# Patient Record
Sex: FEMALE | Race: WHITE | Employment: OTHER | URBAN - METROPOLITAN AREA
[De-identification: names, ages, dates, MRNs, and addresses within clinical notes are randomized per-mention and may not be internally consistent; named-entity substitution may affect disease eponyms.]

---

## 2018-08-01 ENCOUNTER — HOSPITAL ENCOUNTER (OUTPATIENT)
Dept: PREADMISSION TESTING | Age: 38
Discharge: HOME | End: 2018-08-05
Payer: COMMERCIAL

## 2018-08-01 VITALS
DIASTOLIC BLOOD PRESSURE: 81 MMHG | SYSTOLIC BLOOD PRESSURE: 133 MMHG | BODY MASS INDEX: 25.34 KG/M2 | HEIGHT: 68 IN | OXYGEN SATURATION: 96 % | TEMPERATURE: 98.2 F | WEIGHT: 167.2 LBS | HEART RATE: 90 BPM | RESPIRATION RATE: 16 BRPM

## 2018-08-01 DIAGNOSIS — M51.36 DEGENERATIVE DISC DISEASE, LUMBAR: ICD-10-CM

## 2018-08-01 LAB
ANION GAP SERPL CALCULATED.3IONS-SCNC: 10 MEQ/L (ref 7–13)
BUN BLDV-MCNC: 14 MG/DL (ref 6–20)
CALCIUM SERPL-MCNC: 8.6 MG/DL (ref 8.6–10.2)
CHLORIDE BLD-SCNC: 108 MEQ/L (ref 98–107)
CO2: 24 MEQ/L (ref 22–29)
CREAT SERPL-MCNC: 0.68 MG/DL (ref 0.5–0.9)
EKG ATRIAL RATE: 73 BPM
EKG P AXIS: 52 DEGREES
EKG P-R INTERVAL: 136 MS
EKG Q-T INTERVAL: 364 MS
EKG QRS DURATION: 80 MS
EKG QTC CALCULATION (BAZETT): 401 MS
EKG R AXIS: 39 DEGREES
EKG T AXIS: 15 DEGREES
EKG VENTRICULAR RATE: 73 BPM
GFR AFRICAN AMERICAN: >60
GFR NON-AFRICAN AMERICAN: >60
GLUCOSE BLD-MCNC: 83 MG/DL (ref 74–109)
HCT VFR BLD CALC: 36.7 % (ref 37–47)
HEMOGLOBIN: 12.6 G/DL (ref 12–16)
MCH RBC QN AUTO: 30.8 PG (ref 27–31.3)
MCHC RBC AUTO-ENTMCNC: 34.3 % (ref 33–37)
MCV RBC AUTO: 90 FL (ref 82–100)
PDW BLD-RTO: 15.6 % (ref 11.5–14.5)
PLATELET # BLD: 285 K/UL (ref 130–400)
POTASSIUM SERPL-SCNC: 4.1 MEQ/L (ref 3.5–5.1)
RBC # BLD: 4.08 M/UL (ref 4.2–5.4)
SODIUM BLD-SCNC: 142 MEQ/L (ref 132–144)
WBC # BLD: 10.1 K/UL (ref 4.8–10.8)

## 2018-08-01 PROCEDURE — 80048 BASIC METABOLIC PNL TOTAL CA: CPT

## 2018-08-01 PROCEDURE — 85027 COMPLETE CBC AUTOMATED: CPT

## 2018-08-01 PROCEDURE — 93005 ELECTROCARDIOGRAM TRACING: CPT

## 2018-08-01 RX ORDER — ATORVASTATIN CALCIUM 10 MG/1
10 TABLET, FILM COATED ORAL DAILY
COMMUNITY

## 2018-08-01 RX ORDER — LORATADINE 10 MG/1
10 CAPSULE, LIQUID FILLED ORAL DAILY
COMMUNITY
End: 2020-02-21 | Stop reason: SDUPTHER

## 2018-08-01 RX ORDER — PREGABALIN 150 MG/1
150 CAPSULE ORAL 2 TIMES DAILY
COMMUNITY

## 2018-08-01 RX ORDER — TIZANIDINE 2 MG/1
2 TABLET ORAL EVERY 8 HOURS PRN
COMMUNITY

## 2018-08-01 RX ORDER — CELECOXIB 100 MG/1
100 CAPSULE ORAL 2 TIMES DAILY
Status: ON HOLD | COMMUNITY
End: 2020-09-18

## 2018-08-01 RX ORDER — SODIUM CHLORIDE 0.9 % (FLUSH) 0.9 %
10 SYRINGE (ML) INJECTION EVERY 12 HOURS SCHEDULED
Status: CANCELLED | OUTPATIENT
Start: 2018-08-01

## 2018-08-01 RX ORDER — BUPROPION HYDROCHLORIDE 150 MG/1
150 TABLET ORAL EVERY MORNING
Status: ON HOLD | COMMUNITY
End: 2020-02-24

## 2018-08-01 RX ORDER — LIDOCAINE HYDROCHLORIDE 10 MG/ML
1 INJECTION, SOLUTION EPIDURAL; INFILTRATION; INTRACAUDAL; PERINEURAL
Status: CANCELLED | OUTPATIENT
Start: 2018-08-01 | End: 2018-08-01

## 2018-08-01 RX ORDER — DULOXETIN HYDROCHLORIDE 30 MG/1
30 CAPSULE, DELAYED RELEASE ORAL DAILY
COMMUNITY

## 2018-08-01 RX ORDER — MAGNESIUM OXIDE 400 MG/1
400 TABLET ORAL DAILY
COMMUNITY

## 2018-08-01 RX ORDER — SODIUM CHLORIDE, SODIUM LACTATE, POTASSIUM CHLORIDE, CALCIUM CHLORIDE 600; 310; 30; 20 MG/100ML; MG/100ML; MG/100ML; MG/100ML
INJECTION, SOLUTION INTRAVENOUS CONTINUOUS
Status: CANCELLED | OUTPATIENT
Start: 2018-08-01

## 2018-08-01 RX ORDER — ATENOLOL 25 MG/1
25 TABLET ORAL DAILY
COMMUNITY
End: 2020-02-21 | Stop reason: SDUPTHER

## 2018-08-01 RX ORDER — HYDROCODONE BITARTRATE AND ACETAMINOPHEN 7.5; 325 MG/1; MG/1
1 TABLET ORAL EVERY 8 HOURS PRN
COMMUNITY

## 2018-08-01 RX ORDER — CYANOCOBALAMIN 1000 UG/ML
1000 INJECTION INTRAMUSCULAR; SUBCUTANEOUS
COMMUNITY

## 2018-08-01 RX ORDER — SODIUM CHLORIDE 0.9 % (FLUSH) 0.9 %
10 SYRINGE (ML) INJECTION PRN
Status: CANCELLED | OUTPATIENT
Start: 2018-08-01

## 2018-08-01 RX ORDER — DIPHENHYDRAMINE HYDROCHLORIDE 25 MG/1
1 TABLET ORAL ONCE
Status: ON HOLD | COMMUNITY
End: 2020-02-24

## 2018-08-01 RX ORDER — OMEPRAZOLE 20 MG/1
20 CAPSULE, DELAYED RELEASE ORAL 2 TIMES DAILY
Status: ON HOLD | COMMUNITY
End: 2020-02-24

## 2018-08-01 ASSESSMENT — ENCOUNTER SYMPTOMS
HEARTBURN: 0
SORE THROAT: 0
COUGH: 0
BACK PAIN: 1
WHEEZING: 0
BLURRED VISION: 0
ABDOMINAL PAIN: 0
DOUBLE VISION: 0
NAUSEA: 0
VOMITING: 0
SINUS PAIN: 0
SPUTUM PRODUCTION: 0
EYE DISCHARGE: 0
CONSTIPATION: 0
SHORTNESS OF BREATH: 0
DIARRHEA: 0
PHOTOPHOBIA: 0

## 2018-08-01 NOTE — H&P
Nurse Practitioner History and Physical      CHIEF COMPLAINT:  Chronic low back pain    HISTORY OF PRESENT ILLNESS:      The patient is a 34 y.o. female with significant past medical history of low back pain, takes narcotics without long-term relief. Presents today for spinal cord stimulator trial.     Past Medical History:        Diagnosis Date    Arthritis     lumbar spine, on meds    COPD (chronic obstructive pulmonary disease) (HCC)     no meds    Diabetes mellitus ([address] 2005    diet controlled    Hyperlipidemia     on meds > 10yrs    Hypertension     on meds >10 yrs    Thyroid disease 2018    left thyroid nodule     Past Surgical History:    Past Surgical History:   Procedure Laterality Date    BREAST SURGERY Right 2009    lumpectomy    HYSTERECTOMY  2008   [address]         Medications Prior to Admission:    Current Outpatient Prescriptions   Medication Sig Dispense Refill    tiZANidine (ZANAFLEX) 2 MG tablet Take 2 mg by mouth every 8 hours as needed      HYDROcodone-acetaminophen (NORCO) 7.5-325 MG per tablet Take 1 tablet by mouth every 8 hours as needed for Pain. [place] celecoxib (CELEBREX) 100 MG capsule Take 100 mg by mouth 2 times daily      pregabalin (LYRICA) 150 MG capsule Take 150 mg by mouth 2 times daily. [place] omeprazole (PRILOSEC) 20 MG delayed release capsule Take 20 mg by mouth 2 times daily      loratadine (CLARITIN) 10 MG capsule Take 10 mg by mouth daily      buPROPion (WELLBUTRIN XL) 150 MG extended release tablet Take 150 mg by mouth every morning      atenolol (TENORMIN) 25 MG tablet Take 25 mg by mouth daily      aspirin 81 MG tablet Take 81 mg by mouth daily      cyanocobalamin 1000 MCG/ML injection Inject 1,000 mcg into the muscle every 30 days      DULoxetine (CYMBALTA) 30 MG extended release capsule Take 30 mg by mouth daily      atorvastatin (LIPITOR) 10 MG tablet Take 10 mg by mouth daily      Biotin 5 MG CAPS Take 1 capsule by

## 2018-08-07 ENCOUNTER — ANESTHESIA (OUTPATIENT)
Dept: OPERATING ROOM | Age: 38
End: 2018-08-07
Payer: COMMERCIAL

## 2018-08-07 ENCOUNTER — HOSPITAL ENCOUNTER (OUTPATIENT)
Age: 38
Setting detail: OUTPATIENT SURGERY
Discharge: HOME | End: 2018-08-07
Attending: SPECIALIST | Admitting: SPECIALIST
Payer: COMMERCIAL

## 2018-08-07 ENCOUNTER — HOSPITAL ENCOUNTER (OUTPATIENT)
Dept: GENERAL RADIOLOGY | Age: 38
Setting detail: OUTPATIENT SURGERY
Discharge: HOME | End: 2018-08-09
Attending: SPECIALIST
Payer: COMMERCIAL

## 2018-08-07 ENCOUNTER — ANESTHESIA EVENT (OUTPATIENT)
Dept: OPERATING ROOM | Age: 38
End: 2018-08-07
Payer: COMMERCIAL

## 2018-08-07 VITALS
HEIGHT: 68 IN | RESPIRATION RATE: 16 BRPM | SYSTOLIC BLOOD PRESSURE: 104 MMHG | BODY MASS INDEX: 25.34 KG/M2 | TEMPERATURE: 97.3 F | DIASTOLIC BLOOD PRESSURE: 62 MMHG | OXYGEN SATURATION: 98 % | HEART RATE: 74 BPM | WEIGHT: 167.19 LBS

## 2018-08-07 VITALS
RESPIRATION RATE: 21 BRPM | TEMPERATURE: 75.4 F | DIASTOLIC BLOOD PRESSURE: 50 MMHG | SYSTOLIC BLOOD PRESSURE: 92 MMHG | OXYGEN SATURATION: 99 %

## 2018-08-07 DIAGNOSIS — R52 PAIN: ICD-10-CM

## 2018-08-07 PROCEDURE — 2500000003 HC RX 250 WO HCPCS: Performed by: SPECIALIST

## 2018-08-07 PROCEDURE — C1778 LEAD, NEUROSTIMULATOR: HCPCS | Performed by: SPECIALIST

## 2018-08-07 PROCEDURE — 7100000010 HC PHASE II RECOVERY - FIRST 15 MIN: Performed by: SPECIALIST

## 2018-08-07 PROCEDURE — 2580000003 HC RX 258: Performed by: NURSE PRACTITIONER

## 2018-08-07 PROCEDURE — 2580000003 HC RX 258

## 2018-08-07 PROCEDURE — 3600000012 HC SURGERY LEVEL 2 ADDTL 15MIN: Performed by: SPECIALIST

## 2018-08-07 PROCEDURE — 6360000002 HC RX W HCPCS

## 2018-08-07 PROCEDURE — 2709999900 HC NON-CHARGEABLE SUPPLY: Performed by: SPECIALIST

## 2018-08-07 PROCEDURE — 2500000003 HC RX 250 WO HCPCS: Performed by: NURSE ANESTHETIST, CERTIFIED REGISTERED

## 2018-08-07 PROCEDURE — 7100000011 HC PHASE II RECOVERY - ADDTL 15 MIN: Performed by: SPECIALIST

## 2018-08-07 PROCEDURE — 3600000002 HC SURGERY LEVEL 2 BASE: Performed by: SPECIALIST

## 2018-08-07 PROCEDURE — 2500000003 HC RX 250 WO HCPCS: Performed by: NURSE PRACTITIONER

## 2018-08-07 PROCEDURE — 3700000001 HC ADD 15 MINUTES (ANESTHESIA): Performed by: SPECIALIST

## 2018-08-07 PROCEDURE — 2580000003 HC RX 258: Performed by: SPECIALIST

## 2018-08-07 PROCEDURE — 6360000002 HC RX W HCPCS: Performed by: NURSE ANESTHETIST, CERTIFIED REGISTERED

## 2018-08-07 PROCEDURE — A4648 IMPLANTABLE TISSUE MARKER: HCPCS | Performed by: SPECIALIST

## 2018-08-07 PROCEDURE — 7100000000 HC PACU RECOVERY - FIRST 15 MIN: Performed by: SPECIALIST

## 2018-08-07 PROCEDURE — 3209999900 FLUORO FOR SURGICAL PROCEDURES

## 2018-08-07 PROCEDURE — 3700000000 HC ANESTHESIA ATTENDED CARE: Performed by: SPECIALIST

## 2018-08-07 DEVICE — IMPLANTABLE DEVICE: Type: IMPLANTABLE DEVICE | Site: THORACIC | Status: FUNCTIONAL

## 2018-08-07 RX ORDER — SODIUM CHLORIDE 0.9 % (FLUSH) 0.9 %
10 SYRINGE (ML) INJECTION EVERY 12 HOURS SCHEDULED
Status: DISCONTINUED | OUTPATIENT
Start: 2018-08-07 | End: 2018-08-07 | Stop reason: HOSPADM

## 2018-08-07 RX ORDER — ONDANSETRON 2 MG/ML
4 INJECTION INTRAMUSCULAR; INTRAVENOUS
Status: DISCONTINUED | OUTPATIENT
Start: 2018-08-07 | End: 2018-08-07 | Stop reason: HOSPADM

## 2018-08-07 RX ORDER — MEPERIDINE HYDROCHLORIDE 25 MG/ML
12.5 INJECTION INTRAMUSCULAR; INTRAVENOUS; SUBCUTANEOUS EVERY 5 MIN PRN
Status: DISCONTINUED | OUTPATIENT
Start: 2018-08-07 | End: 2018-08-07 | Stop reason: HOSPADM

## 2018-08-07 RX ORDER — LIDOCAINE HYDROCHLORIDE 10 MG/ML
1 INJECTION, SOLUTION EPIDURAL; INFILTRATION; INTRACAUDAL; PERINEURAL
Status: COMPLETED | OUTPATIENT
Start: 2018-08-07 | End: 2018-08-07

## 2018-08-07 RX ORDER — FENTANYL CITRATE 50 UG/ML
50 INJECTION, SOLUTION INTRAMUSCULAR; INTRAVENOUS EVERY 10 MIN PRN
Status: DISCONTINUED | OUTPATIENT
Start: 2018-08-07 | End: 2018-08-07 | Stop reason: HOSPADM

## 2018-08-07 RX ORDER — SODIUM CHLORIDE 0.9 % (FLUSH) 0.9 %
10 SYRINGE (ML) INJECTION PRN
Status: DISCONTINUED | OUTPATIENT
Start: 2018-08-07 | End: 2018-08-07 | Stop reason: HOSPADM

## 2018-08-07 RX ORDER — DIPHENHYDRAMINE HYDROCHLORIDE 50 MG/ML
12.5 INJECTION INTRAMUSCULAR; INTRAVENOUS
Status: DISCONTINUED | OUTPATIENT
Start: 2018-08-07 | End: 2018-08-07 | Stop reason: HOSPADM

## 2018-08-07 RX ORDER — LIDOCAINE HYDROCHLORIDE 10 MG/ML
INJECTION, SOLUTION EPIDURAL; INFILTRATION; INTRACAUDAL; PERINEURAL
Status: DISCONTINUED
Start: 2018-08-07 | End: 2018-08-07 | Stop reason: HOSPADM

## 2018-08-07 RX ORDER — MAGNESIUM HYDROXIDE 1200 MG/15ML
LIQUID ORAL CONTINUOUS PRN
Status: DISCONTINUED | OUTPATIENT
Start: 2018-08-07 | End: 2018-08-07 | Stop reason: HOSPADM

## 2018-08-07 RX ORDER — PROPOFOL 10 MG/ML
INJECTION, EMULSION INTRAVENOUS CONTINUOUS PRN
Status: DISCONTINUED | OUTPATIENT
Start: 2018-08-07 | End: 2018-08-07 | Stop reason: SDUPTHER

## 2018-08-07 RX ORDER — SODIUM CHLORIDE, SODIUM LACTATE, POTASSIUM CHLORIDE, CALCIUM CHLORIDE 600; 310; 30; 20 MG/100ML; MG/100ML; MG/100ML; MG/100ML
INJECTION, SOLUTION INTRAVENOUS CONTINUOUS
Status: DISCONTINUED | OUTPATIENT
Start: 2018-08-07 | End: 2018-08-07 | Stop reason: HOSPADM

## 2018-08-07 RX ORDER — LIDOCAINE HYDROCHLORIDE AND EPINEPHRINE 20; 5 MG/ML; UG/ML
INJECTION, SOLUTION EPIDURAL; INFILTRATION; INTRACAUDAL; PERINEURAL PRN
Status: DISCONTINUED | OUTPATIENT
Start: 2018-08-07 | End: 2018-08-07 | Stop reason: HOSPADM

## 2018-08-07 RX ORDER — METOCLOPRAMIDE HYDROCHLORIDE 5 MG/ML
10 INJECTION INTRAMUSCULAR; INTRAVENOUS
Status: DISCONTINUED | OUTPATIENT
Start: 2018-08-07 | End: 2018-08-07 | Stop reason: HOSPADM

## 2018-08-07 RX ORDER — HYDROCODONE BITARTRATE AND ACETAMINOPHEN 5; 325 MG/1; MG/1
2 TABLET ORAL PRN
Status: DISCONTINUED | OUTPATIENT
Start: 2018-08-07 | End: 2018-08-07 | Stop reason: HOSPADM

## 2018-08-07 RX ORDER — HYDROCODONE BITARTRATE AND ACETAMINOPHEN 5; 325 MG/1; MG/1
1 TABLET ORAL PRN
Status: DISCONTINUED | OUTPATIENT
Start: 2018-08-07 | End: 2018-08-07 | Stop reason: HOSPADM

## 2018-08-07 RX ORDER — CEFAZOLIN SODIUM 1 G/50ML
INJECTION, SOLUTION INTRAVENOUS PRN
Status: DISCONTINUED | OUTPATIENT
Start: 2018-08-07 | End: 2018-08-07 | Stop reason: SDUPTHER

## 2018-08-07 RX ORDER — MIDAZOLAM HYDROCHLORIDE 1 MG/ML
INJECTION INTRAMUSCULAR; INTRAVENOUS PRN
Status: DISCONTINUED | OUTPATIENT
Start: 2018-08-07 | End: 2018-08-07 | Stop reason: SDUPTHER

## 2018-08-07 RX ORDER — LIDOCAINE HYDROCHLORIDE 20 MG/ML
INJECTION, SOLUTION INFILTRATION; PERINEURAL PRN
Status: DISCONTINUED | OUTPATIENT
Start: 2018-08-07 | End: 2018-08-07 | Stop reason: SDUPTHER

## 2018-08-07 RX ADMIN — PROPOFOL 50 MCG/KG/MIN: 10 INJECTION, EMULSION INTRAVENOUS at 13:19

## 2018-08-07 RX ADMIN — LIDOCAINE HYDROCHLORIDE 50 MG: 20 INJECTION, SOLUTION INFILTRATION; PERINEURAL at 13:18

## 2018-08-07 RX ADMIN — CEFAZOLIN SODIUM 2 G: 1 INJECTION, SOLUTION INTRAVENOUS at 13:12

## 2018-08-07 RX ADMIN — LIDOCAINE HYDROCHLORIDE 1 ML: 10 INJECTION, SOLUTION EPIDURAL; INFILTRATION; INTRACAUDAL; PERINEURAL at 09:15

## 2018-08-07 RX ADMIN — SODIUM CHLORIDE, POTASSIUM CHLORIDE, SODIUM LACTATE AND CALCIUM CHLORIDE 1000 ML: 600; 310; 30; 20 INJECTION, SOLUTION INTRAVENOUS at 09:15

## 2018-08-07 RX ADMIN — MIDAZOLAM HYDROCHLORIDE 2 MG: 1 INJECTION, SOLUTION INTRAMUSCULAR; INTRAVENOUS at 13:16

## 2018-08-07 ASSESSMENT — PULMONARY FUNCTION TESTS
PIF_VALUE: 10
PIF_VALUE: 15
PIF_VALUE: 9
PIF_VALUE: 15
PIF_VALUE: 15
PIF_VALUE: 10
PIF_VALUE: 15
PIF_VALUE: 0
PIF_VALUE: 15
PIF_VALUE: 10
PIF_VALUE: 15
PIF_VALUE: 0
PIF_VALUE: 10
PIF_VALUE: 15
PIF_VALUE: 15
PIF_VALUE: 10
PIF_VALUE: 15
PIF_VALUE: 10
PIF_VALUE: 15
PIF_VALUE: 15
PIF_VALUE: 10
PIF_VALUE: 9
PIF_VALUE: 0
PIF_VALUE: 10
PIF_VALUE: 15
PIF_VALUE: 1
PIF_VALUE: 15

## 2018-08-07 ASSESSMENT — PAIN DESCRIPTION - DESCRIPTORS: DESCRIPTORS: SPASM

## 2018-08-07 ASSESSMENT — PAIN - FUNCTIONAL ASSESSMENT: PAIN_FUNCTIONAL_ASSESSMENT: 0-10

## 2018-08-07 ASSESSMENT — LIFESTYLE VARIABLES: SMOKING_STATUS: 1

## 2018-08-07 NOTE — ANESTHESIA PRE PROCEDURE
Department of Anesthesiology  Preprocedure Note       Name:  Kassandra Domínguez   Age:  34 y.o.  :  1980                                          MRN:  [id number]         Date:  2018      Surgeon: Ahmet Mccullough):  Jennifer Soria MD    Procedure: Procedure(s):  SPINAL CORD STIMULATOR TRIAL [facility] REP IS Zoë Weiss [phone])    Medications prior to admission:   Prior to Admission medications    Medication Sig Start Date End Date Taking? Authorizing Provider   Tiotropium Bromide-Olodaterol (STIOLTO RESPIMAT) 2.5-2.5 MCG/ACT AERS Inhale 1 puff into the lungs daily   Yes Historical Provider, MD   HYDROcodone-acetaminophen (NORCO) 7.5-325 MG per tablet Take 1 tablet by mouth every 8 hours as needed for Pain. .   Yes Historical Provider, MD   pregabalin (LYRICA) 150 MG capsule Take 150 mg by mouth 2 times daily. .   Yes Historical Provider, MD   omeprazole (PRILOSEC) 20 MG delayed release capsule Take 20 mg by mouth 2 times daily   Yes Historical Provider, MD   loratadine (CLARITIN) 10 MG capsule Take 10 mg by mouth daily   Yes Historical Provider, MD   buPROPion (WELLBUTRIN XL) 150 MG extended release tablet Take 150 mg by mouth every morning   Yes Historical Provider, MD   atenolol (TENORMIN) 25 MG tablet Take 25 mg by mouth daily   Yes Historical Provider, MD   cyanocobalamin 1000 MCG/ML injection Inject 1,000 mcg into the muscle every 30 days   Yes Historical Provider, MD   DULoxetine (CYMBALTA) 30 MG extended release capsule Take 30 mg by mouth daily   Yes Historical Provider, MD   atorvastatin (LIPITOR) 10 MG tablet Take 10 mg by mouth daily   Yes Historical Provider, MD   tiZANidine (ZANAFLEX) 2 MG tablet Take 2 mg by mouth every 8 hours as needed    Historical Provider, MD   celecoxib (CELEBREX) 100 MG capsule Take 100 mg by mouth 2 times daily    Historical Provider, MD   aspirin 81 MG tablet Take 81 mg by mouth daily    Historical Provider, MD   Biotin 5 MG CAPS Take 1 capsule by mouth once TempSrc: Temporal   SpO2: 98%   Weight: 167 lb 3 oz (75.8 kg)   Height: 5' 7.75\" (1.721 m)                                              BP Readings from Last 3 Encounters:   08/07/18 114/71   08/01/18 133/81       NPO Status: Time of last liquid consumption: 2100                        Time of last solid consumption: 2100                        Date of last liquid consumption: 08/06/18                        Date of last solid food consumption: 08/06/18    BMI:   Wt Readings from Last 3 Encounters:   08/07/18 167 lb 3 oz (75.8 kg)   08/01/18 167 lb 3.2 oz (75.8 kg)     Body mass index is 25.61 kg/m². CBC:   Lab Results   Component Value Date    WBC 10.1 08/01/2018    RBC 4.08 08/01/2018    HGB 12.6 08/01/2018    HCT 36.7 08/01/2018    MCV 90.0 08/01/2018    RDW 15.6 08/01/2018     08/01/2018       CMP:   Lab Results   Component Value Date     08/01/2018    K 4.1 08/01/2018     08/01/2018    CO2 24 08/01/2018    BUN 14 08/01/2018    CREATININE 0.68 08/01/2018    GFRAA >60.0 08/01/2018    LABGLOM >60.0 08/01/2018    GLUCOSE 83 08/01/2018    CALCIUM 8.6 08/01/2018       POC Tests: No results for input(s): POCGLU, POCNA, POCK, POCCL, POCBUN, POCHEMO, POCHCT in the last 72 hours.     Coags: No results found for: PROTIME, INR, APTT    HCG (If Applicable): No results found for: PREGTESTUR, PREGSERUM, HCG, HCGQUANT     ABGs: No results found for: PHART, PO2ART, VDV7FNV, VRA4DZM, BEART, I2RZQLNL     Type & Screen (If Applicable):  No results found for: LABABO, [address]    Anesthesia Evaluation  Patient summary reviewed and Nursing notes reviewed no history of anesthetic complications:   Airway: Mallampati: II  TM distance: >3 FB   Neck ROM: full  Mouth opening: > = 3 FB Dental:    (+) upper dentures and lower dentures      Pulmonary:   (+) COPD:  current smoker                           Cardiovascular:    (+) hypertension:, hyperlipidemia      ECG reviewed               Beta Blocker:  Dose within 24 Hrs         Neuro/Psych:               GI/Hepatic/Renal: Neg GI/Hepatic/Renal ROS            Endo/Other:    (+) DiabetesType II DM, , .                 Abdominal:           Vascular: negative vascular ROS. Anesthesia Plan      MAC     ASA 2       Induction: intravenous. Anesthetic plan and risks discussed with patient and spouse. Plan discussed with CRNA.     Attending anesthesiologist reviewed and agrees with Neeru Hennessy MD   8/7/2018

## 2018-08-07 NOTE — BRIEF OP NOTE
Brief Postoperative Note  ______________________________________________________________    Patient: Amanda Aragon  YOB: 1980  MRN: [id number]  Date of Procedure: 8/7/2018    Pre-Op Diagnosis: BACK PAIN    Post-Op Diagnosis: Same       Procedure(s):  SPINAL CORD STIMULATOR TRIAL    Anesthesia: Monitor Anesthesia Care    Surgeon(s):  Ritu Wilcox MD    Staff:  First Assistant: Novella Hashimoto Beatty-Whitfield, RN  Scrub Person First: Andrew Goins     Estimated Blood Loss: * No values recorded between 8/7/2018  1:10 PM and 8/7/2018  1:57 PM * None    Complications: None    Specimens:   * No specimens in log *    Implants:    Implant Name Type Inv.  Item Serial No.  Lot No. LRB No. Used   KIT LEAD TRIAL 50 CM CONTACT 8 Spine KIT LEAD TRIAL 50 CM CONTACT 8  [place] [employer]: INTERVENTIONAL CARDIO [id number] N/A 1   KIT LEAD TRIAL 50 CM CONTACT 8 Spine KIT LEAD TRIAL 50 CM CONTACT 8   [place] [employer]: INTERVENTIONAL CARDIO [id number] N/A 1         Drains:      Findings: 2 Spinal Cord Stimulator leads were placed in the Thoracic Epidural space    Ritu Wilcox MD  Date: 8/7/2018  Time: 2:13 PM

## 2018-08-09 NOTE — OP NOTE
[address]                       [address], [address] [facility]                                 OPERATIVE REPORT    PATIENT NAME: Apolonia Noonan                 :        1980  MED REC NO:   [id number]                            ROOM:  ACCOUNT NO:   [de-identified]                           ADMIT DATE: 2018  PROVIDER:     Kirti Murphy MD    DATE OF PROCEDURE:  2018    PREOPERATIVE DIAGNOSES:  Chronic low back pain, lumbar radiculopathy, and  lower extremity pain. POSTOPERATIVE DIAGNOSES:  Chronic low back pain, lumbar radiculopathy, and  lower extremity pain. OPERATION PERFORMED:  Placement of 2 spinal cord stimulator octad lead  trials for spinal cord stimulator trial.    SURGEON:  Kirti Murphy MD    ANESTHESIA:  MAC with local anesthesia. COMPLICATIONS:  None. BLOOD LOSS:  Minimal.    OPERATIVE PROCEDURE:  The patient was brought to the operating room after  receiving 1 gm of Ancef intravenously. The patient was placed in the prone  position and after applying all monitors, the mid and the lower back area  was prepped and draped aseptically. Using the C-arm image intensifier, the  intralaminar space at L3-4 was identified and a skin zainab was made 1/2 inch  lateral and 1 inch caudad toward the left side. The skin marked area was  infiltrated with lidocaine 2% with epinephrine. Then, 14-gauge Tuohy  needle 4 inch long was inserted through the skin infiltrated area, directed  towards the epidural space at L3-4 through the midline approach. Epidural  space was identified using glass syringe for loss of resistance technique  with air. Negative aspiration for blood and CSF through the Tuohy needle  and a spinal cord stimulator trial lead octad was inserted and placed  toward the tip of T8 a few millimeters from the midline toward the left  side.   The same procedure was repeated on the right side and another spinal  cord stimulator trial

## 2018-10-09 ENCOUNTER — HOSPITAL ENCOUNTER (OUTPATIENT)
Dept: PREADMISSION TESTING | Age: 38
Discharge: HOME | End: 2018-10-13
Payer: COMMERCIAL

## 2018-10-09 VITALS
RESPIRATION RATE: 16 BRPM | TEMPERATURE: 98.3 F | HEART RATE: 72 BPM | HEIGHT: 67 IN | SYSTOLIC BLOOD PRESSURE: 121 MMHG | WEIGHT: 162 LBS | BODY MASS INDEX: 25.43 KG/M2 | DIASTOLIC BLOOD PRESSURE: 84 MMHG | OXYGEN SATURATION: 98 %

## 2018-10-09 LAB
ANION GAP SERPL CALCULATED.3IONS-SCNC: 12 MEQ/L (ref 7–13)
BUN BLDV-MCNC: 14 MG/DL (ref 6–20)
CALCIUM SERPL-MCNC: 9.2 MG/DL (ref 8.6–10.2)
CHLORIDE BLD-SCNC: 103 MEQ/L (ref 98–107)
CO2: 26 MEQ/L (ref 22–29)
CREAT SERPL-MCNC: 0.82 MG/DL (ref 0.5–0.9)
GFR AFRICAN AMERICAN: >60
GFR NON-AFRICAN AMERICAN: >60
GLUCOSE BLD-MCNC: 84 MG/DL (ref 74–109)
HCT VFR BLD CALC: 40.3 % (ref 37–47)
HEMOGLOBIN: 13.6 G/DL (ref 12–16)
MCH RBC QN AUTO: 30.3 PG (ref 27–31.3)
MCHC RBC AUTO-ENTMCNC: 33.8 % (ref 33–37)
MCV RBC AUTO: 89.8 FL (ref 82–100)
PDW BLD-RTO: 14 % (ref 11.5–14.5)
PLATELET # BLD: 267 K/UL (ref 130–400)
POTASSIUM SERPL-SCNC: 4.2 MEQ/L (ref 3.5–5.1)
RBC # BLD: 4.48 M/UL (ref 4.2–5.4)
SODIUM BLD-SCNC: 141 MEQ/L (ref 132–144)
WBC # BLD: 7.8 K/UL (ref 4.8–10.8)

## 2018-10-09 PROCEDURE — 85027 COMPLETE CBC AUTOMATED: CPT

## 2018-10-09 PROCEDURE — 80048 BASIC METABOLIC PNL TOTAL CA: CPT

## 2018-10-09 RX ORDER — LIDOCAINE HYDROCHLORIDE 10 MG/ML
1 INJECTION, SOLUTION EPIDURAL; INFILTRATION; INTRACAUDAL; PERINEURAL
Status: CANCELLED | OUTPATIENT
Start: 2018-10-09 | End: 2018-10-09

## 2018-10-09 RX ORDER — SODIUM CHLORIDE, SODIUM LACTATE, POTASSIUM CHLORIDE, CALCIUM CHLORIDE 600; 310; 30; 20 MG/100ML; MG/100ML; MG/100ML; MG/100ML
INJECTION, SOLUTION INTRAVENOUS CONTINUOUS
Status: CANCELLED | OUTPATIENT
Start: 2018-10-09

## 2018-10-09 RX ORDER — SODIUM CHLORIDE 0.9 % (FLUSH) 0.9 %
10 SYRINGE (ML) INJECTION EVERY 12 HOURS SCHEDULED
Status: CANCELLED | OUTPATIENT
Start: 2018-10-09

## 2018-10-09 RX ORDER — SODIUM CHLORIDE 0.9 % (FLUSH) 0.9 %
10 SYRINGE (ML) INJECTION PRN
Status: CANCELLED | OUTPATIENT
Start: 2018-10-09

## 2018-10-09 ASSESSMENT — ENCOUNTER SYMPTOMS
CONSTIPATION: 0
BACK PAIN: 1
HEARTBURN: 0
SORE THROAT: 0
EYES NEGATIVE: 1
SHORTNESS OF BREATH: 1
COUGH: 0
NAUSEA: 0
WHEEZING: 0
STRIDOR: 0
DIARRHEA: 0

## 2018-10-16 ENCOUNTER — ANESTHESIA (OUTPATIENT)
Dept: OPERATING ROOM | Age: 38
End: 2018-10-16
Payer: COMMERCIAL

## 2018-10-16 ENCOUNTER — APPOINTMENT (OUTPATIENT)
Dept: GENERAL RADIOLOGY | Age: 38
End: 2018-10-16
Attending: SPECIALIST
Payer: COMMERCIAL

## 2018-10-16 ENCOUNTER — HOSPITAL ENCOUNTER (OUTPATIENT)
Age: 38
Setting detail: OUTPATIENT SURGERY
Discharge: HOME | End: 2018-10-16
Attending: SPECIALIST | Admitting: SPECIALIST
Payer: COMMERCIAL

## 2018-10-16 ENCOUNTER — ANESTHESIA EVENT (OUTPATIENT)
Dept: OPERATING ROOM | Age: 38
End: 2018-10-16
Payer: COMMERCIAL

## 2018-10-16 VITALS
WEIGHT: 162 LBS | HEIGHT: 67 IN | RESPIRATION RATE: 16 BRPM | SYSTOLIC BLOOD PRESSURE: 105 MMHG | HEART RATE: 69 BPM | BODY MASS INDEX: 25.43 KG/M2 | DIASTOLIC BLOOD PRESSURE: 71 MMHG | OXYGEN SATURATION: 100 % | TEMPERATURE: 97.9 F

## 2018-10-16 VITALS — OXYGEN SATURATION: 96 % | DIASTOLIC BLOOD PRESSURE: 71 MMHG | TEMPERATURE: 97.7 F | SYSTOLIC BLOOD PRESSURE: 109 MMHG

## 2018-10-16 LAB
GLUCOSE BLD-MCNC: 102 MG/DL (ref 60–115)
GLUCOSE BLD-MCNC: 103 MG/DL (ref 60–115)
PERFORMED ON: NORMAL
PERFORMED ON: NORMAL

## 2018-10-16 PROCEDURE — 7100000000 HC PACU RECOVERY - FIRST 15 MIN: Performed by: SPECIALIST

## 2018-10-16 PROCEDURE — C1713 ANCHOR/SCREW BN/BN,TIS/BN: HCPCS | Performed by: SPECIALIST

## 2018-10-16 PROCEDURE — 7100000001 HC PACU RECOVERY - ADDTL 15 MIN: Performed by: SPECIALIST

## 2018-10-16 PROCEDURE — 6360000002 HC RX W HCPCS: Performed by: NURSE PRACTITIONER

## 2018-10-16 PROCEDURE — 3700000000 HC ANESTHESIA ATTENDED CARE: Performed by: SPECIALIST

## 2018-10-16 PROCEDURE — 7100000010 HC PHASE II RECOVERY - FIRST 15 MIN: Performed by: SPECIALIST

## 2018-10-16 PROCEDURE — C1822 GEN, NEURO, HF, RECHG BAT: HCPCS

## 2018-10-16 PROCEDURE — 2580000003 HC RX 258: Performed by: NURSE ANESTHETIST, CERTIFIED REGISTERED

## 2018-10-16 PROCEDURE — 7100000011 HC PHASE II RECOVERY - ADDTL 15 MIN: Performed by: SPECIALIST

## 2018-10-16 PROCEDURE — 3600000014 HC SURGERY LEVEL 4 ADDTL 15MIN: Performed by: SPECIALIST

## 2018-10-16 PROCEDURE — 2500000003 HC RX 250 WO HCPCS: Performed by: NURSE ANESTHETIST, CERTIFIED REGISTERED

## 2018-10-16 PROCEDURE — 2709999900 HC NON-CHARGEABLE SUPPLY: Performed by: SPECIALIST

## 2018-10-16 PROCEDURE — C1778 LEAD, NEUROSTIMULATOR: HCPCS

## 2018-10-16 PROCEDURE — 2580000003 HC RX 258: Performed by: NURSE PRACTITIONER

## 2018-10-16 PROCEDURE — 2500000003 HC RX 250 WO HCPCS: Performed by: SPECIALIST

## 2018-10-16 PROCEDURE — 6360000002 HC RX W HCPCS: Performed by: NURSE ANESTHETIST, CERTIFIED REGISTERED

## 2018-10-16 PROCEDURE — 2580000003 HC RX 258: Performed by: SPECIALIST

## 2018-10-16 PROCEDURE — 3209999900 FLUORO FOR SURGICAL PROCEDURES

## 2018-10-16 PROCEDURE — 2500000003 HC RX 250 WO HCPCS: Performed by: ANESTHESIOLOGY

## 2018-10-16 PROCEDURE — 3700000001 HC ADD 15 MINUTES (ANESTHESIA): Performed by: SPECIALIST

## 2018-10-16 PROCEDURE — 3600000004 HC SURGERY LEVEL 4 BASE: Performed by: SPECIALIST

## 2018-10-16 RX ORDER — MIDAZOLAM HYDROCHLORIDE 1 MG/ML
INJECTION INTRAMUSCULAR; INTRAVENOUS PRN
Status: DISCONTINUED | OUTPATIENT
Start: 2018-10-16 | End: 2018-10-16 | Stop reason: SDUPTHER

## 2018-10-16 RX ORDER — METOCLOPRAMIDE HYDROCHLORIDE 5 MG/ML
10 INJECTION INTRAMUSCULAR; INTRAVENOUS
Status: DISCONTINUED | OUTPATIENT
Start: 2018-10-16 | End: 2018-10-16 | Stop reason: HOSPADM

## 2018-10-16 RX ORDER — LIDOCAINE HYDROCHLORIDE 20 MG/ML
INJECTION, SOLUTION INFILTRATION; PERINEURAL PRN
Status: DISCONTINUED | OUTPATIENT
Start: 2018-10-16 | End: 2018-10-16 | Stop reason: SDUPTHER

## 2018-10-16 RX ORDER — LIDOCAINE HYDROCHLORIDE AND EPINEPHRINE BITARTRATE 20; .01 MG/ML; MG/ML
INJECTION, SOLUTION SUBCUTANEOUS PRN
Status: DISCONTINUED | OUTPATIENT
Start: 2018-10-16 | End: 2018-10-16 | Stop reason: HOSPADM

## 2018-10-16 RX ORDER — MEPERIDINE HYDROCHLORIDE 25 MG/ML
12.5 INJECTION INTRAMUSCULAR; INTRAVENOUS; SUBCUTANEOUS EVERY 5 MIN PRN
Status: DISCONTINUED | OUTPATIENT
Start: 2018-10-16 | End: 2018-10-16 | Stop reason: HOSPADM

## 2018-10-16 RX ORDER — SODIUM CHLORIDE 0.9 % (FLUSH) 0.9 %
10 SYRINGE (ML) INJECTION PRN
Status: DISCONTINUED | OUTPATIENT
Start: 2018-10-16 | End: 2018-10-16 | Stop reason: HOSPADM

## 2018-10-16 RX ORDER — SODIUM CHLORIDE 0.9 % (FLUSH) 0.9 %
10 SYRINGE (ML) INJECTION EVERY 12 HOURS SCHEDULED
Status: DISCONTINUED | OUTPATIENT
Start: 2018-10-16 | End: 2018-10-16 | Stop reason: HOSPADM

## 2018-10-16 RX ORDER — HYDROCODONE BITARTRATE AND ACETAMINOPHEN 5; 325 MG/1; MG/1
2 TABLET ORAL PRN
Status: DISCONTINUED | OUTPATIENT
Start: 2018-10-16 | End: 2018-10-16 | Stop reason: HOSPADM

## 2018-10-16 RX ORDER — FENTANYL CITRATE 50 UG/ML
50 INJECTION, SOLUTION INTRAMUSCULAR; INTRAVENOUS EVERY 10 MIN PRN
Status: DISCONTINUED | OUTPATIENT
Start: 2018-10-16 | End: 2018-10-16 | Stop reason: HOSPADM

## 2018-10-16 RX ORDER — FENTANYL CITRATE 50 UG/ML
INJECTION, SOLUTION INTRAMUSCULAR; INTRAVENOUS PRN
Status: DISCONTINUED | OUTPATIENT
Start: 2018-10-16 | End: 2018-10-16 | Stop reason: SDUPTHER

## 2018-10-16 RX ORDER — ONDANSETRON 2 MG/ML
4 INJECTION INTRAMUSCULAR; INTRAVENOUS
Status: DISCONTINUED | OUTPATIENT
Start: 2018-10-16 | End: 2018-10-16 | Stop reason: HOSPADM

## 2018-10-16 RX ORDER — SODIUM CHLORIDE, SODIUM LACTATE, POTASSIUM CHLORIDE, CALCIUM CHLORIDE 600; 310; 30; 20 MG/100ML; MG/100ML; MG/100ML; MG/100ML
INJECTION, SOLUTION INTRAVENOUS CONTINUOUS PRN
Status: DISCONTINUED | OUTPATIENT
Start: 2018-10-16 | End: 2018-10-16 | Stop reason: SDUPTHER

## 2018-10-16 RX ORDER — MAGNESIUM HYDROXIDE 1200 MG/15ML
LIQUID ORAL CONTINUOUS PRN
Status: DISCONTINUED | OUTPATIENT
Start: 2018-10-16 | End: 2018-10-16 | Stop reason: HOSPADM

## 2018-10-16 RX ORDER — DIPHENHYDRAMINE HYDROCHLORIDE 50 MG/ML
12.5 INJECTION INTRAMUSCULAR; INTRAVENOUS
Status: DISCONTINUED | OUTPATIENT
Start: 2018-10-16 | End: 2018-10-16 | Stop reason: HOSPADM

## 2018-10-16 RX ORDER — KETOROLAC TROMETHAMINE 30 MG/ML
INJECTION, SOLUTION INTRAMUSCULAR; INTRAVENOUS PRN
Status: DISCONTINUED | OUTPATIENT
Start: 2018-10-16 | End: 2018-10-16 | Stop reason: SDUPTHER

## 2018-10-16 RX ORDER — PROPOFOL 10 MG/ML
INJECTION, EMULSION INTRAVENOUS PRN
Status: DISCONTINUED | OUTPATIENT
Start: 2018-10-16 | End: 2018-10-16 | Stop reason: SDUPTHER

## 2018-10-16 RX ORDER — PROPOFOL 10 MG/ML
INJECTION, EMULSION INTRAVENOUS CONTINUOUS PRN
Status: DISCONTINUED | OUTPATIENT
Start: 2018-10-16 | End: 2018-10-16 | Stop reason: SDUPTHER

## 2018-10-16 RX ORDER — SODIUM CHLORIDE, SODIUM LACTATE, POTASSIUM CHLORIDE, CALCIUM CHLORIDE 600; 310; 30; 20 MG/100ML; MG/100ML; MG/100ML; MG/100ML
INJECTION, SOLUTION INTRAVENOUS CONTINUOUS
Status: DISCONTINUED | OUTPATIENT
Start: 2018-10-16 | End: 2018-10-16 | Stop reason: HOSPADM

## 2018-10-16 RX ORDER — ONDANSETRON 2 MG/ML
INJECTION INTRAMUSCULAR; INTRAVENOUS PRN
Status: DISCONTINUED | OUTPATIENT
Start: 2018-10-16 | End: 2018-10-16 | Stop reason: SDUPTHER

## 2018-10-16 RX ORDER — DEXAMETHASONE SODIUM PHOSPHATE 10 MG/ML
INJECTION INTRAMUSCULAR; INTRAVENOUS PRN
Status: DISCONTINUED | OUTPATIENT
Start: 2018-10-16 | End: 2018-10-16 | Stop reason: SDUPTHER

## 2018-10-16 RX ORDER — LIDOCAINE HYDROCHLORIDE 10 MG/ML
1 INJECTION, SOLUTION EPIDURAL; INFILTRATION; INTRACAUDAL; PERINEURAL
Status: COMPLETED | OUTPATIENT
Start: 2018-10-16 | End: 2018-10-16

## 2018-10-16 RX ORDER — LIDOCAINE HYDROCHLORIDE 10 MG/ML
1 INJECTION, SOLUTION EPIDURAL; INFILTRATION; INTRACAUDAL; PERINEURAL
Status: DISCONTINUED | OUTPATIENT
Start: 2018-10-16 | End: 2018-10-16 | Stop reason: HOSPADM

## 2018-10-16 RX ORDER — HYDROCODONE BITARTRATE AND ACETAMINOPHEN 5; 325 MG/1; MG/1
1 TABLET ORAL PRN
Status: DISCONTINUED | OUTPATIENT
Start: 2018-10-16 | End: 2018-10-16 | Stop reason: HOSPADM

## 2018-10-16 RX ADMIN — PROPOFOL 50 MG: 10 INJECTION, EMULSION INTRAVENOUS at 08:50

## 2018-10-16 RX ADMIN — PROPOFOL 50 MG: 10 INJECTION, EMULSION INTRAVENOUS at 08:29

## 2018-10-16 RX ADMIN — LIDOCAINE HYDROCHLORIDE 0.1 ML: 10 INJECTION, SOLUTION EPIDURAL; INFILTRATION; INTRACAUDAL; PERINEURAL at 06:57

## 2018-10-16 RX ADMIN — Medication 2 G: at 07:35

## 2018-10-16 RX ADMIN — LIDOCAINE HYDROCHLORIDE 50 MG: 20 INJECTION, SOLUTION INFILTRATION; PERINEURAL at 07:37

## 2018-10-16 RX ADMIN — PROPOFOL 75 MCG/KG/MIN: 10 INJECTION, EMULSION INTRAVENOUS at 07:45

## 2018-10-16 RX ADMIN — DEXAMETHASONE SODIUM PHOSPHATE 8 MG: 10 INJECTION INTRAMUSCULAR; INTRAVENOUS at 07:45

## 2018-10-16 RX ADMIN — MIDAZOLAM HYDROCHLORIDE 0.5 MG: 1 INJECTION, SOLUTION INTRAMUSCULAR; INTRAVENOUS at 08:02

## 2018-10-16 RX ADMIN — FENTANYL CITRATE 100 MCG: 50 INJECTION, SOLUTION INTRAMUSCULAR; INTRAVENOUS at 07:35

## 2018-10-16 RX ADMIN — ONDANSETRON HYDROCHLORIDE 2 MG: 2 INJECTION, SOLUTION INTRAMUSCULAR; INTRAVENOUS at 07:45

## 2018-10-16 RX ADMIN — MIDAZOLAM HYDROCHLORIDE 2 MG: 1 INJECTION, SOLUTION INTRAMUSCULAR; INTRAVENOUS at 07:35

## 2018-10-16 RX ADMIN — PROPOFOL 100 MG: 10 INJECTION, EMULSION INTRAVENOUS at 07:37

## 2018-10-16 RX ADMIN — MIDAZOLAM HYDROCHLORIDE 1 MG: 1 INJECTION, SOLUTION INTRAMUSCULAR; INTRAVENOUS at 07:50

## 2018-10-16 RX ADMIN — PROPOFOL 50 MG: 10 INJECTION, EMULSION INTRAVENOUS at 09:06

## 2018-10-16 RX ADMIN — SODIUM CHLORIDE, POTASSIUM CHLORIDE, SODIUM LACTATE AND CALCIUM CHLORIDE: 600; 310; 30; 20 INJECTION, SOLUTION INTRAVENOUS at 06:45

## 2018-10-16 RX ADMIN — PROPOFOL 50 MG: 10 INJECTION, EMULSION INTRAVENOUS at 08:00

## 2018-10-16 RX ADMIN — PROPOFOL 50 MG: 10 INJECTION, EMULSION INTRAVENOUS at 08:15

## 2018-10-16 RX ADMIN — SODIUM CHLORIDE, POTASSIUM CHLORIDE, SODIUM LACTATE AND CALCIUM CHLORIDE: 600; 310; 30; 20 INJECTION, SOLUTION INTRAVENOUS at 08:40

## 2018-10-16 RX ADMIN — MIDAZOLAM HYDROCHLORIDE 0.5 MG: 1 INJECTION, SOLUTION INTRAMUSCULAR; INTRAVENOUS at 08:15

## 2018-10-16 RX ADMIN — SODIUM CHLORIDE, POTASSIUM CHLORIDE, SODIUM LACTATE AND CALCIUM CHLORIDE: 600; 310; 30; 20 INJECTION, SOLUTION INTRAVENOUS at 06:57

## 2018-10-16 RX ADMIN — KETOROLAC TROMETHAMINE 30 MG: 30 INJECTION, SOLUTION INTRAMUSCULAR; INTRAVENOUS at 07:45

## 2018-10-16 ASSESSMENT — PULMONARY FUNCTION TESTS
PIF_VALUE: 0
PIF_VALUE: 1
PIF_VALUE: 0

## 2018-10-16 ASSESSMENT — PAIN DESCRIPTION - DESCRIPTORS: DESCRIPTORS: ACHING

## 2018-10-16 ASSESSMENT — COPD QUESTIONNAIRES: CAT_SEVERITY: NO INTERVAL CHANGE

## 2018-10-16 ASSESSMENT — PAIN SCALES - GENERAL: PAINLEVEL_OUTOF10: 0

## 2018-10-16 ASSESSMENT — PAIN - FUNCTIONAL ASSESSMENT: PAIN_FUNCTIONAL_ASSESSMENT: 0-10

## 2018-10-16 NOTE — ANESTHESIA POSTPROCEDURE EVALUATION
Department of Anesthesiology  Postprocedure Note    Patient: Matt Winters  MRN: [id number]  YOB: 1980  Date of evaluation: 10/16/2018  Time:  9:22 AM     Procedure Summary     Date:  10/16/18 Room / Location:  [facility] OR 09 / [facility] OR    Anesthesia Start:  7514 Anesthesia Stop:      Procedure:  SPINAL CORD STIMULATOR IMPLANT (N/A Spine Lumbar) Diagnosis:  (BACK PAIN LUMBAR RADICULOPATHY)    Surgeon:  Annie Ruby MD Responsible Provider:  Maryann Wilson MD    Anesthesia Type:  MAC ASA Status:  3          Anesthesia Type: MAC    Arianna Phase I:      Arianna Phase II:      Last vitals: Reviewed and per EMR flowsheets.        Anesthesia Post Evaluation    Patient location during evaluation: PACU  Patient participation: complete - patient participated  Level of consciousness: awake and alert  Pain score: 0  Airway patency: patent  Nausea & Vomiting: no nausea  Complications: no  Cardiovascular status: hemodynamically stable  Respiratory status: acceptable  Hydration status: stable

## 2020-02-18 ENCOUNTER — OFFICE VISIT (OUTPATIENT)
Dept: FAMILY MEDICINE CLINIC | Age: 40
End: 2020-02-18
Payer: COMMERCIAL

## 2020-02-18 VITALS
TEMPERATURE: 97.8 F | OXYGEN SATURATION: 99 % | RESPIRATION RATE: 14 BRPM | HEART RATE: 70 BPM | SYSTOLIC BLOOD PRESSURE: 116 MMHG | BODY MASS INDEX: 23.39 KG/M2 | WEIGHT: 149 LBS | DIASTOLIC BLOOD PRESSURE: 73 MMHG | HEIGHT: 67 IN

## 2020-02-18 DIAGNOSIS — Z01.818 PREOP EXAMINATION: ICD-10-CM

## 2020-02-18 LAB
ANION GAP SERPL CALCULATED.3IONS-SCNC: 14 MEQ/L (ref 9–15)
APTT: 33.4 SEC (ref 24.4–36.8)
BASOPHILS ABSOLUTE: 0 K/UL (ref 0–0.2)
BASOPHILS RELATIVE PERCENT: 0.5 %
BUN BLDV-MCNC: 11 MG/DL (ref 6–20)
CALCIUM SERPL-MCNC: 9.2 MG/DL (ref 8.5–9.9)
CHLORIDE BLD-SCNC: 105 MEQ/L (ref 95–107)
CO2: 23 MEQ/L (ref 20–31)
CREAT SERPL-MCNC: 0.79 MG/DL (ref 0.5–0.9)
EOSINOPHILS ABSOLUTE: 0.1 K/UL (ref 0–0.7)
EOSINOPHILS RELATIVE PERCENT: 0.8 %
GFR AFRICAN AMERICAN: >60
GFR NON-AFRICAN AMERICAN: >60
GLUCOSE BLD-MCNC: 83 MG/DL (ref 70–99)
HCT VFR BLD CALC: 40.5 % (ref 37–47)
HEMOGLOBIN: 13.4 G/DL (ref 12–16)
INR BLD: 1.1
LYMPHOCYTES ABSOLUTE: 3 K/UL (ref 1–4.8)
LYMPHOCYTES RELATIVE PERCENT: 33.5 %
MCH RBC QN AUTO: 30.7 PG (ref 27–31.3)
MCHC RBC AUTO-ENTMCNC: 33.2 % (ref 33–37)
MCV RBC AUTO: 92.6 FL (ref 82–100)
MONOCYTES ABSOLUTE: 0.5 K/UL (ref 0.2–0.8)
MONOCYTES RELATIVE PERCENT: 5.4 %
NEUTROPHILS ABSOLUTE: 5.3 K/UL (ref 1.4–6.5)
NEUTROPHILS RELATIVE PERCENT: 59.8 %
PDW BLD-RTO: 14.9 % (ref 11.5–14.5)
PLATELET # BLD: 249 K/UL (ref 130–400)
POTASSIUM SERPL-SCNC: 4.5 MEQ/L (ref 3.4–4.9)
PROTHROMBIN TIME: 14.4 SEC (ref 12.3–14.9)
RBC # BLD: 4.37 M/UL (ref 4.2–5.4)
SODIUM BLD-SCNC: 142 MEQ/L (ref 135–144)
WBC # BLD: 8.9 K/UL (ref 4.8–10.8)

## 2020-02-18 PROCEDURE — G8420 CALC BMI NORM PARAMETERS: HCPCS | Performed by: NURSE PRACTITIONER

## 2020-02-18 PROCEDURE — 93000 ELECTROCARDIOGRAM COMPLETE: CPT | Performed by: NURSE PRACTITIONER

## 2020-02-18 PROCEDURE — G8484 FLU IMMUNIZE NO ADMIN: HCPCS | Performed by: NURSE PRACTITIONER

## 2020-02-18 PROCEDURE — 99242 OFF/OP CONSLTJ NEW/EST SF 20: CPT | Performed by: NURSE PRACTITIONER

## 2020-02-18 PROCEDURE — G8427 DOCREV CUR MEDS BY ELIG CLIN: HCPCS | Performed by: NURSE PRACTITIONER

## 2020-02-18 SDOH — ECONOMIC STABILITY: TRANSPORTATION INSECURITY
IN THE PAST 12 MONTHS, HAS LACK OF TRANSPORTATION KEPT YOU FROM MEETINGS, WORK, OR FROM GETTING THINGS NEEDED FOR DAILY LIVING?: NO

## 2020-02-18 SDOH — ECONOMIC STABILITY: INCOME INSECURITY: HOW HARD IS IT FOR YOU TO PAY FOR THE VERY BASICS LIKE FOOD, HOUSING, MEDICAL CARE, AND HEATING?: NOT HARD AT ALL

## 2020-02-18 SDOH — ECONOMIC STABILITY: FOOD INSECURITY: WITHIN THE PAST 12 MONTHS, THE FOOD YOU BOUGHT JUST DIDN'T LAST AND YOU DIDN'T HAVE MONEY TO GET MORE.: NEVER TRUE

## 2020-02-18 SDOH — ECONOMIC STABILITY: TRANSPORTATION INSECURITY
IN THE PAST 12 MONTHS, HAS THE LACK OF TRANSPORTATION KEPT YOU FROM MEDICAL APPOINTMENTS OR FROM GETTING MEDICATIONS?: NO

## 2020-02-18 SDOH — ECONOMIC STABILITY: FOOD INSECURITY: WITHIN THE PAST 12 MONTHS, YOU WORRIED THAT YOUR FOOD WOULD RUN OUT BEFORE YOU GOT MONEY TO BUY MORE.: NEVER TRUE

## 2020-02-18 ASSESSMENT — PATIENT HEALTH QUESTIONNAIRE - PHQ9
SUM OF ALL RESPONSES TO PHQ QUESTIONS 1-9: 0
2. FEELING DOWN, DEPRESSED OR HOPELESS: 0
SUM OF ALL RESPONSES TO PHQ QUESTIONS 1-9: 0
SUM OF ALL RESPONSES TO PHQ9 QUESTIONS 1 & 2: 0
1. LITTLE INTEREST OR PLEASURE IN DOING THINGS: 0

## 2020-02-18 ASSESSMENT — ENCOUNTER SYMPTOMS
SINUS PRESSURE: 0
SORE THROAT: 0
SHORTNESS OF BREATH: 0
SINUS PAIN: 0
RHINORRHEA: 0
WHEEZING: 0
NAUSEA: 0
DIARRHEA: 0
BACK PAIN: 1
VOMITING: 0
COUGH: 0
ABDOMINAL PAIN: 0

## 2020-02-18 NOTE — PROGRESS NOTES
20 mg by mouth 2 times daily      loratadine (CLARITIN) 10 MG capsule Take 10 mg by mouth daily      buPROPion (WELLBUTRIN XL) 150 MG extended release tablet Take 150 mg by mouth every morning      atenolol (TENORMIN) 25 MG tablet Take 25 mg by mouth daily      aspirin 81 MG tablet Take 81 mg by mouth daily      cyanocobalamin 1000 MCG/ML injection Inject 1,000 mcg into the muscle every 30 days      DULoxetine (CYMBALTA) 30 MG extended release capsule Take 30 mg by mouth daily      atorvastatin (LIPITOR) 10 MG tablet Take 10 mg by mouth daily      Biotin 5 MG CAPS Take 1 capsule by mouth once      magnesium oxide (MAG-OX) 400 MG tablet Take 400 mg by mouth daily      omeprazole (PRILOSEC) 40 MG delayed release capsule take 1 capsule by mouth twice a day 60 capsule 3    atorvastatin (LIPITOR) 20 MG tablet take 1 tablet by mouth once daily at bedtime 30 tablet 3    BIOTIN 5000 PO Take by mouth      albuterol sulfate HFA (VENTOLIN HFA) 108 (90 BASE) MCG/ACT inhaler Inhale 2 puffs into the lungs every 6 hours as needed for Wheezing or Shortness of Breath 18 g 2    aspirin 81 MG EC tablet Take 81 mg by mouth daily      cyanocobalamin 1000 MCG/ML injection Inject 1,000 mcg into the muscle every 30 days      naproxen sodium (ANAPROX) 220 MG tablet Take 220 mg by mouth as needed      aclidinium (TUDORZA PRESSAIR) 400 MCG/ACT AEPB inhaler Inhale 1 puff into the lungs 2 times daily 1 each 1       This patient presents to the office today for a preoperative consultation at the request of surgeon, Dr. Berta Wadsworth, who plans on performing revision of spinal cord stimulator on February 24 at [facility]. The current problem began 20 years ago, and symptoms have been improving with time. Conservative therapy: symptoms have improved with spinal cord stimulator.     Planned anesthesia: MAC   Known anesthesia problems: None   Bleeding risk: No recent or remote history of abnormal bleeding  Personal or FH of Problems Daughter     Asthma Son      Social History     Socioeconomic History    Marital status:      Spouse name: Not on file    Number of children: Not on file    Years of education: Not on file    Highest education level: Not on file   Occupational History    Not on file   Social Needs    Financial resource strain: Not hard at all   [place] insecurity:     Worry: Never true     Inability: Never true   [employer] needs:     Medical: No     Non-medical: No   Tobacco Use    Smoking status: Current Every Day Smoker     Packs/day: 2.00     Years: 20.00     Pack years: 40.00     Types: Cigarettes     Start date: 1998    Smokeless tobacco: Never Used    Tobacco comment: Pt has switched to vapor cigarrettes   Substance and Sexual Activity    Alcohol use: No    Drug use: No    Sexual activity: Yes   Lifestyle    Physical activity:     Days per week: Not on file     Minutes per session: Not on file    Stress: Not on file   Relationships    Social connections:     Talks on phone: Not on file     Gets together: Not on file     Attends Faith service: Not on file     Active member of club or organization: Not on file     Attends meetings of clubs or organizations: Not on file     Relationship status: Not on file    Intimate partner violence:     Fear of current or ex partner: Not on file     Emotionally abused: Not on file     Physically abused: Not on file     Forced sexual activity: Not on file   Other Topics Concern    Not on file   Social History Narrative    ** Merged History Encounter **            Review of Systems  Review of Systems   Constitutional: Negative for chills and fever. HENT: Negative for congestion, ear pain, postnasal drip, rhinorrhea, sinus pressure, sinus pain and sore throat. Respiratory: Negative for cough, shortness of breath and wheezing. Cardiovascular: Negative for chest pain. Gastrointestinal: Negative for abdominal pain, diarrhea, nausea and vomiting.

## 2020-02-24 ENCOUNTER — ANESTHESIA (OUTPATIENT)
Dept: OPERATING ROOM | Age: 40
End: 2020-02-24
Payer: COMMERCIAL

## 2020-02-24 ENCOUNTER — HOSPITAL ENCOUNTER (OUTPATIENT)
Age: 40
Setting detail: OUTPATIENT SURGERY
Discharge: HOME | End: 2020-02-24
Attending: SPECIALIST | Admitting: SPECIALIST
Payer: COMMERCIAL

## 2020-02-24 ENCOUNTER — ANESTHESIA EVENT (OUTPATIENT)
Dept: OPERATING ROOM | Age: 40
End: 2020-02-24
Payer: COMMERCIAL

## 2020-02-24 ENCOUNTER — HOSPITAL ENCOUNTER (OUTPATIENT)
Dept: GENERAL RADIOLOGY | Age: 40
Setting detail: OUTPATIENT SURGERY
Discharge: HOME | End: 2020-02-26
Attending: SPECIALIST
Payer: COMMERCIAL

## 2020-02-24 VITALS
OXYGEN SATURATION: 98 % | DIASTOLIC BLOOD PRESSURE: 64 MMHG | HEART RATE: 65 BPM | RESPIRATION RATE: 9 BRPM | HEIGHT: 67 IN | BODY MASS INDEX: 23.39 KG/M2 | TEMPERATURE: 98.5 F | WEIGHT: 149 LBS | SYSTOLIC BLOOD PRESSURE: 108 MMHG

## 2020-02-24 VITALS
RESPIRATION RATE: 22 BRPM | DIASTOLIC BLOOD PRESSURE: 52 MMHG | SYSTOLIC BLOOD PRESSURE: 96 MMHG | OXYGEN SATURATION: 96 %

## 2020-02-24 LAB
GLUCOSE BLD-MCNC: 85 MG/DL (ref 60–115)
PERFORMED ON: NORMAL

## 2020-02-24 PROCEDURE — 3700000001 HC ADD 15 MINUTES (ANESTHESIA): Performed by: SPECIALIST

## 2020-02-24 PROCEDURE — 7100000001 HC PACU RECOVERY - ADDTL 15 MIN: Performed by: SPECIALIST

## 2020-02-24 PROCEDURE — 6360000002 HC RX W HCPCS: Performed by: STUDENT IN AN ORGANIZED HEALTH CARE EDUCATION/TRAINING PROGRAM

## 2020-02-24 PROCEDURE — 7100000010 HC PHASE II RECOVERY - FIRST 15 MIN: Performed by: SPECIALIST

## 2020-02-24 PROCEDURE — 3700000000 HC ANESTHESIA ATTENDED CARE: Performed by: SPECIALIST

## 2020-02-24 PROCEDURE — 3600000014 HC SURGERY LEVEL 4 ADDTL 15MIN: Performed by: SPECIALIST

## 2020-02-24 PROCEDURE — 7100000011 HC PHASE II RECOVERY - ADDTL 15 MIN: Performed by: SPECIALIST

## 2020-02-24 PROCEDURE — 2500000003 HC RX 250 WO HCPCS: Performed by: SPECIALIST

## 2020-02-24 PROCEDURE — 2580000003 HC RX 258: Performed by: SPECIALIST

## 2020-02-24 PROCEDURE — 2580000003 HC RX 258: Performed by: NURSE PRACTITIONER

## 2020-02-24 PROCEDURE — 7100000000 HC PACU RECOVERY - FIRST 15 MIN: Performed by: SPECIALIST

## 2020-02-24 PROCEDURE — 3600000004 HC SURGERY LEVEL 4 BASE: Performed by: SPECIALIST

## 2020-02-24 PROCEDURE — 2500000003 HC RX 250 WO HCPCS: Performed by: NURSE ANESTHETIST, CERTIFIED REGISTERED

## 2020-02-24 PROCEDURE — 6360000002 HC RX W HCPCS: Performed by: SPECIALIST

## 2020-02-24 PROCEDURE — C1822 GEN, NEURO, HF, RECHG BAT: HCPCS | Performed by: SPECIALIST

## 2020-02-24 PROCEDURE — 2709999900 HC NON-CHARGEABLE SUPPLY: Performed by: SPECIALIST

## 2020-02-24 PROCEDURE — 6370000000 HC RX 637 (ALT 250 FOR IP): Performed by: SPECIALIST

## 2020-02-24 PROCEDURE — 6360000002 HC RX W HCPCS: Performed by: NURSE ANESTHETIST, CERTIFIED REGISTERED

## 2020-02-24 PROCEDURE — 6370000000 HC RX 637 (ALT 250 FOR IP): Performed by: STUDENT IN AN ORGANIZED HEALTH CARE EDUCATION/TRAINING PROGRAM

## 2020-02-24 PROCEDURE — 2720000010 HC SURG SUPPLY STERILE: Performed by: SPECIALIST

## 2020-02-24 DEVICE — SENZA II
Type: IMPLANTABLE DEVICE | Site: BUTTOCKS | Status: FUNCTIONAL
Brand: NEVRO

## 2020-02-24 RX ORDER — MEPERIDINE HYDROCHLORIDE 25 MG/ML
12.5 INJECTION INTRAMUSCULAR; INTRAVENOUS; SUBCUTANEOUS EVERY 5 MIN PRN
Status: DISCONTINUED | OUTPATIENT
Start: 2020-02-24 | End: 2020-02-24 | Stop reason: HOSPADM

## 2020-02-24 RX ORDER — LIDOCAINE HYDROCHLORIDE 10 MG/ML
1 INJECTION, SOLUTION EPIDURAL; INFILTRATION; INTRACAUDAL; PERINEURAL
Status: DISCONTINUED | OUTPATIENT
Start: 2020-02-24 | End: 2020-02-24 | Stop reason: HOSPADM

## 2020-02-24 RX ORDER — DIPHENHYDRAMINE HYDROCHLORIDE 50 MG/ML
12.5 INJECTION INTRAMUSCULAR; INTRAVENOUS
Status: DISCONTINUED | OUTPATIENT
Start: 2020-02-24 | End: 2020-02-24 | Stop reason: HOSPADM

## 2020-02-24 RX ORDER — CEFAZOLIN SODIUM 2 G/50ML
2 SOLUTION INTRAVENOUS
Status: COMPLETED | OUTPATIENT
Start: 2020-02-24 | End: 2020-02-24

## 2020-02-24 RX ORDER — LIDOCAINE HYDROCHLORIDE 20 MG/ML
INJECTION, SOLUTION INFILTRATION; PERINEURAL PRN
Status: DISCONTINUED | OUTPATIENT
Start: 2020-02-24 | End: 2020-02-24 | Stop reason: SDUPTHER

## 2020-02-24 RX ORDER — ONDANSETRON 2 MG/ML
4 INJECTION INTRAMUSCULAR; INTRAVENOUS
Status: DISCONTINUED | OUTPATIENT
Start: 2020-02-24 | End: 2020-02-24 | Stop reason: HOSPADM

## 2020-02-24 RX ORDER — METOCLOPRAMIDE HYDROCHLORIDE 5 MG/ML
10 INJECTION INTRAMUSCULAR; INTRAVENOUS
Status: DISCONTINUED | OUTPATIENT
Start: 2020-02-24 | End: 2020-02-24 | Stop reason: HOSPADM

## 2020-02-24 RX ORDER — FENTANYL CITRATE 50 UG/ML
INJECTION, SOLUTION INTRAMUSCULAR; INTRAVENOUS PRN
Status: DISCONTINUED | OUTPATIENT
Start: 2020-02-24 | End: 2020-02-24 | Stop reason: SDUPTHER

## 2020-02-24 RX ORDER — SODIUM CHLORIDE 0.9 % (FLUSH) 0.9 %
10 SYRINGE (ML) INJECTION EVERY 12 HOURS SCHEDULED
Status: DISCONTINUED | OUTPATIENT
Start: 2020-02-24 | End: 2020-02-24 | Stop reason: HOSPADM

## 2020-02-24 RX ORDER — DIPHENHYDRAMINE HYDROCHLORIDE 50 MG/ML
INJECTION INTRAMUSCULAR; INTRAVENOUS PRN
Status: DISCONTINUED | OUTPATIENT
Start: 2020-02-24 | End: 2020-02-24 | Stop reason: SDUPTHER

## 2020-02-24 RX ORDER — MIDAZOLAM HYDROCHLORIDE 1 MG/ML
INJECTION INTRAMUSCULAR; INTRAVENOUS PRN
Status: DISCONTINUED | OUTPATIENT
Start: 2020-02-24 | End: 2020-02-24 | Stop reason: SDUPTHER

## 2020-02-24 RX ORDER — HYDROCODONE BITARTRATE AND ACETAMINOPHEN 5; 325 MG/1; MG/1
2 TABLET ORAL PRN
Status: COMPLETED | OUTPATIENT
Start: 2020-02-24 | End: 2020-02-24

## 2020-02-24 RX ORDER — PROPOFOL 10 MG/ML
INJECTION, EMULSION INTRAVENOUS PRN
Status: DISCONTINUED | OUTPATIENT
Start: 2020-02-24 | End: 2020-02-24 | Stop reason: SDUPTHER

## 2020-02-24 RX ORDER — ONDANSETRON 2 MG/ML
INJECTION INTRAMUSCULAR; INTRAVENOUS PRN
Status: DISCONTINUED | OUTPATIENT
Start: 2020-02-24 | End: 2020-02-24 | Stop reason: SDUPTHER

## 2020-02-24 RX ORDER — WOUND DRESSING ADHESIVE - LIQUID
LIQUID MISCELLANEOUS PRN
Status: DISCONTINUED | OUTPATIENT
Start: 2020-02-24 | End: 2020-02-24 | Stop reason: ALTCHOICE

## 2020-02-24 RX ORDER — LIDOCAINE HYDROCHLORIDE AND EPINEPHRINE BITARTRATE 20; .01 MG/ML; MG/ML
INJECTION, SOLUTION SUBCUTANEOUS PRN
Status: DISCONTINUED | OUTPATIENT
Start: 2020-02-24 | End: 2020-02-24 | Stop reason: ALTCHOICE

## 2020-02-24 RX ORDER — FENTANYL CITRATE 50 UG/ML
50 INJECTION, SOLUTION INTRAMUSCULAR; INTRAVENOUS EVERY 10 MIN PRN
Status: DISCONTINUED | OUTPATIENT
Start: 2020-02-24 | End: 2020-02-24 | Stop reason: HOSPADM

## 2020-02-24 RX ORDER — SODIUM CHLORIDE 0.9 % (FLUSH) 0.9 %
10 SYRINGE (ML) INJECTION PRN
Status: DISCONTINUED | OUTPATIENT
Start: 2020-02-24 | End: 2020-02-24 | Stop reason: HOSPADM

## 2020-02-24 RX ORDER — SODIUM CHLORIDE, SODIUM LACTATE, POTASSIUM CHLORIDE, CALCIUM CHLORIDE 600; 310; 30; 20 MG/100ML; MG/100ML; MG/100ML; MG/100ML
INJECTION, SOLUTION INTRAVENOUS CONTINUOUS
Status: DISCONTINUED | OUTPATIENT
Start: 2020-02-24 | End: 2020-02-24 | Stop reason: HOSPADM

## 2020-02-24 RX ORDER — HYDROCODONE BITARTRATE AND ACETAMINOPHEN 5; 325 MG/1; MG/1
1 TABLET ORAL PRN
Status: COMPLETED | OUTPATIENT
Start: 2020-02-24 | End: 2020-02-24

## 2020-02-24 RX ORDER — PROPOFOL 10 MG/ML
INJECTION, EMULSION INTRAVENOUS CONTINUOUS PRN
Status: DISCONTINUED | OUTPATIENT
Start: 2020-02-24 | End: 2020-02-24 | Stop reason: SDUPTHER

## 2020-02-24 RX ADMIN — FENTANYL CITRATE 25 MCG: 50 INJECTION, SOLUTION INTRAMUSCULAR; INTRAVENOUS at 12:42

## 2020-02-24 RX ADMIN — HYDROCODONE BITARTRATE AND ACETAMINOPHEN 2 TABLET: 5; 325 TABLET ORAL at 14:30

## 2020-02-24 RX ADMIN — DIPHENHYDRAMINE HYDROCHLORIDE 25 MG: 50 INJECTION INTRAMUSCULAR; INTRAVENOUS at 12:42

## 2020-02-24 RX ADMIN — SODIUM CHLORIDE, POTASSIUM CHLORIDE, SODIUM LACTATE AND CALCIUM CHLORIDE 125 ML/HR: 600; 310; 30; 20 INJECTION, SOLUTION INTRAVENOUS at 10:30

## 2020-02-24 RX ADMIN — FENTANYL CITRATE 50 MCG: 50 INJECTION, SOLUTION INTRAMUSCULAR; INTRAVENOUS at 13:53

## 2020-02-24 RX ADMIN — ONDANSETRON 4 MG: 2 INJECTION INTRAMUSCULAR; INTRAVENOUS at 13:04

## 2020-02-24 RX ADMIN — FENTANYL CITRATE 25 MCG: 50 INJECTION, SOLUTION INTRAMUSCULAR; INTRAVENOUS at 12:37

## 2020-02-24 RX ADMIN — DIPHENHYDRAMINE HYDROCHLORIDE 25 MG: 50 INJECTION INTRAMUSCULAR; INTRAVENOUS at 12:35

## 2020-02-24 RX ADMIN — PROPOFOL 100 MCG/KG/MIN: 10 INJECTION, EMULSION INTRAVENOUS at 12:31

## 2020-02-24 RX ADMIN — FENTANYL CITRATE 25 MCG: 50 INJECTION, SOLUTION INTRAMUSCULAR; INTRAVENOUS at 12:41

## 2020-02-24 RX ADMIN — CEFAZOLIN SODIUM 2 G: 2 SOLUTION INTRAVENOUS at 12:37

## 2020-02-24 RX ADMIN — MIDAZOLAM HYDROCHLORIDE 2 MG: 2 INJECTION, SOLUTION INTRAMUSCULAR; INTRAVENOUS at 12:25

## 2020-02-24 RX ADMIN — PROPOFOL 30 MG: 10 INJECTION, EMULSION INTRAVENOUS at 12:37

## 2020-02-24 RX ADMIN — FENTANYL CITRATE 25 MCG: 50 INJECTION, SOLUTION INTRAMUSCULAR; INTRAVENOUS at 12:29

## 2020-02-24 RX ADMIN — LIDOCAINE HYDROCHLORIDE 40 MG: 20 INJECTION, SOLUTION INFILTRATION; PERINEURAL at 12:31

## 2020-02-24 RX ADMIN — PROPOFOL 30 MG: 10 INJECTION, EMULSION INTRAVENOUS at 12:42

## 2020-02-24 ASSESSMENT — LIFESTYLE VARIABLES: SMOKING_STATUS: 1

## 2020-02-24 ASSESSMENT — PULMONARY FUNCTION TESTS
PIF_VALUE: 1
PIF_VALUE: 0
PIF_VALUE: 1
PIF_VALUE: 0
PIF_VALUE: 1
PIF_VALUE: 1

## 2020-02-24 ASSESSMENT — ENCOUNTER SYMPTOMS: SHORTNESS OF BREATH: 1

## 2020-02-24 ASSESSMENT — PAIN SCALES - GENERAL
PAINLEVEL_OUTOF10: 6
PAINLEVEL_OUTOF10: 6
PAINLEVEL_OUTOF10: 9
PAINLEVEL_OUTOF10: 0

## 2020-02-24 ASSESSMENT — PAIN DESCRIPTION - FREQUENCY: FREQUENCY: INTERMITTENT

## 2020-02-24 ASSESSMENT — PAIN DESCRIPTION - DESCRIPTORS
DESCRIPTORS: ACHING
DESCRIPTORS: ACHING

## 2020-02-24 ASSESSMENT — PAIN DESCRIPTION - LOCATION: LOCATION: BACK

## 2020-02-24 ASSESSMENT — PAIN DESCRIPTION - PAIN TYPE: TYPE: SURGICAL PAIN;CHRONIC PAIN

## 2020-02-24 ASSESSMENT — PAIN - FUNCTIONAL ASSESSMENT: PAIN_FUNCTIONAL_ASSESSMENT: 0-10

## 2020-02-24 ASSESSMENT — PAIN DESCRIPTION - ORIENTATION: ORIENTATION: LEFT;MID

## 2020-02-24 NOTE — ANESTHESIA PRE PROCEDURE
1 puff into the lungs 2 times daily 5/11/16  Yes Sendy Cope MD   celecoxib (CELEBREX) 200 MG capsule Take 200 mg by mouth 2 times daily    Historical Provider, MD   celecoxib (CELEBREX) 100 MG capsule Take 100 mg by mouth 2 times daily    Historical Provider, MD   aspirin 81 MG tablet Take 81 mg by mouth daily    Historical Provider, MD   atorvastatin (LIPITOR) 20 MG tablet take 1 tablet by mouth once daily at bedtime 10/10/16   Rosendo Marx MD   naproxen sodium (ANAPROX) 220 MG tablet Take 220 mg by mouth as needed 5/20/14   Historical Provider, MD       Current medications:    Current Facility-Administered Medications   Medication Dose Route Frequency Provider Last Rate Last Dose    ceFAZolin (ANCEF) 2 g in dextrose 3 % 50 mL IVPB (duplex)  2 g Intravenous On Call to Piper Mathis MD        lactated ringers infusion   Intravenous Continuous Sharif Granados APRN - CNP        sodium chloride flush 0.9 % injection 10 mL  10 mL Intravenous 2 times per day Sharif Granados, APRN - CNP        sodium chloride flush 0.9 % injection 10 mL  10 mL Intravenous PRN Sharif Granados, APRN - CNP        lidocaine PF 1 % injection 1 mL  1 mL Intradermal Once PRN Andrena Granados, APRN - CNP           Allergies:     Allergies   Allergen Reactions    Gabapentin Other (See Comments)     Headache/migraine    Pcn [Penicillins] Hives    Codeine Nausea And Vomiting    Codeine Nausea And Vomiting    Vicodin [Hydrocodone-Acetaminophen] Nausea And Vomiting       Problem List:    Patient Active Problem List   Diagnosis Code    Restless leg syndrome G25.81    Raynaud's disease I73.00    Fibromyalgia M79.7    RA (rheumatoid arthritis) ([facility]) M06.9    Degenerative disc disease, lumbar M51.36    Anxiety F41.9    Hyperlipidemia E78.5    Chronic obstructive pulmonary disease ([facility]) J44.9    Tobacco abuse disorder Z72.0    Degenerative disc disease, lumbar M51.36       Past Medical History:        Diagnosis Date    consumption: 2230                        Time of last solid consumption: 1900                        Date of last liquid consumption: 02/23/20                        Date of last solid food consumption: 02/23/20    BMI:   Wt Readings from Last 3 Encounters:   02/21/20 149 lb (67.6 kg)   02/18/20 149 lb (67.6 kg)   10/16/18 162 lb (73.5 kg)     Body mass index is 23.34 kg/m². CBC:   Lab Results   Component Value Date    WBC 8.9 02/18/2020    RBC 4.37 02/18/2020    RBC 4.03 08/30/2016    HGB 13.4 02/18/2020    HCT 40.5 02/18/2020    MCV 92.6 02/18/2020    RDW 14.9 02/18/2020     02/18/2020       CMP:   Lab Results   Component Value Date     02/18/2020    K 4.5 02/18/2020     02/18/2020    CO2 23 02/18/2020    BUN 11 02/18/2020    CREATININE 0.79 02/18/2020    GFRAA >60.0 02/18/2020    LABGLOM >60.0 02/18/2020    GLUCOSE 83 02/18/2020    PROT 6.2 08/30/2016    CALCIUM 9.2 02/18/2020    BILITOT 0.6 08/30/2016    ALKPHOS 84 08/30/2016    AST 16 08/30/2016    ALT 17 08/30/2016       POC Tests: No results for input(s): POCGLU, POCNA, POCK, POCCL, POCBUN, POCHEMO, POCHCT in the last 72 hours. Coags:   Lab Results   Component Value Date    PROTIME 14.4 02/18/2020    INR 1.1 02/18/2020    APTT 33.4 02/18/2020       HCG (If Applicable): No results found for: PREGTESTUR, PREGSERUM, HCG, HCGQUANT     ABGs: No results found for: PHART, PO2ART, PUC4KJE, VEI3UVL, BEART, X5ITNGRD     Type & Screen (If Applicable):  No results found for: LABABO, [address]    Anesthesia Evaluation  Patient summary reviewed and Nursing notes reviewed no history of anesthetic complications:   Airway: Mallampati: II  TM distance: >3 FB   Neck ROM: full  Mouth opening: > = 3 FB Dental: normal exam         Pulmonary:normal exam  breath sounds clear to auscultation  (+) COPD:  shortness of breath:  current smoker          Patient did not smoke on day of surgery.                  Cardiovascular:  Exercise tolerance: good (>4 METS),   (+) hypertension:, hyperlipidemia      ECG reviewed  Rhythm: regular  Rate: normal           Beta Blocker:  Not on Beta Blocker      ROS comment: Sinus  Rhythm   WITHIN NORMAL LIMITS     Neuro/Psych:   (+) neuromuscular disease:, psychiatric history:depression/anxiety             GI/Hepatic/Renal:   (+) GERD:,           Endo/Other:    (+) Diabetes, : arthritis:., .          Pt had PAT visit. Abdominal:           Vascular: negative vascular ROS. Anesthesia Plan      MAC     ASA 3       Induction: intravenous. MIPS: Postoperative opioids intended and Prophylactic antiemetics administered. Anesthetic plan and risks discussed with patient. Plan discussed with CRNA.     Attending anesthesiologist reviewed and agrees with Venkata Husain DO   2/24/2020

## 2020-02-24 NOTE — ANESTHESIA POSTPROCEDURE EVALUATION
Department of Anesthesiology  Postprocedure Note    Patient: Patrick Castillo  MRN: [id number]  YOB: 1980  Date of evaluation: 2/24/2020  Time:  2:18 PM     Procedure Summary     Date:  02/24/20 Room / Location:  [address]    Anesthesia Start:  1226 Anesthesia Stop:  6196    Procedure:  REVISION OF SPINAL CORD STIMULATOR, VIKTORIYA MARIE AT [place] [facility] (N/A ) Diagnosis:  (LOW BACK PAIN)    Surgeon:  Nicki Boogie MD Responsible Provider:  Antolin Drake DO    Anesthesia Type:  MAC ASA Status:  3          Anesthesia Type: MAC    Arianna Phase I: Arianna Score: 10    Arianna Phase II:      Last vitals: Reviewed and per EMR flowsheets.        Anesthesia Post Evaluation    Patient location during evaluation: PACU  Patient participation: complete - patient participated  Level of consciousness: awake  Pain score: 0  Airway patency: patent  Nausea & Vomiting: no nausea and no vomiting  Complications: no  Cardiovascular status: blood pressure returned to baseline and hemodynamically stable  Respiratory status: acceptable  Hydration status: euvolemic

## 2020-09-14 ENCOUNTER — NURSE ONLY (OUTPATIENT)
Dept: PRIMARY CARE CLINIC | Age: 40
End: 2020-09-14

## 2020-09-14 ENCOUNTER — HOSPITAL ENCOUNTER (OUTPATIENT)
Age: 40
Setting detail: SPECIMEN
Discharge: HOME | End: 2020-09-14
Payer: COMMERCIAL

## 2020-09-14 PROCEDURE — U0003 INFECTIOUS AGENT DETECTION BY NUCLEIC ACID (DNA OR RNA); SEVERE ACUTE RESPIRATORY SYNDROME CORONAVIRUS 2 (SARS-COV-2) (CORONAVIRUS DISEASE [COVID-19]), AMPLIFIED PROBE TECHNIQUE, MAKING USE OF HIGH THROUGHPUT TECHNOLOGIES AS DESCRIBED BY CMS-2020-01-R: HCPCS

## 2020-09-18 ENCOUNTER — HOSPITAL ENCOUNTER (OUTPATIENT)
Age: 40
Setting detail: OUTPATIENT SURGERY
Discharge: HOME | End: 2020-09-18
Attending: SPECIALIST | Admitting: SPECIALIST
Payer: COMMERCIAL

## 2020-09-18 ENCOUNTER — HOSPITAL ENCOUNTER (OUTPATIENT)
Dept: GENERAL RADIOLOGY | Age: 40
Setting detail: OUTPATIENT SURGERY
Discharge: HOME | End: 2020-09-20
Attending: SPECIALIST
Payer: COMMERCIAL

## 2020-09-18 ENCOUNTER — ANESTHESIA EVENT (OUTPATIENT)
Dept: OPERATING ROOM | Age: 40
End: 2020-09-18
Payer: COMMERCIAL

## 2020-09-18 ENCOUNTER — ANESTHESIA (OUTPATIENT)
Dept: OPERATING ROOM | Age: 40
End: 2020-09-18
Payer: COMMERCIAL

## 2020-09-18 VITALS
WEIGHT: 145 LBS | HEIGHT: 67 IN | OXYGEN SATURATION: 99 % | HEART RATE: 65 BPM | DIASTOLIC BLOOD PRESSURE: 66 MMHG | SYSTOLIC BLOOD PRESSURE: 105 MMHG | RESPIRATION RATE: 14 BRPM | BODY MASS INDEX: 22.76 KG/M2 | TEMPERATURE: 97.9 F

## 2020-09-18 VITALS
SYSTOLIC BLOOD PRESSURE: 78 MMHG | DIASTOLIC BLOOD PRESSURE: 53 MMHG | TEMPERATURE: 97.7 F | OXYGEN SATURATION: 97 % | RESPIRATION RATE: 13 BRPM

## 2020-09-18 PROCEDURE — 6370000000 HC RX 637 (ALT 250 FOR IP): Performed by: STUDENT IN AN ORGANIZED HEALTH CARE EDUCATION/TRAINING PROGRAM

## 2020-09-18 PROCEDURE — 2500000003 HC RX 250 WO HCPCS: Performed by: SPECIALIST

## 2020-09-18 PROCEDURE — 6360000002 HC RX W HCPCS: Performed by: SPECIALIST

## 2020-09-18 PROCEDURE — 6360000002 HC RX W HCPCS: Performed by: STUDENT IN AN ORGANIZED HEALTH CARE EDUCATION/TRAINING PROGRAM

## 2020-09-18 PROCEDURE — 3600000004 HC SURGERY LEVEL 4 BASE: Performed by: SPECIALIST

## 2020-09-18 PROCEDURE — 7100000011 HC PHASE II RECOVERY - ADDTL 15 MIN: Performed by: SPECIALIST

## 2020-09-18 PROCEDURE — 3600000014 HC SURGERY LEVEL 4 ADDTL 15MIN: Performed by: SPECIALIST

## 2020-09-18 PROCEDURE — 3209999900 FLUORO FOR SURGICAL PROCEDURES

## 2020-09-18 PROCEDURE — 2709999900 HC NON-CHARGEABLE SUPPLY: Performed by: SPECIALIST

## 2020-09-18 PROCEDURE — 3700000000 HC ANESTHESIA ATTENDED CARE: Performed by: SPECIALIST

## 2020-09-18 PROCEDURE — 2720000010 HC SURG SUPPLY STERILE: Performed by: SPECIALIST

## 2020-09-18 PROCEDURE — C1713 ANCHOR/SCREW BN/BN,TIS/BN: HCPCS | Performed by: SPECIALIST

## 2020-09-18 PROCEDURE — 2580000003 HC RX 258: Performed by: SPECIALIST

## 2020-09-18 PROCEDURE — 6360000002 HC RX W HCPCS

## 2020-09-18 PROCEDURE — 2580000003 HC RX 258: Performed by: STUDENT IN AN ORGANIZED HEALTH CARE EDUCATION/TRAINING PROGRAM

## 2020-09-18 PROCEDURE — 7100000001 HC PACU RECOVERY - ADDTL 15 MIN: Performed by: SPECIALIST

## 2020-09-18 PROCEDURE — 3700000001 HC ADD 15 MINUTES (ANESTHESIA): Performed by: SPECIALIST

## 2020-09-18 PROCEDURE — 7100000000 HC PACU RECOVERY - FIRST 15 MIN: Performed by: SPECIALIST

## 2020-09-18 PROCEDURE — 6370000000 HC RX 637 (ALT 250 FOR IP): Performed by: SPECIALIST

## 2020-09-18 PROCEDURE — 7100000010 HC PHASE II RECOVERY - FIRST 15 MIN: Performed by: SPECIALIST

## 2020-09-18 DEVICE — N300 LEAD ANCHOR KIT
Type: IMPLANTABLE DEVICE | Site: BACK | Status: FUNCTIONAL
Brand: NEVRO®

## 2020-09-18 RX ORDER — SODIUM CHLORIDE 0.9 % (FLUSH) 0.9 %
10 SYRINGE (ML) INJECTION EVERY 12 HOURS SCHEDULED
Status: DISCONTINUED | OUTPATIENT
Start: 2020-09-18 | End: 2020-09-18 | Stop reason: HOSPADM

## 2020-09-18 RX ORDER — SODIUM CHLORIDE 0.9 % (FLUSH) 0.9 %
10 SYRINGE (ML) INJECTION PRN
Status: DISCONTINUED | OUTPATIENT
Start: 2020-09-18 | End: 2020-09-18 | Stop reason: HOSPADM

## 2020-09-18 RX ORDER — LIDOCAINE HYDROCHLORIDE 10 MG/ML
1 INJECTION, SOLUTION EPIDURAL; INFILTRATION; INTRACAUDAL; PERINEURAL
Status: DISCONTINUED | OUTPATIENT
Start: 2020-09-18 | End: 2020-09-18 | Stop reason: HOSPADM

## 2020-09-18 RX ORDER — WOUND DRESSING ADHESIVE - LIQUID
LIQUID MISCELLANEOUS PRN
Status: DISCONTINUED | OUTPATIENT
Start: 2020-09-18 | End: 2020-09-18 | Stop reason: ALTCHOICE

## 2020-09-18 RX ORDER — PROPOFOL 10 MG/ML
INJECTION, EMULSION INTRAVENOUS PRN
Status: DISCONTINUED | OUTPATIENT
Start: 2020-09-18 | End: 2020-09-18 | Stop reason: SDUPTHER

## 2020-09-18 RX ORDER — PROPOFOL 10 MG/ML
INJECTION, EMULSION INTRAVENOUS CONTINUOUS PRN
Status: DISCONTINUED | OUTPATIENT
Start: 2020-09-18 | End: 2020-09-18 | Stop reason: SDUPTHER

## 2020-09-18 RX ORDER — MAGNESIUM HYDROXIDE 1200 MG/15ML
LIQUID ORAL CONTINUOUS PRN
Status: COMPLETED | OUTPATIENT
Start: 2020-09-18 | End: 2020-09-18

## 2020-09-18 RX ORDER — MIDAZOLAM HYDROCHLORIDE 1 MG/ML
INJECTION INTRAMUSCULAR; INTRAVENOUS PRN
Status: DISCONTINUED | OUTPATIENT
Start: 2020-09-18 | End: 2020-09-18 | Stop reason: SDUPTHER

## 2020-09-18 RX ORDER — MEPERIDINE HYDROCHLORIDE 25 MG/ML
12.5 INJECTION INTRAMUSCULAR; INTRAVENOUS; SUBCUTANEOUS EVERY 5 MIN PRN
Status: DISCONTINUED | OUTPATIENT
Start: 2020-09-18 | End: 2020-09-18 | Stop reason: HOSPADM

## 2020-09-18 RX ORDER — METOCLOPRAMIDE HYDROCHLORIDE 5 MG/ML
10 INJECTION INTRAMUSCULAR; INTRAVENOUS
Status: DISCONTINUED | OUTPATIENT
Start: 2020-09-18 | End: 2020-09-18 | Stop reason: HOSPADM

## 2020-09-18 RX ORDER — HYDROCODONE BITARTRATE AND ACETAMINOPHEN 5; 325 MG/1; MG/1
1 TABLET ORAL PRN
Status: COMPLETED | OUTPATIENT
Start: 2020-09-18 | End: 2020-09-18

## 2020-09-18 RX ORDER — DIPHENHYDRAMINE HYDROCHLORIDE 50 MG/ML
12.5 INJECTION INTRAMUSCULAR; INTRAVENOUS
Status: DISCONTINUED | OUTPATIENT
Start: 2020-09-18 | End: 2020-09-18 | Stop reason: HOSPADM

## 2020-09-18 RX ORDER — ONDANSETRON 2 MG/ML
4 INJECTION INTRAMUSCULAR; INTRAVENOUS
Status: DISCONTINUED | OUTPATIENT
Start: 2020-09-18 | End: 2020-09-18 | Stop reason: HOSPADM

## 2020-09-18 RX ORDER — LIDOCAINE HYDROCHLORIDE 20 MG/ML
INJECTION, SOLUTION INTRAVENOUS PRN
Status: DISCONTINUED | OUTPATIENT
Start: 2020-09-18 | End: 2020-09-18 | Stop reason: SDUPTHER

## 2020-09-18 RX ORDER — FENTANYL CITRATE 50 UG/ML
50 INJECTION, SOLUTION INTRAMUSCULAR; INTRAVENOUS EVERY 10 MIN PRN
Status: DISCONTINUED | OUTPATIENT
Start: 2020-09-18 | End: 2020-09-18 | Stop reason: HOSPADM

## 2020-09-18 RX ORDER — ATENOLOL 25 MG/1
25 TABLET ORAL DAILY
Status: COMPLETED | OUTPATIENT
Start: 2020-09-18 | End: 2020-09-18

## 2020-09-18 RX ORDER — FENTANYL CITRATE 50 UG/ML
INJECTION, SOLUTION INTRAMUSCULAR; INTRAVENOUS PRN
Status: DISCONTINUED | OUTPATIENT
Start: 2020-09-18 | End: 2020-09-18 | Stop reason: SDUPTHER

## 2020-09-18 RX ORDER — SODIUM CHLORIDE, SODIUM LACTATE, POTASSIUM CHLORIDE, CALCIUM CHLORIDE 600; 310; 30; 20 MG/100ML; MG/100ML; MG/100ML; MG/100ML
INJECTION, SOLUTION INTRAVENOUS CONTINUOUS
Status: DISCONTINUED | OUTPATIENT
Start: 2020-09-18 | End: 2020-09-18 | Stop reason: HOSPADM

## 2020-09-18 RX ORDER — HYDROCODONE BITARTRATE AND ACETAMINOPHEN 5; 325 MG/1; MG/1
2 TABLET ORAL PRN
Status: COMPLETED | OUTPATIENT
Start: 2020-09-18 | End: 2020-09-18

## 2020-09-18 RX ADMIN — LIDOCAINE HYDROCHLORIDE 40 MG: 20 INJECTION, SOLUTION INTRAVENOUS at 14:53

## 2020-09-18 RX ADMIN — FENTANYL CITRATE 25 MCG: 50 INJECTION, SOLUTION INTRAMUSCULAR; INTRAVENOUS at 15:06

## 2020-09-18 RX ADMIN — PROPOFOL 20 MG: 10 INJECTION, EMULSION INTRAVENOUS at 14:53

## 2020-09-18 RX ADMIN — ATENOLOL 25 MG: 25 TABLET ORAL at 13:16

## 2020-09-18 RX ADMIN — VANCOMYCIN HYDROCHLORIDE 1000 MG: 1 INJECTION, POWDER, LYOPHILIZED, FOR SOLUTION INTRAVENOUS at 13:49

## 2020-09-18 RX ADMIN — PROPOFOL 20 MG: 10 INJECTION, EMULSION INTRAVENOUS at 14:58

## 2020-09-18 RX ADMIN — PROPOFOL 20 MG: 10 INJECTION, EMULSION INTRAVENOUS at 14:56

## 2020-09-18 RX ADMIN — MIDAZOLAM HYDROCHLORIDE 2 MG: 2 INJECTION, SOLUTION INTRAMUSCULAR; INTRAVENOUS at 14:48

## 2020-09-18 RX ADMIN — FENTANYL CITRATE 25 MCG: 50 INJECTION, SOLUTION INTRAMUSCULAR; INTRAVENOUS at 15:14

## 2020-09-18 RX ADMIN — SODIUM CHLORIDE, POTASSIUM CHLORIDE, SODIUM LACTATE AND CALCIUM CHLORIDE 125 ML/HR: 600; 310; 30; 20 INJECTION, SOLUTION INTRAVENOUS at 12:30

## 2020-09-18 RX ADMIN — FENTANYL CITRATE 50 MCG: 50 INJECTION, SOLUTION INTRAMUSCULAR; INTRAVENOUS at 16:19

## 2020-09-18 RX ADMIN — FENTANYL CITRATE 50 MCG: 50 INJECTION, SOLUTION INTRAMUSCULAR; INTRAVENOUS at 15:11

## 2020-09-18 RX ADMIN — HYDROCODONE BITARTRATE AND ACETAMINOPHEN 2 TABLET: 5; 325 TABLET ORAL at 16:51

## 2020-09-18 RX ADMIN — FENTANYL CITRATE 50 MCG: 50 INJECTION, SOLUTION INTRAMUSCULAR; INTRAVENOUS at 16:09

## 2020-09-18 RX ADMIN — PROPOFOL 100 MCG/KG/MIN: 10 INJECTION, EMULSION INTRAVENOUS at 14:53

## 2020-09-18 ASSESSMENT — PULMONARY FUNCTION TESTS
PIF_VALUE: 0
PIF_VALUE: 1
PIF_VALUE: 0
PIF_VALUE: 1
PIF_VALUE: 0
PIF_VALUE: 0
PIF_VALUE: 1
PIF_VALUE: 0
PIF_VALUE: 2
PIF_VALUE: 0

## 2020-09-18 ASSESSMENT — PAIN SCALES - GENERAL
PAINLEVEL_OUTOF10: 8

## 2020-09-18 ASSESSMENT — ENCOUNTER SYMPTOMS: SHORTNESS OF BREATH: 1

## 2020-09-18 ASSESSMENT — PAIN DESCRIPTION - PAIN TYPE: TYPE: SURGICAL PAIN

## 2020-09-18 ASSESSMENT — PAIN - FUNCTIONAL ASSESSMENT: PAIN_FUNCTIONAL_ASSESSMENT: 0-10

## 2020-09-18 ASSESSMENT — PAIN DESCRIPTION - DESCRIPTORS: DESCRIPTORS: ACHING

## 2020-09-18 ASSESSMENT — LIFESTYLE VARIABLES: SMOKING_STATUS: 1

## 2020-09-18 NOTE — BRIEF OP NOTE
Brief Postoperative Note      Patient: Shakeel Calhoun  YOB: 1980  MRN: [id number]    Date of Procedure: 9/18/2020    Pre-Op Diagnosis: COMPLEX REGIONAL PAIN SYNDROME    Post-Op Diagnosis: Same       Procedure(s):  REVISION OF SCS LEADS    Surgeon(s):  Sai Manuel MD    Assistant:  First Assistant: Chilango Hooper    Anesthesia: Monitor Anesthesia Care    Estimated Blood Loss (mL): Minimal    Complications: None    Specimens:   * No specimens in log *    Implants:  Implant Name Type Inv. Item Serial No.  Lot No. LRB No. Used [employer]   [id number] lead anchor kit   [id number] Inhale Digital [id number] Left 1 Implanted         Drains: * No LDAs found *    Findings:  The left SCS lead was out of the epidural space and was in the IPG pocket, we removed the lead from the pocket and placed it in the epidural space and connected it back to the IPG    Electronically signed by Sai Manuel MD on 9/18/2020 at 3:51 PM

## 2020-09-18 NOTE — ANESTHESIA PRE PROCEDURE
Department of Anesthesiology  Preprocedure Note       Name:  Julian Ramirez   Age:  41 y.o.  :  1980                                          MRN:  [id number]         Date:  2020      Surgeon: Venice Rangel):  Karson Sherwood MD    Procedure: Procedure(s):  REVISION OF SCS LEADS (NEVRO) PAT ON ADMIT DR. BAKER TO COVER H&P    Medications prior to admission:   Prior to Admission medications    Medication Sig Start Date End Date Taking? Authorizing Provider   atenolol (TENORMIN) 25 MG tablet Take 25 mg by mouth daily   Yes Historical Provider, MD   celecoxib (CELEBREX) 200 MG capsule Take 200 mg by mouth 2 times daily   Yes Historical Provider, MD   loratadine (CLARITIN) 10 MG tablet Take 10 mg by mouth daily   Yes Historical Provider, MD   Tiotropium Bromide-Olodaterol (STIOLTO RESPIMAT) 2.5-2.5 MCG/ACT AERS Inhale 1 puff into the lungs daily   Yes Historical Provider, MD   tiZANidine (ZANAFLEX) 2 MG tablet Take 2 mg by mouth every 8 hours as needed   Yes Historical Provider, MD   HYDROcodone-acetaminophen (NORCO) 7.5-325 MG per tablet Take 1 tablet by mouth every 8 hours as needed for Pain. .   Yes Historical Provider, MD   pregabalin (LYRICA) 150 MG capsule Take 150 mg by mouth 2 times daily. .   Yes Historical Provider, MD   cyanocobalamin 1000 MCG/ML injection Inject 1,000 mcg into the muscle every 30 days   Yes Historical Provider, MD   DULoxetine (CYMBALTA) 30 MG extended release capsule Take 30 mg by mouth daily   Yes Historical Provider, MD   omeprazole (PRILOSEC) 40 MG delayed release capsule take 1 capsule by mouth twice a day 16  Yes JULIA Marlow - CNP   atorvastatin (LIPITOR) 20 MG tablet take 1 tablet by mouth once daily at bedtime 10/10/16  Yes Osiris Crawley MD   naproxen sodium (ANAPROX) 220 MG tablet Take 220 mg by mouth as needed 14  Yes Historical Provider, MD   aspirin 81 MG tablet Take 81 mg by mouth daily    Historical Provider, MD   atorvastatin (LIPITOR) 10 MG tablet Take 10 mg by mouth daily    Historical Provider, MD   magnesium oxide (MAG-OX) 400 MG tablet Take 400 mg by mouth daily    Historical Provider, MD       Current medications:    No current facility-administered medications for this encounter. Allergies:     Allergies   Allergen Reactions    Gabapentin Other (See Comments)     Headache/migraine    Pcn [Penicillins] Hives    Codeine Nausea And Vomiting    Codeine Nausea And Vomiting    Vicodin [Hydrocodone-Acetaminophen] Nausea And Vomiting       Problem List:    Patient Active Problem List   Diagnosis Code    Restless leg syndrome G25.81    Raynaud's disease I73.00    Fibromyalgia M79.7    RA (rheumatoid arthritis) ([facility]) M06.9    Degenerative disc disease, lumbar M51.36    Anxiety F41.9    Hyperlipidemia E78.5    Chronic obstructive pulmonary disease ([facility]) J44.9    Tobacco abuse disorder Z72.0    Degenerative disc disease, lumbar M51.36       Past Medical History:        Diagnosis Date    Anxiety     Anxiety     Arthritis     lumbar spine, on meds    Chest pain     COPD (chronic obstructive pulmonary disease) ([facility])     no meds    Degenerative disc disease, lumbar     Depression     Diabetes mellitus ([address] 2005    diet controlled    Fibromyalgia     Heart palpitations     Hyperlipidemia     on meds > 10yrs    Hyperlipidemia     Hypertension     on meds >10 yrs    Lightheadedness     Palpitations     RA (rheumatoid arthritis) ([address]     Raynaud's disease     Reflex sympathetic dystrophy of lower limb     bilateral    Restless leg syndrome     SOB (shortness of breath)     Swollen ankles     Thyroid disease 2018    left thyroid nodule       Past Surgical History:        Procedure Laterality Date    BREAST LUMPECTOMY Right 04/01/2008    BREAST SURGERY Right 2009    lumpectomy    HYSTERECTOMY  2008    HYSTERECTOMY  2003    NERVE SURGERY N/A 2/24/2020    REVISION OF SPINAL CORD STIMULATOR, VIKTORIYA MARIE AT [facility] performed by Nohemi Rivera MD at [place] [facility]/ N/A 10/16/2018    SPINAL CORD STIMULATOR IMPLANT performed by Nohemi Rivera MD at [address] N/A 8/7/2018    SPINAL CORD STIMULATOR TRIAL performed by Nohemi Rivera MD at [address] [facility]  1980's   [facility]       Social History:    Social History     Tobacco Use    Smoking status: Current Every Day Smoker     Packs/day: 2.00     Years: 20.00     Pack years: 40.00     Types: Cigarettes     Start date: 1998    Smokeless tobacco: Never Used    Tobacco comment: Pt has switched to vapor cigarrettes   Substance Use Topics    Alcohol use: No                                Ready to quit: Not Answered  Counseling given: Not Answered  Comment: Pt has switched to vapor cigarrettes      Vital Signs (Current):   Vitals:    09/18/20 1145   BP: 119/75   Pulse: 83   Resp: 14   Temp: 99.4 °F (37.4 °C)   TempSrc: Temporal   SpO2: 98%   Weight: 145 lb (65.8 kg)   Height: 5' 7\" (1.702 m)                                              BP Readings from Last 3 Encounters:   09/18/20 119/75   02/24/20 108/64   02/24/20 (!) 96/52       NPO Status: Time of last liquid consumption: 0800(few sips of black coffee. )                        Time of last solid consumption: 1900                        Date of last liquid consumption: 09/18/20                        Date of last solid food consumption: 09/17/20    BMI:   Wt Readings from Last 3 Encounters:   09/18/20 145 lb (65.8 kg)   02/21/20 149 lb (67.6 kg)   02/18/20 149 lb (67.6 kg)     Body mass index is 22.71 kg/m².     CBC:   Lab Results   Component Value Date    WBC 8.9 02/18/2020    RBC 4.37 02/18/2020    RBC 4.03 08/30/2016    HGB 13.4 02/18/2020    HCT 40.5 02/18/2020    MCV 92.6 02/18/2020    RDW 14.9 02/18/2020     02/18/2020       CMP:   Lab Results   Component Value Date     02/18/2020    K 4.5 02/18/2020     02/18/2020    CO2 23 02/18/2020    BUN 11 02/18/2020    CREATININE 0.79 02/18/2020    GFRAA >60.0 02/18/2020    LABGLOM >60.0 02/18/2020    GLUCOSE 83 02/18/2020    PROT 6.2 08/30/2016    CALCIUM 9.2 02/18/2020    BILITOT 0.6 08/30/2016    ALKPHOS 84 08/30/2016    AST 16 08/30/2016    ALT 17 08/30/2016       POC Tests: No results for input(s): POCGLU, POCNA, POCK, POCCL, POCBUN, POCHEMO, POCHCT in the last 72 hours. Coags:   Lab Results   Component Value Date    PROTIME 14.4 02/18/2020    INR 1.1 02/18/2020    APTT 33.4 02/18/2020       HCG (If Applicable): No results found for: PREGTESTUR, PREGSERUM, HCG, HCGQUANT     ABGs: No results found for: PHART, PO2ART, SUV2XHG, LWA8HPR, BEART, I2SEILNY     Type & Screen (If Applicable):  No results found for: LABABO, LABRH    Drug/Infectious Status (If Applicable):  Lab Results   Component Value Date    HEPCAB NOT DETECTED 08/30/2016       COVID-19 Screening (If Applicable):   Lab Results   Component Value Date    COVID19 Not Detected 09/14/2020         Anesthesia Evaluation  Patient summary reviewed and Nursing notes reviewed no history of anesthetic complications:   Airway: Mallampati: II  TM distance: >3 FB   Neck ROM: full  Mouth opening: > = 3 FB Dental: normal exam         Pulmonary:normal exam  breath sounds clear to auscultation  (+) COPD:  shortness of breath:  current smoker          Patient did not smoke on day of surgery. Cardiovascular:  Exercise tolerance: good (>4 METS),   (+) hypertension:, hyperlipidemia      ECG reviewed  Rhythm: regular  Rate: normal           Beta Blocker:  Dose within 24 Hrs         Neuro/Psych:   (+) neuromuscular disease:, psychiatric history:            GI/Hepatic/Renal:   (+) GERD:,           Endo/Other:    (+) Diabetes, : arthritis: rheumatoid. , . Pt had PAT visit. Abdominal:           Vascular: negative vascular ROS.                                        Anesthesia Plan      MAC     ASA 3 Induction: intravenous. MIPS: Postoperative opioids intended and Prophylactic antiemetics administered. Anesthetic plan and risks discussed with patient. Plan discussed with CRNA.     Attending anesthesiologist reviewed and agrees with DO Shivam   9/18/2020

## 2020-09-18 NOTE — ANESTHESIA POSTPROCEDURE EVALUATION
Department of Anesthesiology  Postprocedure Note    Patient: Zane Orellana  MRN: [id number]  YOB: 1980  Date of evaluation: 9/18/2020  Time:  3:59 PM     Procedure Summary     Date:  09/18/20 Room / Location:  [address]    Anesthesia Start:  9610 Anesthesia Stop:      Procedure:  REVISION OF SCS LEADS (N/A Back) Diagnosis:  (COMPLEX REGIONAL PAIN SYNDROME)    Surgeon:  Kade Rogers MD Responsible Provider:  Leopold Galley, APRN - CRNA    Anesthesia Type:  MAC ASA Status:  3          Anesthesia Type: MAC    Arianna Phase I:      Arianna Phase II:      Last vitals: Reviewed and per EMR flowsheets.        Anesthesia Post Evaluation    Patient location during evaluation: PACU  Patient participation: complete - patient participated  Level of consciousness: awake and alert  Pain score: 0  Airway patency: patent  Nausea & Vomiting: no nausea  Complications: no  Cardiovascular status: hemodynamically stable  Respiratory status: acceptable  Hydration status: stable

## 2020-09-19 NOTE — OP NOTE
[facility] [address] [place] [address], [id number] [facility]                                OPERATIVE REPORT    PATIENT NAME: Sharyle Bali            :        1980  MED REC NO:   [id number]                            ROOM:  ACCOUNT NO:   [de-identified]                           ADMIT DATE: 2020  PROVIDER:     Leigha Dias MD    DATE OF PROCEDURE:  2020    PREPROCEDURE DIAGNOSES:  Complex regional pain syndrome, peripheral  neuropathy. POSTPROCEDURE DIAGNOSES:  Complex regional pain syndrome, peripheral  neuropathy. INDICATION FOR THE PROCEDURE:  Severe low back and lower extremity pain. PROCEDURE PERFORMED:  Revision of spinal cord stimulator leads. SURGEON:  Leigha Dias MD    ANESTHESIA:  MAC with local anesthesia. COMPLICATIONS:  None. BLOOD LOSS:  Minimal.    OPERATIVE PROCEDURE:  The patient was brought to the operating room  after receiving 1 gm of Vancomycin intravenously, 30 minutes before the  procedure start. The patient was placed in the prone position and after  applying all monitors and protecting all pressure points, the mid and  the lower back and the left gluteal region were prepped and draped  aseptically. Using the C-arm image intensifier, the location of the  left spinal cord lead was identified outside the epidural space and it  was wrapped around the IPG in the IPG pocket. An incision was made over  the IPG pocket removing the old surgical scar. After local anesthetic  infiltration using lidocaine 2% with epi, sharp and blunt dissection was  used to deliver the IPG and spinal cord stimulator leads from the  pocket. The spinal cord stimulator lead was disconnected from the IPG  and another incision was made in the mid lumbar region removing the old  surgical scar, after local anesthetic infiltration.   A 14-gauge 4-inch  Tuohy needle was inserted and directed with interlaminar space at L2-L3. Epidural space was identified using loss of resistance technique with  glass syringe and air. Negative aspiration for blood and CSF. The left  spinal cord stimulator lead was placed. Through the Tuohy needle in the  epidural space, after placing the lead Stylet and it was directed  towards the top of T8 few millimeters from the midline towards the left  side. The lead Stylet was removed with Tuohy needle and the lead was  anchored in place using the plastic anchor with the middle screw and two  2-0 silk sutures. The lead was tunneled than from the lumbar incision  to the IPG pockets and connected to the IPG. The impedance for both  leads the right and left one was checked and numbers were within normal  limit. Hemostasis and irrigation was done in both incisions. The IPG  was anchored in place in the pocket using two 2-0 silk sutures. Both  incisions were closed in layers using 3-0 Vicryl interrupted inverted  suture for the subcutaneous layer and 4-0 Vicryl subcuticular continuous  suture for the skin. Steri-Strips were applied and sterile dressing. The patient tolerated the procedure well and transferred to recovery  area in stable condition, moving both lower extremities. The patient  will follow up in two weeks for reevaluation.         Alissa Srivastava MD    D: 09/18/2020 16:00:58       T: 09/18/2020 23:54:29     [email or web address]  Job#: [id number]     Doc#: [id number]    CC:

## 2023-02-01 NOTE — PROGRESS NOTES
Discharge instructions were reviewed with patient by Marily Mckeon R.N.  Patient verbalized understanding. Awake, A & O. Anxious to go home, as they live 1 1/2 hours away. Condition is stable. Will proceed with discharge. Tetracycline Pregnancy And Lactation Text: This medication is Pregnancy Category D and not consider safe during pregnancy. It is also excreted in breast milk.

## 2024-09-18 ENCOUNTER — APPOINTMENT (OUTPATIENT)
Dept: PRIMARY CARE | Facility: CLINIC | Age: 44
End: 2024-09-18
Payer: COMMERCIAL

## 2025-02-07 ENCOUNTER — HOSPITAL ENCOUNTER (OUTPATIENT)
Dept: HOSPITAL 100 - RAD | Age: 45
Discharge: HOME | End: 2025-02-07
Payer: MEDICAID

## 2025-02-07 DIAGNOSIS — R13.10: Primary | ICD-10-CM

## 2025-02-07 PROCEDURE — 74221 X-RAY XM ESOPHAGUS 2CNTRST: CPT

## 2025-02-07 NOTE — XMS RPT_ITS
Comprehensive CCD (C-CDA v2.1)  
  
                          Created on: 2025  
  
  
SALTY PALACIOS, MRS. ROBERT QUIROGA  
External Reference #: [id number],PersonID:[id number]  
: 1980  
Sex: Female  
  
Demographics  
  
  
                                        Address             [address] [place]  [address]  
   
                                        Home Phone          [phone]  
   
                                        Home Phone          [phone]  
   
                                        Home Phone          [phone]  
   
                                        Home Phone          [phone]  
   
                                        Preferred Language  en  
   
                                        Marital Status        
   
                                        Nondenominational Affiliation Unknown  
   
                                        Race                White  
   
                                        Ethnic Group        Not  or Lati  
no  
  
  
Author  
  
  
                                        Organization        [facility]  
  
  
Care Team Providers  
  
  
                                Care Team Member Name Role            Phone  
   
                                DawsonBrunilda goodes Attending       Unavailable  
   
                                PROVIDER, UNKNOWN Referring       Unavailable  
   
                                Aurelia Daniels     Primary Care    Unavailable  
   
                                Jenna Chavarria Primary Care Provider [phone]  
[phone]  
   
                                Jenna Chavarria Primary Care Provider [phone]  
[phone]  
   
                                MALAK, OSAMA A  Admitting       Unavailable  
   
                                JENNA CHAVARRIA Primary Care    Unavailable  
   
                                RYAN, OSAMA A  Attending       Unavailable  
   
                                RYAN, OSAMA A  Admitting       Unavailable  
   
                                JENNA CHAVARRIA Primary Care    Unavailable  
   
                                RYAN, OSAMA A  Attending       Unavailable  
   
                                JENNA CHAVARRIA Primary Care    Unavailable  
   
                                MALAK, OSAMA A  Attending       Unavailable  
   
                                RYAN, OSAMA A  Referring       Unavailable  
   
                                JENNA CHAVARRIA Primary Care    Unavailable  
   
                                MALAK, OSAMA A  Referring       Unavailable  
   
                                MALAK, OSAMA A  Referring       Unavailable  
   
                                JENNA CHAVARRIA Primary Care    Unavailable  
   
                                LEFTYAK, OSAMA A  Attending       Unavailable  
   
                                Jenna Chavarria MD Primary Care Provider [phone]  
[phone]  
   
                                Jenna Chavarria MD Primary Care Provider [phone]  
[phone]  
   
                                Jenna Chavarria MD Primary Care Provider [phone]  
[phone]  
   
                                Jenna Chavarria MD Primary Care Provider [phone]  
[phone]  
   
                                Temsic APRN.Juliane PINZON Primary Care Provider  
 [phone]  
   
                                PHYSICIAN, NOT RECORDED Primary Care Physician U  
carenailbeatrice  
   
                                PHYSICIAN, NOT RECORDED Primary Care    Unavaila  
KIERSTEN Patterson PA-C Attending       Unavailable  
   
                                Temsic APRN.Juliane PINZON Primary Care Provider  
 [phone]  
   
                                Cele FONTAINE, Rich Unavailable     [phone]  
   
                                Kontak MD, Jenna R Primary Care Provider [phone]  
[phone]  
   
                                YOVANY, SARAHPRELLE Referring       Unavailable  
   
                                TEMSIC, JULIANE K Primary Care    Unavailable  
   
                                TEMSIC, JULIANE K Primary Care    Unavailable  
   
                                Yovany FONTAINE, Manpreeterprit Unavailable     [phone]  
   
                                Kari Davis Kirstin Unavailable     [phone]  
   
                                Sandy FONTAINE, Judie Unavailable     Unavailable  
   
                                Pascual Austin MD Unavailable     [phone]  
   
                                TEMSIC, JULIANE K Referring       Unavailable  
   
                                TEMSIC, JULIANE K Primary Care    Unavailable  
   
                                REGINALD PEDROZA Attending       Unavailable  
   
                                TEMSIC, JULIANE K Primary Care    Unavailable  
   
                                REGINALD PEDROZA Attending       Unavailable  
   
                                Jaclyn FONTAINE, Prabhakaran Unavailable     [phone]  
[phone]  
   
                                DEEJAY FONTAINE, TAVON Attending       Unavailable  
   
                                TEMSIC, MRS. CARDOZO Primary Care    Unavailable  
   
                                TEMSIC, MRS. CARDOZO Primary Care    Unavailable  
   
                                BOBBY FONTAINE, NERY Attending       Unavailable  
   
                                TEMSIC, JULIANE K Primary Care    Unavailable  
   
                                SELF            Referring       Unavailable  
   
                                TEMSIC, JULIANE K Primary Care    Unavailable  
   
                                JEFFREY ALVAREZ Attending       Unavailable  
   
                                TEMSIC, JULIANE K Attending       Unavailable  
   
                                TEMSIC, JULIANE K Primary Care    Unavailable  
   
                                TEMSIC, JULIANE K Attending       Unavailable  
   
                                TEMSIC, JULIANE K Primary Care    Unavailable  
   
                                TEMSIC, JULIANE K Attending       Unavailable  
   
                                TEMSIC, JULIANE K Primary Care    Unavailable  
   
                                TEMSIC, JULIANE K Referring       Unavailable  
   
                                TEMSIC, JULIANE K Primary Care    Unavailable  
   
                                TEMSIC, JULIANE K Attending       Unavailable  
   
                                TEMSIC, JULIANE K Primary Care    Unavailable  
   
                                TEMSIC, JULIANE K Referring       Unavailable  
   
                                TEMSIC, JULIANE K Primary Care    Unavailable  
   
                                TEMSIC, JULIANE K Referring       Unavailable  
   
                                TEMSIC, JULIANE K Primary Care    Unavailable  
   
                                TEMSIC, JULIANE K Referring       Unavailable  
   
                                TEMSIC, JULIANE K Primary Care    Unavailable  
   
                                TEMSIC, JULIANE K Referring       Unavailable  
   
                                TEMSIC, JULIANE K Primary Care    Unavailable  
   
                                TEMSIC, JULIANE K Attending       Unavailable  
   
                                TEMSIC, JULIANE K Primary Care    Unavailable  
   
                                PASCUAL AUSTIN Attending       Unavaila  
MOSES Crenshaw Referring       Unavailable  
   
                                TEMSIC, JULIANE K Primary Care    Unavailable  
   
                                TEMSIC, JULIANE K Referring       Unavailable  
   
                                TEMSIC, JULIANE K Primary Care    Unavailable  
   
                                SANDY, JUDIE  Attending       Unavailable  
   
                                TEMSIC, JULIANE K Primary Care    Unavailable  
   
                                TEMSIC, JULIANE K Referring       Unavailable  
   
                                TEMSIC, JULIANE K Primary Care    Unavailable  
   
                                TEMSIC, JULIANE K Attending       Unavailable  
   
                                TEMSIC, JULIANE K Primary Care    Unavailable  
   
                                TEMSIC, JULIANE K Attending       Unavailable  
   
                                TEMSIC, JULIANE K Primary Care    Unavailable  
   
                                RAPAKA, JUDIE  Referring       Unavailable  
   
                                TEMSIC, JULIANE K Primary Care    Unavailable  
   
                                RAPAKA, JUDIE  Referring       Unavailable  
   
                                TEMSIC, JULIANE K Primary Care    Unavailable  
   
                                RAPAKA, JUDIE  Referring       Unavailable  
   
                                TEMSIC, JULIANE K Primary Care    Unavailable  
   
                                SELF            Referring       Unavailable  
   
                                TEMSIC, JULIANE K Primary Care    Unavailable  
   
                                TEE VILLANUEVA Attending       Unavailable  
   
                                TEMSIC, JULIANE K Primary Care    Unavailable  
   
                                LEROY ONEILL Attending       Unavailable  
   
                                TEMSIC, JULIANE K Attending       Unavailable  
   
                                TEMSIC, JULIANE K Primary Care    Unavailable  
   
                                TEMSIC, JULIANE K Attending       Unavailable  
   
                                TEMSIC, JULIANE K Primary Care    Unavailable  
  
  
  
Allergies  
  
  
                                                    Allergy   
Classification                          Reported   
Allergen(s)               Allergy Type              Date of   
Onset                     Reaction(s)               Facility  
   
                                                      
(4 sources)                             Acetaminophen /   
HYDROcodone               Drug Allergy                
8                                       Nausea And   
Vomiting                                [place], [place]  
   
                                                      
(20 sources)                            Codeine;   
Translations:   
[codeine]                 Drug Allergy                
5                                       Nausea And   
Vomiting,   
Vomiting                                [place], [place]  
   
                                                      
(20 sources)                            gabapentin;   
Translations:   
[GABAPENTIN]              Drug Allergy                
6                                       Other (See   
Comments),   
Other: See   
Comments                                [place], [place]  
   
                                                      
(7 sources)                             Penicillins;   
Translations:   
[PENICILLINS]                           Propensity to   
adverse   
reactions to   
drug                                      
5                         Hives                     [place], [place]  
   
                                                      
(19 sources)              Penicillins               Propensity to   
adverse   
reactions to   
drug                                      
5                         Unknown                   [facility]  
Work Phone:   
[phone]  
[phone]  
   
                                                      
(20 sources)              Penicillins               Propensity to   
adverse   
reactions to   
drug                                      
5                         Unknown                   [facility]  
Work Phone:   
[phone]  
[phone]  
   
                                                      
(1 source)                              Penicillin;   
Translations:   
[penicillin]    Drug Allergy                                    [facility]   
[place]  
   
                                                      
(15 sources)                            Hydroxychloroquin  
e; Translations:   
[HYDROXYCHLOROQUI  
NE]                       Drug Allergy                
4                                       Other: See   
Comments                                [facility]  
  
  
  
Medications  
Current Medications  
  
  
  
                      Medication Drug Class(es) Dates      Sig (Normalized) Sig   
(Original)  
   
                                                    [id number]   
actuat albuterol   
0.09 mg/actuat   
metered dose   
inhaler  
(20 sources)                            beta2-Adrenergic   
Agonist                                 Start:   
01-                              take 2 puff(s) by   
inhalation every   
four hours as needed   
for wheezing                            albuterol HFA   
(PROVENTIL HFA,   
VENTOLIN HFA) 90   
mcg/actuation   
inhaler Inhale 2   
Puffs as   
instructed every   
4 hours as needed   
for   
wheezing/shortnes  
s of breath. 1   
Each 2025   
Active  
  
  
  
                                                    Start: 10-  
End: 2024                         take 2 puff(s) by inhalation   
every four hours as needed for   
wheezing                                albuterol HFA (PROVENTIL HFA,   
VENTOLIN HFA) 90 mcg/actuation   
inhaler Indications: COPD, mild (HCC)   
Inhale 2 Puffs as instructed every 4   
hours as needed for   
wheezing/shortness of breath. 1 Each   
1 2024 Active  
   
                                                    Start: 2016  
End: 2020                         take 2 puff(s) by inhalation   
every six hours as needed for   
wheezing                                albuterol sulfate HFA (VENTOLIN HFA)   
108 (90 BASE) MCG/ACT inhaler Inhale   
2 puffs into the lungs every 6 hours   
as needed for Wheezing or Shortness   
of Breath 18 g 2 2016 Discontinued (LIST   
CLEANUP)  
  
  
  
                                        Comment on above:   Inhale 2 Puffs as in  
structed every 4 hours as needed for   
wheezing/shortness of breath.   
   
                                                    atenolol 25 mg   
oral tablet  
(20 sources)              beta-Adrenergic Blocker   Start:   
10-08-20  
23  
End:   
20  
24                                      take 1 tablet   
by mouth once   
daily                                   atenolol (TENORMIN)   
25 mg tablet   
Indications:   
Essential   
hypertension take 1   
tablet by mouth once   
daily 30 tablet 11   
2024 Active  
  
  
  
                                                    Start: 10-  
End: 10-                         take 1 tablet by mouth once   
daily                                   atenolol (TENORMIN) 25 mg tablet   
Indications: Essential hypertension   
take 1 tablet by mouth once daily 30   
tablet 3 2023 10/06/2023   
Discontinued  
   
                                                    Start: 2022  
End: 2022                         take 1 tablet by mouth once   
daily                                   atenolol (TENORMIN) 25 mg tablet   
Indications: Essential hypertension   
Take 1 tablet by mouth once daily.   
30 tablet 0 06/10/2022 Active  
   
                                                    Start: 2020  
End: 2020                                     atenolol (TENORMIN) tablet 2  
5 mg  
   
                                                            take 1 tablet by hiro  
th once   
daily                                   atenolol (TENORMIN) 25 MG tablet   
Take 25 mg by mouth daily 0 Active  
  
  
  
                                        Comment on above:   take 1 tablet by hiro  
th once daily   
   
                                                            Take 1 tablet by hiro  
th once daily.   
   
                                                    atorvastatin 20 mg   
oral tablet  
(20 sources)                            HMG-CoA Reductase   
Inhibitor                               Start:   
06-  
2  
End:   
  
4                                       take 1 tablet   
by mouth once   
daily                                   atorvastatin (LIPITOR)   
20 mg tablet   
Indications: Mixed   
hyperlipidemia Take 1   
tablet by mouth once   
daily. 90 tablet 3   
2024 Active  
  
  
  
                                                    Start: 2021  
End: 2022                         take 1 tablet by mouth once   
daily                                   atorvastatin (LIPITOR) 20 mg tablet   
Take 1 tablet by mouth once daily.   
30 tablet 11 2021 Active  
   
                                                    Start: 10-  
End: 2020                         take 1 tablet by mouth once   
daily at bedtime                        atorvastatin (LIPITOR) 20 MG tablet   
take 1 tablet by mouth once daily at   
bedtime 30 tablet 3 10/10/2016   
2020 Discontinued (Stop Taking   
at Discharge)  
   
                                                            take 1 tablet by hiro  
th once   
daily                                   atorvastatin (LIPITOR) 10 MG tablet   
Take 10 mg by mouth daily 0 Active  
  
  
  
                                        Comment on above:   Take 1 tablet by hiro  
th once daily.   
   
                                                            take 1 tablet by hiro  
th once daily   
   
                                                    Blood Glucose Control,   
Normal soln  
(20 sources)                                        Start:   
2024                                          Blood Glucose Control,   
Normal soln Indications:   
Diabetes mellitus type 2,   
diet-controlled (HCC) Use   
as instructed 1 Each   
2024 Active  
  
  
  
                                Start: 2024                 Blood Glucose   
Control, Normal soln Indications: Diabetes   
mellitus   
type 2, diet-controlled (HCC) Use as instructed 1 Each 0   
2024 Active  
  
  
  
                                        Comment on above:   Use as instructed   
   
                                                    Blood-Glucose Meter  
(20 sources)                    Start: 2024                 Blood-Glucose   
Meter   
Indications: Diabetes   
mellitus type 2,   
diet-controlled (HCC) Test   
One time a day and as   
needed. Insulin Dep? Yes   
E11.9 DM 2 1 Each   
2024 Active  
  
  
  
                                Start: 2024                 Blood-Glucose   
Meter Indications: Diabetes mellitus type 2,   
diet-controlled (HCC) Test One time a day and as needed. Insulin   
Dep? Yes E11.9 DM 2 1 Each 0 2024 Active  
  
  
  
                                        Comment on above:   Test One time a day   
and as needed. Insulin Dep? Yes E11.9 DM   
2   
   
                                                    Blood-Glucose Meter   
(FREESTYLE INSULINX)  
(2 sources)                                         Start:   
2022  
End:   
2022                                          Blood-Glucose Meter   
(FREESTYLE INSULINX)   
Daily glucose check .   
Dx: Type 2 DM -   
Controlled E11.9, no   
insulin 1 Each 0   
2022   
Active  
   
                                        Comment on above:   Daily glucose check   
. Dx: Type 2 DM - Controlled E11.9, no   
insulin   
   
                                                    calcium chloride 0.0014   
meq/ml / potassium   
chloride 0.004 meq/ml /   
sodium chloride 0.103   
meq/ml / sodium lactate   
0.028 meq/ml injectable   
solution  
(2 sources)                                         Start:   
2020                                          lactated ringers   
infusion  
  
  
  
                                Start: 2020                 lactated ringe  
rs infusion  
  
  
  
                                                    cefdinir 300 mg oral   
capsule  
(5 sources)                             Cephalosporin   
Antibacterial                           Start:   
2024  
End: 2024                         take 1   
capsule by   
mouth twice   
daily                                   cefdinir (OMNICEF)   
300 mg capsule   
Indications:   
Rhinosinusitis ,   
Acute suppurative   
otitis media of left   
ear without   
spontaneous rupture   
of tympanic   
membrane, recurrence   
not specified Take 1   
capsule by mouth two   
times a day for 10   
days. 20 capsule 0   
2024 Active  
   
                                        Comment on above:   Take 1 capsule by mo  
[facility] two times a day for 10 days.   
   
                                                    cetirizine   
hydrochloride 10 mg   
oral tablet  
(20 sources)                            Histamine-1   
Receptor   
Antagonist                              Start:   
2024  
End: 2025                         take 1   
tablet by   
mouth once   
daily                                   cetirizine (ZYRTEC)   
10 mg tablet   
Indications:   
Environmental and   
seasonal allergies   
Take 1 tablet by   
mouth once daily. 90   
tablet 3 2024 Active  
   
                                                    1 ml diphenhydrAMINE   
hydrochloride 50   
mg/ml cartridge  
(2 sources)                             Histamine-1   
Receptor   
Antagonist                              Start:   
2020  
End: 2020                                     diphenhydrAMINE   
(BENADRYL) injection   
12.5 mg  
  
  
  
                                                    Start: 2020  
End: 2020                                     12.5 mg, Intravenous, ONCE P  
RN, Itching, Starting Mon 20   
at   
1041, For 1 dose, PACU only  
  
  
  
                                                    doxycycline   
hyclate 100 mg   
oral capsule  
(3 sources)               Tetracycline-class Drug   Start:   
2024  
End: 2024                         take 1   
capsule by   
mouth twice   
daily                                   doxycycline hyclate   
(VIBRAMYCIN) 100 mg   
capsule   
Indications:   
Sinobronchitis ,   
Other acute   
nonsuppurative   
otitis media of   
left ear,   
recurrence not   
specified Take 1   
capsule by mouth   
two times a day for   
7 days. 14 capsule   
0 2024 Active  
  
  
  
                                                    Start: 2023  
End: 2023                         take 1 tablet by mouth twice   
daily                                   doxycycline (VIBRA-TABS) 100 mg   
tablet Take 1 tablet by mouth twice   
daily for 10 days. 20 tablet 0   
2023 Active  
  
  
  
                                        Comment on above:   Take 1 tablet by hiro  
 twice daily for 10 days.   
   
                                                            Take 1 capsule by mo  
[facility] two times a day for 7 days.   
   
                                                    DULoxetine 30 mg   
delayed release oral   
capsule  
(5 sources)                             Serotonin and   
Norepinephrine Reuptake   
Inhibitor                                           take 1 capsule by   
mouth once daily                        DULoxetine   
(CYMBALTA) 30 MG   
extended release   
capsule Take 30 mg   
by mouth daily 0   
Active  
  
  
  
                                                      
End: 2020                                     DULoxetine HCl (CYMBALTA PO)  
 Take by mouth daily 0 2020   
Discontinued (DUPLICATE)  
  
  
  
                                                    esomeprazole 40 mg   
delayed release   
oral capsule  
(6 sources)                             Proton Pump   
Inhibitor                               Start:   
2025  
End: 2026                         take 1 capsule   
by mouth twice   
daily before   
mealtime                                esomeprazole (NEXIUM)   
40 mg capsule   
Indications:   
Gastroesophageal   
reflux disease without   
esophagitis Take 1   
capsule by mouth two   
times a day before   
meals. 1 HOUR BEFORE   
EATING. 180 capsule 3   
2025   
Active  
   
                                                    2 ml fentaNYL 0.05   
mg/ml injection  
(2 sources)               Opioid Agonist            Start:   
2020                                          fentaNYL (SUBLIMAZE)   
injection 50 mcg  
  
  
  
                                Start: 2020                 50 mcg, Intrav  
enous, EVERY 10 MIN PRN, Pain Moderate (4-6),   
Starting Mon 20 at 1041, For 4 doses Phase I - Initial   
therapy for moderate pain. PACU only  
  
  
  
                                                    folic acid 1 mg oral   
tablet  
(11 sources)                            Start: 2024   take 1 tablet   
by mouth once   
daily                                   folic acid 1 mg   
tablet Take 1 tablet   
by mouth once daily.   
90 tablet 2024   
Active  
   
                                                    1 ml HYDROmorphone   
hydrochloride 1 mg/ml   
cartridge  
(2 sources)     Opioid Agonist  Start: 2020                 HYDROmorphone   
(DILAUDID) injection   
0.5 mg  
  
  
  
                                Start: 2020                 0.5 mg, Intrav  
enous, EVERY 10 MIN PRN, Pain Severe (7-10),   
Starting Mon 20 at 1041, For 4 doses Phase I - Initial   
therapy for severe pain. PACU only  
  
  
  
                                                    10 ml lidocaine   
hydrochloride 10 mg/ml   
injection  
(2 sources)                             Antiarrhythmic, Amide   
Local Anesthetic                        Start: 2020  
End: 2020                                     lidocaine PF 1 %   
injection 1 mL  
  
  
  
                                                    Start: 2020  
End: 2020                                     lidocaine PF 1 % injection 1  
 mL  
  
  
  
                                                    LORazepam 0.5 mg oral   
tablet  
(20 sources)              Benzodiazepine            Start: 10-  
End: 2025                         take 1 tablet   
by mouth once   
daily as   
needed                                  LORazepam (ATIVAN)   
0.5 mg Indications:   
Generalized anxiety   
disorder with panic   
attacks Take 1   
tablet by mouth   
once daily as   
needed for up to 90   
days. 30 tablet 2   
10/29/2024   
2025 Active  
   
                                                    meclizine   
hydrochloride 25 mg   
chewable tablet  
(20 sources)        Antiemetic          Start: 2024   take 1 tablet   
by mouth   
every eight   
hours as   
needed for   
nausea                                  meclizine 25 mg   
chewable tablet(s)   
Indications:   
Peripheral vertigo   
of both ears Take 1   
tablet by mouth   
every 8 hours as   
needed for   
nausea/vomiting or   
dizziness. 60   
tablet 1 2024   
Active  
  
  
  
                                                    Start: 2023  
End: 2024                         take 1 tablet by mouth twice   
daily as needed for dizziness           meclizine (ANTIVERT) 25 mg tab   
Indications: Vertigo Take 1 tablet   
by mouth two times a day as needed   
(for dizziness). 60 tablet 2   
2023 Discontinued   
(Course of therapy completed)  
   
                                                      
End: 2024                         take 1 tablet by mouth every   
eight hours as needed                   meclizine 25 mg chewable tablet(s)   
Take 25 mg by mouth three times a   
day as needed for nausea/vomiting   
or dizziness. 2024   
Discontinued  
  
  
  
                                        Comment on above:   Take 1 tablet by hiro  
th two times a day as needed (for   
dizziness).   
   
                                                    meloxicam 15 mg   
oral tablet  
(20 sources)                            Nonsteroidal   
Anti-inflammatory Drug                  Start:   
2024  
End:   
2025                              take 1 tablet by   
mouth once daily                        meloxicam (MOBIC)   
15 mg tablet Take   
1 tablet by mouth   
once daily. 30   
tablet 5   
2024 Active  
  
  
  
                                                    Start: 2024  
End: 2024                         take 1 tablet by mouth once   
daily                                   meloxicam (MOBIC) 7.5 mg tablet   
Indications: Chronic ankle pain,   
bilateral , Chronic foot pain, left   
take 1 tablet by mouth once daily 30   
tablet 5 2024   
Discontinued  
  
  
  
                                        Comment on above:   Take 1 tablet by hiro  
 once daily.   
   
                                                    1 ml meperidine   
hydrochloride 25 mg/ml   
cartridge  
(2 sources)               Opioid Agonist            Start:   
2020                                          meperidine (DEMEROL)   
injection 12.5 mg  
  
  
  
                                Start: 2020                 12.5 mg, Intra  
venous, EVERY 5 MIN PRN, Shivering, , Starting   
Mon   
20 at 1041 May give every 5 minutes to max of 50mg. PACU   
only  
  
  
  
                                                    methotrexate 2.5 mg   
oral tablet  
(11 sources)                            Folate Analog   
Metabolic   
Inhibitor                               Start:   
2024  
End: 2025                         take 4   
tablets by   
mouth once                              methotrexate 2.5 mg   
tablet Indications:   
Seronegative   
rheumatoid arthritis   
(HCC) Take 4 tablets   
by mouth every   
. as directed.   
48 tablet 2024 Active  
   
                                                    2 ml metoclopramide   
5 mg/ml prefilled   
syringe  
(2 sources)                             Dopamine-2   
Receptor   
Antagonist                              Start:   
2020  
End: 2020                                     metoclopramide   
(REGLAN) injection   
10 mg  
  
  
  
                                                    Start: 2020  
End: 2020                                     10 mg, Intravenous, ONCE PRN  
, Nausea, Starting Mon 20 at   
1041, For 1 dose Secondary antiemetic therapy if not given   
intraoperatively. PACU only  
  
  
  
                                                    10 actuat   
olodaterol 0.0025   
mg/actuat /   
tiotropium 0.0025   
mg/actuat   
inhalation spray  
(20 sources)                            Anticholinergic,   
beta2-Adrenergic   
Agonist                                 Start:   
10-                                          tiotropium-olodaterol   
(STIOLTO RESPIMAT) 2.5-2.5   
mcg/actuation inhaler   
Inhale 2 Puffs as   
instructed once daily. 2   
inh a day 4 g 1 10/21/2024   
Active  
  
  
  
                                                                Tiotropium Bromi  
de-Olodaterol (STIOLTO RESPIMAT) 2.5-2.5 MCG/ACT AERS Inhale 1  
  
puff into the lungs daily 0 Active  
  
  
  
                                                    2 ml ondansetron 2   
mg/ml injection  
(20 sources)                            Serotonin-3 Receptor   
Antagonist                              Start: 2020  
End: 2020                                     ondansetron (ZOFRAN)   
injection 4 mg  
  
  
  
                                                    Start: 2020  
End: 2020                                     4 mg, Intravenous, ONCE PRN,  
 Nausea,   
Starting Mon 20 at 1041, For 1   
dose Initial antiemetic therapy.   
PACU only  
   
                                                            take 1 tablet by hiro  
th every   
eight hours as needed                   ondansetron (ZOFRAN) 4 mg tablet   
Take 4 mg by mouth every 8 hours as   
needed for nausea/vomiting. Active  
  
  
  
                                                    oseltamivir 75 mg   
oral capsule  
(3 sources)                             Neuraminidase   
Inhibitor                               Start:   
2025  
End: 2025                         take 1   
capsule by   
mouth twice   
daily                                   oseltamivir   
(TAMIFLU) 75 mg   
capsule Take 1   
capsule by mouth   
two times a day for   
5 days. 10 capsule   
2025 Active  
   
                                                    tobramycin 3 mg/ml   
ophthalmic solution  
(3 sources)                             Aminoglycoside   
Antibacterial                           Start:   
05-  
End: 2023                         take 2   
drop(s) into   
the eye(s)   
every six   
hours                                   tobramycin (TOBREX)   
0.3 % ophthalmic   
solution   
Indications: Acute   
bacterial   
conjunctivitis of   
left eye Use 2   
Drops in the left   
eye every 6 hours   
for 7 days. 5 mL 0   
05/10/2023   
2023 Active  
   
                                        Comment on above:   Use 2 Drops in the l  
eft eye every 6 hours for 7 days.   
   
                                                    triamcinolone   
acetonide 1 mg/ml   
topical lotion  
(8 sources)               Corticosteroid            Start:   
2023  
End: 2023                                     triamcinolone   
(KENALOG) 0.1 %   
lotion Apply to   
affected area three   
times daily. 60 mL   
2 2023 Active  
   
                                        Comment on above:   Apply to affected ar  
ea three times daily.   
  
  
  
Completed/Discontinued Medications  
  
  
  
                      Medication Drug Class(es) Dates      Sig (Normalized) Sig   
(Original)  
   
                                                    acetaminophen 325 mg   
/ HYDROcodone   
bitartrate 5 mg oral   
tablet  
(7 sources)               Opioid Agonist            Start:   
2019  
End:   
2022                              take 1 tablet by   
mouth once daily                        HYDROcodone-acetami  
nophen (NORCO)   
5-325 mg per tablet   
Take 1 tablet by   
mouth once daily. 0   
2019   
Discontinued   
(Other)  
  
  
  
                                                            take 1 tablet by hiro  
th every   
eight hours as needed for pain          HYDROcodone-acetaminophen (NORCO) 7.5-32  
5 MG per   
tablet Take 1 tablet by mouth every 8 hours as   
needed for Pain.. 0 Active  
  
  
  
                                        Comment on above:   Take 1 tablet by hiro  
th once daily.   
   
                                                    30 actuat   
aclidinium bromide   
0.4 mg/actuat dry   
powder inhaler  
(2 sources)                                         Start:   
20  
16  
End:   
20  
20                                      take 1 puff(s) by   
inhalation twice   
daily                                   aclidinium (TUDORZA   
PRESSAIR) 400   
MCG/ACT AEPB inhaler   
Indications: Chronic   
obstructive   
pulmonary disease,   
unspecified COPD   
type (HCC) Inhale 1   
puff into the lungs   
2 times daily 1 each   
1 2016   
Discontinued (LIST   
CLEANUP)  
   
                                                    120 actuat   
albuterol 0.1   
mg/actuat /   
ipratropium bromide   
0.02 mg/actuat   
inhalation spray  
(3 sources)                             Anticholinergic,   
beta2-Adrenergic   
Agonist                                 Start:   
02-15-20  
21  
End:   
20  
22                                      take  ug by   
inhalation four   
times daily as   
needed                                  ipratropium 20   
mcg-albuterol 100   
mcg (COMBIVENT   
RESPIMAT)    
mcg/actuation   
inhaler Inhale 1   
Puff as instructed   
four times daily as   
needed. 3 Inhaler 3   
02/15/2021   
2022   
Discontinued (Other)  
   
                                        Comment on above:   Inhale 1 Puff as ins  
tructed four times daily as needed.   
   
                                                    aspirin 81 mg   
delayed release   
oral tablet  
(20 sources)                            Platelet Aggregation   
Inhibitor,   
Nonsteroidal   
Anti-inflammatory   
Drug                                    Start:   
20  
22  
End:   
20  
22                                      take 1 tablet by   
mouth once daily                        aspirin, enteric   
coated (ASPIR-LOW)   
81 mg EC tablet Take   
1 tablet by mouth   
once daily. 30   
tablet 10 06/10/2022   
Active  
  
  
  
                                                    Start: 2014  
End: 2020                         take 1 tablet by mouth once   
daily                                   aspirin 81 MG EC tablet Take 81 mg   
by mouth daily 0 2014 Discontinued (DUPLICATE)  
   
                                                            take 1 tablet by hiro  
th once   
daily                                   aspirin 81 MG tablet Take 81 mg by   
mouth daily 0 Active  
  
  
  
                                        Comment on above:   Take 1 tablet by hiro  
 once daily.   
   
                                                    azithromycin 250 mg   
oral tablet  
(1 source)                              Macrolide   
Antimicrobial                           Start:   
20  
End:   
20                                      take 2 tablets   
by mouth once   
daily, then   
take 1 tablet   
by mouth once   
daily                                   azithromycin   
(ZITHROMAX) 250 mg   
tablet Indications:   
Acute otitis media,   
left Take 2 tablets   
by mouth once daily   
for 1 day, THEN 1   
tablet once daily   
for 4 days. 6 tablet   
2024   
   
                                                    Biotin  
(2 sources)                                           
End:   
20                                      take 1 capsule   
by mouth once                           Biotin 5 MG CAPS   
Take 1 capsule by   
mouth once 0   
2020   
Discontinued (LIST   
CLEANUP)  
  
  
  
                                                      
End: 2020                                     BIOTIN 5000 PO Take by mouth  
 0 2020 Discontinued (LIST   
CLEANUP)  
  
  
  
                                                    brompheniramine   
maleate 0.4 mg/ml   
/   
dextromethorphan   
hydrobromide 2   
mg/ml /   
pseudoephedrine   
hydrochloride 6   
mg/ml oral   
solution  
(4 sources)                             alpha-Adrenergic   
Agonist,   
Uncompetitive   
N-methyl-D-aspartate   
Receptor Antagonist,   
Sigma-1 Agonist                         Start:   
2024  
End:   
2024                              take 10   
mL by   
mouth   
every   
four   
hours   
as   
needed   
for   
cough                                   Brompheniramine-Pseudoeph-DM   
(BROMFED DM) 2-30-10 mg/5 mL   
syrup Indications:   
Rhinosinusitis Take 10 mL by   
mouth every 4 hours as needed   
(COUGH/CONGESTION) for up to   
5 days. 300 mL 0 2024   
   
                                        Comment on above:   Take 10 mL by mouth   
every 4 hours as needed (COUGH/CONGESTION)  
for   
up to 5 days.   
   
                                                    24 hr buPROPion   
hydrochloride 150   
mg extended   
release oral   
tablet  
(1 source)                Aminoketone                 
End:   
2020                              take 1   
tablet   
by   
mouth   
once   
daily   
in the   
morning                                 buPROPion (WELLBUTRIN XL) 150   
MG extended release tablet   
Take 150 mg by mouth every   
morning 0 2020   
Discontinued (LIST CLEANUP)  
   
                                                    busPIRone   
hydrochloride 10   
mg oral tablet  
(20 sources)                                        Start:   
2023  
End:   
2024                              take 1   
tablet   
by   
mouth   
three   
times   
daily                                   busPIRone (BUSPAR) 10 mg   
tablet Take 1 tablet by mouth   
three times daily. 90 tablet   
2 2023   
Discontinued  
   
                                        Comment on above:   Take 1 tablet by [facility] three times daily.   
   
                                                    celecoxib 200 mg   
oral capsule  
(9 sources)                             Nonsteroidal   
Anti-inflammatory   
Drug                                    Start:   
10-  
End:   
2022                              take 1   
capsule   
by   
mouth   
twice   
daily                                   celecoxib (CELEBREX) 200 mg   
capsule Take 1 capsule by   
mouth twice daily. 0   
10/11/2019 2022   
Discontinued (Other)  
  
  
  
                                                      
End: 2020                         take 1 capsule by mouth twice   
daily                                   celecoxib (CELEBREX) 100 MG capsule   
Take 100 mg by mouth 2 times daily 0   
2020 Discontinued (LIST   
CLEANUP)  
  
  
  
                                        Comment on above:   Take 1 capsule by mo  
uth twice daily.   
   
                                                    chlorzoxazone 375 mg   
oral tablet  
(3 sources)               Muscle Relaxant           Start:   
04-15-2  
021  
End:   
                                     take 2 tablets by   
mouth three times   
daily                                   chlorzoxazone 375 mg   
tab take 2 tablets   
by mouth three times   
a day if needed 0   
04/15/2021   
2022   
Discontinued (Other)  
   
                                        Comment on above:   take 2 tablets by mo  
uth three times a day if needed   
   
                                                    clindamycin 10 mg/ml   
topical solution  
(19 sources)                            Lincosamide   
Antibacterial                           Start:   
  
023  
End:   
10-04-2  
023                                                 clindamycin (CLEOCIN   
T) 1 % external   
solution Apply to   
affected area twice   
daily. 30 mL 1   
2023   
10/04/2023   
Discontinued (Other)  
   
                                        Comment on above:   Apply to affected ar  
ea twice daily.   
   
                                                    fluticasone-umeclidin  
-vilanter (TRELEGY   
ELLIPTA) 200-62.5-25   
mcg inhalation powder  
(14 sources)                                        Start:   
  
024  
End:   
                                     take 1 puff(s) by   
inhalation once   
daily                                   fluticasone-umeclidi  
n-vilanter (TRELEGY   
ELLIPTA) 200-62.5-25   
mcg inhalation   
powder Indications:   
Chronic obstructive   
pulmonary disease,   
unspecified COPD   
type (HCC) Inhale 1   
Puff as instructed   
once daily. 1 Each 5   
2024   
Discontinued (Course   
of therapy   
completed)  
  
  
  
                                        Start: 2024   take 1 puff(s) by   
inhalation once daily                   fluticasone-umeclidin-vilanter (TRELEGY   
ELLIPTA) 200-62.5-25 mcg inhalation powder   
Indications: Chronic obstructive pulmonary   
disease, unspecified COPD type (HCC) Inhale   
1 Puff as instructed once daily. 1 Each 5   
2024 Active  
  
  
  
                                                    hydroxychloroquine   
sulfate 200 mg oral   
tablet  
(10 sources)                            Antimalarial,   
Antirheumatic   
Agent                                   Start:   
10-  
End:   
2025                              take 1.5   
tablets by   
mouth once   
daily                                   hydrOXYchloroQUINE   
(PLAQUENIL) 200 mg   
tablet Take 1.5   
tablets by mouth once   
daily. 45 tablet 5   
10/29/2024 2024   
Discontinued (Course   
of therapy completed)  
   
                                                    hydrOXYzine   
hydrochloride 25 mg   
oral tablet  
(20 sources)              Antihistamine             Start:   
2024  
End:   
2024                              take 1-2   
tablets by   
mouth at   
bedtime as   
needed for   
sleep                                   hydrOXYzine HCl   
(ATARAX) 25 mg tablet   
Indications: BENY   
(generalized anxiety   
disorder) , Primary   
insomnia Take 1-2   
tablets by mouth at   
bedtime as needed   
(sleep). 60 tablet 5   
2024   
Discontinued (Other)  
  
  
  
                                                    Start: 2023  
End: 10-                         take 1 tablet by mouth four   
times daily                             hydrOXYzine HCl (ATARAX) 25 mg   
tablet Take 1 tablet by mouth four   
times daily. 120 tablet 1 2023   
10/04/2023 Discontinued (Other)  
  
  
  
                                        Comment on above:   Take 1 tablet by hiro  
th four times daily.   
   
                                                    Lactobacillus   
acidophilus  
(20 sources)                                                    Lactobacillus   
acidophilus (PROBIOTIC   
ORAL) Take by mouth   
once daily. 0 Active  
   
                                        Comment on above:   Take by mouth once d  
aily.   
   
                                                    loratadine 10 mg oral   
tablet  
(20 sources)                                        Start:   
  
End:   
                                       take 1 tablet   
by mouth once   
daily                                   loratadine (CLARITIN)   
10 mg tablet   
Indications:   
Environmental and   
seasonal allergies   
Take 1 tablet by mouth   
once daily. 30 tablet   
5 2024   
Discontinued (Course   
of therapy completed)  
  
  
  
                                                    Start: 01-  
End: 2024                         take 1 tablet by mouth once   
daily                                   loratadine (CLARITIN) 10 mg tablet   
Indications: Environmental and   
seasonal allergies Take 1 tablet by   
mouth once daily. 30 tablet 5   
01/15/2024 2024 Discontinued   
(Course of therapy completed)  
   
                                                    Start: 2022  
End: 2024                         take 1 tablet by mouth once   
daily                                   loratadine (CLARITIN) 10 mg tablet   
Indications: Environmental and   
seasonal allergies Take 1 tablet by   
mouth once daily. 30 tablet 5   
2023 Discontinued  
   
                                                            take 1 tablet by hiro  
th once   
daily                                   loratadine (CLARITIN) 10 MG tablet   
Take 10 mg by mouth daily 0 Active  
   
                                                      
End: 2020                         take 1 capsule by mouth once   
daily                                   loratadine (CLARITIN) 10 MG capsule   
Take 10 mg by mouth daily 0   
2020 Discontinued (DUPLICATE)  
  
  
  
                                        Comment on above:   Take 1 tablet by hiro  
th once daily.   
   
                                                    magnesium oxide 400   
mg oral tablet  
(20 sources)                                          
End:   
2023                              take 1 tablet by   
mouth once daily                        magnesium oxide 400 mg   
magnesium tab Take 1   
tablet by mouth once   
daily. 2023   
Discontinued  
  
  
  
                                                take 1 tablet by mouth twice red  
ly magnesium oxide 400 mg magnesium tab Take 1   
tablet by mouth twice daily. 0 Active  
  
  
  
                                        Comment on above:   Take 1 tablet by hiro  
th twice daily.   
   
                                                            Take 1 tablet by hiro  
th once daily.   
   
                                                    MEDICATION, NON-DATABASE  
(20 sources)                                                    MEDICATION, NON-  
DATABASE   
once daily. estravin 0   
Active  
   
                                        Comment on above:   once daily. estravin  
   
   
                                                    methylPREDNISolone  
(4 sources)               Corticosteroid            Start:   
  
4  
End:   
  
4                                                   methylPREDNISolone   
(MEDROL, ANA,) 4 mg   
Dose-Pack Indications:   
Rhinosinusitis Take as   
instructed per package. 21   
tablet 0 2024   
  
  
  
                                                    Start: 2024  
End: 2024                                     methylPREDNISolone (MEDROL,   
ANA,) 4 mg Dose-Pack Indications:   
Rhinosinusitis Take as instructed per package. 21 tablet 0   
2024 Active  
  
  
  
                                        Comment on above:   Take as instructed p  
er package.   
   
                                                    naproxen sodium 220   
mg oral tablet  
(2 sources)                             Nonsteroidal   
Anti-inflammatory   
Drug                                    Start:   
  
4  
End:   
  
0                                                   naproxen sodium   
(ANAPROX) 220 MG   
tablet Take 220 mg   
by mouth as needed 0   
2014   
Discontinued (Stop   
Taking at Discharge)  
   
                                                    omeprazole 40 mg   
delayed release   
oral capsule  
(20 sources)              Proton Pump Inhibitor     Start:   
  
4  
End:   
01-  
5                                       take 1 capsule   
by mouth twice   
daily                                   omeprazole   
(PRILOSEC) 40 mg   
capsule Take 1   
capsule by mouth two   
times a day. 180   
capsule 3 2024   
Discontinued  
  
  
  
                                                    Start: 2023  
End: 2024                         take 1 capsule by mouth twice   
daily                                   omeprazole (PRILOSEC) 40 mg capsule   
Take 1 capsule by mouth two times a   
day. 180 capsule 3 2024 Active  
   
                                                    Start: 2023  
End: 2023                         take 1 capsule by mouth twice   
daily                                   omeprazole (PRILOSEC) 40 mg capsule   
take 1 capsule by mouth twice a day   
60 capsule 0 2023 Active  
   
                                                    Start: 2022  
End: 2023                         take 1 capsule by mouth twice   
daily                                   omeprazole (PRILOSEC) 40 mg capsule   
Take 1 capsule by mouth twice daily.   
60 capsule 0 2023   
Discontinued  
   
                                        Start: 2016   take 1 capsule by mo  
uth twice   
daily                                   omeprazole (PRILOSEC) 40 MG delayed   
release capsule take 1 capsule by   
mouth twice a day 60 capsule 3   
2016 Active  
   
                                                      
End: 2020                         take 1 capsule by mouth twice   
daily                                   omeprazole (PRILOSEC) 20 MG delayed   
release capsule Take 20 mg by mouth   
2 times daily 0 2020   
Discontinued (LIST CLEANUP)  
  
  
  
                                        Comment on above:   Take 1 capsule by mo  
uth twice daily.   
   
                                                            take 1 capsule by mo  
uth twice a day   
   
                                                            Take 1 capsule by mo  
uth two times a day.   
   
                                                    PARoxetine   
hydrochloride 10 mg   
oral tablet  
(3 sources)                             Serotonin Reuptake   
Inhibitor                               Start:   
  
1  
End:   
  
2                                       take 1 tablet   
by mouth once   
daily                                   PARoxetine (PAXIL) 10   
mg tablet take 1   
tablet by mouth once   
daily 30 tablet 0   
2021   
Discontinued (Other)  
   
                                        Comment on above:   take 1 tablet by [facility] once daily   
   
                                                    perflutren lipid   
microspheres 1.3 mL in   
NaCl (PF) 0.9% 10 mL   
injection (DEFINITY)  
(16 sources)                                        Start:   
  
3  
End:   
10-  
3                                                   perflutren lipid   
microspheres 1.3 mL   
in NaCl (PF) 0.9% 10   
mL injection   
(DEFINITY)  
  
  
  
                                                    Start: 2023  
End: 2024                                     perflutren lipid microsphere  
s 1.3 mL in NaCl (PF) 0.9% 10 mL   
injection (DEFINITY)  
  
  
  
                                                    pregabalin 150 mg   
oral capsule  
(20 sources)                                        Start: 2018  
End: 2020                         take 1 tablet   
by mouth twice   
daily                                   LYRICA 150 mg   
capsule Indications:   
Fibromyalgia Take 1   
tablet by mouth   
twice daily. 3   
2018 Active  
   
                                        Comment on above:   Take 1 tablet by hiro  
 twice daily.   
   
                                                    rOPINIRole 3 mg   
oral tablet  
(4 sources)                             Nonergot   
Dopamine Agonist                        Start: 2023  
End: 2024                         take 1 tablet   
by mouth once   
daily at   
bedtime                                 rOPINIRole   
Hydrochloride 3 mg   
tablet Take 3 mg by   
mouth daily at   
bedtime. 0   
2023   
Discontinued  
   
                                        Comment on above:   Take 3 mg by mouth d  
aily at bedtime.   
   
                                                    125 ml sodium   
chloride 9 mg/ml   
prefilled syringe  
(20 sources)                                        Start: 2023  
End: 2024                                     sodium chloride 0.9   
% (flush) 10 mL (BD   
POSIFLUSH)  
  
  
  
                                Start: 2020                 sodium chlorid  
e flush 0.9 % injection 10 mL  
   
                                Start: 2020                 sodium chlorid  
e flush 0.9 % injection 10 mL  
   
                                Start: 2020                 sodium chlorid  
e flush 0.9 % injection 10 mL  
  
  
  
                                                    tiZANidine 4 mg   
oral tablet  
(7 sources)                             Central alpha-2   
Adrenergic   
Agonist                                 Start: 2018  
End: 2022                         take 2 tablets   
by mouth three   
times daily                             tiZANidine   
(ZANAFLEX) 4 mg   
tablet Take two   
tablets by mouth   
three times daily.   
0 2018   
Discontinued   
(Other)  
  
  
  
                                                            take 1 tablet by hiro  
th every eight hours   
as needed                               tiZANidine (ZANAFLEX) 2 MG tablet Take 2  
 mg   
by mouth every 8 hours as needed 0 Active  
  
  
  
                                        Comment on above:   Take two tablets by   
mouth three times daily.  
   
   
                                                    30 actuat umeclidinium   
0.0625 mg/actuat /   
vilanterol 0.025   
mg/actuat dry powder   
inhaler  
(20 sources)                            Anticholinergic,   
beta2-Adrenergic   
Agonist                                 Start:   
2024  
End:   
2024                              take 1 puff(s)   
by mouth once   
daily                                   ANORO ELLIPTA 62.5-25   
mcg/actuation inhaler   
INHALE 1 PUFF BY   
MOUTH INTO THE LUNGS   
ONCE A DAY 1 Each 11   
2024   
Discontinued  
  
  
  
                                                    Start: 2022  
End: 2024                         take 1 dose by inhalation   
once daily                              umeclidinium-vilanterol (ANORO ELLIPTA)   
62.5-25 mcg/actuation inhaler Inhale 1   
Inhalation as instructed once daily. 1   
Each 5 2023   
Discontinued  
  
  
  
                                        Comment on above:   Inhale 1 Inhalation   
as instructed once daily.   
   
                                                            INHALE 1 PUFF BY HIRO  
TH INTO THE LUNGS ONCE A DAY   
   
                                                    vancomycin 1000 mg   
IVPB in 250 mL D5W   
addavial  
(1 source)                                          Start:   
2020  
End:   
2020                                          vancomycin 1000 mg IVPB in   
250 mL D5W addavial  
   
                                                    vitamin b12 1 mg/ml   
injectable solution  
(20 sources)              Vitamin B12               Start:   
2021  
End:   
2022                                          cyanocobalamin 1,000 mcg/mL   
inject 1 milliliter ( 1000   
MCG ) intramuscularly Every   
Month 1 mL 11 2021 Discontinued  
  
  
  
                                                    Start: 2020  
End: 2022                                     Cyanocobalamin 1,000 mcg sub  
l   
Indications: Vitamin B12 deficiency   
Dissolve 1 tablet under the tongue   
once daily. 90 tablet 3 2020 Discontinued (Other)  
   
                                                      
End: 2024                         take 1 tablet by mouth once   
daily                                   cyanocobalamin (VITAMIN B-12) 100 mcg   
tab Take 100 mcg by mouth once daily.   
2024 Discontinued (Course of   
therapy completed)  
   
                                                      
End: 2020                                     cyanocobalamin 1000 MCG/ML i  
njection   
Inject 1,000 mcg into the muscle   
every 30 days 0 Active  
  
  
  
                                        Comment on above:   Dissolve 1 tablet un  
merlyn the tongue once daily.   
   
                                                            inject 1 milliliter   
( 1000 MCG ) intramuscularly Every Month   
   
                                                            Take 100 mcg by mout  
h once daily.   
  
  
  
Problems  
Active Problems  
  
  
                      Problem Classification Problem    Date       Documented Da  
te Episodic/Chronic  
   
                                                    Adjustment disorders  
(20 sources)                            Adjustment disorder   
with mixed anxiety and   
depressed mood;   
Translations:   
[Adjustment disorder   
with mixed anxiety and   
depressed mood]                         Onset:   
  
8                         2018                Chronic  
   
                                                    Allergic reactions  
(7 sources)                             Allergy status to   
penicillin;   
Translations: [Allergy   
status to other drugs,   
medicaments and   
biological substances   
status]                                 Onset:   
  
9                                                   Episodic  
   
                                                    Anxiety disorders  
(20 sources)                            Anxiety disorder,   
unspecified;   
Translations:   
[Anxiety]                               Onset:   
  
4                         2016                Chronic  
   
                                                    Cancer of cervix  
(20 sources)                            Malignant tumor of   
cervix; Translations:   
[Malignant neoplasm of   
cervix uteri,   
unspecified]                            2021          Chronic  
   
                                                    Chronic obstructive   
pulmonary disease and   
bronchiectasis  
(20 sources)                            Chronic obstructive   
lung disease;   
Translations: [Mild   
chronic obstructive   
pulmonary disease]                      Onset:   
  
4                         2016                Chronic  
   
                                                    Conditions associated   
with dizziness or   
vertigo  
(20 sources)                            Vertigo; Translations:   
[Dizziness and   
giddiness]                              Onset:   
  
3                         2023                Episodic  
   
                                                    Diabetes mellitus   
without complication  
(20 sources)                            Type 2 diabetes   
mellitus controlled by   
diet; Translations:   
[Type 2 diabetes   
mellitus without   
complications]                          Onset:   
10-  
6                         2019                Chronic  
   
                                                    Disorders of lipid   
metabolism  
(20 sources)                            Hyperlipidemia,   
unspecified;   
Translations:   
[Hyperlipidemia]                        Onset:   
  
4                         2016                Chronic  
   
                                                    Esophageal disorders  
(20 sources)                            Gastroesophageal   
reflux disease without   
esophagitis;   
Translations:   
[Gastro-esophageal   
reflux disease without   
esophagitis]                            Onset:   
  
5                         2015                Chronic  
   
                                                    Essential hypertension  
(20 sources)                            Essential   
hypertension;   
Translations:   
[Essential (primary)   
hypertension]                           Onset:   
  
5                         2015                Chronic  
   
                                                    Inflammation;   
infection of eye   
(except that caused by   
tuberculosis or   
sexually   
transmitteddisease)  
(1 source)                              Acute infectious   
conjunctivitis;   
Translations:   
[Unspecified acute   
conjunctivitis, left   
eye]                                                        Episodic  
   
                                                    Influenza  
(8 sources)                             Influenza due to   
Influenza A virus;   
Translations:   
[Influenza due to   
other identified   
influenza virus with   
other respiratory   
manifestations]                         Onset:   
  
5                         2025                Episodic  
   
                                                    Menopausal disorders  
(1 source)                              Menopausal flushing;   
Translations:   
[Menopausal and female   
climacteric states]                                         Chronic  
   
                                                    Miscellaneous mental   
health disorders  
(20 sources)                            Chronic insomnia;   
Translations:   
[Psychophysiologic   
insomnia]                               Onset:   
10-  
3                         10-                Chronic  
   
                                                    Mood disorders  
(20 sources)                            Depressive disorder;   
Translations:   
[Depression]                            Onset:   
10-  
6                         10-                Chronic  
   
                                                    Mood disorders  
(2 sources)                             Major depressive   
disorder, single   
episode, unspecified;   
Translations: [Major   
depressive disorder,   
single episode,   
unspecified]                            Onset:   
  
9                                                     
   
                                                    Nonmalignant breast   
conditions  
(20 sources)                            Fibroadenosis of   
breast; Translations:   
[Fibroadenosis of   
unspecified breast]                     Onset:   
  
9                         2014                Chronic  
   
                                                    Nonspecific chest pain  
(20 sources)                            Precordial pain;   
Translations: [Other   
chest pain]                             Onset:   
  
4                                                   Episodic  
   
                                                    Nutritional   
deficiencies  
(1 source)                              Vitamin D deficiency,   
unspecified;   
Translations: [Vitamin   
D deficiency]                           Onset:   
  
5                                                   Chronic  
   
                                                    Osteoarthritis  
(5 sources)                             Arthritis;   
Translations:   
[Unspecified   
osteoarthritis,   
unspecified site]                       Onset:   
  
4                         2024                Chronic  
   
                                                    Other aftercare  
(2 sources)                             Long term (current)   
use of aspirin;   
Translations: [Long   
term (current) use of   
aspirin]                                Onset:   
  
9                                                   Episodic  
   
                                                    Other aftercare  
(2 sources)                             Long term (current)   
use of non-steroidal   
anti-inflammatories   
(NSAID); Translations:   
[Long term (current)   
use of non-steroidal   
non-inflam (NSAID)]                     Onset:   
  
9                                                   Episodic  
   
                                                    Other circulatory   
disease  
(2 sources)                             Raynaud's syndrome   
without gangrene;   
Translations:   
[Raynaud's syndrome   
without gangrene]                       Onset:   
  
9                                                   Chronic  
   
                                                    Other circulatory   
disease  
(20 sources)                            Raynaud's disease;   
Translations:   
[Raynaud's syndrome   
without gangrene]                       2016          Chronic  
   
                                                    Other congenital   
anomalies  
(20 sources)                            Facial asymmetry;   
Translations:   
[Congenital facial   
asymmetry]                              Onset:   
  
3                         2023                Chronic  
   
                                                    Other connective   
tissue disease  
(2 sources)                             Fibromyalgia;   
Translations:   
[Fibromyalgia]                          Onset:   
  
9                                                   Episodic  
   
                                                    Other connective   
tissue disease  
(2 sources)                             Pain in right hand;   
Translations: [Pain in   
right hand]                             Onset:   
  
9                                                   Episodic  
   
                                                    Other connective   
tissue disease  
(20 sources)                            Fibromyalgia;   
Translations:   
[Fibromyalgia]                          2016          Episodic  
   
                                                    Other gastrointestinal   
disorders  
(1 source)                              Oral phase dysphagia;   
Translations:   
[Dysphagia, oral   
phase]                                                      Episodic  
   
                                                    Other gastrointestinal   
disorders  
(1 source)                              Acute diarrhea;   
Translations:   
[Diarrhea,   
unspecified]                            2024          Episodic  
   
                                                    Other hereditary and   
degenerative nervous   
system conditions  
(2 sources)                             Restless legs   
syndrome;   
Translations:   
[Restless legs   
syndrome]                               Onset:   
  
9                                                   Chronic  
   
                                                    Other hereditary and   
degenerative nervous   
system conditions  
(20 sources)                            Restless legs;   
Translations:   
[Restless legs   
syndrome]                               2016          Chronic  
   
                                                    Other infections;   
including parasitic  
(1 source)                              Recurrent infectious   
disease; Translations:   
[Unspecified   
infectious disease]                     2024          Episodic  
   
                                                    Other nervous system   
disorders  
(2 sources)                             Complex regional pain   
syndrome I of lower   
limb, bilateral;   
Translations: [Complex   
regional pain syndrome   
I of lower limb,   
bilateral]                              Onset:   
  
9                                                   Chronic  
   
                                                    Other nervous system   
disorders  
(20 sources)                            Complex regional pain   
syndrome of lower   
limb; Translations:   
[Complex regional pain   
syndrome I of   
unspecified lower   
limb]                                   Onset:   
  
5                         2021                Chronic  
   
                                                    Other nervous system   
disorders  
(16 sources)                            Chronic pain syndrome;   
Translations: [Chronic   
pain syndrome]                          Onset:   
12-  
4                         12-                Chronic  
   
                                                    Other nervous system   
disorders  
(2 sources)                             Other chronic pain;   
Translations: [Chronic   
ankle pain, bilateral]                  Onset:   
  
4                                                   Chronic  
   
                                                    Other nutritional;   
endocrine; and   
metabolic disorders  
(1 source)                              Abnormal weight gain;   
Translations:   
[Abnormal weight gain]                                         Episodic  
   
                                                    Other nutritional;   
endocrine; and   
metabolic disorders  
(1 source)                              Weight gain;   
Translations:   
[Abnormal weight gain]                                         Episodic  
   
                                                    Other nutritional;   
endocrine; and   
metabolic disorders  
(1 source)                              Unintentional weight   
loss; Translations:   
[Abnormal weight loss]                     09-          Episodic  
   
                                                    Other upper   
respiratory disease  
(20 sources)                            Allergic disposition;   
Translations: [Other   
allergic rhinitis]                      Onset:   
  
8                         2018                Chronic  
   
                                                    Other upper   
respiratory disease  
(1 source)                              Allergic rhinitis due   
to pollen;   
Translations:   
[Allergic rhinitis due   
to pollen]                              2024          Chronic  
   
                                                    Other upper   
respiratory disease  
(2 sources)                             Allergic rhinitis due   
to pollen;   
Translations:   
[Seasonal allergic   
rhinitis due to   
pollen]                                 Onset:   
  
4                                                   Chronic  
   
                                                    Other upper   
respiratory disease  
(1 source)                              Other seasonal   
allergic rhinitis;   
Translations:   
[Seasonal allergies]                    Onset:   
  
4                                                   Chronic  
   
                                                    Other upper   
respiratory infections  
(4 sources)                             Chronic sinusitis,   
unspecified;   
Translations:   
[Unspecified sinusitis   
(chronic)]                              Onset:   
  
4                         2024                Chronic  
   
                                                    Other upper   
respiratory infections  
(3 sources)                             Viral upper   
respiratory tract   
infection;   
Translations: [Acute   
upper respiratory   
infection,   
unspecified]                                                Episodic  
   
                                                    Residual codes;   
unclassified  
(4 sources)                             Tobacco user;   
Translations: [Tobacco   
abuse disorder]                         Onset:   
  
6                         2016                Chronic  
   
                                                    Residual codes;   
unclassified  
(2 sources)                             Acquired absence of   
both cervix and   
uterus; Translations:   
[Acquired absence of   
both cervix and   
uterus]                                 Onset:   
  
9                                                   Episodic  
   
                                                    Residual codes;   
unclassified  
(1 source)                              Nicotine-filled   
electronic cigarette   
user; Translations:   
[Tobacco use]                                               Episodic  
   
                                                    Residual codes;   
unclassified  
(1 source)                              Tobacco user;   
Translations: [Tobacco   
use]                                    10-          Episodic  
   
                                                    Residual codes;   
unclassified  
(1 source)                              Treatment not   
available;   
Translations:   
[Procedure and   
treatment not carried   
out for other reasons]                     2024          Episodic  
   
                                                    Residual codes;   
unclassified  
(3 sources)                             Past history of   
procedure;   
Translations:   
[Personal history of   
other specified   
conditions]                             2024          Episodic  
   
                                                    Rheumatoid arthritis   
and related disease  
(20 sources)                            Other specified   
rheumatoid arthritis,   
right hand;   
Translations:   
[Rheumatoid arthritis]                  Onset:   
  
9                         2016                Chronic  
   
                                                    Spondylosis;   
intervertebral disc   
disorders; other back   
problems  
(20 sources)                            Other intervertebral   
disc degeneration,   
lumbar region;   
Translations:   
[Degeneration of   
lumbar intervertebral   
disc]                                   Onset:   
  
6                         2016                Chronic  
   
                                                    Substance-related   
disorders  
(20 sources)                            Nicotine dependence,   
unspecified,   
uncomplicated;   
Translations: [Smoker]                  Onset:   
  
4                         2015                Chronic  
   
                                                    Thyroid disorders  
(20 sources)                            Thyroid nodule;   
Translations:   
[Nontoxic single   
thyroid nodule]                         Onset:   
  
2                                                   Chronic  
   
                                                    Unclassified  
(20 sources)              NO SHOW                   Onset:   
  
7                         2021                  
  
  
Past or Other Problems  
  
  
                      Problem Classification Problem    Date       Documented Da  
te Episodic/Chronic  
   
                                                    Cardiac dysrhythmias  
(20 sources)                            Tachycardia;   
Translations:   
[Tachycardia,   
unspecified]                            Onset:   
  
4                         2021                Episodic  
   
                                                    Chronic obstructive   
pulmonary disease and   
bronchiectasis  
(1 source)                              Bronchitis, not   
specified as acute or   
chronic; Translations:   
[Sinobronchitis]                        Onset:   
  
4                                                   Episodic  
   
                                                    Immunizations and   
screening for   
infectious disease  
(2 sources)                             Encounter for   
immunization;   
Translations:   
[Encounter for   
screening for human   
immunodeficiency virus   
[HIV]]                                  Onset:   
  
4                                                   Episodic  
   
                                                    Malaise and fatigue  
(20 sources)                            Fatigue; Translations:   
[Other fatigue]                         Onset:   
  
5                         2015                Episodic  
   
                                                    Neoplasms of   
unspecified nature or   
uncertain behavior  
(20 sources)                            Neoplasm of uncertain   
behavior of neck;   
Translations:   
[Neoplasm of uncertain   
behavior of other   
specified sites]                        Onset:   
10-  
6                         10-                Episodic  
   
                                                    Nonmalignant breast   
conditions  
(20 sources)                            Breast lump;   
Translations:   
[Unspecified lump in   
unspecified breast]                     Onset:   
  
9                         2014                Episodic  
   
                                                    Nutritional   
deficiencies  
(20 sources)                            Cobalamin deficiency;   
Translations:   
[Deficiency of other   
specified B group   
vitamins]                               Onset:   
  
4                                                   Episodic  
   
                                                    Other connective   
tissue disease  
(20 sources)                            Chronic pain of left   
foot; Translations:   
[Pain in left foot]                     Onset:   
  
4                         2024                Episodic  
   
                                                    Other connective   
tissue disease  
(1 source)                              Pain in left foot;   
Translations: [Chronic   
foot pain, left]                        Onset:   
  
4                                                   Episodic  
   
                                                    Other ear and sense   
organ disorders  
(20 sources)                            Bilateral tinnitus;   
Translations:   
[Tinnitus, bilateral]                   Onset:   
  
3                         2023                Episodic  
   
                                                    Other gastrointestinal   
disorders  
(20 sources)                            Dysphagia;   
Translations:   
[Dysphagia,   
unspecified]                            Onset:   
  
4                         2014                Episodic  
   
                                                    Other gastrointestinal   
disorders  
(20 sources)                            Constipation;   
Translations:   
[Constipation,   
unspecified]                            Onset:   
  
5                         2015                Episodic  
   
                                                    Other gastrointestinal   
disorders  
(1 source)                              Dysphagia,   
unspecified;   
Translations:   
[Dysphagia,   
unspecified type]                       Onset:   
  
4                                                   Episodic  
   
                                                    Other infections;   
including parasitic  
(2 sources)                             Unspecified infectious   
disease; Translations:   
[Recurrent infections]                  Onset:   
  
4                                                   Episodic  
   
                                                    Other lower   
respiratory disease  
(20 sources)                            Snoring; Translations:   
[Snoring]                               Onset:   
  
3                                                   Episodic  
   
                                                    Other lower   
respiratory disease  
(20 sources)                            Dyspnea; Translations:   
[Shortness of breath]                   Onset:   
  
3                         2023                Episodic  
   
                                                    Other lower   
respiratory disease  
(1 source)                              Shortness of breath;   
Translations: [SOB   
(shortness of breath)]                  Onset:   
  
3                                                   Episodic  
   
                                                    Other non-traumatic   
joint disorders  
(20 sources)                            Ankle pain;   
Translations: [Pain in   
right ankle and joints   
of right foot]                          Onset:   
  
4                         2024                Episodic  
   
                                                    Other non-traumatic   
joint disorders  
(20 sources)                            Multiple joint pain;   
Translations: [Pain in   
unspecified joint]                      Onset:   
  
4                         2024                Episodic  
   
                                                    Other non-traumatic   
joint disorders  
(2 sources)                             Pain in unspecified   
joint; Translations:   
[Multiple joint pain]                   Onset:   
  
4                                                   Episodic  
   
                                                    Other non-traumatic   
joint disorders  
(1 source)                              Pain in right ankle   
and joints of right   
foot; Translations:   
[Chronic ankle pain,   
bilateral]                              Onset:   
  
4                                                   Episodic  
   
                                                    Other non-traumatic   
joint disorders  
(1 source)                              Pain in left ankle and   
joints of left foot;   
Translations: [Chronic   
ankle pain, bilateral]                  Onset:   
  
4                                                   Episodic  
   
                                                    Other screening for   
suspected conditions   
(not mental disorders   
or infectious disease)  
(20 sources)                            Patient encounter   
status; Translations:   
[Encounter for   
screening mammogram   
for malignant neoplasm   
of breast]                              Onset:   
  
4                                                   Episodic  
   
                                                    Other skin disorders  
(20 sources)                            Mass of neck;   
Translations:   
[Localized swelling,   
mass and lump, neck]                    Onset:   
  
4                         2014                Episodic  
   
                                                    Other skin disorders  
(20 sources)                            Loss of hair;   
Translations:   
[Nonscarring hair   
loss, unspecified]                      Onset:   
  
3                                                   Episodic  
   
                                                    Other skin disorders  
(20 sources)                            Folliculitis;   
Translations:   
[Follicular disorder,   
unspecified]                            Onset:   
  
3                                                   Episodic  
   
                                                    Other upper   
respiratory disease  
(20 sources)                            Hoarse; Translations:   
[Dysphonia]                             Onset:   
  
5                         2015                Episodic  
   
                                                    Otitis media and   
related conditions  
(19 sources)                            Acute suppurative   
otitis media without   
spontaneous rupture of   
ear drum;   
Translations: [Acute   
suppurative otitis   
media without   
spontaneous rupture of   
ear drum, left ear]                     Onset:   
  
4                         2024                Episodic  
   
                                                    Residual codes;   
unclassified  
(20 sources)                            Pain; Translations:   
[Pain, unspecified]                     Onset:   
  
3                         2023                Episodic  
   
                                                    Residual codes;   
unclassified  
(20 sources)                            Menopause present;   
Translations:   
[Asymptomatic   
menopausal state]                       Onset:   
  
2                                                   Episodic  
   
                                                    Residual codes;   
unclassified  
(20 sources)                            H/O: breast problem;   
Translations:   
[Personal history of   
other specified   
conditions]                             Onset:   
  
4                         2024                Episodic  
   
                                                    Residual codes;   
unclassified  
(1 source)                              Personal history of   
other specified   
conditions;   
Translations: [History   
of abnormal mammogram]                  Onset:   
  
4                                                   Episodic  
   
                                                    Spondylosis;   
intervertebral disc   
disorders; other back   
problems  
(20 sources)                            Lumbago with sciatica;   
Translations: [Lumbago   
with sciatica, left   
side]                                   Onset:   
  
1                         2021                Episodic  
  
  
  
Results  
  
  
                          Test Name    Value        Interpretation Reference   
Range                                   Facility  
   
                                                    [facility] 2025   
   
                      [facility] 2025   
   
                      [facility]  
   
                                                    CNOVon 2025   
   
                      CNOV                  [facility] 2025   
   
                      [facility]  
   
                                                    CNPTOUTREACHon 2025   
   
                      [facility]  
   
                                                    CBC W Auto Differential pane  
l (Bld)on 2025   
   
                                                    Basophils (Bld)   
[#/Vol]         0.06 10*3/uL    Normal          <0.11           [facility]  
   
                                        Comment on above:   Order Comment: Speci  
men Type: BLOOD SPECIMENOrdering Facility:  
   
[facility] Address: [address] [place] [address]   
   
                                                            Performed By: #### 5  
7021-8 ####[facility] LABORATORYCLIA   
[id number] [place] [address] [place]   
[place]   
   
                                                    Basophils/100 WBC   
(Bld)           0.6 %           Normal                          [facility]  
   
                                        Comment on above:   Order Comment: Speci  
men Type: BLOOD SPECIMENOrdering Facility:  
   
[facility] Address: [address] [place] [address]   
   
                                                            Performed By: #### 5  
7021-8 ####[facility] LABORATORYCLIA   
[id number] [place] [address] [place]   
[place]   
   
                                                    Differential cell   
count method Nom   
(Bld)           Auto            Normal                          [facility]  
   
                                        Comment on above:   Order Comment: Speci  
men Type: BLOOD SPECIMENOrdering Facility:  
   
[facility] Address: [address] [place] [address]   
   
                                                            Performed By: #### 5  
7021-8 ####[facility] LABORATORYCLIA   
[id number] [place] [address] [place]   
   
                                                    Eosinophils (Bld)   
[#/Vol]         0.05 10*3/uL    Normal          <0.46           [facility]  
   
                                        Comment on above:   Order Comment: Speci  
men Type: BLOOD SPECIMENOrdering Facility:  
   
[facility] Address: [address] [place] [address]   
   
                                                            Performed By: #### 5  
7021-8 ####[facility] LABORATORYCLIA   
[id number] [place] [address] [place]   
[place]   
   
                                                    Eosinophils/100 WBC   
(Bld)           0.5 %           Normal                          [facility]  
   
                                        Comment on above:   Order Comment: Speci  
men Type: BLOOD SPECIMENOrdering Facility:  
   
[facility] Address: [address] [place] [address]   
   
                                                            Performed By: #### 5  
7021-8 ####[facility] LABORATORYCLIA   
[id number] [address] [place]   
[place]   
   
                                                    Erythrocyte   
distribution width   
(RBC) [Ratio]   13.6 %          Normal          11.5-15.0       [facility]  
   
                                        Comment on above:   Order Comment: Speci  
men Type: BLOOD SPECIMENOrdering Facility:  
   
[facility] Address: [address] [place] [address]   
   
                                                            Performed By: #### 5  
7021-8 ####[facility] LABORATORYCLIA   
[id number] [place] [address] [place]   
[place]   
   
                                                    Hematocrit (Bld)   
[Volume fraction] 42.4 %          Normal          36.0-46.0       [facility]  
   
                                        Comment on above:   Order Comment: Speci  
men Type: BLOOD SPECIMENOrdering Facility:  
  
[facility] Address: [address] [place] [address]   
   
                                                            Performed By: #### 5  
7021-8 ####[facility] LABORATORYCLIA   
[id number] [place] [id number] [place]   
[place]   
   
                                                    Hemoglobin (Bld)   
[Mass/Vol]      14.0 g/dL       Normal          11.5-15.5       [facility]  
   
                                        Comment on above:   Order Comment: Speci  
men Type: BLOOD SPECIMENOrdering Facility:  
   
[facility] Address: [address] [place] [address]   
   
                                                            Performed By: #### 5  
7021-8 ####[facility] LABORATORYCLIA   
[id number] [place] [address] [place]   
   
                                                    Immature   
granulocytes (Bld)   
[#/Vol]         0.03 10*3/uL    Normal          <0.10           [facility]  
   
                                        Comment on above:   Order Comment: Speci  
men Type: BLOOD SPECIMENOrdering Facility:  
   
[facility] Address: [address] [place] [address]   
   
                                                            Performed By: #### 5  
7021-8 ####[facility] LABORATORYCLIA   
[id number] [address]   
   
                                                    Immature   
granulocytes/100 WBC   
(Bld)           0.3 %           Normal                          [facility]  
   
                                        Comment on above:   Order Comment: Speci  
men Type: BLOOD SPECIMENOrdering Facility:  
   
[facility] Address: [address] [place] [address]   
   
                                                            Performed By: #### 5  
7021-8 ####[facility] LABORATORYCLIA   
[id number] [address] [place]   
[place]   
   
                                                    Lymphocytes (Bld)   
[#/Vol]         1.13 10*3/uL    Normal          1.00-4.00       [facility]  
   
                                        Comment on above:   Order Comment: Speci  
men Type: BLOOD SPECIMENOrdering Facility:  
   
[facility] Address: [address] [place] [address]   
   
                                                            Performed By: #### 5  
7021-8 ####[facility] LABORATORYCLIA   
[id number] [place] [address] [place]   
   
                                                    Lymphocytes/100 WBC   
(Bld)           11.9 %          Normal                          [facility]  
   
                                        Comment on above:   Order Comment: Speci  
men Type: BLOOD SPECIMENOrdering Facility:  
   
[facility] Address: [address] [place] [address]   
   
                                                            Performed By: #### 5  
7021-8 ####[facility] LABORATORYCLIA   
[id number] [place] [id number] [place]   
[place]   
   
                                                    MCH (RBC) [Entitic   
mass]           29.7 pg         Normal          26.0-34.0       [facility]  
   
                                        Comment on above:   Order Comment: Speci  
men Type: BLOOD SPECIMENOrdering Facility:  
  
[facility] Address: [address] [place] [address]   
   
                                                            Performed By: #### 5  
7021-8 ####[facility] LABORATORYCLIA   
[id number] [address] [place]   
[place]   
   
                                                    MCHC (RBC)   
[Mass/Vol]      33.0 g/dL       Normal          30.5-36.0       [facility]  
   
                                        Comment on above:   Order Comment: Speci  
men Type: BLOOD SPECIMENOrdering Facility:  
   
[facility] Address: [address] [place] [address]   
   
                                                            Performed By: #### 5  
7021-8 ####[facility] LABORATORYCLIA   
[id number] [place] [address] [place]   
   
                                                    MCV (RBC) [Entitic   
vol]            89.8 fL         Normal          80.0-100.0      [facility]  
   
                                        Comment on above:   Order Comment: Speci  
men Type: BLOOD SPECIMENOrdering Facility:  
  
[facility] Address: [address] [place] [address]   
   
                                                            Performed By: #### 5  
7021-8 ####[facility] LABORATORYCLIA   
[id number] [place] [address] [place]   
   
                                                    Monocytes (Bld)   
[#/Vol]         0.58 10*3/uL    Normal          <0.87           [facility]  
   
                                        Comment on above:   Order Comment: Speci  
men Type: BLOOD SPECIMENOrdering Facility:  
   
[facility] Address: [address] [place] [address]   
   
                                                            Performed By: #### 5  
7021-8 ####[facility] LABORATORYCLIA   
[id number] [address]   
[place]   
   
                                                    Monocytes/100 WBC   
(Bld)           6.1 %           Normal                          [facility]  
   
                                        Comment on above:   Order Comment: Speci  
men Type: BLOOD SPECIMENOrdering Facility:  
   
[facility] Address: [address] [place] [address]   
   
                                                            Performed By: #### 5  
7021-8 ####[facility] LABORATORYCLIA   
[id number] [place] [address] [place]   
   
                                                    Neutrophils (Bld)   
[#/Vol]         7.62 10*3/uL    High            1.45-7.50       [facility]  
   
                                        Comment on above:   Order Comment: Speci  
men Type: BLOOD SPECIMENOrdering Facility:  
   
[facility] Address: [address] [place] [address]   
   
                                                            Performed By: #### 5  
7021-8 ####[facility] LABORATORYCLIA   
[id number] [place] [address] [place]   
   
                                                    Neutrophils/100 WBC   
(Bld)           80.6 %          Normal                          [facility]  
   
                                        Comment on above:   Order Comment: Speci  
men Type: BLOOD SPECIMENOrdering Facility:  
   
[facility] Address: [address] [place] [address]   
   
                                                            Performed By: #### 5  
7021-8 ####[facility] LABORATORYCLIA   
[id number] [place] [address] [place]   
   
                                                    Nucleated RBC (Bld)   
[#/Vol]         10*3/uL         Normal          <0.01           [facility]  
   
                                        Comment on above:   Order Comment: Speci  
men Type: BLOOD SPECIMENOrdering Facility:  
   
[facility] Address: [address] [place] [address]   
   
                                                            Performed By: #### 5  
7021-8 ####[facility] LABORATORYCLIA   
[id number] [place] [address] [place]   
   
                                                    Nucleated RBC/100   
WBC (Bld) [Ratio] 0.0 /100 WBC    Normal                          [facility]  
   
                                        Comment on above:   Order Comment: Speci  
men Type: BLOOD SPECIMENOrdering Facility:  
   
[facility] Address: [address] [place] [address]   
   
                                                            Performed By: #### 5  
7021-8 ####[facility] LABORATORYCLIA   
[id number] [place] [address] [place]   
   
                                                    Platelet mean volume   
(Bld) [Entitic vol] 10.6 fL         Normal          9.0-12.7        [facility]  
   
                                        Comment on above:   Order Comment: Speci  
men Type: BLOOD SPECIMENOrdering Facility:  
  
[facility] Address: [address] [place] [address]   
   
                                                            Performed By: #### 5  
7021-8 ####[facility] LABORATORYCLIA   
[id number] [place] [id number] [place] [facility] OF   
[place]   
   
                                                    Platelets (Bld)   
[#/Vol]         341 10*3/uL     Normal          150-400         [facility]  
   
                                        Comment on above:   Order Comment: Speci  
men Type: BLOOD SPECIMENOrdering Facility:  
   
[facility] Address: [address] [place] [address]   
   
                                                            Performed By: #### 5  
7021-8 ####[facility] LABORATORYCLIA   
[id number] [place] [address] [place] [facility] OF   
[place]   
   
                      RBC (Bld) [#/Vol] 4.72 10*6/uL Normal     3.90-5.20  [facility]  
   
                                        Comment on above:   Order Comment: Speci  
men Type: BLOOD SPECIMENOrdering Facility:  
   
[facility] Address: [address] [place] [address]   
   
                                                            Performed By: #### 5  
7021-8 ####[facility] LABORATORYCLIA   
[id number] [address]   
   
                      WBC (Bld) [#/Vol] 9.47 10*3/uL Normal     3.70-11.00 [facility]  
   
                                        Comment on above:   Order Comment: Speci  
men Type: BLOOD SPECIMENOrdering Facility:  
   
[facility] Address: [address] [place] [address]   
   
                                                            Performed By: #### 5  
7021-8 ####[facility] LABORATORYCLIA   
[id number] [place] [id number] [facility] OF   
[place]   
   
                                                    CNOVon 2025   
   
                      CNOV                  Normal                [facility]  
   
                                                    Comprehensive metabolic 2000  
 panelon 2025   
   
                      Albumin [Mass/Vol] 4.3 g/dL   Normal     3.2-5.0    [facility]  
   
                                        Comment on above:   Order Comment: Speci  
men Type: BLOOD SPECIMENOrdering Facility:  
   
[facility] Address: [address] [place] [address]   
   
                                                            Performed By: #### 2  
4323-8, 32572-6, [id number] ####[facility] LABORATORYCLIA [id number] [place]   
[address] [place]   
   
                                                    ALP [Catalytic   
activity/Vol]   81 U/L          Normal                    [facility]  
   
                                        Comment on above:   Order Comment: Speci  
men Type: BLOOD SPECIMENOrdering Facility:  
  
[facility] Address: [address] [place] [address]   
   
                                                            Performed By: #### 2  
4323-8, , [id number] ####[facility] LABORATORYCLIA [id number] [place] [id number] [place]   
   
                                                    ALT [Catalytic   
activity/Vol]   16 U/L          Normal          13-61           [facility]  
   
                                        Comment on above:   Order Comment: Speci  
men Type: BLOOD SPECIMENOrdering Facility:  
  
[facility] Address: [address] [place] [address]   
   
                                                            Result Comment: Resu  
lts may be falsely depressed after the   
administration of Sulfasalazine and/or Sulfapyridine.   
   
                                                            Performed By: #### 2  
4323-8, , [id number] ####[facility] LABORATORYCLIA [id number] [address] [place] [facility]   
   
                                                    Anion gap   
[Moles/Vol]     6 mmol/L        Normal          5-16            [facility]  
   
                                        Comment on above:   Order Comment: Speci  
men Type: BLOOD SPECIMENOrdering Facility:  
   
[facility] Address: [address] [place] [address]   
   
                                                            Performed By: #### 2  
4323-8, , [id number] ####[facility] LABORATORYCLIA [id number] [place] [id number] [place]   
   
                                                    AST [Catalytic   
activity/Vol]   15 U/L          Normal          8-34            [facility]  
   
                                        Comment on above:   Order Comment: Speci  
men Type: BLOOD SPECIMENOrdering Facility:  
  
[facility] Address: [address] [place] [address]   
   
                                                            Result Comment: Resu  
lts may be falsely depressed after the   
administration of Sulfasalazine and/or Sulfapyridine.   
   
                                                            Performed By: #### 2  
4323-8, , [id number] ####[facility] LABORATORYCLIA [id number] [place] [id number] [place]   
   
                      Bilirubin [Mass/Vol] 1.2 mg/dL  High       0.2-1.0    [facility]  
   
                                        Comment on above:   Order Comment: Speci  
men Type: BLOOD SPECIMENOrdering Facility:  
   
[facility] Address: [address] [place] [address]   
   
                                                            Performed By: #### 2  
4323-8, , [id number] ####[facility] LABORATORYCLIA [id number] [place] [id number] [place]   
   
                      Calcium [Mass/Vol] 10.1 mg/dL Normal     8.5-10.5   [facility]  
   
                                        Comment on above:   Order Comment: Speci  
men Type: BLOOD SPECIMENOrdering Facility:  
   
[facility] Address: [address] [place], [place] [address] [id number]   
   
                                                            Performed By: #### 2  
4323-8, , [id number] ####[facility] LABORATORYCLIA [id number] [place] [address] [place]   
   
                      Chloride [Moles/Vol] 107 mmol/L Normal          [facility]  
   
                                        Comment on above:   Order Comment: Speci  
men Type: BLOOD SPECIMENOrdering Facility:  
   
[facility] Address: [address] [place] [address]   
   
                                                            Performed By: #### 2  
4323-8, , [id number] ####[facility] LABORATORYCLIA [id number] [place] [id number] [place]   
   
                      CO2 [Moles/Vol] 26 mmol/L  Normal     21-32      [facility]  
   
                                        Comment on above:   Order Comment: Speci  
men Type: BLOOD SPECIMENOrdering Facility:  
   
[facility] Address: [address] [place] [address]   
   
                                                            Performed By: #### 2  
4323-8, , [id number] ####[facility] LABORATORYCLIA [id number] [place] [id number] [place]   
   
                                                    Creatinine   
[Mass/Vol]      0.78 mg/dL      Normal          0.51-0.95       [facility]  
   
                                        Comment on above:   Order Comment: Speci  
men Type: BLOOD SPECIMENOrdering Facility:  
   
[facility] Address: [address] [place] [address]   
   
                                                            Result Comment: Anna  
ents receiving either N-Acetylcysteine (NAC)   
or Metamizole prior to venipuncture, may have falsely depressed   
results.   
   
                                                            Performed By: #### 2  
4323-8, 09355-9, [id number] ####[facility] LABORATORYCLIA [id number] [place] [id number] [place]   
   
                                                    Creatinine and   
Glomerular   
filtration   
rate.predicted panel   
(S/P/Bld)       95 mL/min/1.73m??? Normal          >=60            [facility]  
   
                                        Comment on above:   Order Comment: Speci  
men Type: BLOOD SPECIMENOrdering Facility:  
   
[facility] Address: [address] [place] [address]   
   
                                                            Result Comment: Erika  
mated Glomerular Filtration Rate (eGFR) is   
calculated using the  CKD-EPI creatinine equation. This   
equation utilizes serum creatinine, sex, and age as parameters.   
The creatinine assay has traceable calibration to isotope   
dilution-mass spectrometry. Refer to KDIGO guidelines for   
clinical interpretation. In patients with unstable renal   
function, e.g. those with acute kidney injury, the eGFR may not   
accurately reflect actual GFR.   
   
                                                            Performed By: #### 2  
4323-8, 12929-6, [id number] ####[facility] LABORATORYCLIA [id number] [place] [id number] [place]   
   
                      Glucose [Mass/Vol] 98 mg/dL   Normal          [facility]  
   
                                        Comment on above:   Order Comment: Speci  
men Type: BLOOD SPECIMENOrdering Facility:  
   
[facility] Address: [address] [place] [id number]   
   
                                                            Result Comment: The   
American Diabetes Association (ADA) provides   
guidance for cutoff values for fasting glucose and random   
glucose. The ADA defines fasting as no caloric intake for at   
least 8 hours. Fasting plasma glucose results between 100 to 125   
mg/dL indicate increased risk for diabetes (prediabetes).Fasting   
plasma glucose results greater than or equal to 126 mg/dL meet   
the criteria for diagnosis of diabetes. In the absence of   
unequivocal hyperglycemia, results should be confirmed by repeat   
testing. In a patient with classic symptoms of hyperglycemia or   
hyperglycemic crisis, random plasma glucose results greater than   
or equal to 200 mg/dL meet the criteria for diagnosis of   
diabetes.Reference: Standards of Medical Care in Diabetes 2016,   
American Diabetes Association. Diabetes Care. 2016.39(Suppl   
1).Results may be falsely elevated after the administration of   
Sulfapyridine.Results may be falsely depressed after the   
administration of Sulfasalazine.   
   
                                                            Performed By: #### 2  
4323-8, , [id number] ####[facility] LABORATORYCLIA [id number] [place] [id number] [place]   
   
                                                    Potassium   
[Moles/Vol]     4.1 mmol/L      Normal          3.5-5.1         [facility]  
   
                                        Comment on above:   Order Comment: Speci  
men Type: BLOOD SPECIMENOrdering Facility:  
   
[facility] Address: [address] [place] [address]   
   
                                                            Performed By: #### 2  
4323-8, , [id number] ####[facility] LABORATORYCLIA [id number] [place] [id number] [place]   
   
                      Protein [Mass/Vol] 7.4 g/dL   Normal     6.0-8.5    [facility]  
   
                                        Comment on above:   Order Comment: Speci  
men Type: BLOOD SPECIMENOrdering Facility:  
   
[facility] Address: [address] [place] [address]   
   
                                                            Performed By: #### 2  
4323-8, , [id number] ####[facility] LABORATORYCLIA [id number] [place] [id number] [place]   
   
                      Sodium [Moles/Vol] 139 mmol/L Normal     136-145    [facility]  
   
                                        Comment on above:   Order Comment: Speci  
men Type: BLOOD SPECIMENOrdering Facility:  
   
[facility] Address: [address] [place] [address]   
   
                                                            Performed By: #### 2  
4323-8, , [id number] ####[facility] LABORATORYCLIA [id number] [place] [id number] [place]   
   
                                                    Urea nitrogen   
[Mass/Vol]      6 mg/dL         Low             7-26            [facility]  
   
                                        Comment on above:   Order Comment: Speci  
men Type: BLOOD SPECIMENOrdering Facility:  
   
[facility] Address: [address] [place] [address]   
   
                                                            Performed By: #### 2  
4323-8, , [id number] ####[facility] LABORATORYCLIA [id number] [place] [id number] [place]   
   
                                                    ECG COMPLETEon 2025   
   
                      ECG COMPLETE            Normal                [facility]  
   
                                                    ED PROV NOTEon 2025   
   
                      ED PROV NOTE            Normal                [facility]  
   
                                                    ED Triage Noteon 2025   
   
                      ED Triage Note            Normal                [facility]  
   
                                                    HIGH SENSITIVITY TROPONIN I   
(INITIAL)on 2025   
   
                                                    Tropinin I.cardiac   
panel High   
sensitivity method <2.5            Normal          0.0-34.0        [facility]  
   
                                        Comment on above:   Order Comment: Speci  
men Type: BLOOD SPECIMENOrdering Facility:  
   
[facility] Address: [address] [place] [address]   
   
                                                            Performed By: #### 2  
4323-8, , [id number] ####[facility] LABORATORYCLIA [id number] [address]   
   
                                                    HIGH SENSITIVITY TROPONIN I   
(SECOND)on 2025   
   
                                                    Tropinin I.cardiac   
panel High   
sensitivity method <2.5            Normal          0.0-34.0        [facility]  
   
                                        Comment on above:   Order Comment: Speci  
men Type: BLOOD SPECIMENOrdering Facility:  
   
[facility] Address: [address] [place] [address]   
   
                                                            Performed By: #### H  
[id number] ####[facility]   
LABORATORYCLIA [id number] [place] [id number]   
[place]   
   
                                                    Magnesium SerPl-mCncon    
   
                      Magnesium [Mass/Vol] 2.1 mg/dL  Normal     1.6-2.6    [facility]  
   
                                        Comment on above:   Order Comment: Speci  
men Type: BLOOD SPECIMENOrdering Facility:  
   
[facility] Address: [address] [place] [address]   
   
                                                            Performed By: #### 2  
4323-8, , [id number] ####[facility] LABORATORYCLIA [id number] [place] [id number] [place]   
   
                                                    XR CHEST 2V FRONTAL/LATon    
   
                                                    XR CHEST 2V   
FRONTAL/LAT                     [facility]  
   
                                                    CNPTOUTREACHon 2024   
   
                      CNPTOUTREACH            [facility]  
   
                                                    CNOVon 2024   
   
                                        CNOV                Office Visit (CLARK  
)  
---------------------------  
---------------------------  
--------------------------  
ROBERT LIMA   
([id number]) 1980 F  
Date Time Provider   
Department  
24 1:40 PM REGINALD PEDROZA  
During your visit today, we   
recorded the following   
information about you:  
Temperature Pulse Blood   
pressure Weight  
98.5 degrees 76/minute   
106/67 73.5 kg  
Height  
1.702 m  
Reginald Pedroza MD   
2024 2:07 PM Signed  
This note was created using   
Listen Edition.  
Subjective  
Robert Palacios is a   
44 year old female.  
Of the plaquenil  
Hand are hurting  
On plaquenil some   
improvement  
But could not tolerate  
Nausea and chest pain  
Hand pains swollen and pain  
Back also hurt  
Equal  
Neck pain off and on  
Review of Systems  
Objective  
/67   Pulse 76   Temp   
36.9 ?C (98.5 ?F)   
(Temporal)   Ht 170.2 cm   
(5'  
7 )   Wt 73.5 kg (162 lb)     
LMP (LMP Unknown)   BMI   
25.37 kg/m?  
Physical Exam  
Vitals reviewed.  
Constitutional:  
General: She is not in   
acute distress.  
Appearance: She is not   
ill-appearing or   
toxic-appearing.  
Cardiovascular:  
Rate and Rhythm: Normal   
rate and regular rhythm.  
Heart sounds: Normal heart   
sounds. No murmur heard.  
No friction rub. No gallop.  
Pulmonary:  
Effort: No respiratory   
distress.  
Breath sounds: Normal   
breath sounds. No stridor.   
No wheezing or rhonchi.  
Abdominal:  
General: There is no   
distension.  
Palpations: Abdomen is   
soft. There is no mass.  
Tenderness: There is no   
abdominal tenderness.  
Hernia: No hernia is   
present.  
Musculoskeletal:  
Right shoulder: Normal.  
Left shoulder: Normal.  
Right elbow: Normal.  
Left elbow: Normal.  
Right wrist: Normal.  
Left wrist: Normal.  
Right hand: Normal.  
Left hand: Normal.  
Cervical back: No rigidity   
or tenderness.  
Thoracic back: Normal.  
Lumbar back: Normal.  
Right hip: Normal.  
Left hip: Normal.  
Right knee: Normal.  
Left knee: Normal.  
Right lower leg: No edema.  
Left lower leg: No edema.  
Right ankle: Normal.  
Left ankle: Normal.  
Right foot: Normal.  
Left foot: Normal.  
Comments: No clear   
synovitis  
Synovitis seen only on US   
images in her  
Lymphadenopathy:  
Cervical: No cervical   
adenopathy.  
Skin:  
Findings: No rash.  
Assessment and Plan  
First visit 2024 lt   
handed  
[facility] seen 2019, no   
RA diagnosis Dr. Tess Yang 2024  
Moves to [place]   
RA seronegative  
 HIV Hep B ab 2019 Hep   
C neg  
 ASHLIE neg , 2019 RF CCP   
ASHLIE neg  
 cold agglutin neg cryo   
neg  
 TPO neg  
 SPEP IgG 550 , 2019   
SPEP IgG 800  
2019   
 alpha 1 anti trypsin   
129 normla  
 IgG 737 IgG1 367 low ,   
IgG2 278 Low , IgG3 88 IgG4   
2.2 low  
 Post vaccination   
diptheria ab. tetanus ab.   
normal  
2024 aug pre/ post   
vaccination pneumococcal   
ab.low in serotype : 2023 RF ASHLIE neg CCP 28 (<20   
) , esr 5 crp normal uric   
4.1  
 IgA, Transglutaminase   
Ab: neg, IgA (mg/dl): 126  
 ACE 36  
 cANCA pANCA neg  
 RAST NE 5 neg  
 - ESR < 10 .  
9619-6895 CRP normal  
 chest xr normal  
2019 hand xr bl normal  
2019 feet xr bl normal  
2019 bilateral knee xr :   
Lateral patellar tilt   
bilaterally.  
2016 CT neck saliva gland   
normal  
 US bilateral wrist   
hand  
3RD PIP: Hypertrophy:   
Moderate. Power Doppler:   
None.  
4TH MCP: Hypertrophy:   
Moderate. Power Doppler:   
None.  
4TH PIP: Hypertrophy: None.   
Power Doppler: None.  
3RD PIP: Hypertrophy:   
Moderate. Power Doppler:   
None.  
4TH PIP: Hypertrophy:   
Moderate. Power Doppler:   
None.  
5TH PIP: Hypertrophy:   
Moderate. Power Doppler:   
None.  
Synovial hypertrophy   
without hyperemia at the   
bilateral PIP joints as   
described  
above.  
 EMG rt upper and lower   
ext normal  
((  sept Chronic   
leukocytosis? Last high wbc   
in 2019 24170 and rest   
normal  
)  
((2024 RA diagnosis   
2004 [place] Rheum hand pain,   
knee pain back pain,  
plaquenil 9311-3749 not   
sure if helped), enbrel   
[phone] helped a lot ) (   
no  
use of arava mtx ) (   
prednisone used past no   
memory )  
(( to  no tt ))   
((2004 no tt ))  
((2024 Raynaud's   
Phenomenon 2010 or before,   
hand and feet triphasic  
changes  
((2024 left side of   
face swells up 2023   
onset, left side gland   
swells  
up visually apparent,   
happens about once a week,   
at time can last one one  
week, uses warm rag, no   
medicine used for this ) (   
age 25 loss of teeth,  
failed root canals )  
((2024 : exam nail   
capillary normal, nasal   
septal perforation, no   
saliva  
gland swelling )  
TT  
LABS NOT SUPPORTIVE OF   
Immunodeficiency  
10/26/24 plaquenil 300 mg (   
never took ) , used   
plaquenil 200 mg 24  
message sternal pain   
indigestion was told to   
hold 10 day ( after   
stopping it  
all better )  
24 MTx 10 mg ( max 15   
mg ) 24 ( labs in 5   
week ) PA next visit  
, I will see visit after .  
Drug and disease monitoring  
2024 cmp cbc normal  
 tsh normal  
2024 : iron 55, tibc   
294 % sat 19 low , ferritin   
6.3 ( PCP addressing )  
 folic normal B12 763  
Fibromyalgia  
((2024 or before   
diagnosis )  
T (more content not   
included)...        Normal                                  [facility]  
   
                                                    DIMERon 2024   
   
                      D-Dimer    <200       Normal     0-230      VIKKI   
MASSILLON  
   
                                        Comment on above:   Result Comment: DDN:  
 Results reported in D-DU ng/mL. Negative   
for   
D-dimer. DVT/PE is highly unlikely. Note: False negative results   
may be seen in patients on anticoagulant therapy.   
   
                                                            Performed By: #### D  
MAGDALENE SIMS ####  
V[place]ki [place]  
 [address] [address]24   
   
                                                    High Sensitivity   
Troponin I      6 ng/L          Normal          0-51            VIKKI   
MASSILLON  
   
                                        Comment on above:   Result Comment: High  
 Sensitive Troponin I Reference Ranges:  
Female: 0-51 ng/L  
Male: 0-76 ng/L  
Testing performed on [employer] using a homogeneous sandwich  
chemiluminescent immunoassay based on [employer] technology.   
   
                                                            Performed By: #### D  
MAGDALENE SIMS ####  
Vikki Emy  
 [place], [place] [address]   
   
                                                    CNPNon 2024   
   
                      CNPN                  [facility] 2024   
   
                                        [facility] [place]                Telephone (iWOPI)  
---------------------------  
---------------------------  
--------------------------  
ROBERT LIMA   
([id number]) 1980 F  
Date Time Provider   
Department  
24 REGINALD PEDROZA  
During your visit today, we   
recorded the following   
information about you:  
Ondina Fitch LPN   
2024 2:15 PM Signed  
Pt reports that since   
starting plaquenil she has   
been with indigestion and  
slight pressure of the   
sternal area. Her PCP   
encouraged her to call this  
office. Pt never took the   
1.5 tabs daily has been on   
just 1 tab daily.  
Last visit was 24 next   
visit 24  
MAC Hinkle Inderprit, MD   
2024 7:06 PM Signed  
Please stop plaquenil for   
10 days and resume and see   
if still has same issue  
Thanks  
Reginald Pedroza MD  
Allergies As of Date:   
2024 Noted Allergy   
Reaction  
CODEINE 2005 11 -   
Vomiting  
GABAPENTIN 2016 14 -   
Other: See Comments  
Comments: Headache/migraine  
PENICILLINS 2005 16 -   
Unknown  
Comments: childhood   
reaction  
Date Reviewed: 2024  
Reviewed by: Ruth Leo, RRT - Fully Assessed  
Reason for Visit:  
Medication Problem [65]  
Prescriptions as of   
12/10/2024  
- ondansetron (ZOFRAN) 4 mg   
tablet  
Take 4 mg by mouth every 8   
hours as needed for   
nausea/vomiting.  
- meclizine 25 mg chewable   
tablet(s)  
Take 1 tablet by mouth   
every 8 hours as needed for   
nausea/vomiting or  
dizziness.  
- hydrOXYchloroQUINE   
(PLAQUENIL) 200 mg tablet  
Take 1.5 tablets by mouth   
once daily.  
- LORazepam (ATIVAN) 0.5 mg  
Take 1 tablet by mouth once   
daily as needed for up to   
90 days.  
- tiotropium-olodaterol   
(STIOLTO RESPIMAT) 2.5-2.5   
mcg/actuation inhaler  
Inhale 2 Puffs as   
instructed once daily. 2   
inh a day  
-   
fluticasone-umeclidin-vilan  
ter (TRELEGY ELLIPTA)   
200-62.5-25 mcg inhalation  
powder  
Inhale 1 Puff as instructed   
once daily.  
- albuterol HFA (PROVENTIL   
HFA, VENTOLIN HFA) 90   
mcg/actuation inhaler  
Inhale 2 Puffs as   
instructed every 4 hours as   
needed for   
wheezing/shortness  
of breath.  
- meloxicam (MOBIC) 15 mg   
tablet  
Take 1 tablet by mouth once   
daily.  
- cetirizine (ZYRTEC) 10 mg   
tablet  
Take 1 tablet by mouth once   
daily.  
- omeprazole (PRILOSEC) 40   
mg capsule  
Take 1 capsule by mouth two   
times a day.  
- Blood-Glucose Meter  
Test One time a day and as   
needed. Insulin Dep? Yes   
E11.9 DM 2  
- Blood Glucose Control,   
Normal soln  
Use as instructed  
- blood sugar diagnostic   
(BLOOD GLUCOSE TEST) test   
strip  
1 Strip once daily. Test   
blood sugar once daily and   
as needed.  
- atenolol (TENORMIN) 25 mg   
tablet  
take 1 tablet by mouth once   
daily  
- atorvastatin (LIPITOR) 20   
mg tablet  
Take 1 tablet by mouth once   
daily.  
- blood sugar diagnostic   
(FREESTYLE LITE STRIPS)   
test strip  
Test blood sugar(s) 1 times   
daily. Dx: E11.9. Insulin:   
No  
- Lancets lancets  
Check glucose daily . Dx:   
11.9.  
Problem List As Of Date   
2024 Noted Resolved  
NO SHOW [654480] 2007  
Lump or mass in breast   
[N63.0] 2009  
Fibroadenosis of breast   
[N60.29] 2009  
Anxiety [F41.9] 2014  
Malignant neoplasm of   
cervix (HCC) [C53.9]  
Fibromyalgia [M79.7]  
Rheumatoid arthritis of   
multiple sites with neg*  
Raynaud disease [I73.00]  
Restless legs [G25.81]  
Mass of left side of neck   
[R22.1] 2014  
Routine gynecological   
examination [Z01.419]   
2014  
Dysphagia [R13.10]   
2014  
Smoker [F17.200] 2014  
Mixed hyperlipidemia   
[E78.2] 2014  
Tachycardia [R00.0]   
2014  
COPD, mild (HCC) [J44.9]   
2014  
Essential hypertension   
[I10] 2015  
Constipation [K59.00]   
2015  
Fatigue [R53.83] 2015  
Hoarse voice quality   
[R49.0] 2015  
Gastroesophageal reflux   
disease without   
esophag*2015  
RSD lower limb [G90.529]   
2015  
Diabetes mellitus type 2,   
diet-controlled   
(HCC)*10/14/2016  
Depression [F32.A]   
10/14/2016  
Neoplasm of uncertain   
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   
10/14/2016  
Encounter for gynecological   
examination   
without*10/14/2016  
Adjustment disorder with   
mixed anxiety and   
depr*2018  
Environmental and seasonal   
allergies [J30.89]   
2018  
Bilateral low back pain   
with bilateral   
sciatica*2021  
Sacroiliitis (HCC) [M46.1]   
2021  
Menopause [Z78.0]   
2022  
Degenerative disc disease,   
lumbar [M51.369] 2016  
Thyroid nodule [E04.1]   
2022  
Snoring [R06.83] 2023  
Pain [R52] 2023  
Folliculitis [L73.9]   
2023  
Loss of hair [L65.9]   
2023  
Chronic insomnia [F51.04]   
10/04/2023  
Chronic midline low back   
pain with bilateral   
sc*10/04/2023  
Vertigo [R42] 2023  
Tinnitus of both ears   
[H93.13] 2023  
SOB (shortness of breath)   
[R06.02] 2023  
Palpitations [R00.2]   
2023  
Facial asymmetry [Q67.0]   
2023  
Chronic ankle pain,   
bilateral [M25.571,   
G89.29,*2024  
Chronic foot pain, left   
[M79.672, G89.29]   
2024  
BENY (generalized anxiety   
disorder) [F41.1]   
2024  
Vitamin B12 deficiency   
[E53.8] 2024  
Multiple joint pain   
[M25.50] 2024  
Chest discomfort (more   
content not included)... Normal                                  [facility]  
   
                                                    No Panel Informationon    
   
                                                            [facility]  
[address] [place] [address]  
  
  
Test Date: 2024  
Pat Name: ROBERT PALACIOS Department:  
Patient ID: [id number] Room:  
Gender: F  Technician:  
: 1980 Requested   
By:  
Order Number:   
[id number]   
MD: Tripp Fonseca DO  
Interpretive Statements  
PRE and POST BD: Current   
ATS/ERS acceptability and   
repeatability standards for  
spirometry met. Start of   
test and EOFE criteria met.   
Medications and Allergies  
were reviewed for possible   
drug interactions per   
policy. No   
contraindications or  
sensitivities were noted.   
Meds taken: none before   
testing. 2 puffs Albuterol  
(180 mcg) delivered by MDI   
via holding chamber. HR pre   
= 81/min, HR post =  
87/min. Current ATS/ERS   
acceptability and   
repeatability standards for   
DLCO met  
with 2 acceptable   
maneuvers. TGV repeatable X   
3.//MD  
IMPRESSION:  
Spirometry is normal.  
Negative bronchodilator   
response.  
Mild isolated reduction in   
RV  
The diffusing capacity   
(uncorrected for   
hemoglobin) is reduced.  
The reduced kCO (DLCO/VA)   
reflects an alteration of   
the normal   
transfer/diffusion  
of CO from the alveolar   
regions to the blood.   
Clinical correlation   
recommended.  
Electronically Signed On   
2024 11:58:24 EST by   
Tripp Fonseca DO  
  
ID: [id number] Name: ROBERT LIMA Race: White  
Ht: 66.85 in Wt: 162.92 lbs   
Age: 44  
Gender: Female :   
1980 Dx: Chronic   
obstructive pulmonary   
disease,  
unspecified  
Smoking Hx: Non-smoker   
Doctor: JUDIE DELGADO  
Test Date: 2024 Site:   
[facility] Tech: uRth Leo  
  
  
PRE-BRONCH POST-BRONCH  
Pre LLN Pred ULN %Pred Post   
%Pred %Chg  
SPIROMETRY  
FVC (L) 4.29 2.78 3.70 4.65   
115 4.29 115 0  
FEV1 (L) 3.40 2.24 3.01   
3.74 113 3.37 111 -1  
FEV1/FVC 0.79 0.70 0.81   
0.90 97 0.79 97 0  
PEF L/s (L/sec) 9.56 5.41   
7.28 9.15 131 7.75 106 -18  
FEF50 (L/sec)  4.94 2.28   
3.89 5.50 126 5.45 139 10  
FIF50 (L/sec) 1.69 3.85 127  
FEF50/FIF50  2.92    
1.42 -51  
FIVC (L) 3.25 3.97 21  
CDO63-70 (L/sec) 3.37 1.77   
3.06 4.68 109 3.24 105 -3  
 Time (sec) 9.71   
11.53 18  
FET PEF (sec) 0.13 0.12 -6  
RAMESH (L) 0.15 0.13 -13  
Vol Extrap % (%) 4 3 -13  
  
LUNG VOLUMES  
TGV (L)  2.58 2.22 3.08   
3.94 83  
ERV (L) 1.17 1.23 94  
RV (Pleth) (L) 1.44 1.24   
1.86 2.49 77  
SVC (L) 3.99 2.78 3.70 4.65   
107  
IC (L) 2.82 2.47 114  
TLC (Pleth) (L) 5.30 4.60   
5.48 6.36 96  
RV/TLC (Pleth) (%) 27 25 34   
43 80  
  
LUNG DIFFUSION  
DLCOunc (ml/min/mmHg) 18.00   
18.09 24.25 30.42 74  
VA (L) 5.35 4.43 5.53 6.63   
96  
DLunc/VA (ml/min/mmHg/L)    
3.37 3.20 4.52 5.84 74  
BHT (sec) 11.16  
IVC (L)  4.09  
  
  
Comments: PRE and POST BD:   
Current ATS/ERS   
acceptability and   
repeatability  
standards for spirometry   
met. Start of test and EOFE   
criteria met. Medications  
and Allergies were reviewed   
for possible drug   
interactions per policy. No  
contraindications or   
sensitivities were noted.   
Meds taken: none before  
testing. 2 puffs Albuterol   
(180 mcg) delivered by MDI   
via holding chamber. HR  
pre = 81/min, HR post =   
87/min. Current ATS/ERS   
acceptability and  
repeatability standards for   
DLCO met with 2 acceptable   
maneuvers. TGV  
repeatable X 3.//MD  
                                                            PULMONARY   
FUNCTION   
LAB  
   
                                                                  [facility]  
   
                                                    SPIROMETRY - BASELINE AND PO  
ST YASHon 2024   
   
                      DLCO (ml/min/mmHg) 18.00                 ml/min/mmHg [facility] [place] [facility]  
   
                                                    DLCO LLN   
(ml/min/mmHg)   18.09                           ml/min/mmHg     [facility]  
   
                                                    DLCO PREDICTED   
(ml/min/mmHg)   24.25                           ml/min/mmHg     [facility]  
   
                                                    DLCO ULN   
(ml/min/mmHg)   30.42                           ml/min/mmHg     [facility]  
   
                                                    DLCO/VA   
(ml/min/mmHg/L)     3.37                                    ml/min/mmHg/  
L                                       [facility]  
   
                                                    DLCO/VA PREDICTED   
(ml/min/mmHg/L)     4.52                                    ml/min/mmHg/  
L                                       [facility]  
   
                                                    DLCOcor PREDICTED   
(ml/min/mmHg)   24.25                           ml/min/mmHg     [facility]  
   
                      ERV BOX (L) 1.17 L                           [facility]  
   
                      ERV PREDICTED (L) 1.23 L/S                         [facility]  
   
                      FEF25% POST (L/S) 7.39 L/S                         [facility]  
   
                      FEF25% PRE (L/S) 8.75 L/S                         [facility]  
   
                      MBO14-38% LLN (L/S) 1.77 L/S                         [facility]  
   
                      WTC01-40% POST (L/S) 3.24 L/S                         [facility]  
   
                      OIW40-94% PRE (L/S) 3.37 L/S                         [facility]  
   
                                                    PIR44-62% PREDICTED   
(L/S)           3.06 L/S                                        [facility]  
   
                      FEF75% LLN (L/S) 0.50 L/S                         [facility]  
   
                      FEF75% POST (L/S) 0.91 L/S                         [facility]  
   
                      FEF75% PRE (L/S0 0.98 L/S                         [facility]  
d   
[facility]  
   
                                                    FEF75% PREDICTED   
(L/S)           1.10 L/S                                        [facility]  
   
                      FEF75% ULN (L/S) 2.22 L/S                         [facility]  
   
                      FET POST (S) 11.53 S                          [facility]  
   
                      FET PRE (S) 9.71 S                           [facility]  
   
                      FEV1 LLN (L) 2.24 L                           [facility]  
   
                      FEV1 PRE (L) 3.40 L                           [facility]  
   
                      FEV1 PREDICTED (L) 3.01 L                           [facility]  
   
                      FEV1 ULN (L) 3.74 L                           [facility]  
   
                      FEV1/FVC LLN (%) 70 %                             [facility]  
   
                      FEV1/FVC POST (%) 79 %                             [facility]  
   
                      FEV1/FVC PRE (%) 79 %                             [facility]  
   
                                                    FEV1/FVC PREDICTED   
(%)             81 %                                            [facility]  
   
                      FEV1_POST (L) 3.37 L                           [facility]  
   
                      FRC Box (L) 2.58 L                           [facility]  
   
                      FVC LLN (L) 2.78 L                           [facility]  
   
                      FVC POST (L) 4.29 L                           [facility]  
   
                      FVC PRE (L) 4.29 L                           [facility]  
   
                      FVC PREDICTED (L) 3.70 L                           [facility]  
   
                      FVC ULN (L) 4.65 L                           [facility]  
   
                      IC BOX (L) 2.82 L                           [facility]  
   
                      IC PREDICTED (L) 2.47 L/S                         [facility]  
   
                      PEF LLN (L/S) 5.41 L/S                         [facility]  
   
                      PEF POST (L/S) 7.75 L/S                         [facility]  
   
                      PEF PRE (L/S) 9.56 L/S                         [facility]  
   
                      PEF ULN (L/S) 9.15 L/S                         [facility]  
   
                      RV Box (L) 1.44 L                           [facility]  
   
                      RV Box PREDICTED (L) 1.86 L                           [facility]  
   
                      RV/TLC Box (%) 27 %                             [facility]  
   
                                                    RV/TLC Box PREDICTED   
(%)             34 %                                            [facility]  
   
                      SVC LLN (L) 2.78 L/S                         [facility]  
   
                      SVC PREDICTED (L) 3.70 L/S                         [facility]  
   
                      SVC ULN (L) 4.65 L/S                         [facility]  
   
                      TLC Box (L) 5.30 L                           [facility]  
   
                                                    TLC Box PREDICTED   
(L)             5.48 L                                          [facility]  
   
                      VA (L)     5.35 L                           [facility]  
   
                      VA PREDICTED (L) 5.53 L                           [facility]  
   
                      VC (L) BOX 3.99 L                           [facility]  
   
                                                    CNOVon 2024   
   
                      CNOV                  [facility]  
   
                                                    CNPTOUTREACHon 2024   
   
                      [facility]  
   
                                                    CNOVon 10-   
   
                      CNOV                  Normal                [facility]  
   
                                                    No Panel Informationon 10-21  
-2024   
   
                                                            IMPRESSION:  
  
No active synovitis or   
tenosynovitis of the hands   
or wrists.  
  
Synovial hypertrophy   
without hyperemia at the   
bilateral PIP joints as  
described above.  
  
  
  
  
  
: PEDRO  
Transcribe Date/Time: Oct   
21 2024 3:38P  
  
Dictated by : MANDO SENA MD  
  
This examination was   
interpreted and the report   
reviewed and  
electronically signed by:  
MANDO SENA MD on Oct   
21 2024 10:25PM EST  
  
                                                            DIVISION OF   
RADIOLOGY  
   
                                                    Radiology Study   
observation   
(narrative)                                                     [facility]  
   
                                                    No Panel InformationOrdered   
By: [facility] Provider on 10-   
   
                                                                  [facility]  
   
                                                    US HAND/WRIST SYNOVIAL SCREE  
N LTon 10-   
   
                                                    US HAND/WRIST   
SYNOVIAL SCREEN LT                      * * *Final Report* * *  
DATE OF EXAM: Oct 21 2024   
3:37PM  
KELLY 1198 - US HAND/WRIST   
SYNOVIAL SCREEN LT /   
ACCESSION # [id number]  
PROCEDURE REASON:   
Inflammatory arthritis  
  
* * * * Physician   
Interpretation * * * *  
MSK_US SYNOVITIS SCREENING   
ULTRASOUND OF THE HANDS AND   
WRISTS:  
CLINICAL INFORMATION:   
Inflammatory arthritis  
TECHNIQUE: Grey-scale,   
real-time ultrasound of   
both the right and left  
hand and wrist synovium and   
tenosynovium was performed   
with power Doppler  
examination. Images were   
saved to the permanent   
image archive.   
COMPARISON: X-ray   
10/18/2019.  
FINDINGS:  
RIGHT SIDE:  
RIGHT MCP AND PIP JOINT   
SYNOVIUM:  
2ND MCP: Hypertrophy: None.   
Power Doppler: None.  
2ND PIP: Hypertrophy: None.   
Power Doppler: None.  
3RD MCP: Hypertrophy: None.   
Power Doppler: None.  
3RD PIP: Hypertrophy:   
Moderate. Power Doppler:   
None.  
4TH MCP: Hypertrophy:   
Moderate. Power Doppler:   
None.  
4TH PIP: Hypertrophy: None.   
Power Doppler: None.  
5TH MCP: Hypertrophy: None.   
Power Doppler: None.  
5TH PIP: Hypertrophy: None.   
Power Doppler: None.  
RIGHT EXTENSOR AND FLEXOR   
TENOSYNOVIUM:  
2ND Digit Flexor:   
Hypertrophy: None. Power   
Doppler: None.  
2ND Digit Extensor:   
Hypertrophy: None. Power   
Doppler: None.  
3RD Digit Flexor:   
Hypertrophy: None. Power   
Doppler: None.  
3RD Digit Extensor:   
Hypertrophy: None. Power   
Doppler: None.  
4TH Digit Flexor:   
Hypertrophy: None. Power   
Doppler: None.  
4TH Digit Extensor:   
Hypertrophy: None. Power   
Doppler: None.  
5TH Digit Flexor:   
Hypertrophy: None. Power   
Doppler: None.  
5TH Digit Extensor:   
Hypertrophy: None. Power   
Doppler: None.  
CARPUS Synovitis:   
Hypertrophy: None. Power   
Doppler: None.  
OTHER: None  
LEFT SIDE:  
LEFT MCP AND PIP JOINT   
SYNOVIUM:  
2ND MCP: Hypertrophy: None.   
Power Doppler: None.  
2ND PIP: Hypertrophy: None.   
Power Doppler: None.  
3RD MCP: Hypertrophy: None.   
Power Doppler: None.  
3RD PIP: Hypertrophy:   
Moderate. Power Doppler:   
None.  
4TH MCP: Hypertrophy: None.   
Power Doppler: None.  
4TH PIP: Hypertrophy:   
Moderate. Power Doppler:   
None.  
5TH MCP: Hypertrophy: None.   
Power Doppler: None.  
5TH PIP: Hypertrophy:   
Moderate. Power Doppler:   
None.  
LEFT EXTENSOR AND FLEXOR   
TENOSYNOVIUM:  
2ND Digit Flexor:   
Hypertrophy: None. Power   
Doppler: None.  
2ND Digit Extensor:   
Hypertrophy: None. Power   
Doppler: None.  
3RD Digit Flexor:   
Hypertrophy: None. Power   
Doppler: None.  
3RD Digit Extensor:   
Hypertrophy: None. Power   
Doppler: None.  
4TH Digit Flexor:   
Hypertrophy: None. Power   
Doppler: None.  
4TH Digit Extensor:   
Hypertrophy: None. Power   
Doppler: None.  
5TH Digit Flexor:   
Hypertrophy: None. Power   
Doppler: None.  
5TH Digit Extensor:   
Hypertrophy: None. Power   
Doppler: None.  
CARPUS Synovitis:   
Hypertrophy: None. Power   
Doppler: None.  
OTHER: None  
IMPRESSION:  
No active synovitis or   
tenosynovitis of the hands   
or wrists.  
Synovial hypertrophy   
without hyperemia at the   
bilateral PIP joints as  
described above.  
: [facility]  
Transcribe Date/Time: Oct   
21 2024 3:38P  
Dictated by : MANDO SENA MD  
This examination was   
interpreted and the report   
reviewed and  
electronically signed by:  
MANDO SENA MD on Oct   
21 2024 10:25PM EST  
[id number] Normal                                  [facility]  
   
                                                    US HAND/WRIST SYNOVIAL SCREE  
N RTon 10-   
   
                                                    US HAND/WRIST   
SYNOVIAL SCREEN RT                      * * *Final Report* * *  
DATE OF EXAM: Oct 21 2024   
3:15PM  
KELLY 1197 - US HAND/WRIST   
SYNOVIAL SCREEN RT /   
ACCESSION # [id number]  
PROCEDURE REASON:   
Inflammatory arthritis  
  
* * * * Physician   
Interpretation * * * *  
MSK_US SYNOVITIS SCREENING   
ULTRASOUND OF THE HANDS AND   
WRISTS:  
CLINICAL INFORMATION:   
Inflammatory arthritis  
TECHNIQUE: Grey-scale,   
real-time ultrasound of   
both the right and left  
hand and wrist synovium and   
tenosynovium was performed   
with power Doppler  
examination. Images were   
saved to the permanent   
image archive.   
COMPARISON: X-ray   
10/18/2019.  
FINDINGS:  
RIGHT SIDE:  
RIGHT MCP AND PIP JOINT   
SYNOVIUM:  
2ND MCP: Hypertrophy: None.   
Power Doppler: None.  
2ND PIP: Hypertrophy: None.   
Power Doppler: None.  
3RD MCP: Hypertrophy: None.   
Power Doppler: None.  
3RD PIP: Hypertrophy:   
Moderate. Power Doppler:   
None.  
4TH MCP: Hypertrophy:   
Moderate. Power Doppler:   
None.  
4TH PIP: Hypertrophy: None.   
Power Doppler: None.  
5TH MCP: Hypertrophy: None.   
Power Doppler: None.  
5TH PIP: Hypertrophy: None.   
Power Doppler: None.  
RIGHT EXTENSOR AND FLEXOR   
TENOSYNOVIUM:  
2ND Digit Flexor:   
Hypertrophy: None. Power   
Doppler: None.  
2ND Digit Extensor:   
Hypertrophy: None. Power   
Doppler: None.  
3RD Digit Flexor:   
Hypertrophy: None. Power   
Doppler: None.  
3RD Digit Extensor:   
Hypertrophy: None. Power   
Doppler: None.  
4TH Digit Flexor:   
Hypertrophy: None. Power   
Doppler: None.  
4TH Digit Extensor:   
Hypertrophy: None. Power   
Doppler: None.  
5TH Digit Flexor:   
Hypertrophy: None. Power   
Doppler: None.  
5TH Digit Extensor:   
Hypertrophy: None. Power   
Doppler: None.  
CARPUS Synovitis:   
Hypertrophy: None. Power   
Doppler: None.  
OTHER: None  
LEFT SIDE:  
LEFT MCP AND PIP JOINT   
SYNOVIUM:  
2ND MCP: Hypertrophy: None.   
Power Doppler: None.  
2ND PIP: Hypertrophy: None.   
Power Doppler: None.  
3RD MCP: Hypertrophy: None.   
Power Doppler: None.  
3RD PIP: Hypertrophy:   
Moderate. Power Doppler:   
None.  
4TH MCP: Hypertrophy: None.   
Power Doppler: None.  
4TH PIP: Hypertrophy:   
Moderate. Power Doppler:   
None.  
5TH MCP: Hypertrophy: None.   
Power Doppler: None.  
5TH PIP: Hypertrophy:   
Moderate. Power Doppler:   
None.  
LEFT EXTENSOR AND FLEXOR   
TENOSYNOVIUM:  
2ND Digit Flexor:   
Hypertrophy: None. Power   
Doppler: None.  
2ND Digit Extensor:   
Hypertrophy: None. Power   
Doppler: None.  
3RD Digit Flexor:   
Hypertrophy: None. Power   
Doppler: None.  
3RD Digit Extensor:   
Hypertrophy: None. Power   
Doppler: None.  
4TH Digit Flexor:   
Hypertrophy: None. Power   
Doppler: None.  
4TH Digit Extensor:   
Hypertrophy: None. Power   
Doppler: None.  
5TH Digit Flexor:   
Hypertrophy: None. Power   
Doppler: None.  
5TH Digit Extensor:   
Hypertrophy: None. Power   
Doppler: None.  
CARPUS Synovitis:   
Hypertrophy: None. Power   
Doppler: None.  
OTHER: None  
IMPRESSION:  
No active synovitis or   
tenosynovitis of the hands   
or wrists.  
Synovial hypertrophy   
without hyperemia at the   
bilateral PIP joints as  
described above.  
: PEDRO  
Transcribe Date/Time: Oct   
21 2024 3:38P  
Dictated by : MANDO SENA MD  
This examination was   
interpreted and the report   
reviewed and  
electronically signed by:  
MANDO SENA MD on Oct   
21 2024 10:25PM EST  
[id number] Normal                                  [facility]  
   
                                                    US Upper extremity - lefton   
10-   
   
                                                            * * *Final Report* *  
 *  
  
DATE OF EXAM: Oct 21 2024   
3:37PM  
  
[facility] - US HAND/WRIST   
SYNOVIAL SCREEN LT  /   
ACCESSION # [id number]  
  
PROCEDURE REASON:   
Inflammatory arthritis  
  
* * * * Physician   
Interpretation * * * *  
  
MSK_US SYNOVITIS SCREENING   
ULTRASOUND OF THE HANDS AND   
WRISTS:  
  
CLINICAL INFORMATION:   
Inflammatory arthritis  
  
TECHNIQUE: Grey-scale,   
real-time ultrasound of   
both the right and left  
hand and wrist synovium and   
tenosynovium was performed   
with power Doppler  
examination. Images were   
saved to the permanent   
image archive. -  
  
COMPARISON: X-ray   
10/18/2019.  
  
FINDINGS:  
  
RIGHT SIDE:  
  
RIGHT MCP AND PIP JOINT   
SYNOVIUM:  
  
2ND MCP: Hypertrophy: None.   
Power Doppler: None.  
2ND PIP: Hypertrophy: None.   
Power Doppler: None.  
  
3RD MCP: Hypertrophy: None.   
Power Doppler: None.  
3RD PIP: Hypertrophy:   
Moderate. Power Doppler:   
None.  
  
4TH MCP: Hypertrophy:   
Moderate. Power Doppler:   
None.  
4TH PIP: Hypertrophy: None.   
Power Doppler: None.  
  
5TH MCP: Hypertrophy: None.   
Power Doppler: None.  
5TH PIP: Hypertrophy: None.   
Power Doppler: None.  
  
RIGHT EXTENSOR AND FLEXOR   
TENOSYNOVIUM:  
  
2ND Digit Flexor:   
Hypertrophy: None. Power   
Doppler: None.  
2ND Digit Extensor:   
Hypertrophy: None. Power   
Doppler: None.  
  
3RD Digit Flexor:   
Hypertrophy: None. Power   
Doppler: None.  
3RD Digit Extensor:   
Hypertrophy: None. Power   
Doppler: None.  
  
4TH Digit Flexor:   
Hypertrophy: None. Power   
Doppler: None.  
4TH Digit Extensor:   
Hypertrophy: None. Power   
Doppler: None.  
  
5TH Digit Flexor:   
Hypertrophy: None. Power   
Doppler: None.  
5TH Digit Extensor:   
Hypertrophy: None. Power   
Doppler: None.  
  
CARPUS Synovitis:   
Hypertrophy: None. Power   
Doppler: None.  
  
OTHER: None  
  
LEFT SIDE:  
  
LEFT MCP AND PIP JOINT   
SYNOVIUM:  
2ND MCP: Hypertrophy: None.   
Power Doppler: None.  
2ND PIP: Hypertrophy: None.   
Power Doppler: None.  
  
3RD MCP: Hypertrophy: None.   
Power Doppler: None.  
3RD PIP: Hypertrophy:   
Moderate. Power Doppler:   
None.  
  
4TH MCP: Hypertrophy: None.   
Power Doppler: None.  
4TH PIP: Hypertrophy:   
Moderate. Power Doppler:   
None.  
  
5TH MCP: Hypertrophy: None.   
Power Doppler: None.  
5TH PIP: Hypertrophy:   
Moderate. Power Doppler:   
None.  
  
LEFT EXTENSOR AND FLEXOR   
TENOSYNOVIUM:  
  
2ND Digit Flexor:   
Hypertrophy: None. Power   
Doppler: None.  
2ND Digit Extensor:   
Hypertrophy: None. Power   
Doppler: None.  
  
3RD Digit Flexor:   
Hypertrophy: None. Power   
Doppler: None.  
3RD Digit Extensor:   
Hypertrophy: None. Power   
Doppler: None.  
  
4TH Digit Flexor:   
Hypertrophy: None. Power   
Doppler: None.  
4TH Digit Extensor:   
Hypertrophy: None. Power   
Doppler: None.  
  
5TH Digit Flexor:   
Hypertrophy: None. Power   
Doppler: None.  
5TH Digit Extensor:   
Hypertrophy: None. Power   
Doppler: None.  
  
CARPUS Synovitis:   
Hypertrophy: None. Power   
Doppler: None.  
  
OTHER: None  
                                                            DIVISION OF   
RADIOLOGY  
   
                                                            Provider, [facility] - 10/21/2024   
Formatting of this note   
might be different from the   
original.  
* * *Final Report* * *  
  
DATE OF EXAM: Oct 21 2024   
3:37PM  
  
[facility] 1198 - US HAND/WRIST   
SYNOVIAL SCREEN LT /   
ACCESSION # [id number]  
  
PROCEDURE REASON:   
Inflammatory arthritis  
  
* * * * Physician   
Interpretation * * * *  
  
MSK_US SYNOVITIS SCREENING   
ULTRASOUND OF THE HANDS AND   
WRISTS:  
  
CLINICAL INFORMATION:   
Inflammatory arthritis  
  
TECHNIQUE: Grey-scale,   
real-time ultrasound of   
both the right and left  
hand and wrist synovium and   
tenosynovium was performed   
with power Doppler  
examination. Images were   
saved to the permanent   
image archive.   
  
COMPARISON: X-ray   
10/18/2019.  
  
FINDINGS:  
  
RIGHT SIDE:  
  
RIGHT MCP AND PIP JOINT   
SYNOVIUM:  
  
2ND MCP: Hypertrophy: None.   
Power Doppler: None.  
2ND PIP: Hypertrophy: None.   
Power Doppler: None.  
  
3RD MCP: Hypertrophy: None.   
Power Doppler: None.  
3RD PIP: Hypertrophy:   
Moderate. Power Doppler:   
None.  
  
4TH MCP: Hypertrophy:   
Moderate. Power Doppler:   
None.  
4TH PIP: Hypertrophy: None.   
Power Doppler: None.  
  
5TH MCP: Hypertrophy: None.   
Power Doppler: None.  
5TH PIP: Hypertrophy: None.   
Power Doppler: None.  
  
RIGHT EXTENSOR AND FLEXOR   
TENOSYNOVIUM:  
  
2ND Digit Flexor:   
Hypertrophy: None. Power   
Doppler: None.  
2ND Digit Extensor:   
Hypertrophy: None. Power   
Doppler: None.  
  
3RD Digit Flexor:   
Hypertrophy: None. Power   
Doppler: None.  
3RD Digit Extensor:   
Hypertrophy: None. Power   
Doppler: None.  
  
4TH Digit Flexor:   
Hypertrophy: None. Power   
Doppler: None.  
4TH Digit Extensor:   
Hypertrophy: None. Power   
Doppler: None.  
  
5TH Digit Flexor:   
Hypertrophy: None. Power   
Doppler: None.  
5TH Digit Extensor:   
Hypertrophy: None. Power   
Doppler: None.  
  
CARPUS Synovitis:   
Hypertrophy: None. Power   
Doppler: None.  
  
OTHER: None  
  
LEFT SIDE:  
  
LEFT MCP AND PIP JOINT   
SYNOVIUM:  
2ND MCP: Hypertrophy: None.   
Power Doppler: None.  
2ND PIP: Hypertrophy: None.   
Power Doppler: None.  
  
3RD MCP: Hypertrophy: None.   
Power Doppler: None.  
3RD PIP: Hypertrophy:   
Moderate. Power Doppler:   
None.  
  
4TH MCP: Hypertrophy: None.   
Power Doppler: None.  
4TH PIP: Hypertrophy:   
Moderate. Power Doppler:   
None.  
  
5TH MCP: Hypertrophy: None.   
Power Doppler: None.  
5TH PIP: Hypertrophy:   
Moderate. Power Doppler:   
None.  
  
LEFT EXTENSOR AND FLEXOR   
TENOSYNOVIUM:  
  
2ND Digit Flexor:   
Hypertrophy: None. Power   
Doppler: None.  
2ND Digit Extensor:   
Hypertrophy: None. Power   
Doppler: None.  
  
3RD Digit Flexor:   
Hypertrophy: None. Power   
Doppler: None.  
3RD Digit Extensor:   
Hypertrophy: None. Power   
Doppler: None.  
  
4TH Digit Flexor:   
Hypertrophy: None. Power   
Doppler: None.  
4TH Digit Extensor:   
Hypertrophy: None. Power   
Doppler: None.  
  
5TH Digit Flexor:   
Hypertrophy: None. Power   
Doppler: None.  
5TH Digit Extensor:   
Hypertrophy: None. Power   
Doppler: None.  
  
CARPUS Synovitis:   
Hypertrophy: None. Power   
Doppler: None.  
  
OTHER: None  
  
IMPRESSION  
IMPRESSION:  
  
No active synovitis or   
tenosynovitis of the hands   
or wrists.  
  
Synovial hypertrophy   
without hyperemia at the   
bilateral PIP joints as  
described above.  
  
  
  
  
  
: PEDRO  
Transcribe Date/Time: Oct   
21 2024 3:38P  
  
Dictated by : MANDO SENA MD  
  
This examination was   
interpreted and the report   
reviewed and  
electronically signed by:  
MANDO SENA MD on Oct   
21 2024 10:25PM [facility] Upper extremity - righton  
 10-   
   
                                                            * * *Final Report* *  
 *  
  
DATE OF EXAM: Oct 21 2024   
3:15PM  
  
[facility] - US HAND/WRIST   
SYNOVIAL SCREEN RT /   
ACCESSION # [id number]  
  
PROCEDURE REASON:   
Inflammatory arthritis  
  
* * * * Physician   
Interpretation * * * *  
  
MSK_US SYNOVITIS SCREENING   
ULTRASOUND OF THE HANDS AND   
WRISTS:  
  
CLINICAL INFORMATION:   
Inflammatory arthritis  
  
TECHNIQUE: Grey-scale,   
real-time ultrasound of   
both the right and left  
hand and wrist synovium and   
tenosynovium was performed   
with power Doppler  
examination. Images were   
saved to the permanent   
image archive. -  
  
COMPARISON: X-ray   
10/18/2019.  
  
FINDINGS:  
  
RIGHT SIDE:  
  
RIGHT MCP AND PIP JOINT   
SYNOVIUM:  
  
2ND MCP: Hypertrophy: None.   
Power Doppler: None.  
2ND PIP: Hypertrophy: None.   
Power Doppler: None.  
  
3RD MCP: Hypertrophy: None.   
Power Doppler: None.  
3RD PIP: Hypertrophy:   
Moderate. Power Doppler:   
None.  
  
4TH MCP: Hypertrophy:   
Moderate. Power Doppler:   
None.  
4TH PIP: Hypertrophy: None.   
Power Doppler: None.  
  
5TH MCP: Hypertrophy: None.   
Power Doppler: None.  
5TH PIP: Hypertrophy: None.   
Power Doppler: None.  
  
RIGHT EXTENSOR AND FLEXOR   
TENOSYNOVIUM:  
  
2ND Digit Flexor:   
Hypertrophy: None. Power   
Doppler: None.  
2ND Digit Extensor:   
Hypertrophy: None. Power   
Doppler: None.  
  
3RD Digit Flexor:   
Hypertrophy: None. Power   
Doppler: None.  
3RD Digit Extensor:   
Hypertrophy: None. Power   
Doppler: None.  
  
4TH Digit Flexor:   
Hypertrophy: None. Power   
Doppler: None.  
4TH Digit Extensor:   
Hypertrophy: None. Power   
Doppler: None.  
  
5TH Digit Flexor:   
Hypertrophy: None. Power   
Doppler: None.  
5TH Digit Extensor:   
Hypertrophy: None. Power   
Doppler: None.  
  
CARPUS Synovitis:   
Hypertrophy: None. Power   
Doppler: None.  
  
OTHER: None  
  
LEFT SIDE:  
  
LEFT MCP AND PIP JOINT   
SYNOVIUM:  
2ND MCP: Hypertrophy: None.   
Power Doppler: None.  
2ND PIP: Hypertrophy: None.   
Power Doppler: None.  
  
3RD MCP: Hypertrophy: None.   
Power Doppler: None.  
3RD PIP: Hypertrophy:   
Moderate. Power Doppler:   
None.  
  
4TH MCP: Hypertrophy: None.   
Power Doppler: None.  
4TH PIP: Hypertrophy:   
Moderate. Power Doppler:   
None.  
  
5TH MCP: Hypertrophy: None.   
Power Doppler: None.  
5TH PIP: Hypertrophy:   
Moderate. Power Doppler:   
None.  
  
LEFT EXTENSOR AND FLEXOR   
TENOSYNOVIUM:  
  
2ND Digit Flexor:   
Hypertrophy: None. Power   
Doppler: None.  
2ND Digit Extensor:   
Hypertrophy: None. Power   
Doppler: None.  
  
3RD Digit Flexor:   
Hypertrophy: None. Power   
Doppler: None.  
3RD Digit Extensor:   
Hypertrophy: None. Power   
Doppler: None.  
  
4TH Digit Flexor:   
Hypertrophy: None. Power   
Doppler: None.  
4TH Digit Extensor:   
Hypertrophy: None. Power   
Doppler: None.  
  
5TH Digit Flexor:   
Hypertrophy: None. Power   
Doppler: None.  
5TH Digit Extensor:   
Hypertrophy: None. Power   
Doppler: None.  
  
CARPUS Synovitis:   
Hypertrophy: None. Power   
Doppler: None.  
  
OTHER: None  
                                                            DIVISION OF   
RADIOLOGY  
   
                                                            Provider, [facility]/21/[facility]   
Formatting of this note   
might be different from the   
original.  
* * *Final Report* * *  
  
DATE OF EXAM: Oct 21 2024   
3:15PM  
  
[facility] 1197 - US HAND/WRIST   
SYNOVIAL SCREEN RT /   
ACCESSION # [id number]  
  
PROCEDURE REASON:   
Inflammatory arthritis  
  
* * * * Physician   
Interpretation * * * *  
  
MSK_US SYNOVITIS SCREENING   
ULTRASOUND OF THE HANDS AND   
WRISTS:  
  
CLINICAL INFORMATION:   
Inflammatory arthritis  
  
TECHNIQUE: Grey-scale,   
real-time ultrasound of   
both the right and left  
hand and wrist synovium and   
tenosynovium was performed   
with power Doppler  
examination. Images were   
saved to the permanent   
image archive. -  
  
COMPARISON: X-ray   
10/18/2019.  
  
FINDINGS:  
  
RIGHT SIDE:  
  
RIGHT MCP AND PIP JOINT   
SYNOVIUM:  
  
2ND MCP: Hypertrophy: None.   
Power Doppler: None.  
2ND PIP: Hypertrophy: None.   
Power Doppler: None.  
  
3RD MCP: Hypertrophy: None.   
Power Doppler: None.  
3RD PIP: Hypertrophy:   
Moderate. Power Doppler:   
None.  
  
4TH MCP: Hypertrophy:   
Moderate. Power Doppler:   
None.  
4TH PIP: Hypertrophy: None.   
Power Doppler: None.  
  
5TH MCP: Hypertrophy: None.   
Power Doppler: None.  
5TH PIP: Hypertrophy: None.   
Power Doppler: None.  
  
RIGHT EXTENSOR AND FLEXOR   
TENOSYNOVIUM:  
  
2ND Digit Flexor:   
Hypertrophy: None. Power   
Doppler: None.  
2ND Digit Extensor:   
Hypertrophy: None. Power   
Doppler: None.  
  
3RD Digit Flexor:   
Hypertrophy: None. Power   
Doppler: None.  
3RD Digit Extensor:   
Hypertrophy: None. Power   
Doppler: None.  
  
4TH Digit Flexor:   
Hypertrophy: None. Power   
Doppler: None.  
4TH Digit Extensor:   
Hypertrophy: None. Power   
Doppler: None.  
  
5TH Digit Flexor:   
Hypertrophy: None. Power   
Doppler: None.  
5TH Digit Extensor:   
Hypertrophy: None. Power   
Doppler: None.  
  
CARPUS Synovitis:   
Hypertrophy: None. Power   
Doppler: None.  
  
OTHER: None  
  
LEFT SIDE:  
  
LEFT MCP AND PIP JOINT   
SYNOVIUM:  
2ND MCP: Hypertrophy: None.   
Power Doppler: None.  
2ND PIP: Hypertrophy: None.   
Power Doppler: None.  
  
3RD MCP: Hypertrophy: None.   
Power Doppler: None.  
3RD PIP: Hypertrophy:   
Moderate. Power Doppler:   
None.  
  
4TH MCP: Hypertrophy: None.   
Power Doppler: None.  
4TH PIP: Hypertrophy:   
Moderate. Power Doppler:   
None.  
  
5TH MCP: Hypertrophy: None.   
Power Doppler: None.  
5TH PIP: Hypertrophy:   
Moderate. Power Doppler:   
None.  
  
LEFT EXTENSOR AND FLEXOR   
TENOSYNOVIUM:  
  
2ND Digit Flexor:   
Hypertrophy: None. Power   
Doppler: None.  
2ND Digit Extensor:   
Hypertrophy: None. Power   
Doppler: None.  
  
3RD Digit Flexor:   
Hypertrophy: None. Power   
Doppler: None.  
3RD Digit Extensor:   
Hypertrophy: None. Power   
Doppler: None.  
  
4TH Digit Flexor:   
Hypertrophy: None. Power   
Doppler: None.  
4TH Digit Extensor:   
Hypertrophy: None. Power   
Doppler: None.  
  
5TH Digit Flexor:   
Hypertrophy: None. Power   
Doppler: None.  
5TH Digit Extensor:   
Hypertrophy: None. Power   
Doppler: None.  
  
CARPUS Synovitis:   
Hypertrophy: None. Power   
Doppler: None.  
  
OTHER: None  
  
IMPRESSION  
IMPRESSION:  
  
No active synovitis or   
tenosynovitis of the hands   
or wrists.  
  
Synovial hypertrophy   
without hyperemia at the   
bilateral PIP joints as  
described above.  
  
  
  
  
  
: PEDRO  
Transcribe Date/Time: Oct   
21 2024 3:38P  
  
Dictated by : MANDO SENA MD  
  
This examination was   
interpreted and the report   
reviewed and  
electronically signed by:  
MANDO SENA MD on Oct   
21 2024 10:25PM [facility]  
   
                                                    US THYROID/PARATHYROIDon    
   
                                                    US   
THYROID/PARATHYROID                 Normal                          [facility]  
   
                                                    US Thyroid glandon    
   
                                                            IMPRESSION:  
  
Borderline enlarged   
thyroid. No discrete   
nodules or masses.  
  
  
  
  
  
: PEDRO  
Transcribe Date/Time: Sep   
19 2024 12:15P  
  
Dictated by : WILLIAM SANTANA MD  
  
This examination was   
interpreted and the report   
reviewed and  
electronically signed by:  
WILLIAM SANTANA MD on Sep 19   
2024 12:16PM [facility]   
RADIOLOGY  
   
                                                            * * *Final Report* *  
 *  
  
DATE OF EXAM: Sep 19 2024   
12:13PM  
  
[facility] 1048 - US   
THYROID/PARATHYROID /   
ACCESSION # [id number]  
  
PROCEDURE REASON: multiple   
diagnoses  
  
* * * * Physician   
Interpretation * * * *  
  
EXAMINATION: THYROID   
ULTRASOUND  
  
CLINICAL HISTORY:   
Thyromegaly Fatigue,   
unspecified type  
  
TECHNIQUE: Sonography and   
Doppler imaging of the   
thyroid was performed.  
Images were obtained and   
stored in a permanent   
archive.  
  
MQ: UST_1  
COMPARISON: 2022  
  
RESULT:  
  
Right Lobe: 5.8 x 1.7 x 1.8   
cm; homogeneous   
echogenicity, expected  
vascular flow.  
  
Left Lobe: 5.2 x 1.9 x 1.6   
cm; homogeneous   
echogenicity, expected  
vascular flow.  
  
Isthmus: 0.3 cm  
  
The most suspicious thyroid   
nodule(s) (up to four) as   
below:  
  
Nodules: None  
  
                                                            [facility]   
RADIOLOGY  
   
                                                            Provider, Domingo Harrison - 2024   
Formatting of this note   
might be different from the   
original.  
* * *Final Report* * *  
  
DATE OF EXAM: Sep 19 2024   
12:13PM  
  
[facility] 1048 - US   
THYROID/PARATHYROID /   
ACCESSION # [id number]  
  
PROCEDURE REASON: multiple   
diagnoses  
  
* * * * Physician   
Interpretation * * * *  
  
EXAMINATION: THYROID   
ULTRASOUND  
  
CLINICAL HISTORY:   
Thyromegaly Fatigue,   
unspecified type  
  
TECHNIQUE: Sonography and   
Doppler imaging of the   
thyroid was performed.  
Images were obtained and   
stored in a permanent   
archive.  
  
MQ: UST_1  
COMPARISON: 2022  
  
RESULT:  
  
Right Lobe: 5.8 x 1.7 x 1.8   
cm; homogeneous   
echogenicity, expected  
vascular flow.  
  
Left Lobe: 5.2 x 1.9 x 1.6   
cm; homogeneous   
echogenicity, expected  
vascular flow.  
  
Isthmus: 0.3 cm  
  
The most suspicious thyroid   
nodule(s) (up to four) as   
below:  
  
Nodules: None  
  
  
IMPRESSION  
IMPRESSION:  
  
Borderline enlarged   
thyroid. No discrete   
nodules or masses.  
  
  
  
  
  
: PSCB  
Transcribe Date/Time: Sep   
19 2024 12:15P  
  
Dictated by : WILLIAM SANTANA MD  
  
This examination was   
interpreted and the report   
reviewed and  
electronically signed by:  
WILLIAM SANTANA MD on Sep 19   
2024 12:16PM EST  
  
  
                                                            [facility]  
   
                                                    Radiology Study   
observation   
(narrative)                                                     [facility]  
   
                                                    US Thyroid glandOrdered By:   
[facility] Provider on 2024   
   
                                                                  [facility]  
   
                                                    CNOVon 2024   
   
                      CNOV                  [facility]  
   
                                                    CNPNon 2024   
   
                                        CNPN                Telephone (HEMAWS)  
---------------------------  
---------------------------  
--------------------------  
ROBERT LIMA   
([id number]) 1980 F  
Date Time Provider   
Department  
24 KAREL ROBERTSON  
During your visit today, we   
recorded the following   
information about you:  
Erin Napoles 2024   
9:05 AM Signed  
Patient is being referred   
to Hematology. She saw Dr Wiseman about 5 years ago.  
DX : Unintentional Weight   
Loss  
Insurance: [employer]  
Referred by: Juliane Murphy  
Patient requesting Dr Robertson  
Please advise  
Paris Narvaez LPN   
2024 10:31 AM Signed  
Spoke with Dr. Robertson, he   
informed if pt. Has   
questions concerning lab   
results  
she needs to contact her   
rheumatologist and have   
them explain those results   
to  
her. He does not see pts.   
For unintentional weight   
loss.  
Left message on pts.   
Voicemail with instructions   
to contact her PCP and or  
Rheumatologist as we will   
not be scheduling her an   
appt.  
MAC Crawford Kristin K, APRN.CNP   
2024 11:06 AM Signed  
Please inform patient that   
referral has been refused   
by hematology Dr. Robertson  
directly per message above.   
Patient is directed back to   
her rheumatologist to  
discuss questions regarding   
recent lab results. Patient   
does not have lymph  
node enlargement, abnormal   
CBC suggestive of lymphoma,   
or other indication to  
see hematology at this   
time. She does have mild   
enlargement of her thyroid  
gland. Thyroid function   
testing has been   
consistently normal.   
Patient was  
referred for thyroid   
ultrasound but has yet to   
complete testing. She may   
call  
[phone] to schedule   
ultrasound. I will monitor   
for results and  
communicate them to patient   
once reviewed. If patient   
has questions about her  
labs ordered per her   
rheumatologist, she needs   
to contact Dr. Pedroza to   
discuss  
results.  
NITHYA Reyes Kristin K, APRN.CNP   
2024 11:08 AM Signed  
Hello-  
Thank you for the update. I   
will also have my staff   
inform patient regarding  
need to revisit lab results   
with her rheumatologist.  
HELLEN Reyes.ALBERTA Burton Leanna AMAC   
2024 1:59 PM Signed  
I spoke with patient Robert   
and told her that Dr Robertson   
won't take her as a  
patient due to reasons   
listed below . I advised   
patient to go back to her  
Rheumatologist and she   
agreed. She did schedule   
her thyroid ultrasound for   
next  
week on 24.  
Leanna Burton LPN  
2024 1:58 PM  
Juliane Murphy APRN.CNP   
2024 3:43 PM Signed  
Thank you. I will monitor   
for results of thyroid US.  
Juliane Murphy APRN.CNP  
Allergies As of Date:   
2024 Noted Allergy   
Reaction  
CODEINE 2005 11 -   
Vomiting  
GABAPENTIN 2016 14 -   
Other: See Comments  
Comments: Headache/migraine  
PENICILLINS 2005 16 -   
Unknown  
Comments: childhood   
reaction  
Date Reviewed: 2024  
Reviewed by: Reginald Pedroza MD - Fully   
Assessed  
Reason for Visit:  
New Patient [[id number]]  
Prescriptions as of   
10/08/2024  
-   
fluticasone-umeclidin-vilan  
ter (TRELEGY ELLIPTA)   
200-62.5-25 mcg inhalation  
powder  
Inhale 1 Puff as instructed   
once daily.  
- albuterol HFA (PROVENTIL   
HFA, VENTOLIN HFA) 90   
mcg/actuation inhaler  
Inhale 2 Puffs as   
instructed every 4 hours as   
needed for   
wheezing/shortness  
of breath.  
- meloxicam (MOBIC) 15 mg   
tablet  
Take 1 tablet by mouth once   
daily.  
- cetirizine (ZYRTEC) 10 mg   
tablet  
Take 1 tablet by mouth once   
daily.  
- omeprazole (PRILOSEC) 40   
mg capsule  
Take 1 capsule by mouth two   
times a day.  
- Blood-Glucose Meter  
Test One time a day and as   
needed. Insulin Dep? Yes   
E11.9 DM 2  
- Blood Glucose Control,   
Normal soln  
Use as instructed  
- blood sugar diagnostic   
(BLOOD GLUCOSE TEST) test   
strip  
1 Strip once daily. Test   
blood sugar once daily and   
as needed.  
- atenolol (TENORMIN) 25 mg   
tablet  
take 1 tablet by mouth once   
daily  
- atorvastatin (LIPITOR) 20   
mg tablet  
Take 1 tablet by mouth once   
daily.  
- blood sugar diagnostic   
(FREESTYLE LITE STRIPS)   
test strip  
Test blood sugar(s) 1 times   
daily. Dx: E11.9. Insulin:   
No  
- Lancets lancets  
Check glucose daily . Dx:   
11.9.  
Problem List As Of Date   
2024 Noted Resolved  
NO SHOW [648319] 2007  
Lump or mass in breast   
[N63.0] 2009  
Fibroadenosis of breast   
[N60.29] 2009  
Anxiety [F41.9] 2014  
Malignant neoplasm of   
cervix (HCC) [C53.9]  
Fibromyalgia [M79.7]  
Rheumatoid arthritis of   
multiple sites with neg*  
Raynaud disease [I73.00]  
Restless legs [G25.81]  
Mass of left side of neck   
[R22.1] 2014  
Routine gynecological   
examination [Z01.419]   
2014  
Dysphagia [R13.10]   
2014  
Smoker [F17.200] 2014  
Mixed hyperlipidemia   
[E78.2] 2014  
Tachycardia [R00.0]   
2014  
COPD, mild (HCC) [J44.9]   
2014  
Essential hypertension   
[I10] 2015  
Constipation [K59.00]   
2015  
Fatigue [R53.83] 2015  
Hoarse voice quality   
[R49.0] 2015  
Gastroesophageal reflux   
disease without   
esophag*2015  
RSD lower limb [G90.529]   
2015  
Di (more content not   
included)...        Normal                                  [facility]   
   
                      [facility]   
   
                                        [facility] [place]                Telephone (mokonoK)  
---------------------------  
---------------------------  
--------------------------  
ROBERT LIMA   
([id number]) 1980 F  
Date Time Provider   
Department  
24 REGINALD PEDROZA  
During your visit today, we   
recorded the following   
information about you:  
Ondina Fitch LPN   
2024 12:09 PM Signed  
Pt asking about recent lab   
results- specifically     
does any of those results  
show what my issue is .  
MAC Hinkle Inderprit, MD   
2024 7:12 PM Signed  
Please let her know   
immunodeficiency testing   
all negative  
No immuno deficiency  
Blood test for RA positive   
( CCP ) low titer  
Waiting on her US hand   
prior to any additional med  
MD Constantine Diana Gail, LPN   
9/10/2024 9:21 AM Signed  
Did speak to the pt and she   
is scheduled in Oct for the   
us wrists/hands.  
Discussed results as listed   
by  but the pt   
remains concerned over the  
 gland swelling  in her   
neck. Is concerned this is   
lymphoma and discussed  
with PCP and stated today   
that provider said   
  will look into   
that .  
Pt also concerned that all   
of the immunodeficiency   
labs were not reviewed by  
provider.  
MAC Hinkle Inderprit, MD   
9/10/2024 6:18 PM Signed  
Called her  
Do not think lymphoma  
No immunodeficiency  
Reginald Pedroza MD  
[facility]G and getting testing   
done  
Reginald Pedroza MD  
Allergies As of Date:   
2024 Noted Allergy   
Reaction  
CODEINE 2005 11 -   
Vomiting  
GABAPENTIN 2016 14 -   
Other: See Comments  
Comments: Headache/migraine  
PENICILLINS 2005 16 -   
Unknown  
Comments: childhood   
reaction  
Date Reviewed: 2024  
Reviewed by: Reginald Pedroza MD - Fully   
Assessed  
Reason for Visit:  
Results [95]  
Prescriptions as of   
09/10/2024  
- meloxicam (MOBIC) 15 mg   
tablet  
Take 1 tablet by mouth once   
daily.  
- cetirizine (ZYRTEC) 10 mg   
tablet  
Take 1 tablet by mouth once   
daily.  
- omeprazole (PRILOSEC) 40   
mg capsule  
Take 1 capsule by mouth two   
times a day.  
- ANORO ELLIPTA 62.5-25   
mcg/actuation inhaler  
INHALE 1 PUFF BY MOUTH INTO   
THE LUNGS ONCE A DAY  
- Blood-Glucose Meter  
Test One time a day and as   
needed. Insulin Dep? Yes   
E11.9 DM 2  
- Blood Glucose Control,   
Normal soln  
Use as instructed  
- blood sugar diagnostic   
(BLOOD GLUCOSE TEST) test   
strip  
1 Strip once daily. Test   
blood sugar once daily and   
as needed.  
- atenolol (TENORMIN) 25 mg   
tablet  
take 1 tablet by mouth once   
daily  
- atorvastatin (LIPITOR) 20   
mg tablet  
Take 1 tablet by mouth once   
daily.  
- albuterol HFA (PROVENTIL   
HFA, VENTOLIN HFA) 90   
mcg/actuation inhaler  
Inhale 2 Puffs as   
instructed every 4 hours as   
needed for   
wheezing/shortness  
of breath.  
- blood sugar diagnostic   
(FREESTYLE LITE STRIPS)   
test strip  
Test blood sugar(s) 1 times   
daily. Dx: E11.9. Insulin:   
No  
- Lancets lancets  
Check glucose daily . Dx:   
11.9.  
Problem List As Of Date   
2024 Noted Resolved  
NO SHOW [192021] 2007  
Lump or mass in breast   
[N63.0] 2009  
Fibroadenosis of breast   
[N60.29] 2009  
Anxiety [F41.9] 2014  
Malignant neoplasm of   
cervix (HCC) [C53.9]  
Fibromyalgia [M79.7]  
Rheumatoid arthritis of   
multiple sites with neg*  
Raynaud disease [I73.00]  
Restless legs [G25.81]  
Mass of left side of neck   
[R22.1] 2014  
Routine gynecological   
examination [Z01.419]   
2014  
Dysphagia [R13.10]   
2014  
Smoker [F17.200] 2014  
Mixed hyperlipidemia   
[E78.2] 2014  
Tachycardia [R00.0]   
2014  
COPD, mild (HCC) [J44.9]   
2014  
Essential hypertension   
[I10] 2015  
Constipation [K59.00]   
2015  
Fatigue [R53.83] 2015  
Hoarse voice quality   
[R49.0] 2015  
Gastroesophageal reflux   
disease without   
esophag*2015  
RSD lower limb [G90.529]   
2015  
Diabetes mellitus type 2,   
diet-controlled   
(HCC)*10/14/2016  
Depression [F32.A]   
10/14/2016  
Neoplasm of uncertain   
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   
10/14/2016  
Encounter for gynecological   
examination   
without*10/14/2016  
Adjustment disorder with   
mixed anxiety and   
depr*2018  
Environmental and seasonal   
allergies [J30.89]   
2018  
Bilateral low back pain   
with bilateral   
sciatica*2021  
Sacroiliitis (HCC) [M46.1]   
2021  
Menopause [Z78.0]   
2022  
Degenerative disc disease,   
lumbar [M51.36] 2016  
Thyroid nodule [E04.1]   
2022  
Snoring [R06.83] 2023  
Pain [R52] 2023  
Folliculitis [L73.9]   
2023  
Loss of hair [L65.9]   
2023  
Chronic insomnia [F51.04]   
10/04/2023  
Chronic midline low back   
pain with bilateral   
sc*10/04/2023  
Vertigo [R42] 2023  
Tinnitus of both ears   
[H93.13] 2023  
SOB (shortness of breath)   
[R06.02] 2023  
Palpitations [R00.2]   
2023  
Facial asymmetry [Q67.0]   
2023  
Chronic ankle pain,   
bilateral [M25.571,   
G89.29,*2024  
Chronic foot pain, left   
[M79.672, G89.29]   
2024  
BENY (generalized anxiety   
disorder) [F41.1]   
2024  
Vitamin B12 deficiency   
[E53.8] 2024  
Multiple joint pain   
[M25.50] 2024  
Chest discomfort [R0 (more   
content not included)... Normal                                  [facility]  
   
                                                    ACE SerPl-cCncon 2024   
   
                                                    Angiotensin   
converting enzyme   
[Catalytic   
activity/Vol]   36 U/L          Normal          <=52            [facility]  
   
                                        Comment on above:   Order Comment: Speci  
men Type: BLOOD SPECIMENOrdering Facility:  
  
[facility] Address: [address] [place] [address]   
   
                                                            Result Comment: Patrica  
ficially low ACE levels may be found for   
patients taking ACE inhibitors or after the administration of   
gadolinium.This test was developed and its performance   
characteristics determined by [facility] [place] ([facility] It   
has not been cleared or approved by the FDA. -[facility] is regulated   
under CLIA as qualified to perform high-complexity testing. This   
test is used for clinical purposes. It should not be regarded as   
investigational or for research.   
   
                                                            Performed By: #### 2  
458-8, 2465-3, 2742-5 ####[facility] LABIA [id number] [place] [address] [place]   
   
                                                    ALGN RESP DISEASE PROF REG 5  
on 2024   
   
                                                    A. alternata IgE Qn   
(S)             <0.35           Normal          <0.35           [facility]  
   
                                        Comment on above:   Order Comment: Speci  
men Type: BLOOD SPECIMENOrdering Facility:  
  
[facility] Address: [address] [place] [address]   
   
                                                            Performed By: #### L  
[id number] ####[facility]   
LABIA [id number] [address] [place]   
   
                                                    A. alternata IgE   
RAST class (S)  Class 0         Normal          Class 0         [facility]  
   
                                        Comment on above:   Order Comment: Speci  
men Type: BLOOD SPECIMENOrdering Facility:  
  
[facility] Address: [address] [place] [address]   
   
                                                            Performed By: #### L  
[id number] ####[facility]   
LABIA [id number] [address] [place]   
   
                                                    A. fumigatus IgE Qn   
(S)             <0.35           Normal          <0.35           [facility]  
   
                                        Comment on above:   Order Comment: Speci  
men Type: BLOOD SPECIMENOrdering Facility:  
  
[facility] Address: [address] [place] [address]   
   
                                                            Performed By: #### L  
[id number] ####[facility]   
LABIA [id number] [address] [place]   
   
                                                    A. fumigatus IgE   
RAST class (S)  Class 0         Normal          Class 0         [facility]  
   
                                        Comment on above:   Order Comment: Speci  
men Type: BLOOD SPECIMENOrdering Facility:  
  
[facility] Address: [address] [place] [address]   
   
                                                            Performed By: #### L  
[id number] ####[facility]   
LABCLIA [id number] [place] [address]   
[place]   
   
                                                    American house dust   
mite IgE Qn (S) <0.35           Normal          <0.35           [facility]  
   
                                        Comment on above:   Order Comment: Speci  
men Type: BLOOD SPECIMENOrdering Facility:  
  
[facility] Address: [address] [place] [address]   
   
                                                            Performed By: #### L  
[id number] ####[facility]   
LABCLIA [id number] [address] [place]   
   
                                                    American house dust   
mite IgE RAST class   
(S)             Class 0         Normal          Class 0         [facility]  
   
                                        Comment on above:   Order Comment: Speci  
men Type: BLOOD SPECIMENOrdering Facility:  
  
[facility] Address: [address] [place] [address]   
   
                                                            Performed By: #### L  
[id number] ####[facility]   
LABCLIA [id number] [address] [place]   
   
                                                    Bermuda grass IgE Qn   
(S)             <0.35           Normal          <0.35           [facility]  
   
                                        Comment on above:   Order Comment: Speci  
men Type: BLOOD SPECIMENOrdering Facility:  
  
[facility] Address: [address] [place] [address]   
   
                                                            Performed By: #### L  
[id number] ####[facility]   
LABCLIA [id number] [place] [address]   
[place]   
   
                                                    Bermuda grass IgE   
RAST class (S)  Class 0         Normal          Class 0         [facility]  
   
                                        Comment on above:   Order Comment: Speci  
men Type: BLOOD SPECIMENOrdering Facility:  
  
[facility] Address: [address] [place] [address]   
   
                                                            Performed By: #### L  
[id number] ####[facility]   
LABCLIA [id number] [place] [address]   
[place]   
   
                      Boxelder IgE Qn (S) <0.35      Normal     <0.35      [facility]  
   
                                        Comment on above:   Order Comment: Speci  
men Type: BLOOD SPECIMENOrdering Facility:  
  
[facility] Address: [address] [place] [address]   
   
                                                            Performed By: #### L  
[id number] ####[facility]   
LABCLIA [id number] [address] [place]   
   
                                                    Boxelder IgE RAST   
class (S)       Class 0         Normal          Class 0         [facility]  
   
                                        Comment on above:   Order Comment: Speci  
men Type: BLOOD SPECIMENOrdering Facility:  
  
[facility] Address: [address] [place] [address]   
   
                                                            Performed By: #### L  
[id number] ####[facility]   
LABCLIA [id number] [address] [place]   
   
                                                    C. herbarum IgE Qn   
(S)             <0.35           Normal          <0.35           [facility]  
   
                                        Comment on above:   Order Comment: Speci  
men Type: BLOOD SPECIMENOrdering Facility:  
  
[facility] Address: [address] [place] [address]   
   
                                                            Performed By: #### L  
[id number] ####[facility]   
LABCLIA [id number] [address] [place]   
   
                                                    C. herbarum IgE RAST   
class (S)       Class 0         Normal          Class 0         [facility]  
   
                                        Comment on above:   Order Comment: Speci  
men Type: BLOOD SPECIMENOrdering Facility:  
  
[facility] Address: [address] [place] [address]   
   
                                                            Performed By: #### L  
[id number] ####[facility]   
LABCLIA [id number] [place] [address]   
[place]   
   
                                                    Cat dander IgE Qn   
(S)             <0.35           Normal          <0.35           [facility]  
   
                                        Comment on above:   Order Comment: Speci  
men Type: BLOOD SPECIMENOrdering Facility:  
  
[facility] Address: [address] [place] [address]   
   
                                                            Performed By: #### L  
[id number] ####[facility]   
LABCLIA [id number] [place] [address]   
[place]   
   
                                                    Cat dander IgE RAST   
class (S)       Class 0         Normal          Class 0         [facility]  
   
                                        Comment on above:   Order Comment: Speci  
men Type: BLOOD SPECIMENOrdering Facility:  
  
[facility] Address: [address] [place] [address]   
   
                                                            Performed By: #### L  
[id number] ####[facility]   
LABCLIA [id number] [place] [address]   
[place]   
   
                      Cocklebur IgE Qn (S) <0.35      Normal     <0.35      [facility]  
   
                                        Comment on above:   Order Comment: Speci  
men Type: BLOOD SPECIMENOrdering Facility:  
  
[facility] Address: [address] [place] [address]   
   
                                                            Performed By: #### L  
[id number] ####[facility]   
LABCLIA [id number] [address] [place]   
   
                                                    [place]le[place] IgE RAST   
class (S)       Class 0         Normal          Class 0         [facility]  
   
                                        Comment on above:   Order Comment: Speci  
men Type: BLOOD SPECIMENOrdering Facility:  
  
[facility] Address: [address] [place] [address]   
   
                                                            Performed By: #### L  
[id number] ####[facility]   
LABCLIA [id number] [address] [place]   
   
                      Cockroach IgE Qn (S) <0.35      Normal     <0.35      [facility]  
   
                                        Comment on above:   Order Comment: Speci  
men Type: BLOOD SPECIMENOrdering Facility:  
  
[facility] Address: [address] [place] [address]   
   
                                                            Performed By: #### L  
[id number] ####[facility]   
LABCLIA [id number] [address] [place]   
   
                                                    Cockroach IgE RAST   
class (S)       Class 0         Normal          Class 0         [facility]  
   
                                        Comment on above:   Order Comment: Speci  
men Type: BLOOD SPECIMENOrdering Facility:  
  
[facility] Address: [address] [place] [address]   
   
                                                            Performed By: #### L  
[id number] ####[facility]   
LABCLIA [id number] [place] [address]   
[place]   
   
                                                    Common Pigweed IgE   
Qn (S)          <0.35           Normal          <0.35           [facility]  
   
                                        Comment on above:   Order Comment: Speci  
men Type: BLOOD SPECIMENOrdering Facility:  
  
[facility] Address: [address] [place] [address]   
   
                                                            Performed By: #### L  
[id number] ####[facility]   
LABCLIA [id number] [address] [place]   
   
                                                    Common Pigweed IgE   
RAST class (S)  Class 0         Normal          Class 0         [facility]  
   
                                        Comment on above:   Order Comment: Speci  
men Type: BLOOD SPECIMENOrdering Facility:  
  
[facility] Address: [address] [place] [address]   
   
                                                            Performed By: #### L  
[id number] ####[facility]   
LABCLIA [id number] [address] [place]   
   
                                                    Common Ragweed IgE   
Qn (S)          <0.35           Normal          <0.35           [facility]  
   
                                        Comment on above:   Order Comment: Speci  
men Type: BLOOD SPECIMENOrdering Facility:  
  
[facility] Address: [address] [place] [address]   
   
                                                            Performed By: #### L  
[id number] ####[facility]   
LABCLIA [id number] [address] [place]   
   
                                                    Common Ragweed IgE   
RAST class (S)  Class 0         Normal          Class 0         [facility]  
   
                                        Comment on above:   Order Comment: Speci  
men Type: BLOOD SPECIMENOrdering Facility:  
  
[facility] Address: [address] [place] [address]   
   
                                                            Performed By: #### L  
[id number] ####[facility]   
LABCLIA [id number] [place] [address]   
[place]   
   
                                                    Fentress IgE Qn   
(S)             <0.35           Normal          <0.35           [facility]  
   
                                        Comment on above:   Order Comment: Speci  
men Type: BLOOD SPECIMENOrdering Facility:  
  
[facility] Address: [address] [place] [address]   
   
                                                            Performed By: #### L  
[id number] ####[facility]   
LABCLIA [id number] [address] [place]   
   
                                                    [place]entress IgE RAST   
class (S)       Class 0         Normal          Class 0         [facility]  
   
                                        Comment on above:   Order Comment: Speci  
men Type: BLOOD SPECIMENOrdering Facility:  
  
[facility] Address: [address] [place] [address]   
   
                                                            Performed By: #### L  
[id number] ####[facility]   
LABCLIA [id number] [address] [place]   
   
                                                    Dog dander IgE Qn   
(S)             <0.35           Normal          <0.35           [facility]  
   
                                        Comment on above:   Order Comment: Speci  
men Type: BLOOD SPECIMENOrdering Facility:  
  
[facility] Address: [address] [place] [address]   
   
                                                            Performed By: #### L  
[id number] ####[facility]   
LABCLIA [id number] [address] [place]   
   
                                                    Dog dander IgE RAST   
class (S)       Class 0         Normal          Class 0         [facility]  
   
                                        Comment on above:   Order Comment: Speci  
men Type: BLOOD SPECIMENOrdering Facility:  
  
[facility] Address: [address] [place] [address]   
   
                                                            Performed By: #### L  
[id number] ####[facility]   
LABCLIA [id number] [address] [place]   
   
                                                    English plantain IgE   
Qn (S)          <0.35           Normal          <0.35           [facility]  
   
                                        Comment on above:   Order Comment: Speci  
men Type: BLOOD SPECIMENOrdering Facility:  
  
[facility] Address: [address] [place] [address]   
   
                                                            Performed By: #### L  
[id number] ####[facility]   
LABCLIA [id number] [place] [address]   
[place]   
   
                                                    English plantain IgE   
RAST class (S)  Class 0         Normal          Class 0         [facility]  
   
                                        Comment on above:   Order Comment: Speci  
men Type: BLOOD SPECIMENOrdering Facility:  
  
[facility] Address: [address] [place] [address]   
   
                                                            Performed By: #### L  
[id number] ####[facility]   
LABCLIA [id number] [place] [address]   
[place]   
   
                                                    European house dust   
mite IgE Qn (S) <0.35           Normal          <0.35           [facility]  
   
                                        Comment on above:   Order Comment: Speci  
men Type: BLOOD SPECIMENOrdering Facility:  
  
[facility] Address: [address] [place] [address]   
   
                                                            Performed By: #### L  
[id number] ####[facility]   
LABCLIA [id number] [address] [place]   
   
                                                    European house dust   
mite IgE RAST class   
(S)             Class 0         Normal          Class 0         [facility]  
   
                                        Comment on above:   Order Comment: Speci  
men Type: BLOOD SPECIMENOrdering Facility:  
  
[facility] Address: [address] [place] [address]   
   
                                                            Performed By: #### L  
[id number] ####[facility]   
LABCLIA [id number] [address] [place]   
   
                      [place] IgE Qn (S) <0.35      Normal     <0.35      [facility]  
   
                                        Comment on above:   Order Comment: Speci  
men Type: BLOOD SPECIMENOrdering Facility:  
  
[facility] Address: [address] [place] [address]   
   
                                                            Performed By: #### L  
[id number] ####[facility]   
LABCLIA [id number] [address] [place]   
   
                                                    [place] IgE RAST   
class (S)       Class 0         Normal          Class 0         [facility]  
   
                                        Comment on above:   Order Comment: Speci  
men Type: BLOOD SPECIMENOrdering Facility:  
  
[facility] Address: [address] [place], [place] [address]   
   
                                                            Performed By: #### L  
[id number] ####[facility]   
LABCLIA [id number] [address] [place]   
   
                                                    Paul grass smut   
IgE Qn (S)      <0.35           Normal          <0.35           [facility]  
   
                                        Comment on above:   Order Comment: Speci  
men Type: BLOOD SPECIMENOrdering Facility:  
  
[facility] Address: [address] [place] [address]   
   
                                                            Performed By: #### L  
[id number] ####[facility]   
LABCLIA [id number] [address] [place]   
   
                                                    Paul grass smut   
IgE RAST class (S) Class 0         Normal          Class 0         [facility]  
   
                                        Comment on above:   Order Comment: Speci  
men Type: BLOOD SPECIMENOrdering Facility:  
  
[facility] Address: [address] [place] [address]   
   
                                                            Performed By: #### L  
[id number] ####[facility]   
LABCLIA [id number] [address] [place]   
   
                                                    [place] IgE Qn   
(S)             <0.35           Normal          <0.35           [facility]  
   
                                        Comment on above:   Order Comment: Speci  
men Type: BLOOD SPECIMENOrdering Facility:  
  
[facility] Address: [address] [place] [address]   
   
                                                            Performed By: #### L  
[id number] ####[facility]   
LABCLIA [id number] [address] [place]   
   
                                                    [place] IgE   
RAST class (S)  Class 0         Normal          Class 0         [facility]  
   
                                        Comment on above:   Order Comment: Speci  
men Type: BLOOD SPECIMENOrdering Facility:  
  
[facility] Address: [address] [place] [address]   
   
                                                            Performed By: #### L  
[id number] ####[facility]   
LABCLIA [id number] [address] [place]   
   
                                                    Márquez [place] IgE Qn   
(S)             <0.35           Normal          <0.35           [facility]  
   
                                        Comment on above:   Order Comment: Speci  
men Type: BLOOD SPECIMENOrdering Facility:  
  
[facility] Address: [address] [place] [address]   
   
                                                            Performed By: #### L  
[id number] ####[facility]   
LABCLIA [id number] [place] [address]   
[place]   
   
                                                    Márquez Elder IgE RAST   
class (S)       Class 0         Normal          Class 0         [facility]  
   
                                        Comment on above:   Order Comment: Speci  
men Type: BLOOD SPECIMENOrdering Facility:  
  
[facility] Address: [address] [place] [address]   
   
                                                            Performed By: #### L  
[id number] ####[facility]   
LABCLIA [id number] [place] [address]   
[place]   
   
                                                    Mouse urine proteins   
IgE Qn (S)      <0.35           Normal          <0.35           [facility]  
   
                                        Comment on above:   Order Comment: Speci  
men Type: BLOOD SPECIMENOrdering Facility:  
  
[facility] Address: [address] [place] [address]   
   
                                                            Performed By: #### L  
[id number] ####[facility]   
LABCLIA [id number] [address] [place]   
   
                                                    Mouse urine proteins   
IgE RAST class (S) Class 0         Normal          Class 0         [facility]  
   
                                        Comment on above:   Order Comment: Speci  
men Type: BLOOD SPECIMENOrdering Facility:  
  
[facility] Address: [address] [place] [address]   
   
                                                            Performed By: #### L  
[id number] ####[facility]   
LABCLIA [id number] [place] [address]   
[place]   
   
                                                    Pecan or [place]   
Tree IgE Qn (S) <0.35           Normal          <0.35           [facility]  
   
                                        Comment on above:   Order Comment: Speci  
men Type: BLOOD SPECIMENOrdering Facility:  
  
[facility] Address: [address] [place] [address]   
   
                                                            Performed By: #### L  
[id number] ####[facility]   
LABCLIA [id number] [place] [address]   
[place]   
   
                                                    Pecan or [place]   
Tree IgE RAST class   
(S)             Class 0         Normal          Class 0         [facility]  
   
                                        Comment on above:   Order Comment: Speci  
men Type: BLOOD SPECIMENOrdering Facility:  
  
[facility] Address: [address] [place] [address]   
   
                                                            Performed By: #### L  
[id number] ####[facility]   
LABCLIA [id number] [place] [address]   
[place]   
   
                                                    Sheep Sorrel IgE Qn   
(S)             <0.35           Normal          <0.35           [facility]  
   
                                        Comment on above:   Order Comment: Speci  
men Type: BLOOD SPECIMENOrdering Facility:  
  
[facility] Address: [address] [place] [address]   
   
                                                            Performed By: #### L  
[id number] ####[facility]   
LABCLIA [id number] [place] [address]   
[place]   
   
                                                    Sheep [place] IgE   
RAST class (S)  Class 0         Normal          Class 0         [facility]  
   
                                        Comment on above:   Order Comment: Speci  
men Type: BLOOD SPECIMENOrdering Facility:  
  
[facility] Address: [address] [place] [address]   
   
                                                            Performed By: #### L  
[id number] ####[facility]   
LABCLIA [id number] [place] [address]   
[place]   
   
                                                    [place] IgE Qn   
(S)             <0.35           Normal          <0.35           [facility]  
   
                                        Comment on above:   Order Comment: Speci  
men Type: BLOOD SPECIMENOrdering Facility:  
  
[facility] Address: [address] [place] [address]   
   
                                                            Performed By: #### L  
[id number] ####[facility]   
LABCLIA [id number] [place] [address]   
[place]   
   
                                                    [place] IgE   
RAST class (S)  Class 0         Normal          Class 0         [facility]  
   
                                        Comment on above:   Order Comment: Speci  
men Type: BLOOD SPECIMENOrdering Facility:  
  
[facility] Address: [address] [place] [address]   
   
                                                            Performed By: #### L  
[id number] ####[facility]   
LABCLIA [id number] [address] [place]   
   
                      Crist[place] IgE Qn (S) <0.35      Normal     <0.35      [facility]  
   
                                        Comment on above:   Order Comment: Speci  
men Type: BLOOD SPECIMENOrdering Facility:  
  
[facility] Address: [address] [place] [address]   
   
                                                            Performed By: #### L  
[id number] ####[facility]   
LABCLIA [id number] [address] [place]   
   
                                                    Cristian IgE RAST   
class (S)       Class 0         Normal          Class 0         [facility]  
   
                                        Comment on above:   Order Comment: Speci  
men Type: BLOOD SPECIMENOrdering Facility:  
  
[facility] Address: [address] [place] [address]   
   
                                                            Performed By: #### L  
[id number] ####[facility]   
LABCLIA [id number] [address] [place]   
   
                      [place] IgE Qn (S) <0.35      Normal     <0.35      [facility]  
   
                                        Comment on above:   Order Comment: Speci  
men Type: BLOOD SPECIMENOrdering Facility:  
  
[facility] Address: [address] [place] [address]   
   
                                                            Performed By: #### L  
[id number] ####[facility]   
LABCLIA [id number] [place] [address]   
[place]   
   
                                                    [place] IgE RAST   
class (S)       Class 0         Normal          Class 0         [facility]  
   
                                        Comment on above:   Order Comment: Speci  
men Type: BLOOD SPECIMENOrdering Facility:  
  
[facility] Address: [address] [place] [address]   
   
                                                            Performed By: #### L  
[id number] ####[facility]   
LABCLIA [id number] [address] [place]   
   
                      [place] IgE Qn (S) <0.35      Normal     <0.35      [facility]  
   
                                        Comment on above:   Order Comment: Speci  
men Type: BLOOD SPECIMENOrdering Facility:  
  
[facility] Address: [address] [place] [address]   
   
                                                            Performed By: #### L  
[id number] ####[facility]   
LABCLIA [id number] [address] [place]   
   
                                                    [place] IgE RAST   
class (S)       Class 0         Normal          Class 0         [facility]  
   
                                        Comment on above:   Order Comment: Speci  
men Type: BLOOD SPECIMENOrdering Facility:  
  
[facility] Address: [address] [place], [place] [address]   
   
                                                            Performed By: #### L  
[id number] ####[facility]   
LABCLIA [id number] [address] [place]   
   
                                                    White [place] IgE   
Qn (S)          <0.35           Normal          <0.35           [facility]  
   
                                        Comment on above:   Order Comment: Speci  
men Type: BLOOD SPECIMENOrdering Facility:  
  
[facility] Address: [address] [place] [address]   
   
                                                            Performed By: #### L  
[id number] ####[facility]   
LABCLIA [id number] [address]   
   
                                                    [place] mul[place] IgE   
RAST class (S)  Class 0         Normal          Class 0         [facility]  
   
                                        Comment on above:   Order Comment: Speci  
men Type: BLOOD SPECIMENOrdering Facility:  
  
[facility] Address: [address] [place] [address]   
   
                                                            Performed By: #### L  
[id number] ####[facility]   
LABCLIA [id number] [address] [place]   
   
                      [place] IgE Qn (S) <0.35      Normal     <0.35      [facility]  
   
                                        Comment on above:   Order Comment: Speci  
men Type: BLOOD SPECIMENOrdering Facility:  
  
[facility] Address: [address] [place], [place] [address]   
   
                                                            Performed By: #### L  
[id number] ####[facility]   
LABCLIA [id number] [id number] [address]   
[place]   
   
                                                    [place] IgE RAST   
class (S)       Class 0         Normal          Class 0         [facility]  
   
                                        Comment on above:   Order Comment: Speci  
men Type: BLOOD SPECIMENOrdering Facility:  
  
[facility] Address: [address] [place] [address]   
   
                                                            Performed By: #### L  
[id number] ####[facility]   
LABCLIA [id number] [place] [address]   
[place]   
   
                                                    ASHLIE BY IFA SCREENon 20  
24   
   
                      Nuclear Ab Ql (S) Negative   Normal     Negative   [facility]  
   
                                        Comment on above:   Order Comment: Speci  
men Type: BLOOD SPECIMENOrdering Facility:  
  
[facility] Address: [address] [place] [address]   
   
                                                            Result Comment: Anti  
-nuclear antibody test is used as an aid in   
diagnosis of systemic autoimmune diseases. Where positive and   
clinically warranted, follow-up using disease-specific testing is   
recommended. Low positive titers are not uncommon with advanced   
age, certain chronic infections, and malignancies among   
others.Test methodology: Indirect fluorescence immunoassay (IFA)   
using HEp-2 cells.   
   
                                                            Performed By: #### A  
NCA, ANAIFS ####[facility]   
LABCLIA [id number] [place] [address]   
[place]   
   
                                                    ANTI NEUTRO CYTO ABon 2024   
   
                                                    INTERPRETATION   
(ANCA)                          Normal                          [facility]  
   
                                        Comment on above:   Order Comment: Speci  
men Type: BLOOD SPECIMENOrdering Facility:  
  
[facility] Address: [address] [place] [address]   
   
                                                            Result Comment: Nega  
tive for C-ANCA and P-ANCA by indirect   
immunofluorescence. A negative result cannot reliably rule out   
ANCA-associated vaculitides especially when in remission.   
Clinical correlation is required.   
   
                                                            Performed By: #### A  
NCA, ANAIFS ####[facility]   
LABCLIA [id number] [place] [address]   
[place]   
   
                                                    Neutrophil   
cytoplasmic   
Ab.classic IF Ql (S) Negative        Normal          Negative        [facility]  
   
                                        Comment on above:   Order Comment: Speci  
men Type: BLOOD SPECIMENOrdering Facility:  
  
[facility] Address: [address] [place] [address]   
   
                                                            Performed By: #### A  
FELIX DUMONTS ####[facility]   
LABCLIA [id number] [place] [address]   
[place]   
   
                                                    Neutrophil   
cytoplasmic   
Ab.perinuclear IF Ql   
(S)             Negative        Normal          Negative        [facility]  
   
                                        Comment on above:   Order Comment: Speci  
men Type: BLOOD SPECIMENOrdering Facility:  
  
[facility] Address: [address] [place] [address]   
   
                                                            Performed By: #### A  
FELIX DUMONTS ####[facility]   
LABCLIA [id number] [place] [address]   
[place]   
   
                      STAFF REVIEW (ANCA) No review performed. Normal             
     [facility]  
   
                                        Comment on above:   Order Comment: Speci  
men Type: BLOOD SPECIMENOrdering Facility:  
  
[facility] Address: [address] [place] [address]   
   
                                                            Performed By: #### A  
LUTHER DUMONT ####[facility]   
LABCLIA [id number] [place] [address]   
[place]   
   
                                                    CRP SerPl-mCncon 2024   
   
                      CRP [Mass/Vol] mg/L       Normal     <1.0       [facility]  
   
                                        Comment on above:   Order Comment: Speci  
men Type: BLOOD SPECIMENOrdering Facility:  
   
[facility] Address: [address] [place] [address]   
   
                                                            Performed By: #### 1  
988-5, 3051-0, 3024-7, 3016-3, 3084-1   
####[facility] LABORATORYCLIA [id number] [place] [address] [place]   
   
                                                    Cyclic citrullinated peptide  
 IgG Qnon 2024   
   
                                                    CCP ANTIBODY IGG   
QUALITATIVE     Positive        Abnormal        Negative        [facility]  
   
                                        Comment on above:   Order Comment: Speci  
men Type: BLOOD SPECIMENOrdering Facility:  
   
[facility] Address: [address] [place] [address]   
   
                                                            Performed By: #### 3  
[id number], 07608-9 ####[facility] LABCLIA [id number] [place] [id number] [place]   
   
                                                    DIPHTHER/TETANUS ABon 2024   
   
                      DIPHTHERIA AB 1.4 IU/mL  [facility]  
   
                                        Comment on above:   Order Comment: Speci  
men Type: BLOOD SPECIMENOrdering Facility:  
   
[facility] Address: [address] [place] [address]   
   
                                                            Result Comment: INTE  
RPRETIVE INFORMATION: Diphtheria Ab,   
IgGAntibody concentration of greater than 0.1 IU/mL is   
usuallyconsidered protective.Responder status is determined   
according to the ratio of a onemonth post-vaccination sample to   
pre-vaccination concentrations ofDiphtheria IgG Abs as follows:1.   
If the one month post-vaccination concentration is less than 1.0   
IU/mL, the patient is considered to be a non-responder.2. If the   
post-vaccination concentration is greater than or equal to 1.0   
IU/mL, a patient with a ratio of less than 1.5 is a   
non-responder, a ratio of 1.5 to less than 3.0, a weak responder,   
and a ratio of 3.0 or greater, a good responder.3. If the   
pre-vaccination concentration is greater than 1.0 IU/mL, it may   
be difficult to assess the response based on a ratio alone. A   
post-vaccination concentration above 2.5 IU/mL in this case is   
usually adequate.This test was developed and its performance   
characteristicsdetermined by [facility]. It has not been   
cleared orapproved by the [place] Food and Drug Administration. This   
test wasperformed in a CLIA certified laboratory and is intended   
forclinical purposes.   
   
                                                            Performed By: #### D  
[facility] ####[facility] [id number]   
[place] [address]   
   
                      TETANUS AB 2.5 IU/mL  [facility]  
   
                                        Comment on above:   Order Comment: [place]  
men Type: BLOOD SPECIMENOrdering Facility:  
   
[facility] Address: [address] [place] [address]   
   
                                                            Result Comment: INTE  
RPRETIVE INFORMATION: Tetanus Ab, IgGAntibody   
concentration of greater than 0.1 IU/mL is usuallyconsidered   
protective.Responder status is determined according to the ratio   
of aone-month post-vaccination sample to pre-vaccination   
concentrationof Tetanus IgG Abs as follows:1. If the one month   
post-vaccination concentration is less than 1.0 IU/mL, the   
patient is considered a non-responder.2. If the post-vaccination   
concentration is greater than or equal to 1.0 IU/mL, a patient   
with a ratio of less than 1.5 is a non-responder, a ratio of 1.5   
to less than 3.0, a weak responder, and a ratio of 3.0 or   
greater, a good responder.3. If the pre-vaccination concentration   
is greater than 1.0 IU/mL, it may be difficult to assess the   
response based on a ratio alone. A post-vaccination concentration   
above 2.5 IU/mL in this case is usually adequate.This test was   
developed and its performance characteristicsdetermined by [facility]. It has not been cleared orapproved by the [place] Food   
and Drug Administration. This test wasperformed in a CLIA   
certified laboratory and is intended forclinical   
purposes.Performed By: [id number] [place], [place] [address]Zgldigiotz Director: Claudy Espinal MD,   
PhDCLIA Number: [id number]   
   
                                                            Performed By: #### D  
IPTET ####[facility]IA [id number]   
[place] [address]   
   
                                                    ESR Westergren method (Bld)   
[Velocity]on 2024   
   
                      ESR (Bld) [Velocity] 5 mm/h     Normal     0-20       [facility]  
   
                                        Comment on above:   Order Comment: Speci  
men Type: BLOOD SPECIMENOrdering Facility:  
   
[facility] Address: [address] [place], [place] [address]   
   
                                                            Performed By: #### 4  
537-7 ####[facility] LABORATORYCLIA   
[id number] [place] [id number] [place]   
   
                                                    IGG SUBCLASS 1,2,3,4on    
   
                                                    IgG subclass 1 (S)   
[Mass/Vol]      367.8 mg/dL     Low             382.4-928.6     [facility]  
   
                                        Comment on above:   Order Comment: Speci  
men Type: BLOOD SPECIMENOrdering Facility:  
   
[facility] Address: [address] [place] [address]   
   
                                                            Performed By: #### I  
 ####[facility]   
LABCLIA [id number] [place] [address]   
[place]   
   
                                                    IgG subclass 2 (S)   
[Mass/Vol]      238.5 mg/dL     Low             241.8-700.3     [facility]  
   
                                        Comment on above:   Order Comment: Speci  
men Type: BLOOD SPECIMENOrdering Facility:  
   
[facility] Address: [address] [place] [address]   
   
                                                            Performed By: #### I  
 ####[facility]   
LABCLIA [id number] [place] [address]   
[place]   
   
                                                    IgG subclass 3 (S)   
[Mass/Vol]      88.9 mg/dL      Normal          21.8-176.1      [facility]  
   
                                        Comment on above:   Order Comment: Speci  
men Type: BLOOD SPECIMENOrdering Facility:  
   
[facility] Address: [address] [place] [address]   
   
                                                            Performed By: #### I  
 ####[facility]   
LABCLIA [id number] [address] [place]   
   
                                                    IgG subclass 4 (S)   
[Mass/Vol]      2.2 mg/dL       Low             3.9-86.4        [facility]  
   
                                        Comment on above:   Order Comment: Speci  
men Type: BLOOD SPECIMENOrdering Facility:  
   
[facility] Address: [address] [place] [address]   
   
                                                            Performed By: #### I  
 ####[facility]   
LABCLIA [id number] [place] [address]   
[place]   
   
                                                    IgA SerPl-mCncon 2024   
   
                      IgA [Mass/Vol] 126 mg/dL  Normal          [facility]  
   
                                        Comment on above:   Order Comment: Speci  
men Type: BLOOD SPECIMENOrdering Facility:  
   
[facility] Address: [address] [place] [address]   
   
                                                            Performed By: #### 2  
458-8, 2465-3, 2742-5 ####[facility] LABCLIA [id number] [place] [address] [place]   
   
                                                    IgG SerPl-mCncon 2024   
   
                      IgG [Mass/Vol] 737 mg/dL  Normal     700-1600   [facility]  
   
                                        Comment on above:   Order Comment: Speci  
men Type: BLOOD SPECIMENOrdering Facility:  
   
[facility] Address: [address] [place] [address]   
   
                                                            Performed By: #### 2  
458-8, 2465-3, 2742-5 ####[facility] LABCLIA [id number] [address] [place]   
   
                                                    No Panel Informationon    
   
                                                    Interpretation and   
review of laboratory   
results         Normal                                          [facility]  
   
                                                    PNEUMOCOCCAL IGG ABS, 14 SER  
OTYPESon 2024   
   
                                                    PNEUMO SEROTYPE 1   
IGG (P13,PNX)   4.96 ug/mL      Normal                          [facility]  
   
                                        Comment on above:   Order Comment: Speci  
men Type: BLOOD SPECIMENOrdering Facility:  
   
[facility] Address: [address] [place] [address]   
   
                                                            Performed By: #### P  
NEUMG ####[facility]IES[facility] [id number]   
[place] [address]   
   
                                                    PNEUMO SEROTYPE 12F   
IGG (PNX)       0.53 ug/mL      Normal                          [facility]  
   
                                        Comment on above:   Order Comment: Speci  
men Type: BLOOD SPECIMENOrdering Facility:  
   
[facility] Address: [address] [place] [address]   
   
                                                            Performed By: #### P  
NEUMG ####[facility]OR[facility]IESCLIA [id number]   
[place] [address]   
   
                                                    PNEUMO SEROTYPE 14   
IGG (P7,P13,PNX) 5.47 ug/mL      [facility]  
   
                                        Comment on above:   Order Comment: Speci  
men Type: BLOOD SPECIMENOrdering Facility:  
   
[facility] Address: [address] [place] [address]   
   
                                                            Performed By: #### P  
NEUMG ####[facility]IESCLIA [id number]   
[place] [address]   
   
                                                    PNEUMO SEROTYPE 18C   
IGG (P7,P13,PNX) 4.75 ug/mL      [facility]  
   
                                        Comment on above:   Order Comment: Speci  
men Type: BLOOD SPECIMENOrdering Facility:  
   
[facility] Address: [address] [place] [address]   
   
                                                            Performed By: #### P  
NEUMG ####[facility]IESCLIA [id number]   
[place] [address]   
   
                                                    PNEUMO SEROTYPE 19F   
IGG (P7,P13,PNX) 69.27 ug/mL     [facility]  
   
                                        Comment on above:   Order Comment: Speci  
men Type: BLOOD SPECIMENOrdering Facility:  
   
[facility] Address: [address] [place] [address]   
   
                                                            Performed By: #### P  
NEUMG ####[facility]IESCLIA [id number]   
[place] [address]   
   
                                                    PNEUMO SEROTYPE 23F   
IGG (P7,P13,PNX) >9.46           [facility]  
   
                                        Comment on above:   Order Comment: Speci  
men Type: BLOOD SPECIMENOrdering Facility:  
   
[facility] Address: [address] [place] [address]   
   
                                                            Performed By: #### P  
NEUMG ####[facility]IESCLIA [id number]   
[place] [address]   
   
                                                    PNEUMO SEROTYPE 3   
IGG (P13,PNX)   2.32 ug/mL      [facility]  
   
                                        Comment on above:   Order Comment: Speci  
men Type: BLOOD SPECIMENOrdering Facility:  
   
[facility] Address: [address] [place] [address]   
   
                                                            Performed By: #### P  
NEUMG ####[facility]IESCLIA [id number]   
[place] [address]   
   
                                                    PNEUMO SEROTYPE 4   
IGG (P7,P13,PNX) 2.28 ug/mL      [facility]  
   
                                        Comment on above:   Order Comment: Speci  
men Type: BLOOD SPECIMENOrdering Facility:  
   
[facility] Address: [address] [place] [address]   
   
                                                            Performed By: #### P  
NEUMG ####[facility]IESCLIA [id number]   
[place] [address]   
   
                                                    PNEUMO SEROTYPE 5   
IGG (P13,PNX)   1.80 ug/mL      [facility]  
   
                                        Comment on above:   Order Comment: Speci  
men Type: BLOOD SPECIMENOrdering Facility:  
   
[facility] Address: [address] [place] [address]   
   
                                                            Performed By: #### P  
NEUMG ####[facility]IESCLIA [id number]   
[place] [address]   
   
                                                    PNEUMO SEROTYPE 6B   
IGG (P7,P13,PNX) 11.11 ug/mL     [facility]  
   
                                        Comment on above:   Order Comment: Speci  
men Type: BLOOD SPECIMENOrdering Facility:  
   
[facility] Address: [address] [place] [address]   
   
                                                            Performed By: #### P  
NEUMG ####[facility]IESCLIA [id number]   
[place] [address]   
   
                                                    PNEUMO SEROTYPE 7F   
IGG (P13,PNX)   16.72 ug/mL     [facility]  
   
                                        Comment on above:   Order Comment: Speci  
men Type: BLOOD SPECIMENOrdering Facility:  
   
[facility] Address: [address] [place] [address]   
   
                                                            Performed By: #### P  
NEUMG ####[facility]IESCLIA [id number]   
[place] [address]   
   
                                                    PNEUMO SEROTYPE 8   
IGG (PNX)       1.97 ug/mL      [facility]  
   
                                        Comment on above:   Order Comment: Speci  
men Type: BLOOD SPECIMENOrdering Facility:  
   
[facility] Address: [address] [place] [address]   
   
                                                            Performed By: #### P  
NEUMG ####[facility]IESCLIA [id number]   
[place] [address]   
   
                                                    PNEUMO SEROTYPE 9N   
IGG (PNX)       1.66 ug/mL      [facility]  
   
                                        Comment on above:   Order Comment: Speci  
men Type: BLOOD SPECIMENOrdering Facility:  
   
[facility] Address: [address] [place] [address]   
   
                                                            Performed By: #### P  
NEUMG ####[facility]IESCLIA [id number]   
[place] [address]   
   
                                                    PNEUMO SEROTYPE 9V   
IGG (P7,P13,PNX) 3.39 ug/mL      [facility]  
   
                                        Comment on above:   Order Comment: Speci  
men Type: BLOOD SPECIMENOrdering Facility:  
   
[facility] Address: [address] [place] [address]   
   
                                                            Performed By: #### P  
NEUMG ####[facility]IESCLIA [id number]   
[place] [address]   
   
                                                    PNEUMOCOCCAL   
INTERPRETATION  See Note        Normal                          [facility]  
   
                                        Comment on above:   Order Comment: Speci  
men Type: BLOOD SPECIMENOrdering Facility:  
   
[facility] Address: [address] [place] [address]   
   
                                                            Result Comment: INTE  
RPRETIVE INFORMATION: Streptococcus   
pneumoniae Antibodies, IgGA pre- and postvaccination comparison   
is required to adequatelyassess the humoral immune response to   
the pure polysaccharidePneumovax 23 (PNX) and/or the protein   
conjugated Prevnar 7 (P7),Prevnar 13 (P13), Prevnar 20 (P20), and   
Vaxneuvance (V15)Streptococcus pneumoniae vaccines.   
Prevaccination samples shouldbe collected prior to vaccine   
administration. Postvaccinationsamples should be obtained at   
least 4 weeks after immunization.Testing of postvaccination   
samples alone will provide only generalimmune status of the   
individual to various pneumococcal serotypes.In the case of pure   
polysaccharide vaccine, indication of immunesystem competence is   
further delineated as an adequate response toat least 50 percent   
of the serotypes in the vaccine challenge forthose 2-5 years of   
age and to at least 70 percent of the serotypesin the vaccine   
challenge for those 6-65 years of age. Individualimmune response   
may vary based on age, past exposure,immunocompetence, and   
pneumococcal serotype.Responder Status Antibody Ratio   
Nonresponder ........... Less than twofold increase and   
postvaccination concentration less than 1.3 ug/mL Good responder   
......... At least a twofold increase and/or a postvaccination   
concentration greater than or equal to 1.3 ug/mLA response to   
50-70 percent or more of the serotypes in thevaccine challenge is   
considered a normal humoral response.(Marian,2014) Antibody   
concentration greater than 1.0-1.3 ug/mL isgenerally considered   
long-term protection.(Marian, 2015)References:1. Marian MOHAN, Laurence MENSAH, Karri X, et al. Multilaboratoryassessment of threshold versus   
fold-change algorithms forminimizing analytical variability in   
multiplexed pneumococcal IgGmeasurements. Clin Vaccine Immunol.   
2014;21(7):982-988.2. Marian MOHAN, Ry SINGLETARY. Use and clinical   
interpretation ofpneumococcal antibody measurements in the   
evaluation of humoralimmune function. Clin Vaccine Immunol.   
2015;22(2):148-152.This test was developed and its performance   
characteristicsdetermined by [facility]. It has not been   
cleared orapproved by the U.S. Food and Drug Administration. This   
test wasperformed in a CLIA-certified laboratory and is intended   
forclinical purposes.Performed By: [facility] [address] [place], [place] [address]Biyaiirfbm Director: Claudy Espinal MD, PhDCLIA Number: [id number]   
   
                                                            Performed By: #### P  
NEUMG ####[facility]IA [id number]   
[place] [address]   
   
                                                    Rheumatoid fact SerPl-aCncon  
 2024   
   
                      Rheumatoid factor Qn [IU]/mL    Normal     0-15       [facility]  
   
                                        Comment on above:   Order Comment: Speci  
men Type: BLOOD SPECIMENOrdering Facility:  
   
[facility] Address: [address] [place] [address]   
   
                                                            Performed By: #### 1  
1572-5 ####[facility] LABORATORYCLIA   
[id number] [place] [address] [place]   
   
                                                    T3, FREEon 2024   
   
                          Free T3 [Mass/Vol] 2.9 pg/mL                 2.2 - 4.0  
   
pg/mL                                   [facility]  
   
                                                    T3Free SerPl-mCncon 20  
24   
   
                      Free T3 [Mass/Vol] 2.9 pg/mL  Normal     2.2-4.0    [facility]  
   
                                        Comment on above:   Order Comment: Speci  
men Type: BLOOD SPECIMENOrdering Facility:  
   
[facility] Address: [address] [place] [address]   
   
                                                            Performed By: #### 1  
988-5, 3051-0, 3024-7, 3016-3, 3084-1   
####[facility] LABORATORYCLIA [id number] [place] [address] [place]   
   
                                                    T4 FREE/FREE THYROXINEon    
   
                          Free T4 [Mass/Vol] 1.1 ng/dL                 0.8 - 1.5  
   
ng/dL                                   [facility]  
   
                                                    T4 Free SerPl-mCncon   
024   
   
                      Free T4 [Mass/Vol] 1.1 ng/dL  Normal     0.8-1.5    [facility]  
   
                                        Comment on above:   Order Comment: Speci  
men Type: BLOOD SPECIMENOrdering Facility:  
   
[facility] Address: [address] [place] [address]   
   
                                                            Performed By: #### 1  
988-5, 3051-0, 3024-7, 6-3, 3084-1   
####[facility] LABORATORYCLIA [id number] [place] [address] [place]   
   
                                                    THYROID STIMULATING HORMONEo  
n 2024   
   
                      TSH Qn     1.554 m[IU]/L                       [facility]  
   
                                        Comment on above:   3rd generation ultra  
 sensitive TSH.   
   
                                                    TSH SerPl-aCncon 2024   
   
                      TSH Qn     1.554 m[IU]/L Normal     0.358-3.740 [facility]  
   
                                        Comment on above:   Order Comment: Speci  
men Type: BLOOD SPECIMENOrdering Facility:  
   
[facility] Address: [address] [place] [address]   
   
                                                            Result Comment: 3rd   
generation ultra sensitive TSH.   
   
                                                            Performed By: #### 1  
988-5, 3051-0, -7, -3, 308-1   
####[facility] LABORATORYCLIA [id number] [address] [place]   
   
                                                    Urate SerPl-mCncon   
4   
   
                      Urate [Mass/Vol] 4.3 mg/dL  Normal     2.6-6.0    [facility]  
   
                                        Comment on above:   Order Comment: Speci  
men Type: BLOOD SPECIMENOrdering Facility:  
   
[facility] Address: [address] [place] [address]   
   
                                                            Result Comment: Anna  
ents receiving Metamizole prior to   
venipuncture, may have falsely depressed results.   
   
                                                            Performed By: #### 1  
988-5, 3051-0, 3024-7, 3016-3, 3084-1   
####[facility] LABORATORYCLIA [id number] [place] [address] [place]   
   
                                                    cCP IgG SerPl-aCncon   
024   
   
                                                    Cyclic citrullinated   
peptide IgG Qn  28 Units        High            <20             [facility]  
   
                                        Comment on above:   Order Comment: Speci  
men Type: BLOOD SPECIMENOrdering Facility:  
   
[facility] Address: [address] [place] [address]   
   
                                                            Performed By: #### 3  
3935-8, 81954-2 ####[facility] LABCLIA [id number] [address] [place]   
   
                                                    tTG IgA Qn (S)on 2024   
   
                                                    TRANSGLUTAMINASE IGA   
ABS INTERPRETATION Negative        Normal          Negative        [facility]  
   
                                        Comment on above:   Order Comment: Speci  
men Type: BLOOD SPECIMENOrdering Facility:  
   
[facility] Address: [address] [place] [address]   
   
                                                            Result Comment: The   
following results were obtained with [employer]   
[employer] R h-tTG IgA ALECIA.???R h-tTG IgA values obtained with   
different manufacturers' assay methods may not be used   
interchangeably. The magnitude of the reported IgA levels cannot   
be correlated to an endpoint???concentration.This is used as an   
aid in diagnosis of celiac disease. Clinical correlation is   
required.   
   
                                                            Performed By: #### 3  
3935-8, 87614-2 ####[facility] LABIA [id number] [place] [address] [place]   
   
                                                    tTG IgA Ser-aCncon    
   
                      tTG IgA Qn (S) <2         Normal     <4         [facility]  
   
                                        Comment on above:   Order Comment: Speci  
men Type: BLOOD SPECIMENOrdering Facility:  
   
[facility] Address: [address] [place] [address]   
   
                                                            Performed By: #### 3  
3935-8, 20796-1 ####[facility] LABIA [id number] [place] [id number] [place]   
   
                                                    CNOVon 2024   
   
                                        CNOV                Office Visit (SOHANK  
)  
---------------------------  
---------------------------  
--------------------------  
ROBERT LIMA   
([id number]) 1980 F  
Date Time Provider   
Department  
24 8:20 AM REGINALD PEDROZA  
During your visit today, we   
recorded the following   
information about you:  
Temperature Pulse Blood   
pressure Weight  
98.7 degrees 69/minute   
122/65 74.5 kg  
Height  
1.702 m  
Reginald Pedroza MD   
2024 8:33 AM Addendum  
This note was created using   
Click[employer].  
Subjective  
Robert Palacios is a   
44 year old female.  
Not feeling good  
Worse since 2024  
Has to force herself to do   
any thing  
Wt loss  
180 and now down to 164   
2024  
Hurt most back and legs  
Morning stiffness is   
lasting 4 hours plus, vague   
possibly day long, not  
recovering from stiffness.  
Review of Systems  
Objective  
/65   Pulse 69   Temp   
37.1 ?C (98.7 ?F)   Ht   
170.2 cm (5' 7 )   Wt  
74.5 kg (164 lb 3.2 oz)     
LMP (LMP Unknown)   BMI   
25.72 kg/m?  
Physical Exam  
Vitals reviewed.  
Constitutional:  
General: She is not in   
acute distress.  
Appearance: She is not   
ill-appearing or   
toxic-appearing.  
HENT:  
Right Ear: There is no   
impacted cerumen.  
Left Ear: There is no   
impacted cerumen.  
Nose: No congestion or   
rhinorrhea.  
Mouth/Throat:  
Pharynx: No oropharyngeal   
exudate or posterior   
oropharyngeal erythema.  
Eyes:  
General:  
Right eye: No discharge.  
Left eye: No discharge.  
Cardiovascular:  
Rate and Rhythm: Normal   
rate and regular rhythm.  
Heart sounds: Normal heart   
sounds. No murmur heard.  
No friction rub. No gallop.  
Pulmonary:  
Effort: No respiratory   
distress.  
Breath sounds: Normal   
breath sounds. No stridor.   
No wheezing or rhonchi.  
Abdominal:  
General: There is no   
distension.  
Palpations: Abdomen is   
soft. There is no mass.  
Tenderness: There is no   
abdominal tenderness.  
Hernia: No hernia is   
present.  
Musculoskeletal:  
General: No swelling,   
tenderness, deformity or   
signs of injury.  
Right shoulder: Normal.  
Left shoulder: Normal.  
Right elbow: Normal.  
Left elbow: Normal.  
Right wrist: Normal.  
Left wrist: Normal.  
Right hand: Normal.  
Left hand: Normal.  
Cervical back: No rigidity   
or tenderness.  
Thoracic back: Normal.  
Lumbar back: Normal.  
Right hip: Normal.  
Left hip: Normal.  
Right knee: Normal.  
Left knee: Normal.  
Right ankle: Normal.  
Left ankle: Normal.  
Right foot: Normal.  
Left foot: Normal.  
Comments: Tenderness CS TS   
LS  
Decreased range of motion   
CS LS  
bilateral shoulder passive   
range of motion is near   
normal , active range of  
motion is mod limited  
Wrist not tender  
Mcp not tender  
Pip 2-5 low grade swelling  
Dip pip enlargement  
Ankle feet normal  
There was no sclerodactyly   
noted on exam either and   
there was also no loss of  
digital pulp. There is no   
periungual erythema.  
Lymphadenopathy:  
Cervical: No cervical   
adenopathy.  
Skin:  
Coloration: Skin is not   
jaundiced or pale.  
Findings: No bruising or   
erythema.  
Neurological:  
Mental Status: She is   
oriented to person, place,   
and time.  
Cranial Nerves: No cranial   
nerve deficit.  
Sensory: No sensory   
deficit.  
Motor: No weakness.  
Coordination: Coordination   
normal.  
Assessment and Plan  
First visit 2024 lt   
handed  
[facility] seen , no   
RA diagnosis Dr. Tess Yang 2024  
Moves to [place]   
Polyarthralgia  
 HIV Hep B ab 2019 Hep   
C neg  
2020 ASHLIE neg , 2019 RF CCP   
ASHLIE neg  
2020 cold agglutin neg cryo   
neg  
2020 TPO neg  
2020 SPEP IgG 550 , 2019   
SPEP IgG 800  
2019   
 alpha 1 anti trypsin   
129 normla  
 IgG 737 IgG1 367 low ,   
IgG2 278 Low , IgG3 88 IgG4   
2.2 low  
 Post vaccination   
diptheria ab. tetanus ab.   
normal  
2024 aug pre/ post   
vaccination pneumococcal   
ab.low in serotype : 2023 RF ASHLIE neg CCP 28 (<20   
) , esr 5 crp normal uric   
4.1  
 IgA, Transglutaminase   
Ab: neg, IgA (mg/dl): 126  
 ACE 36  
 cANCA pANCA neg  
 RAST NE 5 neg  
 - ESR < 10 .  
[id number] CRP normal  
 chest xr normal  
2019 hand xr bl normal  
2019 feet xr bl normal  
2019 bilateral knee xr :   
Lateral patellar tilt   
bilaterally.  
2016 CT neck saliva gland   
normal  
 US bilateral wrist   
hand  
3RD PIP: Hypertrophy:   
Moderate. Power Doppler:   
None.  
4TH MCP: Hypertrophy:   
Moderate. Power Doppler:   
None.  
4TH PIP: Hypertrophy: None.   
Power Doppler: None.  
3RD PIP: Hypertrophy:   
Moderate. Power Doppler:   
None.  
4TH PIP: Hypertrophy:   
Moderate. Power Doppler:   
None.  
5TH PIP: Hypertrophy:   
Moderate. Power Doppler:   
None.  
Synovial hypertrophy   
without hyperemia at the   
bilateral PIP joints as   
described  
above.  
 EMG rt upper and lower   
ext normal  
((  sept Chronic   
leukocytosis? Last high wbc   
in 2019 80521 and rest   
normal  
)  
((2024 RA diagnosis   
2004 [place] Rheum hand pain,   
knee pain back pain,  
plaquenil [phone] not   
sure if helped), enbrel   
[phone] helped a lot ) (   
no  
use of arava mtx ) (   
prednisone used past no   
memory )  
((2024 Raynaud's   
Phenomenon 2010 or before,   
hand and feet triphasic  
changes  
((20 (more content not   
included)...        Normal                                  [facility]  
   
                                                    CNPNon 2024   
   
                                        CNPN                Telephone (RULTTB)  
---------------------------  
---------------------------  
--------------------------  
ROBERT LIMA   
([id number]) 1980 F  
Date Time Provider   
Department  
24 LIZZY YOUNG   
LT  
During your visit today, we   
recorded the following   
information about you:  
Lizzy Young 2024   
9:42 AM Signed  
Visit Type: MSK SYN  
Visit Length: 45, 50 OR 60   
MINUTES  
Order Name/Protocol: US   
HAND/WRIST SYNOVIAL SCREEN   
RT+LT  
Preferred Provider: N/A  
Comment: N/A  
Location: ANY FACILITY  
Slot held: N/A  
Lizzy Young 2024   
11:47 AM Signed  
Called patient on 2024 at 11:47 AM to   
schedule their MSK US exam.  
No answer, left , 1st   
attempt.  
Lizzy Young 2024   
12:42 PM Signed  
PT scheduled for MSK US on   
10/21/24 at 3 PM at [facility]  
Allergies As of Date:   
2024 Noted Allergy   
Reaction  
CODEINE 2005 11 -   
Vomiting  
GABAPENTIN 2016 14 -   
Other: See Comments  
Comments: Headache/migraine  
PENICILLINS 2005 16 -   
Unknown  
Comments: childhood   
reaction  
Date Reviewed: 2024  
Reviewed by: Reginald Pedroza MD - Fully   
Assessed  
Reason for Visit:  
Appointment [186]  
Prescriptions as of   
2024  
- meloxicam (MOBIC) 15 mg   
tablet  
Take 1 tablet by mouth once   
daily.  
- cetirizine (ZYRTEC) 10 mg   
tablet  
Take 1 tablet by mouth once   
daily.  
- omeprazole (PRILOSEC) 40   
mg capsule  
Take 1 capsule by mouth two   
times a day.  
- ANORO ELLIPTA 62.5-25   
mcg/actuation inhaler  
INHALE 1 PUFF BY MOUTH INTO   
THE LUNGS ONCE A DAY  
- Blood-Glucose Meter  
Test One time a day and as   
needed. Insulin Dep? Yes   
E11.9 DM 2  
- Blood Glucose Control,   
Normal soln  
Use as instructed  
- blood sugar diagnostic   
(BLOOD GLUCOSE TEST) test   
strip  
1 Strip once daily. Test   
blood sugar once daily and   
as needed.  
- atenolol (TENORMIN) 25 mg   
tablet  
take 1 tablet by mouth once   
daily  
- atorvastatin (LIPITOR) 20   
mg tablet  
Take 1 tablet by mouth once   
daily.  
- albuterol HFA (PROVENTIL   
HFA, VENTOLIN HFA) 90   
mcg/actuation inhaler  
Inhale 2 Puffs as   
instructed every 4 hours as   
needed for   
wheezing/shortness  
of breath.  
- blood sugar diagnostic   
(FREESTYLE LITE STRIPS)   
test strip  
Test blood sugar(s) 1 times   
daily. Dx: E11.9. Insulin:   
No  
- Lancets lancets  
Check glucose daily . Dx:   
11.9.  
Problem List As Of Date   
2024 Noted Resolved  
NO SHOW [839591] 2007  
Lump or mass in breast   
[N63.0] 2009  
Fibroadenosis of breast   
[N60.29] 2009  
Anxiety [F41.9] 2014  
Malignant neoplasm of   
cervix (HCC) [C53.9]  
Fibromyalgia [M79.7]  
Rheumatoid arthritis of   
multiple sites with neg*  
Raynaud disease [I73.00]  
Restless legs [G25.81]  
Mass of left side of neck   
[R22.1] 2014  
Routine gynecological   
examination [Z01.419]   
2014  
Dysphagia [R13.10]   
2014  
Smoker [F17.200] 2014  
Mixed hyperlipidemia   
[E78.2] 2014  
Tachycardia [R00.0]   
2014  
COPD, mild (HCC) [J44.9]   
2014  
Essential hypertension   
[I10] 2015  
Constipation [K59.00]   
2015  
Fatigue [R53.83] 2015  
Hoarse voice quality   
[R49.0] 2015  
Gastroesophageal reflux   
disease without   
esophag*2015  
RSD lower limb [G90.529]   
2015  
Diabetes mellitus type 2,   
diet-controlled   
(HCC)*10/14/2016  
Depression [F32.A]   
10/14/2016  
Neoplasm of uncertain   
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   
10/14/2016  
Encounter for gynecological   
examination   
without*10/14/2016  
Adjustment disorder with   
mixed anxiety and   
depr*2018  
Environmental and seasonal   
allergies [J30.89]   
2018  
Bilateral low back pain   
with bilateral   
sciatica*2021  
Sacroiliitis (HCC) [M46.1]   
2021  
Menopause [Z78.0]   
2022  
Degenerative disc disease,   
lumbar [M51.36] 2016  
Thyroid nodule [E04.1]   
2022  
Snoring [R06.83] 2023  
Pain [R52] 2023  
Folliculitis [L73.9]   
2023  
Loss of hair [L65.9]   
2023  
Chronic insomnia [F51.04]   
10/04/2023  
Chronic midline low back   
pain with bilateral   
sc*10/04/2023  
Vertigo [R42] 2023  
Tinnitus of both ears   
[H93.13] 2023  
SOB (shortness of breath)   
[R06.02] 2023  
Palpitations [R00.2]   
2023  
Facial asymmetry [Q67.0]   
2023  
Chronic ankle pain,   
bilateral [M25.571,   
G89.29,*2024  
Chronic foot pain, left   
[M79.672, G89.29]   
2024  
BENY (generalized anxiety   
disorder) [F41.1]   
2024  
Vitamin B12 deficiency   
[E53.8] 2024  
Multiple joint pain   
[M25.50] 2024  
Chest discomfort [R07.89]   
2024  
Abnormal Holter exam   
[R94.31] 2024  
Encounter Number: [id number]  
Encounter Status:Closed by   
LIZZY YOUNG on 24 Normal                                  [facility]  
   
                                                    CNOVon 2024   
   
                      CNOV                  Normal                [facility]  
   
                                                    HISTORY PHYSICALon    
   
                      HISTORY PHYSICAL            Normal                [facility]  
   
                                                    CNOVon 2024   
   
                      CNOV                  Normal                [facility]  
   
                                                    CNPNon 2024   
   
                      CNPN                  Normal                [facility]  
   
                                                    XR Chest PA and Lateralon    
   
                                                            IMPRESSION:  
  
No acute abnormalities.  
  
  
  
  
  
  
  
: PEDRO  
Transcribe Date/Time: 2024 8:01A  
  
Dictated by : BALTAZAR HODGES MD  
  
This examination was   
interpreted and the report   
reviewed and  
electronically signed by:  
BALTAZAR HODGES MD on 2024 8:02AM EST  
  
                                                            [facility]   
RADIOLOGY  
   
                                                            * * *Final Report* *  
 *  
  
DATE OF EXAM: 2024   
11:10AM  
  
RMX 5291 - XR CHEST 2V   
FRONTAL/LAT / ACCESSION #   
[id number]  
  
PROCEDURE REASON: multiple   
diagnoses  
  
* * * * Physician   
Interpretation * * * *  
  
EXAMINATION: CHEST   
RADIOGRAPH (2 VIEW FRONTAL   
& LATERAL)  
  
CLINICAL HISTORY: SOB   
(shortness of breath) COPD,   
mild (HCC)  
MQ: XC2_6  
  
EXAM DATE/TIME: 2024   
11:10 AM  
  
COMPARISON: 2020  
  
  
RESULT:  
  
Lines, tubes, and devices:   
There is a spinal   
stimulator.  
  
Lungs and pleura: The   
costophrenic angles are   
clear. No acute  
infiltrates or congestion   
is seen. There is no   
pneumothorax.  
  
Cardiomediastinal   
silhouette: The cardiac   
silhouette is normal in   
size.  
The aorta is slightly   
tortuous.  
  
Bones and soft tissues:   
There are no acute osseous   
abnormalities.  
  
                                                            [facility]   
RADIOLOGY  
   
                                                            Provider, Domingo Harrison - 2024   
Formatting of this note   
might be different from the   
original.  
* * *Final Report* * *  
  
DATE OF EXAM: 2024   
11:10AM  
  
RMX 5291 - XR CHEST 2V   
FRONTAL/LAT / ACCESSION #   
[id number]  
  
PROCEDURE REASON: multiple   
diagnoses  
  
* * * * Physician   
Interpretation * * * *  
  
EXAMINATION: CHEST   
RADIOGRAPH (2 VIEW FRONTAL   
& LATERAL)  
  
CLINICAL HISTORY: SOB   
(shortness of breath) COPD,   
mild (HCC)  
MQ: XC2_6  
  
EXAM DATE/TIME: 2024   
11:10 AM  
  
COMPARISON: 2020  
  
  
RESULT:  
  
Lines, tubes, and devices:   
There is a spinal   
stimulator.  
  
Lungs and pleura: The   
costophrenic angles are   
clear. No acute  
infiltrates or congestion   
is seen. There is no   
pneumothorax.  
  
Cardiomediastinal   
silhouette: The cardiac   
silhouette is normal in   
size.  
The aorta is slightly   
tortuous.  
  
Bones and soft tissues:   
There are no acute osseous   
abnormalities.  
  
  
IMPRESSION  
IMPRESSION:  
  
No acute abnormalities.  
  
  
  
  
  
  
  
: PEDRO  
Transcribe Date/Time: 2024 8:01A  
  
Dictated by : BALTAZAR HODGES MD  
  
This examination was   
interpreted and the report   
reviewed and  
electronically signed by:  
BALTAZAR HODGES MD on 2024 8:02AM EST  
  
  
                                                            [facility]  
   
                                                    XR Chest PA and LateralOrder  
ed By: [facility] Provider on 2024   
   
                                                                  [facility]  
   
                                                    CBC W Auto Differential pane  
l (Bld)on 2024   
   
                                                    Basophils (Bld)   
[#/Vol]         0.05 10*3/uL    Normal          <0.11           [facility]  
   
                                        Comment on above:   Order Comment: Speci  
men Type: BLOOD SPECIMENOrdering Facility:  
   
[facility] Address: [address] [place] [address]   
   
                                                            Performed By: #### 5  
7021-8 ####[facility]   
[id number] [place] [address] [place]   
OF [place]   
   
                                                    Basophils/100 WBC   
(Bld)           0.6 %           Normal                          [facility]  
   
                                        Comment on above:   Order Comment: Speci  
men Type: BLOOD SPECIMENOrdering Facility:  
   
[facility] Address: [address] [place] [address]   
   
                                                            Performed By: #### 5  
7021-8 ####[facility]   
[id number] [place] [id number] [place]   
OF [place]   
   
                                                    Differential cell   
count method Nom   
(Bld)           Auto            Normal                          [facility]  
   
                                        Comment on above:   Order Comment: Speci  
men Type: BLOOD SPECIMENOrdering Facility:  
   
[facility] Address: [address] [place] [address]   
   
                                                            Performed By: #### 5  
7021-8 ####[facility]   
[id number] [place] [address] [place]   
[place]   
   
                                                    Eosinophils (Bld)   
[#/Vol]         0.12 10*3/uL    Normal          <0.46           [facility]  
   
                                        Comment on above:   Order Comment: Speci  
men Type: BLOOD SPECIMENOrdering Facility:  
   
[facility] Address: [address] [place] [address]   
   
                                                            Performed By: #### 5  
7021-8 ####[facility]   
[id number] [place] [address] [place]   
OF [place]   
   
                                                    Eosinophils/100 WBC   
(Bld)           1.5 %           Normal                          [facility]  
   
                                        Comment on above:   Order Comment: Speci  
men Type: BLOOD SPECIMENOrdering Facility:  
   
[facility] Address: [address] [place] [address]   
   
                                                            Performed By: #### 5  
7021-8 ####[facility]   
[id number] [place] [address] [facility]   
OF [place]   
   
                                                    Erythrocyte   
distribution width   
(RBC) [Ratio]   14.2 %          Normal          11.5-15.0       [facility]  
   
                                        Comment on above:   Order Comment: Speci  
men Type: BLOOD SPECIMENOrdering Facility:  
   
[facility] Address: [address] [place] [address]   
   
                                                            Performed By: #### 5  
7021-8 ####[facility]   
[id number] [place] [address] [place]   
OF [place]   
   
                                                    Hematocrit (Bld)   
[Volume fraction] 38.4 %          Normal          36.0-46.0       [facility]  
   
                                        Comment on above:   Order Comment: Speci  
men Type: BLOOD SPECIMENOrdering Facility:  
  
[facility] Address: [address] [place] [address]   
   
                                                            Performed By: #### 5  
7021-8 ####[facility]   
[id number] [place] [address] [place]   
OF [place]   
   
                                                    Hemoglobin (Bld)   
[Mass/Vol]      12.5 g/dL       Normal          11.5-15.5       [facility]  
   
                                        Comment on above:   Order Comment: Speci  
men Type: BLOOD SPECIMENOrdering Facility:  
   
[facility] Address: [address] [place] [address]   
   
                                                            Performed By: #### 5  
7021-8 ####[facility]   
[id number] [place] [address] [place]   
OF [place]   
   
                                                    Immature   
granulocytes (Bld)   
[#/Vol]         10*3/uL         Normal          <0.10           [facility]  
   
                                        Comment on above:   Order Comment: Speci  
men Type: BLOOD SPECIMENOrdering Facility:  
   
[facility] Address: [address] [place] [address]   
   
                                                            Performed By: #### 5  
7021-8 ####[facility]   
[id number] [place] [address] [place]   
OF [place]   
   
                                                    Immature   
granulocytes/100 WBC   
(Bld)           0.1 %           Normal                          [facility]  
   
                                        Comment on above:   Order Comment: Speci  
men Type: BLOOD SPECIMENOrdering Facility:  
   
[facility] Address: [address] [place] [address]   
   
                                                            Performed By: #### 5  
7021-8 ####[facility]   
[id number] [place] [address] [place]   
[place]   
   
                                                    Lymphocytes (Bld)   
[#/Vol]         2.25 10*3/uL    Normal          1.00-4.00       [facility]  
   
                                        Comment on above:   Order Comment: Speci  
men Type: BLOOD SPECIMENOrdering Facility:  
   
[facility] Address: [address] [place] [address]   
   
                                                            Performed By: #### 5  
7021-8 ####[facility]   
[id number] [place] [address] [place]   
[place]   
   
                                                    Lymphocytes/100 WBC   
(Bld)           28.7 %          Normal                          [facility]  
   
                                        Comment on above:   Order Comment: Speci  
men Type: BLOOD SPECIMENOrdering Facility:  
   
[facility] Address: [address] [place] [address]   
   
                                                            Performed By: #### 5  
7021-8 ####[facility]   
[id number] [place] [address] [place]   
[place]   
   
                                                    MCH (RBC) [Entitic   
mass]           28.1 pg         Normal          26.0-34.0       [facility]  
   
                                        Comment on above:   Order Comment: Speci  
men Type: BLOOD SPECIMENOrdering Facility:  
  
[facility] Address: [address] [place] [address]   
   
                                                            Performed By: #### 5  
7021-8 ####[facility]   
[id number] [place] [address] [place]   
OF [place]   
   
                                                    MCHC (RBC)   
[Mass/Vol]      32.6 g/dL       Normal          30.5-36.0       [facility]  
   
                                        Comment on above:   Order Comment: Speci  
men Type: BLOOD SPECIMENOrdering Facility:  
   
[facility] Address: [address] [place] [address]   
   
                                                            Performed By: #### 5  
7021-8 ####[facility]   
[id number] [place] [address] [place]   
[place]   
   
                                                    MCV (RBC) [Entitic   
vol]            86.3 fL         Normal          80.0-100.0      [facility]  
   
                                        Comment on above:   Order Comment: Speci  
men Type: BLOOD SPECIMENOrdering Facility:  
  
[facility] Address: [address] [place] [address]   
   
                                                            Performed By: #### 5  
7021-8 ####[facility]   
[id number] [place] [address] [place]   
OF [place]   
   
                                                    Monocytes (Bld)   
[#/Vol]         0.51 10*3/uL    Normal          <0.87           [facility]  
   
                                        Comment on above:   Order Comment: Speci  
men Type: BLOOD SPECIMENOrdering Facility:  
   
[facility] Address: [address] [place] [address]   
   
                                                            Performed By: #### 5  
7021-8 ####[facility]   
[id number] [place] [address] [place]   
[place]   
   
                                                    Monocytes/100 WBC   
(Bld)           6.5 %           Normal                          [facility]  
   
                                        Comment on above:   Order Comment: Speci  
men Type: BLOOD SPECIMENOrdering Facility:  
   
[facility] Address: [address] [place] [address]   
   
                                                            Performed By: #### 5  
7021-8 ####[facility]   
[id number] [place] [address] [place]   
OF [place]   
   
                                                    Neutrophils (Bld)   
[#/Vol]         4.91 10*3/uL    Normal          1.45-7.50       [facility]  
   
                                        Comment on above:   Order Comment: Speci  
men Type: BLOOD SPECIMENOrdering Facility:  
   
[facility] Address: [address] [place] [address]   
   
                                                            Performed By: #### 5  
7021-8 ####[facility]   
[id number] [place] [address] [place]   
OF [place]   
   
                                                    Neutrophils/100 WBC   
(Bld)           62.6 %          Normal                          [facility]  
   
                                        Comment on above:   Order Comment: Speci  
men Type: BLOOD SPECIMENOrdering Facility:  
   
[facility] Address: [address] [place] [address]   
   
                                                            Performed By: #### 5  
7021-8 ####[facility]   
[id number] [place] [address] [place]   
[place]   
   
                                                    Platelet mean volume   
(Bld) [Entitic vol] 10.6 fL         Normal          9.0-12.7        [facility]  
   
                                        Comment on above:   Order Comment: Speci  
men Type: BLOOD SPECIMENOrdering Facility:  
  
[facility] Address: [address] [place] [address]   
   
                                                            Performed By: #### 5  
7021-8 ####[facility]   
[id number] [place] [address] [place]   
[place]   
   
                                                    Platelets (Bld)   
[#/Vol]         337 10*3/uL     Normal          150-400         [facility]  
   
                                        Comment on above:   Order Comment: Speci  
men Type: BLOOD SPECIMENOrdering Facility:  
   
[facility] Address: [address] [place] [address]   
   
                                                            Performed By: #### 5  
7021-8 ####[facility]   
[id number] [place], [place] [address] [place]   
[place]   
   
                      RBC (Bld) [#/Vol] 4.45 10*6/uL Normal     3.90-5.20  [facility]  
   
                                        Comment on above:   Order Comment: Speci  
men Type: BLOOD SPECIMENOrdering Facility:  
   
[facility] Address: [address] [place] [address]   
   
                                                            Performed By: #### 5  
7021-8 ####[facility] LABCLIA   
[id number] [place] [address] [facility]   
OF [facility]   
   
                      WBC (Bld) [#/Vol] 7.85 10*3/uL Normal     3.70-11.00 [facility]  
   
                                        Comment on above:   Order Comment: Speci  
men Type: BLOOD SPECIMENOrdering Facility:  
   
[facility] Address: [address] [place] [address]   
   
                                                            Performed By: #### 5  
7021-8 ####[facility] LABCLIA   
[id number] [place] [address] [place]   
[place]   
   
                                                    Comprehensive metabolic 2000  
 panelon 2024   
   
                      Albumin [Mass/Vol] 3.9 g/dL   Normal     3.2-5.0    [facility]  
   
                                        Comment on above:   Order Comment: Speci  
men Type: BLOOD SPECIMENOrdering Facility:  
   
[facility] Address: [address] [place] [address]   
   
                                                            Performed By: #### 2  
276-4, 46422-5, 73064-6 ####[facility] LABORATORYCLIA [id number] [address] [place]   
   
                                                    ALP [Catalytic   
activity/Vol]   76 U/L          Normal                    [facility]  
   
                                        Comment on above:   Order Comment: Speci  
men Type: BLOOD SPECIMENOrdering Facility:  
  
[facility] Address: [address] [place] [address]   
   
                                                            Performed By: #### 2  
276-4, 08075-8, 04003-3 ####[facility] LABORATORYCLIA [id number] [place] [id number] [place]   
   
                                                    ALT [Catalytic   
activity/Vol]   18 U/L          Normal          13-61           [facility]  
   
                                        Comment on above:   Order Comment: Speci  
men Type: BLOOD SPECIMENOrdering Facility:  
  
[facility] Address: [address] [place] [address]   
   
                                                            Result Comment: Resu  
lts may be falsely depressed after the   
administration of Sulfasalazine and/or Sulfapyridine.   
   
                                                            Performed By: #### 2  
276-4, 79124-5, 59473-7 ####[facility] LABORATORYCLIA [id number] [place] [id number] [place]   
   
                                                    Anion gap   
[Moles/Vol]     7 mmol/L        Normal          5-16            [facility]  
   
                                        Comment on above:   Order Comment: Speci  
men Type: BLOOD SPECIMENOrdering Facility:  
   
[facility] Address: [address] [place] [address]   
   
                                                            Performed By: #### 2  
276-4, 41908-1, 05017-5 ####[facility] LABORATORYCLIA [id number] [place] [id number] [place]   
   
                                                    AST [Catalytic   
activity/Vol]   16 U/L          Normal          8-34            [facility]  
   
                                        Comment on above:   Order Comment: Speci  
men Type: BLOOD SPECIMENOrdering Facility:  
  
[facility] Address: [address] [place] [address]   
   
                                                            Result Comment: Resu  
lts may be falsely depressed after the   
administration of Sulfasalazine and/or Sulfapyridine.   
   
                                                            Performed By: #### 2  
276-4, 58728-7, 78037-6 ####[facility] LABORATORYCLIA [id number] [place] [id number] [place]   
   
                      Bilirubin [Mass/Vol] 0.6 mg/dL  Normal     0.2-1.0    [facility]  
   
                                        Comment on above:   Order Comment: Speci  
men Type: BLOOD SPECIMENOrdering Facility:  
   
[facility] Address: [address] [place] [address]   
   
                                                            Performed By: #### 2  
276-4, 36809-8, 40421-7 ####[facility] LABORATORYCLIA [id number] [place] [id number] [place]   
   
                      Calcium [Mass/Vol] 9.6 mg/dL  Normal     8.5-10.5   [facility]  
   
                                        Comment on above:   Order Comment: Speci  
men Type: BLOOD SPECIMENOrdering Facility:  
   
[facility] Address: [address] [place] [address]   
   
                                                            Performed By: #### 2  
276-4, 45430-5, 12353-3 ####[facility] LABORATORYCLIA [id number] [place] [id number] [place]   
   
                      Chloride [Moles/Vol] 109 mmol/L High            [facility]  
   
                                        Comment on above:   Order Comment: Speci  
men Type: BLOOD SPECIMENOrdering Facility:  
   
[facility] Address: [address] [place] [address]   
   
                                                            Performed By: #### 2  
276-4, 78901-3, 36050-1 ####[facility] LABORATORYCLIA [id number] [place] [id number] [place]   
   
                      CO2 [Moles/Vol] 26 mmol/L  Normal     21-32      [facility]  
   
                                        Comment on above:   Order Comment: Speci  
men Type: BLOOD SPECIMENOrdering Facility:  
   
[facility] Address: [address] [place] [address]   
   
                                                            Performed By: #### 2  
276-4, 58710-5, 02734-5 ####[facility] LABORATORYCLIA [id number] [place] [id number] [place]   
   
                                                    Creatinine   
[Mass/Vol]      0.91 mg/dL      Normal          0.51-0.95       [facility]  
   
                                        Comment on above:   Order Comment: Speci  
men Type: BLOOD SPECIMENOrdering Facility:  
   
[facility] Address: [address] [place] [address]   
   
                                                            Result Comment: Anna  
ents receiving either N-Acetylcysteine (NAC)   
or Metamizole prior to venipuncture, may have falsely depressed   
results.   
   
                                                            Performed By: #### 2  
276-4, 06226-1, 70801-2 ####[facility] LABORATORYCLIA [id number] [place] [address] [place]   
   
                                                    Creatinine and   
Glomerular   
filtration   
rate.predicted panel   
(S/P/Bld)       79 mL/min/1.73m??? Normal          >=60            [facility]  
   
                                        Comment on above:   Order Comment: Speci  
men Type: BLOOD SPECIMENOrdering Facility:  
   
[facility] Address: [address] [place] [address]   
   
                                                            Result Comment: Erika  
mated Glomerular Filtration Rate (eGFR) is   
calculated using the  CKD-EPI creatinine equation. This   
equation utilizes serum creatinine, sex, and age as parameters.   
The creatinine assay has traceable calibration to isotope   
dilution-mass spectrometry. Refer to KDIGO guidelines for   
clinical interpretation. In patients with unstable renal   
function, e.g. those with acute kidney injury, the eGFR may not   
accurately reflect actual GFR.   
   
                                                            Performed By: #### 2  
276-4, 96485-3, 79983-5 ####[facility] LABORATORYCLIA [id number] [place] [address] [place]   
   
                      Glucose [Mass/Vol] 84 mg/dL   Normal          [facility]  
   
                                        Comment on above:   Order Comment: Speci  
men Type: BLOOD SPECIMENOrdering Facility:  
   
[facility] Address: [address] [place] [address]   
   
                                                            Result Comment: The   
American Diabetes Association (ADA) provides   
guidance for cutoff values for fasting glucose and random   
glucose. The ADA defines fasting as no caloric intake for at   
least 8 hours. Fasting plasma glucose results between 100 to 125   
mg/dL indicate increased risk for diabetes (prediabetes).Fasting   
plasma glucose results greater than or equal to 126 mg/dL meet   
the criteria for diagnosis of diabetes. In the absence of   
unequivocal hyperglycemia, results should be confirmed by repeat   
testing. In a patient with classic symptoms of hyperglycemia or   
hyperglycemic crisis, random plasma glucose results greater than   
or equal to 200 mg/dL meet the criteria for diagnosis of   
diabetes.Reference: Standards of Medical Care in Diabetes 2016,   
American Diabetes Association. Diabetes Care. 2016.39(Suppl   
1).Results may be falsely elevated after the administration of   
Sulfapyridine.Results may be falsely depressed after the   
administration of Sulfasalazine.   
   
                                                            Performed By: #### 2  
276-4, 24315-8, 86025-2 ####[facility] LABORATORYCLIA [id number] [place] [id number] [place]   
   
                                                    Potassium   
[Moles/Vol]     4.0 mmol/L      Normal          3.5-5.1         [facility]  
   
                                        Comment on above:   Order Comment: Speci  
men Type: BLOOD SPECIMENOrdering Facility:  
   
[facility] Address: [address] [place] [address]   
   
                                                            Performed By: #### 2  
276-4, 34614-1, 34048-9 ####[facility] LABORATORYCLIA [id number] [place] [id number] [place]   
   
                      Protein [Mass/Vol] 6.5 g/dL   Normal     6.0-8.5    [facility]  
   
                                        Comment on above:   Order Comment: Speci  
men Type: BLOOD SPECIMENOrdering Facility:  
   
[facility] Address: [address] [place] [address]   
   
                                                            Performed By: #### 2  
276-4, 12450-3, 28613-1 ####[facility] LABORATORYCLIA [id number] [place] [id number] [place]   
   
                      Sodium [Moles/Vol] 142 mmol/L Normal     136-145    [facility]  
   
                                        Comment on above:   Order Comment: Speci  
men Type: BLOOD SPECIMENOrdering Facility:  
   
[facility] Address: [address] [place], [place] [address]   
   
                                                            Performed By: #### 2  
276-4, 76789-9, 07189-6 ####[facility] LABORATORYCLIA [id number] [place] [id number] [place]   
   
                                                    Urea nitrogen   
[Mass/Vol]      7 mg/dL         Normal          7-26            [facility]  
   
                                        Comment on above:   Order Comment: Speci  
men Type: BLOOD SPECIMENOrdering Facility:  
   
[facility] Address: [address] [place] [address]   
   
                                                            Performed By: #### 2  
276-4, 89463-1, 37284-7 ####[facility] LABORATORYCLIA [id number] [place] [id number] [place]   
   
                                                    Ferritin SerPl-mCncon 2024   
   
                      Ferritin [Mass/Vol] 6.3 ng/mL  Low        8.0-307.0  [facility]  
   
                                        Comment on above:   Order Comment: Speci  
men Type: BLOOD SPECIMENOrdering Facility:  
   
[facility] Address: [address] [place] [address]   
   
                                                            Performed By: #### 2  
276-4, 42174-8, 32598-6 ####[facility] LABORATORYCLIA [id number] [place] [address] [place]   
   
                                                    Folate SerPl-mCncon 20   
   
                      Folate [Mass/Vol] 4.0 ng/mL  Normal     >3.0       [facility]  
   
                                        Comment on above:   Order Comment: Speci  
men Type: BLOOD SPECIMENOrdering Facility:  
   
[facility] Address: [address] [place] [address]   
   
                                                            Performed By: #### 2  
284-8 ####[facility] LABORATORYCLIA   
[id number] [address] [place]   
[place]   
   
                                                    Iron and Iron binding capaci  
ty panelon 2024   
   
                      Iron [Mass/Vol] 55 ug/dL   Normal          [facility]  
   
                                        Comment on above:   Order Comment: Speci  
men Type: BLOOD SPECIMENOrdering Facility:  
   
[facility] Address: [address] [place] [address]   
   
                                                            Result Comment: Anna  
ents treated with metal-binding drugs   
(e.g.deferoxamine) may have depressed iron values, as chelated   
iron may not properly react in the Siemens iron assay.   
   
                                                            Performed By: #### 2  
276-4, 95779-2, 64459-2 ####[facility] LABORATORYCLIA [id number] [address] [place]   
   
                                                    Iron binding   
capacity [Mass/Vol] 294 ug/dL       Normal          221-481         [facility]  
   
                                        Comment on above:   Order Comment: Speci  
men Type: BLOOD SPECIMENOrdering Facility:  
   
[facility] Address: [address] [place] [address]   
   
                                                            Performed By: #### 2  
276-4, 03214-4, 10437-0 ####[facility] LABORATORYCLIA [id number] [place] [address] [place]   
   
                                                    Iron/TIBC [Molar   
ratio]          18.7 %          Low             22.0-44.0       [facility]  
   
                                        Comment on above:   Order Comment: Speci  
men Type: BLOOD SPECIMENOrdering Facility:  
  
[facility] Address: [address] [place] [address]   
   
                                                            Performed By: #### 2  
276-4, 95200-3, 51370-3 ####[facility] LABORATORYCLIA [id number] [place] [id number] [place]   
   
                                                    XR CHEST 2V FRONTAL/LATon    
   
                                                    XR CHEST 2V   
FRONTAL/LAT                     Normal                          [facility]  
   
                                                    XR Chest PA and Lateralon    
   
                                                    Radiology Study   
observation   
(narrative)                                                     [facility]  
   
                                                    DBT Breast - bilateral diagn  
ostic for implanton 2024   
   
                                                            * * *Final Report* *  
 *  
  
DATE OF EXAM: 2024   
9:43AM  
  
[facility] 0627 - ALYSON DIAG W JUAN   
[place] / ACCESSION # [id number]  
  
PROCEDURE REASON: Breast   
pain N64.4 , Left breast   
mass N63.22  
  
* * * * Physician   
Interpretation * * * *  
  
#[id number] - ALYSON DIAG W   
JUAN FELTON  
  
BILATERAL DIGITAL   
DIAGNOSTIC MAMMOGRAM   
TOMOSYNTHESIS WITH CAD:   
2024  
  
HISTORY: Breast Pain N64.4   
, Left Breast Mass N63.22.  
  
RESULT:  
  
TECHNIQUE: The study was   
acquired using full field   
digital technology and  
interpreted from soft copy.  
Digital Breast   
Tomosynthesis (DBT) images   
were obtained and used to  
assist in the   
interpretation of this   
examination.  
Current study was also   
evaluated with a Computer   
Aided Detection (CAD).  
Comparison is made to exams   
dated: 2022 mammogram   
- [facility] and   
2023 mammogram. The   
breasts are  
heterogeneously dense,   
which may obscure small   
masses.  
  
No significant masses,   
calcifications, or other   
findings are seen in  
either breast.  
                                                            [facility]   
RADIOLOGY  
   
                                                            Provider, Domingo Harrison - 2024   
Formatting of this note   
might be different from the   
original.  
* * *Final Report* * *  
  
DATE OF EXAM: 2024   
9:43AM  
  
W 0627 - ALYSON DIAG W JUAN   
[place] / ACCESSION # [id number]  
  
PROCEDURE REASON: Breast   
pain N64.4 , Left breast   
mass N63.22  
  
* * * * Physician   
Interpretation * * * *  
  
#[id number] - ALYSON DIAG W   
JUAN FLETON  
  
BILATERAL DIGITAL   
DIAGNOSTIC MAMMOGRAM   
TOMOSYNTHESIS WITH CAD:   
2024  
  
HISTORY: Breast Pain N64.4   
, Left Breast Mass N63.22.  
  
RESULT:  
  
TECHNIQUE: The study was   
acquired using full field   
digital technology and  
interpreted from soft copy.  
Digital Breast   
Tomosynthesis (DBT) images   
were obtained and used to  
assist in the   
interpretation of this   
examination.  
Current study was also   
evaluated with a Computer   
Aided Detection (CAD).  
Comparison is made to exams   
dated: 2022 mammogram   
- [facility] and   
2023 mammogram. The   
breasts are  
heterogeneously dense,   
which may obscure small   
masses.  
  
No significant masses,   
calcifications, or other   
findings are seen in  
either breast.  
  
IMPRESSION  
IMPRESSION: INCOMPLETE:   
NEEDS ADDITIONAL IMAGING   
EVALUATION  
There is no abnormality   
seen in either breast to   
correspond with the  
pain, however, clinical   
followup is recommended.   
There is no abnormality  
seen in the left breast to   
correspond with the   
palpable abnormality,  
however, ultrasound is   
recommended.  
  
  
  
Etelvina minor/samia:2024   
10:36:06  
  
Imaging Technologist(s):   
Kathryn Ballesteros, [facility]  
  
Mammogram BI-RADS: 0   
Incomplete: needs   
additional imaging   
evaluation  
  
Multiple national specialty   
organizations have released   
breast cancer  
screening guidelines for   
women at average risk for   
developing breast  
cancer - guidelines that   
are based on both evidence   
and opinion, yet  
differ on when to start and   
how often to screen for   
breast cancer. With  
representation from Breast   
Imaging, Internal Medicine,   
Women's Health,  
Family Medicine, and   
Medical/Surgical Oncology,   
the [facility] has  
carefully reviewed the data   
and reached the following   
consensus:  
  
1) All women should engage   
in shared decision-making   
with their providers  
to decide when to start and   
how often to screen;  
2) All women should have   
the opportunity to start   
screening mammography  
at age 40;  
3) For women ages 45-55, we   
recommend annual screening   
mammograms;  
4) For women ages 55 and   
over, we support both the   
transition from an  
annual to a biennial   
interval if this aligns   
more with patient's values  
and preferences, or   
continuation with annual   
screening;  
5) All women should discuss   
with their providers when   
to stop screening  
mammograms.  
  
  
  
  
: Samia  
Transcribe Date/Time: 2024 9:21A  
  
Dictated by : ETELVINA LI MD  
  
This examination was   
interpreted and the report   
reviewed and  
electronically signed by:  
ETELVINA LI MD on 2024 10:36AM EST  
  
  
                                                            [facility]  
   
                                                    Radiology Study   
observation   
(narrative)                                                     [facility]  
   
                                                    ALYSON DIAG W JUAN BILon 2024   
   
                      ALYSON DIAG W JUAN FELTON            [facility]  
   
                                                    ALYSON US BREAST LTD LTon    
   
                      [facility] US BREAST LTD [facility]  
   
                                                    No Panel InformationOrdered   
By: [facility] Provider on 2024   
   
                                                                  [facility]  
   
                                                    US Breast - left limitedon 0  
2024   
   
                                                            IMPRESSION: PROBABLY  
 BENIGN   
- SHORT TERM INTERVAL   
FOLLOW-UP RECOMMENDED  
The oval area in the left   
breast most likely is duct   
ectasia or a  
complicated cyst and is   
probably benign.  
A follow-up left ultrasound   
in 6 months is recommended   
to demonstrate  
stability.  
Etelvina Li M.D.  
er/:2024 10:57:41  
  
Imaging Technologist(s):   
Kalee Ruiz, [facility]  
  
Ultrasound BI-RADS: 3   
Probably benign finding -   
short term interval  
follow-up recommended  
  
Multiple national specialty   
organizations have released   
breast cancer  
screening guidelines for   
women at average risk for   
developing breast  
cancer - guidelines that   
are based on both evidence   
and opinion, yet  
differ on when to start and   
how often to screen for   
breast cancer. With  
representation from Breast   
Imaging, Internal Medicine,   
Women's Health,  
Family Medicine, and   
Medical/Surgical Oncology,   
the [facility] has  
carefully reviewed the data   
and reached the following   
consensus:  
  
1) All women should engage   
in shared decision-making   
with their providers  
to decide when to start and   
how often to screen;  
2) All women should have   
the opportunity to start   
screening mammography  
at age 40;  
3) For women ages 45-55, we   
recommend annual screening   
mammograms;  
4) For women ages 55 and   
over, we support both the   
transition from an  
annual to a biennial   
interval if this aligns   
more with patient's values  
and preferences, or   
continuation with annual   
screening;  
5) All women should discuss   
with their providers when   
to stop screening  
mammograms.  
  
  
  
  
: Samia Gaytanrilauro Date/Time: 2024 10:23A  
  
Dictated by : ETELVINA LI MD  
  
This examination was   
interpreted and the report   
reviewed and  
electronically signed by:  
ETELVINA LI MD on 2024 10:57AM [facility]   
RADIOLOGY  
   
                                                            * * *Final Report* *  
 *  
  
DATE OF EXAM: 2024   
10:12AM  
  
[facility] 0593 - [facility] [employer] BREAST   
LTD LT / ACCESSION #   
[id number]  
  
PROCEDURE REASON: multiple   
diagnoses  
  
* * * * Physician   
Interpretation * * * *  
  
#[id number] - [facility] BREAST   
LTD LT  
  
LIMITED ULTRASOUND OF LEFT   
BREAST: 2024  
HISTORY: Multiple   
Diagnoses.  
  
RESULT:  
Comparison is made to exams   
dated: 2024 mammogram   
- [facility] and 2023   
mammogram.  
Color flow and real-time   
ultrasound of the left   
breast were performed.  
Gray scale images of the   
real-time examination were   
reviewed.  
  
There is a duct with a 4 x   
3 mm oval area in the left   
breast central to  
the nipple in the   
retroareolar region. This   
oval area is hypoechoic   
with  
posterior acoustic   
enhancement and located   
within dense breast tissue.  
This correlates with the   
palpable area of concern.  
  
                                                            [facility]   
RADIOLOGY  
   
                                                            Provider, Domingo Harrison - 2024   
Formatting of this note   
might be different from the   
original.  
* * *Final Report* * *  
  
DATE OF EXAM: 2024   
10:12AM  
  
[facility] 0593 - [facility] [employer] BREAST   
LTD LT / ACCESSION #   
[id number]  
  
PROCEDURE REASON: multiple   
diagnoses  
  
* * * * Physician   
Interpretation * * * *  
  
#[id number] - [facility] [employer] BREAST   
LTD LT  
  
LIMITED ULTRASOUND OF LEFT   
BREAST: 2024  
HISTORY: Multiple   
Diagnoses.  
  
RESULT:  
Comparison is made to exams   
dated: 2024 mammogram   
- [facility] and 2023   
mammogram.  
Color flow and real-time   
ultrasound of the left   
breast were performed.  
Gray scale images of the   
real-time examination were   
reviewed.  
  
There is a duct with a 4 x   
3 mm oval area in the left   
breast central to  
the nipple in the   
retroareolar region. This   
oval area is hypoechoic   
with  
posterior acoustic   
enhancement and located   
within dense breast tissue.  
This correlates with the   
palpable area of concern.  
  
  
IMPRESSION  
IMPRESSION: PROBABLY BENIGN   
- SHORT TERM INTERVAL   
FOLLOW-UP RECOMMENDED  
The oval area in the left   
breast most likely is duct   
ectasia or a  
complicated cyst and is   
probably benign.  
A follow-up left ultrasound   
in 6 months is recommended   
to demonstrate  
stability.  
Etelvina Li M.D.  
er/:2024 10:57:41  
  
Imaging Technologist(s):   
Kalee Ruiz, [facility]  
  
Ultrasound BI-RADS: 3   
Probably benign finding -   
short term interval  
follow-up recommended  
  
Multiple national specialty   
organizations have released   
breast cancer  
screening guidelines for   
women at average risk for   
developing breast  
cancer - guidelines that   
are based on both evidence   
and opinion, yet  
differ on when to start and   
how often to screen for   
breast cancer. With  
representation from Breast   
Imaging, Internal Medicine,   
Women's Health,  
Family Medicine, and   
Medical/Surgical Oncology,   
the [facility] has  
carefully reviewed the data   
and reached the following   
consensus:  
  
1) All women should engage   
in shared decision-making   
with their providers  
to decide when to start and   
how often to screen;  
2) All women should have   
the opportunity to start   
screening mammography  
at age 40;  
3) For women ages 45-55, we   
recommend annual screening   
mammograms;  
4) For women ages 55 and   
over, we support both the   
transition from an  
annual to a biennial   
interval if this aligns   
more with patient's values  
and preferences, or   
continuation with annual   
screening;  
5) All women should discuss   
with their providers when   
to stop screening  
mammograms.  
  
  
  
  
: Samia  
Transcribe Date/Time: 2024 10:23A  
  
Dictated by : ETELVINA LI MD  
  
This examination was   
interpreted and the report   
reviewed and  
electronically signed by:  
ETELVINA LI MD on 2024 10:57AM EST  
  
  
                                                            [facility]  
   
                                                    Radiology Study   
observation   
(narrative)                                                     [facility]  
   
                                                    CNPNon 2024   
   
                      CNPN                  [facility]  
   
                                                    CNOVon 2024   
   
                      CNOV                  [facility]  
   
                                                    HbA1c (Bld)on 2024   
   
                                                    Average glucose   
Estimated from   
glycated hemoglobin   
(Bld) [Mass/Vol] 111 mg/dL                                       [facility]  
   
                                        Comment on above:   eAG: (Estimated aver  
age glucose) is a calculated value from   
HgbA1c and is representative of the average blood glucose level   
in the last 2-3 month period.   
   
                                                    HbA1c (Bld) [Mass   
fraction]       5.5 %                           4.3 - 5.6 %     [facility]  
   
                                        Comment on above:   American Diabetes As  
sociation guidelines indicate that   
patients   
with HgbA1c in the range 5.7-6.4% are at increased risk for   
development of diabetes, and intervention by lifestyle   
modification may be beneficial. HgbA1c greater or equal to 6.5%   
is considered diagnostic of diabetes.   
   
                                                                  [facility]  
   
                                                    ALBUMIN/CREATININE RATIO, UR  
INEon 05-   
   
                                                    Albumin DL <= 20   
mg/L (U) [Mass/Vol] mg/L                                    0.0 - 19.0   
mg/L                                    [facility]  
   
                                                    Albumin/Creatinine   
(U) [Mass ratio]                                                 [facility]  
   
                                        Comment on above:   Not calculated  
Adult Male and Female Nephrotic Criteria:  
<30 mg/g is considered normal to mildly increased  
 mg/g is considered moderately increased  
>300 mg/g is considered severely increased  
  
KDIGO. (2013). KDIGO 2012 Clinical Practice Guideline for the   
Evaluation and Management of Chronic Kidney Disease. Official   
Journal of the International Society of Nephrology, 3(1), 1-150.  
  
   
   
                                                    Creatinine (U)   
[Mass/Vol]          18.2 mg/dL          Low                 29.0 - 226.0   
mg/dL                                   [facility]  
   
                                                    Interpretation and   
review of laboratory   
results         Abnormal                                        [facility]  
   
                                                    Albumin DL <= 20   
mg/L (U) [Mass/Vol] mg/dL           Normal          0.0-19.0        [facility]  
   
                                        Comment on above:   Order Comment: Speci  
men Type: URINE SPECIMENOrdering Facility:  
   
[facility] Address: [address] [place] [address]   
   
                                                            Performed By: #### U  
ACR ####[facility] LABORATORYCLIA   
[id number] [place] [address] [place]   
   
                                                    Albumin/Creatinine   
(U) [Mass ratio]                 Normal                          [facility]  
   
                                        Comment on above:   Order Comment: Speci  
men Type: URINE SPECIMENOrdering Facility:  
  
[facility] Address: [address] [place] [address]   
   
                                                            Result Comment: Not   
calculatedAdult Male and Female Nephrotic   
Criteria:<30 mg/g is considered normal to mildly [id number]   
mg/g is considered moderately increased>300 mg/g is considered   
severely increasedKDIGO. (2013). KDIGO 2012 Clinical Practice   
Guideline for the Evaluation and Management of Chronic Kidney   
Disease. Official Journal of the International Society of   
Nephrology, 3(1), 1-150.   
   
                                                            Performed By: #### U  
ACR ####[facility] LABORATORYCLIA   
[id number] [place] [id number] [place]   
[place]   
   
                                                    Creatinine (U)   
[Mass/Vol]      18.2 mg/dL      Low             29.0-226.0      [facility]  
   
                                        Comment on above:   Order Comment: Speci  
men Type: URINE SPECIMENOrdering Facility:  
   
[facility] Address: [address] [place] [address]   
   
                                                            Performed By: #### U  
ACR ####[facility] LABORATORYCLIA   
[id number] [place] [address] [place]   
   
                                                    Cobalamin (Vitamin B12) [Mas  
s/Vol]on 2024   
   
                                                    Interpretation and   
review of laboratory   
results         Normal                                          [facility]  
   
                                                    Comprehensive metabolic 2000  
 panelon 2024   
   
                          Albumin [Mass/Vol] 3.7 g/dL                  3.2 - 5.0  
   
g/dL                                    [facility]  
   
                                                    ALP [Catalytic   
activity/Vol]   85 U/L                          45 - 117 U/L    [facility]  
   
                                                    ALT [Catalytic   
activity/Vol]   23 U/L                          13 - 61 U/L     [facility]  
   
                                        Comment on above:   Results may be false  
ly depressed after the administration of   
Sulfasalazine and/or Sulfapyridine.   
   
                                                    Anion gap   
[Moles/Vol]         7 mmol/L                                5 - 16   
mmol/L                                  [facility]  
   
                                                    AST [Catalytic   
activity/Vol]   25 U/L                          8 - 34 U/L      [facility]  
   
                                        Comment on above:   Results may be false  
ly depressed after the administration of   
Sulfasalazine and/or Sulfapyridine.   
   
                          Bilirubin [Mass/Vol] 0.5 mg/dL                 0.2 - 1  
.0   
mg/dL                                   [facility]  
   
                          Calcium [Mass/Vol] 9.3 mg/dL                 8.5 - 10.  
5   
mg/dL                                   [facility]  
   
                          Chloride [Moles/Vol] 110 mmol/L   High         98 - 10  
7   
mmol/L                                  [facility]  
   
                          CO2 [Moles/Vol] 26 mmol/L                 21 - 32   
mmol/L                                  [facility]  
   
                                                    Creatinine   
[Mass/Vol]          0.83 mg/dL                              0.51 - 0.95   
mg/dL                                   [facility]  
   
                                        Comment on above:   Patients receiving e  
ither N-Acetylcysteine (NAC) or Metamizole  
   
prior to venipuncture, may have falsely depressed results.   
   
                                                    GFR/1.73 sq   
M.predicted among   
non-blacks MDRD   
(S/P/Bld) [Vol   
rate/Area]      88 mL/min/{1.73_m2}                 - PINF          [facility]  
   
                                        Comment on above:   Estimated Glomerular  
 Filtration Rate (eGFR) is calculated   
using   
the  CKD-EPI creatinine equation. This equation utilizes   
serum creatinine, sex, and age as parameters. The creatinine   
assay has traceable calibration to isotope dilution-mass   
spectrometry. Refer to KDIGO guidelines for clinical   
interpretation. In patients with unstable renal function, e.g.   
those with acute kidney injury, the eGFR may not accurately   
reflect actual GFR.   
   
                          Glucose [Mass/Vol] 108 mg/dL    High         70 - 100   
mg/dL                                   [facility]  
   
                                        Comment on above:   The American Diabete  
s Association (ADA) provides guidance for   
cutoff values for fasting glucose and random glucose. The ADA   
defines fasting as no caloric intake for at least 8 hours.   
Fasting plasma glucose results between 100 to 125 mg/dL indicate   
increased risk for diabetes (prediabetes).  
Fasting plasma glucose results greater than or equal to 126 mg/dL   
meet the criteria for diagnosis of diabetes. In the absence of   
unequivocal hyperglycemia, results should be confirmed by repeat   
testing. In a patient with classic symptoms of hyperglycemia or   
hyperglycemic crisis, random plasma glucose results greater than   
or equal to 200 mg/dL meet the criteria for diagnosis of   
diabetes.  
Reference: Standards of Medical Care in Diabetes 2016, American   
Diabetes Association. Diabetes Care. 2016.39(Suppl 1).  
  
Results may be falsely elevated after the administration of   
Sulfapyridine.  
Results may be falsely depressed after the administration of   
Sulfasalazine.  
   
   
                                                    Potassium   
[Moles/Vol]         3.8 mmol/L                              3.5 - 5.1   
mmol/L                                  [facility]  
   
                          Protein [Mass/Vol] 6.6 g/dL                  6.0 - 8.5  
   
g/dL                                    [facility]  
   
                          Sodium [Moles/Vol] 143 mmol/L                136 - 145  
   
mmol/L                                  [facility]  
   
                                                    Urea nitrogen   
[Mass/Vol]      7 mg/dL                         7 - 26 mg/dL    [facility]  
   
                      Albumin [Mass/Vol] 3.7 g/dL   Normal     3.2-5.0    [facility]  
   
                                        Comment on above:   Order Comment: Speci  
men Type: BLOOD SPECIMENOrdering Facility:  
   
[facility] Address: [address] [address]   
   
                                                            Performed By: #### 2  
132-9, 62454-8 ####[facility]   
LABORATORYCLIA [id number] [place] [id number]   
[place]#### 88579-5 ####[facility]   
LABORATORYCLIA [id number] [place] [id number]   
[place] LABCLIA [id number]   
[place] [address] [place]   
   
                                                    ALP [Catalytic   
activity/Vol]   85 U/L          Normal                    [facility]  
   
                                        Comment on above:   Order Comment: Speci  
men Type: BLOOD SPECIMENOrdering Facility:  
  
[facility] Address: [address] [place] [address]   
   
                                                            Performed By: #### 2  
132-9, 14577-7 ####[facility]   
LABORATORYCLIA [id number] [address] [place]#### 99374-8 ####[facility]   
LABORATORYCLIA [id number] [place] [id number]   
[facility] [place] LABCLIA [id number]   
[place], [place] [address] [place] [address]   
   
                                                    ALT [Catalytic   
activity/Vol]   23 U/L          Normal          13-61           [facility]  
   
                                        Comment on above:   Order Comment: Speci  
men Type: BLOOD SPECIMENOrdering Facility:  
  
[facility] Address: [address] [place] [address]   
   
                                                            Result Comment: Resu  
lts may be falsely depressed after the   
administration of Sulfasalazine and/or Sulfapyridine.   
   
                                                            Performed By: #### 2  
132-9, 20583-3 ####[facility]   
LABORATORYCLIA [id number] [address] OF [facility]#### [address]-2 ####[facility]   
LABORATORYCLIA [id number] [place] [id number]   
[facility] [place] LABCLIA [id number]   
[place] [address] [place]   
   
                                                    Anion gap   
[Moles/Vol]     7 mmol/L        Normal          5-16            [facility]  
   
                                        Comment on above:   Order Comment: Speci  
men Type: BLOOD SPECIMENOrdering Facility:  
   
[facility] Address: [address] [place] [address]   
   
                                                            Performed By: #### 2  
132-9, 59957-7 ####[facility]   
LABORATORYCLIA [id number] [address] OF [facility]#### [address]-5 ####[facility]   
LABORATORYCLIA [id number] [address] [place] LABCLIA [id number]   
[place] [address] [place]   
   
                                                    AST [Catalytic   
activity/Vol]   25 U/L          Normal          8-34            [facility]  
   
                                        Comment on above:   Order Comment: Speci  
men Type: BLOOD SPECIMENOrdering Facility:  
  
[facility] Address: [address] [place] [address]   
   
                                                            Result Comment: Resu  
lts may be falsely depressed after the   
administration of Sulfasalazine and/or Sulfapyridine.   
   
                                                            Performed By: #### 2  
132-9, 23162-1 ####[facility]   
LABORATORYCLIA [id number] [address] [place]#### [address]-2 ####[facility]   
LABORATORYCLIA [id number] [address] [place] LABCLIA [id number]   
[place] [address] [place]   
   
                      Bilirubin [Mass/Vol] 0.5 mg/dL  Normal     0.2-1.0    [facility]  
   
                                        Comment on above:   Order Comment: Speci  
men Type: BLOOD SPECIMENOrdering Facility:  
   
[facility] Address: [address] [place] [address]   
   
                                                            Performed By: #### 2  
132-9, 83407-0 ####[facility]   
LABORATORYCLIA [id number] [address] [place]#### [address]9568-9 ####[facility]   
LABORATORYCLIA [id number] [address] [place] LABCLIA [id number]   
[place] [address] [place]   
   
                      Calcium [Mass/Vol] 9.3 mg/dL  Normal     8.5-10.5   [facility]  
   
                                        Comment on above:   Order Comment: Speci  
men Type: BLOOD SPECIMENOrdering Facility:  
   
[facility] Address: [address] [id number]   
   
                                                            Performed By: #### 2  
132-9, 83676-3 ####[facility]   
LABORATORYCLIA [id number] [place] [address]   
[place]#### 80615-4 ####[facility]   
LABORATORYCLIA [id number] [place] [id number]   
[place] OF [facility] [place] LABCLIA [id number]   
[place] [address] [place]   
   
                      Chloride [Moles/Vol] 110 mmol/L High            [facility]  
   
                                        Comment on above:   Order Comment: Speci  
men Type: BLOOD SPECIMENOrdering Facility:  
   
[facility] Address: [facility] [place] [id number]   
   
                                                            Performed By: #### 2  
132-9,  ####[facility]   
LABORATORYCLIA [id number] [place] [address]   
[place]#### 92599-6 ####[facility]   
LABORATORYCLIA [id number] [place] [id number]   
[place] OF [facility] [place] LABCLIA [id number]   
[place] [address] [place]   
   
                      CO2 [Moles/Vol] 26 mmol/L  Normal     21-32      [facility]  
   
                                        Comment on above:   Order Comment: Speci  
men Type: BLOOD SPECIMENOrdering Facility:  
   
[facility] Address: [address] [facility] [place] [address]   
   
                                                            Performed By: #### 2  
132-9, 89155-6 ####[facility]   
LABORATORYCLIA [id number] [place] [address]   
[place]#### 96871-1 ####[facility]   
LABORATORYCLIA [id number] [place] [id number]   
[place] LABCLIA [id number]   
[address]   
   
                                                    Creatinine   
[Mass/Vol]      0.83 mg/dL      Normal          0.51-0.95       [facility]  
   
                                        Comment on above:   Order Comment: Speci  
men Type: BLOOD SPECIMENOrdering Facility:  
   
[facility] Address: [address] [place] [address]   
   
                                                            Result Comment: Anna  
ents receiving either N-Acetylcysteine (NAC)   
or Metamizole prior to venipuncture, may have falsely depressed   
results.   
   
                                                            Performed By: #### 2  
132-9, 07698-5 ####[facility]   
LABORATORYCLIA [id number] [address]#### [address]-0 ####[facility]   
LABORATORYCLIA [id number] [address] LABCLIA [id number]   
[address]   
   
                                                    Creatinine and   
Glomerular   
filtration   
rate.predicted panel   
(S/P/Bld)       88 mL/min/1.73m??? Normal          >=60            [facility]  
   
                                        Comment on above:   Order Comment: Speci  
men Type: BLOOD SPECIMENOrdering Facility:  
   
[facility] Address: [address] [place] [address]   
   
                                                            Result Comment: Erika  
mated Glomerular Filtration Rate (eGFR) is   
calculated using the  CKD-EPI creatinine equation. This   
equation utilizes serum creatinine, sex, and age as parameters.   
The creatinine assay has traceable calibration to isotope   
dilution-mass spectrometry. Refer to KDIGO guidelines for   
clinical interpretation. In patients with unstable renal   
function, e.g. those with acute kidney injury, the eGFR may not   
accurately reflect actual GFR.   
   
                                                            Performed By: #### 2  
132-9, 17956-0 ####[facility]   
LABORATORYCLIA [id number] [address]#### 81972-6 ####[facility]   
LABORATORYCLIA [id number] [address] LABCLIA [id number]   
[place] [address] [place]   
   
                      Glucose [Mass/Vol] 108 mg/dL  High            [facility]  
   
                                        Comment on above:   Order Comment: Speci  
men Type: BLOOD SPECIMENOrdering Facility:  
   
[facility] Address: [address] [place], [place] [address]   
   
                                                            Result Comment: The   
American Diabetes Association (ADA) provides   
guidance for cutoff values for fasting glucose and random   
glucose. The ADA defines fasting as no caloric intake for at   
least 8 hours. Fasting plasma glucose results between 100 to 125   
mg/dL indicate increased risk for diabetes (prediabetes).Fasting   
plasma glucose results greater than or equal to 126 mg/dL meet   
the criteria for diagnosis of diabetes. In the absence of   
unequivocal hyperglycemia, results should be confirmed by repeat   
testing. In a patient with classic symptoms of hyperglycemia or   
hyperglycemic crisis, random plasma glucose results greater than   
or equal to 200 mg/dL meet the criteria for diagnosis of   
diabetes.Reference: Standards of Medical Care in Diabetes 2016,   
American Diabetes Association. Diabetes Care. 2016.39(Suppl   
1).Results may be falsely elevated after the administration of   
Sulfapyridine.Results may be falsely depressed after the   
administration of Sulfasalazine.   
   
                                                            Performed By: #### 2  
132-9, 38337-9 ####[facility]   
LABORATORYCLIA [id number] [place] [address]   
[place]#### 80741-9 ####[facility]   
LABORATORYCLIA [id number] [place] [address]   
[place] [facility] [place] LABCLIA [id number]   
[place] [address] [place]   
   
                                                    Potassium   
[Moles/Vol]     3.8 mmol/L      Normal          3.5-5.1         [facility]  
   
                                        Comment on above:   Order Comment: Speci  
men Type: BLOOD SPECIMENOrdering Facility:  
   
[facility] Address: [address] [place] [address]   
   
                                                            Performed By: #### 2  
132-9, 92223-2 ####[facility]   
LABORATORYCLIA [id number] [place] [address]   
[place]#### 51131-4 ####[facility]   
LABORATORYCLIA [id number] [place] [id number]   
[place] [facility] [place] LABCLIA [id number]   
[address] STATES [facility]   
   
                      Protein [Mass/Vol] 6.6 g/dL   Normal     6.0-8.5    [facility]  
   
                                        Comment on above:   Order Comment: Speci  
men Type: BLOOD SPECIMENOrdering Facility:  
   
[facility] Address: [address] [place] [address]   
   
                                                            Performed By: #### 2  
132-9, 66365-3 ####[facility]   
LABORATORYCLIA [id number] [address] [place]#### [address]246-1 ####[facility]   
LABORATORYCLIA [id number] [address] [place] [facility] [place] LABCLIA [id number]   
[address] [place] [facility]   
   
                      Sodium [Moles/Vol] 143 mmol/L Normal     136-145    [facility]  
   
                                        Comment on above:   Order Comment: Speci  
men Type: BLOOD SPECIMENOrdering Facility:  
   
[facility] Address: [address] [place] [address]   
   
                                                            Performed By: #### 2  
132-9, 90576-1 ####[facility]   
LABORATORYCLIA [id number] [place] [address]   
[place]#### 33605-1 ####[facility]   
LABORATORYCLIA [id number] [place] [id number]   
[place] [facility] LABCLIA [id number]   
[place] [address] [place]   
   
                                                    Urea nitrogen   
[Mass/Vol]      7 mg/dL         Normal          7-26            [facility]  
   
                                        Comment on above:   Order Comment: Speci  
men Type: BLOOD SPECIMENOrdering Facility:  
   
[facility] Address: [address] [facility] [place] [address]   
   
                                                            Performed By: #### 2  
132-9, 38010-9 ####[facility]   
LABORATORYCLIA [id number] [place] [id number]   
[place]#### 09194-3 ####[facility]   
LABORATORYCLIA [id number] [place] [id number]   
[place] [facility] LABCLIA [id number]   
[place] [address] [place] [facility]   
   
                                                    HBV surface Ab Ql (S)Ordered  
 By: Anne Martinez on 2024   
   
                                                    HBV surface Ab Qn   
(S)             9.50                            mIU/mL          [facility]  
   
                                        Comment on above:   STATUS OF IMMUNITY  
Protective Immunity: greater than or equal to 10 mIU/mL  
(Traceable to WHO International Reference Preparation)  
No Protective Immunity: less than 10 mIU/mL  
Note: The magnitude of the measured result above the cutoff is   
not indicative of the total amount of antibody present.  
  
   
   
                                                                  [facility]  
   
                                                    HBV surface Ab Ql (S)on    
   
                                                    HBV surface Ab Qn   
(S)             9.50 mIU/mL     [facility]  
   
                                        Comment on above:   Order Comment: Speci  
men Type: BLOOD SPECIMENOrdering Facility:  
   
[facility] Address: [address] [place] [address]   
   
                                                            Result Comment: STAT  
US OF IMMUNITYProtective Immunity: greater   
than or equal to 10 mIU/mL (Traceable to WHO International   
Reference Preparation)No Protective Immunity: less than 10   
mIU/mLNote: The magnitude of the measured result above the cutoff   
is not indicative of the total amount of antibody present.   
   
                                                            Performed By: #### 3  
1201-7, 20366-4 ####[facility]   
LABORATORYCLIA [id number] [place] [id number]   
[place] [facility]   
   
                                                    HBV surface Ab Ser Qlon    
   
                                                    HBV surface Ab Ql   
(S)             Negative        [facility]  
   
                                        Comment on above:   Order Comment: Speci  
men Type: BLOOD SPECIMENOrdering Facility:  
   
[facility] Address: [address] [place] [address]   
   
                                                            Result Comment: No s  
erological evidence of immunity to Hepatitis   
B Virus.   
   
                                                            Performed By: #### 3  
1201-7, 48330-1 ####[facility]   
LABORATORYCLIA [id number] [place] [address]   
[place]   
   
                                                    HEPATITIS B SURFACE ANTIBODY  
Ordered By: Anne Martinez on 2024   
   
                                                    HBV surface Ab Ql   
(S)             Negative                                        [facility]  
   
                                        Comment on above:   No serological evide  
nce of immunity to Hepatitis B Virus.   
   
                                                    HIV 1+2 Ab IA Qlon    
   
                                                    HIV 1+2 Ab+HIV1 p24   
Ag IA Ql        Non-Reactive                    Nonreactive     [facility]  
   
                                        Comment on above:   Nonreactive: Less th  
an 1.0 index value  
Specimens with an index value <1.0 are considered nonreactive for   
antibodies to HIV-1, HIV-2, and p24 antigen by the [employer] IM   
CHIV assay.  
  
   
   
                                                    Interpretation and   
review of laboratory   
results         Normal                                          [facility]  
   
                                                    HIV 1+2 Ab+HIV1 p24   
Ag IA Ql        Non-Reactive    Normal          Nonreactive     [facility]  
   
                                        Comment on above:   Order Comment: Speci  
men Type: BLOOD SPECIMENOrdering Facility:  
   
[facility] Address: [address] [place] [address]   
   
                                                            Result Comment: Nonr  
eactive: Less than 1.0 index valueSpecimens   
with an index value <1.0 are considered nonreactive for   
antibodies to HIV-1, HIV-2, and p24 antigen by the [employer] IM   
CHIV assay.   
   
                                                            Performed By: #### 3  
1201-7, 08462-2 ####[facility]   
LABORATORYCLIA [id number] [place] [address]   
[place]   
   
                                                    HbA1c (Bld)on 2024   
   
                                                    Average glucose   
Estimated from   
glycated hemoglobin   
(Bld) [Mass/Vol] 111 mg/dL       Normal                          [facility]  
   
                                        Comment on above:   Order Comment: Speci  
men Type: BLOOD SPECIMENOrdering Facility:  
   
[facility] Address: [address] [place] [address]   
   
                                                            Result Comment: eAG:  
 (Estimated average glucose) is a calculated   
value from HgbA1c and is representative of the average blood   
glucose level in the last 2-3 month period.   
   
                                                            Performed By: #### 5  
5454-3 ####[facility]   
LABCLIA [id number] [place] [address]   
[place]   
   
                                                    HbA1c (Bld) [Mass   
fraction]       5.5 %           Normal          4.3-5.6         [facility]  
   
                                        Comment on above:   Order Comment: Speci  
men Type: BLOOD SPECIMENOrdering Facility:  
  
[facility] Address: [address] [place] [address]   
   
                                                            Result Comment: Amer  
ican Diabetes Association guidelines indicate   
that patients with HgbA1c in the range 5.7-6.4% are at increased   
risk for development of diabetes, and intervention by lifestyle   
modification may be beneficial. HgbA1c greater or equal to 6.5%   
is considered diagnostic of diabetes.   
   
                                                            Performed By: #### 5  
5454-3 ####[facility]   
LABCLIA [id number] [facility] CountyDES[facility] [id number] [place] [id number]   
[place]   
   
                                                    Lipid 1996 panelon   
4   
   
                                                    Cholesterol   
[Mass/Vol]          145 mg/dL                               0 - 199   
mg/dL                                   [facility]  
   
                                        Comment on above:   <200 mg/dL, Desirabl  
e  
200-239 mg/dL, Borderline high  
>239 mg/dL, High  
  
   
   
                                                    Cholesterol in HDL   
[Mass/Vol]          51 mg/dL                                40 - PINF   
mg/dL                                   [facility]  
   
                                        Comment on above:   40-59 mg/dL, Accepta  
ble  
>59 mg/dL, High: Negative risk factor for coronary heart disease  
<40 mg/dL, Low: Positive risk factor for coronary heart disease  
  
   
   
                                                    Cholesterol in LDL   
[Mass/Vol]          61 mg/dL                                0 - 129   
mg/dL                                   [facility]  
   
                                        Comment on above:   <100 mg/dL, Optimal  
100-129 mg/dL, Near optimal/above optimal  
130-159 mg/dL, Borderline high  
160-189 mg/dL, High  
>189 mg/dL, Very high  
Secondary prevention optimal LDL Cholesterol levels are   
recommended to be < 70 mg/dL  
  
   
   
                                                    Cholesterol in   
LDL/Cholesterol in   
HDL [Mass ratio] 1.20 {ratio}                    NINF - 2.54     [facility]  
   
                                        Comment on above:   Reference:  
1. National Cholesterol Education Program ATP III Guideline   
At-A-Glance Quick Desk Reference: National Heart, Lung, and Blood   
[place]. National Institutes of Health. 2001: NIH Publication   
No. .  
2. An International Atherosclerosis Society position paper:   
global recommendations for the management of dyslipidemia:   
executive summary, Atherosclerosis. 2014: 232(2):410-413.  
  
   
   
                                                    Cholesterol in VLDL   
[Mass/Vol]          33 mg/dL            High                NINF - 30   
mg/dL                                   [facility]  
   
                                                    Cholesterol non HDL   
[Mass/Vol]          94 mg/dL                                NINF - 130   
mg/dL                                   [facility]  
   
                                        Comment on above:   <130 mg/dL, Optimal  
130-159 mg/dL, Near optimal/above optimal  
160-189 mg/dL, Borderline high  
190-219 mg/dL, High  
>219 mg/dL, Very high  
Secondary prevention optimal non HDL Cholesterol levels are   
recommended to be <100 mg/dL  
  
   
   
                                                    Cholesterol.total/Ch  
olesterol in HDL   
[Mass ratio]    2.84 {ratio}                    NINF - 5.10     [facility]  
   
                      Fasting Time 12                    hrs        [facility]  
   
                                                    Triglyceride   
[Mass/Vol]          165 mg/dL           High                30 - 149   
mg/dL                                   [facility]  
   
                                        Comment on above:   <150 mg/dL, Normal  
150-199 mg/dL, Borderline high  
200-499 mg/dL, High  
>499 mg/dL, Very high  
  
Patients receiving either N-Acetylcysteine (NAC) or Metamizole   
prior to venipuncture, may have falsely depressed results.  
   
   
                                                    Cholesterol   
[Mass/Vol]      145 mg/dL       Normal          0-199           [facility]  
   
                                        Comment on above:   Order Comment: Speci  
men Type: BLOOD SPECIMENOrdering Facility:  
   
[facility] Address: [address] [place] [address]   
   
                                                            Result Comment: <200  
 mg/dL, Desirable 200-239 mg/dL, Borderline   
high>239 mg/dL, High   
   
                                                            Performed By: #### 2  
132-9, 21833-9 ####[facility]   
LABORATORYCLIA [id number] [place] [id number]   
[place]#### 78540-2 ####[facility]   
LABORATORYCLIA [id number] [place] [id number]   
[place] [facility] LABCLIA [id number]   
[place] [address] [place] [facility]   
   
                                                    Cholesterol in HDL   
[Mass/Vol]      51 mg/dL        Normal          >40             [facility]  
   
                                        Comment on above:   Order Comment: Speci  
men Type: BLOOD SPECIMENOrdering Facility:  
   
[facility] Address: [address] [place] [address]   
   
                                                            Result Comment: 40-5  
9 mg/dL, Acceptable>59 mg/dL, High: Negative   
risk factor for coronary heart disease<40 mg/dL, Low: Positive   
risk factor for coronary heart disease   
   
                                                            Performed By: #### 2  
132-9, 17953-7 ####[facility]   
LABORATORYCLIA [id number] [address]#### 75449-0 ####[facility]   
LABORATORYCLIA [id number] [place] [id number]   
[facility] LABCL[facility] [id number]   
[place], [place] [address] [place] [address]   
   
                                                    Cholesterol in LDL   
[Mass/Vol]      61 mg/dL        Normal          0-129           [facility]  
   
                                        Comment on above:   Order Comment: Speci  
men Type: BLOOD SPECIMENOrdering Facility:  
   
[facility] Address: [address] [place] [address]   
   
                                                            Result Comment: <100  
 mg/dL, Optimal 100-129 mg/dL, Near   
optimal/above optimal 130-159 mg/dL, Borderline high 160-189   
mg/dL, High>189 mg/dL, Very highSecondary prevention optimal LDL   
Cholesterol levels are recommended to be < 70 mg/dL   
   
                                                            Performed By: #### 2  
132-9, 08729-4 ####[facility]   
LABORATORYCLIA [id number] [address]#### 18857-1 ####[facility]   
LABORATORYCLIA [id number] [place] [id number]   
[facility] LABCLIA [id number]   
[address]   
   
                                                    Cholesterol in   
LDL/Cholesterol in   
HDL [Mass ratio] 1.20 {ratio}    Normal          <2.54           [facility]  
   
                                        Comment on above:   Order Comment: Speci  
men Type: BLOOD SPECIMENOrdering Facility:  
  
[facility] Address: [address] [place] [address]   
   
                                                            Result Comment: Nick campbell:1. National Cholesterol Education   
Program ATP III Guideline At-A-Glance Quick Desk Reference:   
National Heart, Lung, and Blood [place]. National Institutes of   
Health. 2001: NIH Publication No. .2. An International   
Atherosclerosis Society position paper: global recommendations   
for the management of dyslipidemia: executive summary,   
Atherosclerosis. 2014: 232(2):410-413.   
   
                                                            Performed By: #### 2  
132-9, 90817-4 ####[facility]   
LABORATORYCLIA [id number] [address]#### 57915-6 ####[facility]   
LABORATORYCLIA [id number] [address] [place] LABCLIA [id number]   
[place], [place] [address] [place] [address] [place] [facility]   
   
                                                    Cholesterol in VLDL   
[Mass/Vol]      33 mg/dL        High            <30             [facility]  
   
                                        Comment on above:   Order Comment: Speci  
men Type: BLOOD SPECIMENOrdering Facility:  
   
[facility] Address: [address] [place] [address]   
   
                                                            Performed By: #### 2  
132-9,  ####[facility]   
LABORATORYCLIA [id number] [address]#### 27418-6 ####[facility]   
LABORATORYCLIA [id number] [address] [place] LABCLIA [id number]   
[address]   
   
                                                    Cholesterol non HDL   
[Mass/Vol]      94 mg/dL        Normal          <130            [facility]  
   
                                        Comment on above:   Order Comment: Speci  
men Type: BLOOD SPECIMENOrdering Facility:  
   
[facility] Address: [address] [place] [address]   
   
                                                            Result Comment: <130  
 mg/dL, Optimal 130-159 mg/dL, Near   
optimal/above optimal 160-189 mg/dL, Borderline high 190-219   
mg/dL, High>219 mg/dL, Very highSecondary prevention optimal non   
HDL Cholesterol levels are recommended to be <100 mg/dL   
   
                                                            Performed By: #### 2  
132-9, 95154-4 ####[facility]   
LABORATORYCLIA [id number] [address]#### 93558-7 ####[facility]   
LABORATORYCLIA [id number] [address] [place] LABCLIA [id number]   
[address]   
   
                                                    Cholesterol.total/Ch  
olesterol in HDL   
[Mass ratio]    2.84 {ratio}    Normal          <5.10           [facility]  
   
                                        Comment on above:   Order Comment: Speci  
men Type: BLOOD SPECIMENOrdering Facility:  
  
[facility] Address: [address] [place] [address]   
   
                                                            Performed By: #### 2  
132-9, 42230-5 ####[facility]   
LABORATORYCLIA [id number] [address]#### 09976-4 ####[facility]   
LABORATORYCLIA [id number] [address] [place] LABCLIA [id number]   
[address]   
   
                      FASTING TIME 12 hrs     Normal                [facility]  
   
                                        Comment on above:   Order Comment: Speci  
men Type: BLOOD SPECIMENOrdering Facility:  
  
[facility] Address: [address] [place] [address]   
   
                                                            Performed By: #### 2  
132-9, 64132-5 ####[facility]   
LABORATORYCLIA [id number] [address] [place]#### 27407-8 ####[facility]   
LABORATORYCLIA [id number] [address] [place] LABCLIA [id number]   
[address] [place]   
   
                                                    Triglyceride   
[Mass/Vol]      165 mg/dL       High                      [facility]  
   
                                        Comment on above:   Order Comment: Speci  
men Type: BLOOD SPECIMENOrdering Facility:  
   
[facility] Address: [address] [place] [address]   
   
                                                            Result Comment: <150  
 mg/dL, Normal 150-199 mg/dL, Borderline high   
200-499 mg/dL, High>499 mg/dL, Very highPatients receiving either   
N-Acetylcysteine (NAC) or Metamizole prior to venipuncture, may   
have falsely depressed results.   
   
                                                            Performed By: #### 2  
132-9, 92340-2 ####[facility] [facility]   
LABORATORYCLIA [id number] [place] [id number]   
[place] OF [place]#### [id number] ####[facility]   
LABORATORYCLIA [id number] [place] [id number]   
[place] [facility] LABCLIA [id number]   
[place], [place] [id number] [place] [address] STATES OF [facility]   
   
                                                    No Panel Informationon    
   
                                                    Interpretation and   
review of laboratory   
results         Abnormal                                        [facility]  
   
                                                    VITAMIN B12on 2024   
   
                                                    Cobalamin (Vitamin   
B12) [Mass/Vol]     763 pg/mL                               193 - 986   
pg/mL                                   [facility]  
   
                                                    Vit B12 SerPl-mCncon   
024   
   
                                                    Cobalamin (Vitamin   
B12) [Mass/Vol] 763 pg/mL       Normal          193-986         [facility]  
   
                                        Comment on above:   Order Comment: Speci  
men Type: BLOOD SPECIMENOrdering Facility:  
   
[facility] Address: [address] [place] [address]   
   
                                                            Performed By: #### 2  
[id number], [id number] ####[facility]   
LABORATORYCLIA [id number] [place] [id number]   
[facility] OF [place]#### [id number] ####[facility]   
LABORATORYCLIA [id number] [place] [id number]   
[facility] LABCLIA [id number]   
[place] [address] [place]   
   
                                                    CNOVon 2024   
   
                      CNOV                  [facility]  
   
                                                    CNPNon 2024   
   
                      CNPN                  [facility]  
   
                                                    CNOVon 2024   
   
                      CNOV                  [facility]  
   
                                                    CNOVon 2024   
   
                      CNOV                  [facility]  
   
                                                    CNPNon 2024   
   
                      CNPN                  [facility]  
   
                                                    .Auto Diffon 2024   
   
                      Basophil, Absolute 0.1 10 3/mcL Normal     0.0-0.3    [facility]   
([place])  
   
                                        Comment on above:   Performed By: #### C  
BC, REBECCA, JUANCARLOS, MDW, TROPHS, BMP, GFR   
####  
[address] [address]   
   
                                                    Basophils/100 WBC   
(Bld)           0.5 %           Normal          0.0-2.5         [facility]   
([place])  
   
                                        Comment on above:   Performed By: #### C  
BC, ADTIANA, JUANCARLOS, MDW, TROPHS, BMP, GFR   
####  
[address] [address]   
   
                      Eosinophil, Absolute 0.0 10 3/mcL Normal     0.0-0.7    [facility]   
([place])  
   
                                        Comment on above:   Performed By: #### C  
BC, REBECCA, JUANCARLOS, MDW, TROPHS, BMP, GFR   
####  
[address] [address]   
   
                                                    Eosinophils/100 WBC   
(Bld)           0.2 %           Normal          0.0-6.0         [facility]   
([place]  
   
                                        Comment on above:   Performed By: #### C  
BC, REBECCA, JUANCARLOS, MDW, TROPHS, BMP, GFR   
####  
[address] [address]   
   
                      Lymphocyte, Absolute 2.4 10 3/mcL Normal     0.9-4.3    [facility]   
([place])  
   
                                        Comment on above:   Performed By: #### C  
BC, REBECCA, JUANCARLOS, MDW, TROPHS, BMP, GFR   
####  
[address] [address]   
   
                                                    Lymphocytes/100 WBC   
(Bld)           18.1 %          Low             20.0-40.0       [facility]   
([place])  
   
                                        Comment on above:   Performed By: #### C  
BC, REBECCA, JUANCARLOS, MDW, TROPHS, BMP, GFR   
####  
[address] [address]   
   
                      Monocyte, Absolute 0.6 10 3/mcL Normal     0.1-1.4    [facility]   
([place]  
   
                                        Comment on above:   Performed By: #### C  
BC, ADIFF, ANEU, MDW, TROPHS, BMP, GFR   
####  
[address] [address]   
   
                                                    Monocytes/100 WBC   
(Bld)           4.9 %           Normal          2.0-13.0        [facility]   
([place]  
   
                                        Comment on above:   Performed By: #### C  
BC, ADIFF, ANEU, MDW, TROPHS, BMP, GFR   
####  
[address] [address]   
   
                                                    Neutrophils/100 WBC   
(Bld)           76.3 %          High            50.0-75.0       [facility]   
([place])  
   
                                        Comment on above:   Performed By: #### C  
BC, ADIFF, ANEU, MDW, TROPHS, BMP, GFR   
####  
[address] [address]   
   
                                                    .GFRon 2024   
   
                                                    GFR Non-   
American        >60             Normal                          [facility]   
([place])  
   
                                        Comment on above:   Result Comment:  
GFR Population mean for , Non- Americans  
Ages 20-29 = 116 mL/min/1.73 sq.m.  
Ages 30-39 = 107 mL/min/1.73 sq.m.  
Ages 40-49 = 99 mL/min/1.73 sq.m.  
Ages 50-59 = 93 mL/min/1.73 sq.m.  
Ages 60-69 = 85 mL/min/1.73 sq.m.  
Ages 70+ = 75 mL/min/1.73 sq.m.  
Chronic Kidney Disease: Less than 60 mL/min/1.73 square meters  
End Stage Renal Disease: Less than 15 mL/min/1.73 square meters   
   
                                                            Performed By: #### C  
BC, ADIFF, ANEU, MDW, TROPHS, BMP, GFR ####  
[address] [address]   
   
                      GFR  >60        Normal                [facility]   
([place]  
   
                                        Comment on above:   Result Comment:  
GFR Population mean for , Non- Americans  
Ages 20-29 = 116 mL/min/1.73 sq.m.  
Ages 30-39 = 107 mL/min/1.73 sq.m.  
Ages 40-49 = 99 mL/min/1.73 sq.m.  
Ages 50-59 = 93 mL/min/1.73 sq.m.  
Ages 60-69 = 85 mL/min/1.73 sq.m.  
Ages 70+ = 75 mL/min/1.73 sq.m.  
Chronic Kidney Disease: Less than 60 mL/min/1.73 square meters  
End Stage Renal Disease: Less than 15 mL/min/1.73 square meters   
   
                                                            Performed By: #### C  
REBECCA LUIS ANEU, MDW, TROPHS, BMP, GFR ####  
[address] [address]   
   
                                                    .MDWon 2024   
   
                                                    Monocyte   
Distribution Width 18.80           Normal          0.00-20.00      [facility]   
([place])  
   
                                        Comment on above:   Result Comment: For   
ED adult patients suspected of sepsis,   
MDW<=20.0 does not rule out sepsis or risk of sepsis   
   
                                                            Performed By: #### C  
BC, ADIFF, ANEU, MDW, TROPHS, BMP, GFR ####  
[address] [address]   
   
                                                    .NEUABSon 2024   
   
                      Neutrophil, Absolute 9.9 10 3/mcL High       2.3-8.1    [facility]   
([place])  
   
                                        Comment on above:   Performed By: #### C  
BC, ADIFF, ANEU, MDW, TROPHS, BMP, GFR   
####  
[place] [id number]   
   
                                                    BMPon 2024   
   
                      BUN/Creatinine Ratio 8.7 ratio  Low        10.0-22.0  [facility]   
([place])  
   
                                        Comment on above:   Performed By: #### C  
BC, ADIFF, ANEU, MDW, TROPHS, BMP, GFR   
####  
[place] [id number]   
   
                      Calcium [Mass/Vol] 9.6 mg/dL  Normal     8.7-10.4   [facility]   
([place])  
   
                                        Comment on above:   Performed By: #### C  
BC, ADIFF, ANEU, MDW, TROPHS, BMP, GFR   
####  
[address] [address]   
   
                      Chloride [Moles/Vol] 104 mmol/L Normal          [facility]   
([place]  
   
                                        Comment on above:   Performed By: #### C  
BC, ADIFF, ANEU, MDW, TROPHS, BMP, GFR   
####  
[address] [address]   
   
                      CO2 [Moles/Vol] 23 mmol/L  Normal     22-32      [facility]   
([place])  
   
                                        Comment on above:   Performed By: #### C  
BC, ADIFF, ANEU, MDW, TROPHS, BMP, GFR   
####  
[address] [address]   
   
                                                    Creatinine   
[Mass/Vol]      0.92 mg/dL      Normal          0.50-1.20       [facility]   
([place]  
   
                                        Comment on above:   Performed By: #### C  
BC, ADIFF, ANEU, MDW, TROPHS, BMP, GFR   
####  
[address] [address]   
   
                      Electrolyte Balance 9.0 mEq/L  Normal     4.0-15.0   [facility]   
([place]  
   
                                        Comment on above:   Performed By: #### C  
BC, ADIFF, ANEU, MDW, TROPHS, BMP, GFR   
####  
[address] [address]   
   
                      Glucose [Mass/Vol] 119 mg/dL  High            [facility]   
([place]  
   
                                        Comment on above:   Performed By: #### C  
BC, ADIFF, ANEU, MDW, TROPHS, BMP, GFR   
####  
[address] [address]   
   
                                                    Potassium   
[Moles/Vol]     3.4 mmol/L      Low             3.5-5.0         [facility]   
([place]  
   
                                        Comment on above:   Performed By: #### C  
BC, ADIFF, ANEU, MDW, TROPHS, BMP, GFR   
####  
[address] [address]   
   
                      Sodium [Moles/Vol] 136 mmol/L Normal     136-145    [facility]   
([place])  
   
                                        Comment on above:   Performed By: #### C  
BC, ADIFF, ANEU, MDW, TROPHS, BMP, GFR   
####  
[address] [address]   
   
                                                    Urea nitrogen   
[Mass/Vol]      8.0 mg/dL       Normal          8.0-22.0        [facility]   
([place]  
   
                                        Comment on above:   Performed By: #### C  
REBECCA LUIS ANEU, MDW, TROPHS, BMP, GFR   
####  
[place] [address]   
   
                                                    CBCon 2024   
   
                                                    Erythrocyte   
distribution width   
(RBC) [Ratio]   13.3 %          Normal          11.5-15.5       [facility]   
([place]  
   
                                        Comment on above:   Performed By: #### C  
BC, REBECCA, JUANCARLOS, MDW, TROPHS, BMP, GFR   
####  
[place] [address]   
   
                                                    Hematocrit (Bld)   
[Volume fraction] 36.0 %          Normal          34.0-46.0       [facility]   
([place]  
   
                                        Comment on above:   Performed By: #### C  
BC, REBECCA, JUANCARLOS, MDW, TROPHS, BMP, GFR   
####  
[place] [address]   
   
                      Hgb        12.3 G/dL  Normal     12.0-16.0  [facility]   
([place]  
   
                                        Comment on above:   Performed By: #### C  
BC, JUANCARLOS FERNANDEZ, MDW, TROPHS, BMP, GFR   
####  
[place] [address]   
   
                                                    MCH (RBC) [Entitic   
mass]           28.9 pg         Normal          27.0-33.0       [facility]   
([place]  
   
                                        Comment on above:   Performed By: #### C  
BC, REBECCA, JUANCARLOS, MDW, TROPHS, BMP, GFR   
####  
[place] [address]   
   
                      MCHC       34.3 G/dL  Normal     32.0-36.0  [facility]   
([place]  
   
                                        Comment on above:   Performed By: #### C  
BC, JUANCARLOS FERNANDEZ, MDW, TROPHS, BMP, GFR   
####  
[place] [address]   
   
                                                    MCV (RBC) [Entitic   
vol]            84.1 fL         Normal          80.0-99.0       [facility]   
([place]  
   
                                        Comment on above:   Performed By: #### C  
BC, REBECCA, JUANCARLOS, MDW, TROPHS, BMP, GFR   
####  
[place] [address]   
   
                      Platelet   418 10 3/mcL Normal     150-450    [facility]   
([place])  
   
                                        Comment on above:   Performed By: #### C  
BC, ADIFF, ANEU, MDW, TROPHS, BMP, GFR   
####  
[place] [address] [address] [place] [address]   
   
                                                    Platelet mean volume   
(Bld) [Entitic vol] 8.3 fL          Normal          6.6-10.5        [facility]   
([place]  
   
                                        Comment on above:   Performed By: #### C  
BC, ADIFF, ANEU, MDW, MAGDALENE, BMP, GFR   
####  
[place] [address] [address] [place] [address]   
   
                      RBC        4.28 10 6/mcL Normal     4.10-5.30  [facility]   
([place]  
   
                                        Comment on above:   Performed By: #### C  
BC, ADIFF, ANEU, MDW, MAGDALENE, BMP, GFR   
####  
[place] [address] [place] [address]   
   
                      WBC        13.0 10 3/mcL High       4.5-10.8   [facility]   
([place])  
   
                                        Comment on above:   Performed By: #### C  
BC, ADIFF, ANEU, MDW, TROPHS, BMP, GFR   
####  
[address] [address]   
   
                                                    CNOVon 2024   
   
                      CNOV                  Normal                [facility]  
   
                                                    HOLTER REPORTon 2024   
   
                                                                  [facility]  
   
                                                    LABORATORYOrdered By: SYSTEM  
 SYSTEM on 2024   
   
                                                    Basophils (Bld)   
[#/Vol]             0.1 103/mcL         Normal              0.0 - 0.3   
10^3/mcL                                AH Workflow   
SS  
   
                                                    Basophils/100 WBC   
(Bld)           0.5 %           Normal          0.0 - 2.5 %     AH Workflow   
SS  
   
                          Calcium [Mass/Vol] 9.6 mg/dL    Normal       8.7 - 10.  
4   
mg/dL                                   AH ADM SS  
   
                          Chloride [Moles/Vol] 104 mmol/L   Normal       98 - 11  
0   
mEq/L                                   AH ADM SS  
   
                          CO2 [Moles/Vol] 23 mmol/L    Normal       22 - 32   
mEq/L                                   AH ADM SS  
   
                                                    Creatinine   
[Mass/Vol]          0.92 mg/dL          Normal              0.50 - 1.20   
mg/dL                                    ADM SS  
   
                          Electrolyte Balance 9.0 mEq/L    Normal       4.0 - 15  
.0   
mEq/L                                   AH ADM SS  
   
                                                    Eosinophils (Bld)   
[#/Vol]             0.0 103/mcL         Normal              0.0 - 0.7   
10^3/mcL                                 Workflow   
SS  
   
                                                    Eosinophils/100 WBC   
(Bld)           0.2 %           Normal          0.0 - 6.0 %      Workflow   
SS  
   
                                                    Erythrocyte   
distribution width   
(RBC) [Ratio]       13.3 %              Normal              11.5 - 15.5   
%                                        Workflow   
SS  
   
                                                    GFR/1.73 sq   
M.predicted among   
blacks MDRD   
(S/P/Bld) [Vol   
rate/Area]                ml/min/1.73sqm            Invalid   
Interpretation   
Code                                                [facility]  
   
                                        Comment on above:   Interpretive Data:  
GFR Population mean for , Non- Americans  
Ages 20-29 = 116 mL/min/1.73 sq.m.  
Ages 30-39 = 107 mL/min/1.73 sq.m.  
Ages 40-49 = 99 mL/min/1.73 sq.m.  
Ages 50-59 = 93 mL/min/1.73 sq.m.  
Ages 60-69 = 85 mL/min/1.73 sq.m.  
Ages 70+ = 75 mL/min/1.73 sq.m.  
  
Chronic Kidney Disease: Less than 60 mL/min/1.73 square meters  
End Stage Renal Disease: Less than 15 mL/min/1.73 square meters   
   
                                                    GFR/1.73 sq   
M.predicted among   
non-blacks MDRD   
(S/P/Bld) [Vol   
rate/Area]                ml/min/1.73sqm            Invalid   
Interpretation   
Code                                                [facility]  
   
                                        Comment on above:   Interpretive Data:  
GFR Population mean for , Non- Americans  
Ages 20-29 = 116 mL/min/1.73 sq.m.  
Ages 30-39 = 107 mL/min/1.73 sq.m.  
Ages 40-49 = 99 mL/min/1.73 sq.m.  
Ages 50-59 = 93 mL/min/1.73 sq.m.  
Ages 60-69 = 85 mL/min/1.73 sq.m.  
Ages 70+ = 75 mL/min/1.73 sq.m.  
  
Chronic Kidney Disease: Less than 60 mL/min/1.73 square meters  
End Stage Renal Disease: Less than 15 mL/min/1.73 square meters   
   
                          Glucose [Mass/Vol] 119 mg/dL    High         70 - 110   
mg/dL                                   [facility]  
   
                                                    Hematocrit (Bld)   
[Volume fraction]   36.0 %              Normal              34.0 - 46.0   
%                                       AH Workflow   
SS  
   
                                                    Hemoglobin (Bld)   
[Mass/Vol]          12.3 G/dL           Normal              12.0 - 16.0   
G/dL                                    AH Workflow   
SS  
   
                                                    Lymphocytes (Bld)   
[#/Vol]             2.4 103/mcL         Normal              0.9 - 4.3   
10^3/mcL                                AH Workflow   
SS  
   
                                                    Lymphocytes/100 WBC   
(Bld)               18.1 %              Low                 20.0 - 40.0   
%                                       AH Workflow   
SS  
   
                                                    MCH (RBC) [Entitic   
mass]               28.9 pg             Normal              27.0 - 33.0   
pg                                      AH Workflow   
SS  
   
                          MCHC         34.3 G/dL    Normal       32.0 - 36.0   
G/dL                                    AH Workflow   
SS  
   
                                                    MCV (RBC) [Entitic   
vol]                84.1 fL             Normal              80.0 - 99.0   
fL                                      AH Workflow   
SS  
   
                                                    Monocyte   
distribution width   
Auto (Bld) [Entitic   
vol]            18.80 1         Normal          0.00 - 20.00    AH Workflow   
SS  
   
                                        Comment on above:   Result Comment: For   
ED adult patients suspected of sepsis,   
MDW<=20.0 does not rule out sepsis or risk of sepsis   
   
                                                    Monocytes (Bld)   
[#/Vol]             0.6 103/mcL         Normal              0.1 - 1.4   
10^3/mcL                                AH Workflow   
SS  
   
                                                    Monocytes/100 WBC   
(Bld)           4.9 %           Normal          2.0 - 13.0 %    AH Workflow   
SS  
   
                                                    Neutrophils (Bld)   
[#/Vol]             9.9 103/mcL         High                2.3 - 8.1   
10^3/mcL                                AH Workflow   
SS  
   
                                                    Neutrophils/100 WBC   
(Bld)               76.3 %              High                50.0 - 75.0   
%                                       AH Workflow   
SS  
   
                                                    Platelet mean volume   
(Bld) [Entitic vol] 8.3 fL              Normal              6.6 - 10.5   
fL                                      AH Workflow   
SS  
   
                                                    Platelets (Bld)   
[#/Vol]             418 103/mcL         Normal              150 - 450   
10^3/mcL                                AH Workflow   
SS  
   
                                                    Potassium   
[Moles/Vol]         3.4 mmol/L          Low                 3.5 - 5.0   
mEq/L                                   AH ADM SS  
   
                          RBC (Bld) [#/Vol] 4.28 106/mcL Normal       4.10 - 5.3  
0   
10^6/mcL                                AH Workflow   
SS  
   
                          Sodium [Moles/Vol] 136 mmol/L   Normal       136 - 145  
   
mEq/L                                    ADM SS  
   
                                                    Troponin I.cardiac   
DL <= 0.01 ng/mL   
[Mass/Vol]          ng/L                Normal              0.00 - 34.00   
ng/L                                    AH ADM SS  
   
                                                    Urea nitrogen   
[Mass/Vol]          8.0 mg/dL           Normal              8.0 - 22.0   
mg/dL                                   AH ADM SS  
   
                                                    Urea   
nitrogen/Creatinine   
[Mass ratio]        8.7 ratio           Low                 10.0 - 22.0   
ratio                                   AH ADM SS  
   
                          WBC (Bld) [#/Vol] 13.0 103/mcL High         4.5 - 10.8  
   
10^3/mcL                                AH Workflow   
SS  
   
                                                    TROPHSon 2024   
   
                                                    Troponin I High   
Sensitivity     <2.50           Normal          0.00-34.00      [facility]   
([place]  
   
                                        Comment on above:   Performed By: #### C  
BC, REBECCA, JUANCARLOS, MDW, TROPHS, BMP, GFR   
####  
[place] [address]   
   
                                                    XR CHEST 1 VIEWon 2024  
   
   
                                        XR CHEST 1 VIEW     ORIGINAL  
EXAMINATION:  
ONE XRAY VIEW OF THE CHEST  
  
2024 8:12 pm  
  
COMPARISON:  
None.  
  
HISTORY:  
ORDERING SYSTEM PROVIDED   
HISTORY:  
Reason for Exam: cp  
  
FINDINGS:  
There is a neurostimulator   
lead projected at   
midthoracic spine.  
  
There is hypoinflation with   
accentuation of the   
cardiomediastinal   
silhouette  
and increased   
bronchovascular markings.  
  
There is no consolidation   
or pleural effusion. There   
are mild infiltrates in  
bilateral lower lung zones,   
left greater than right.  
  
There is no pulmonary   
vascular congestion.  
  
There is no pneumothorax.  
  
Osseous structures   
demonstrate mild   
degenerative changes.  
  
IMPRESSION:  
  
1. There is no   
consolidation or pleural   
effusion. There are mild   
infiltrates  
in bilateral lower lung   
zones, left greater than   
right.  
  
Interpreted by: Cosme Johnson  
Preliminary Report By:   
Cosme Johnson  
Electronically signed By   
Cosme Johnson  
Dictated Date: 2024   
8:15:39 PM  
Prelim Date: 2024   
8:20:00 PM  
Sign Date: 2024   
8:20:00 PM  
Ordering Provider: KIERSTEN REARDON               [facility]   
([place])  
   
                                                    CNOVon 2024   
   
                      [facility]  
   
                                                    CNOVon 2024   
   
                      [facility]  
   
                                                    ECHOon 2023   
   
                                                            CONCLUSIONS:  
- Exam indication:   
Palpitations, facial   
asymmetry, short of breath,   
vertigo  
- The left ventricle is   
normal in size. Left   
ventricular systolic   
function is  
normal. EF = 69 5% (2D   
biplane) Normal left   
ventricular diastolic   
function.  
- The right ventricle is   
normal in size. Right   
ventricular systolic   
function is  
normal.  
- There is no patent   
foramen ovale as detected   
by saline contrast with   
valsalva.  
Addendum  
  
1. See computer-generated   
data above  
  
2. Normal right and left   
ventricular systolic   
function if ejection   
greater equal  
to 60 to 65%  
  
3. Small pericardial   
effusion noted  
  
4. Concentric left   
ventricular perjury (mild)  
  
5. Mild to moderate   
tricuspid regurgitation  
  
6. Borderline elevated   
pulmonary pressure  
  
7. Negative bubble study   
for intracardiac shunt  
- The patient has not had a   
prior CC echocardiographic   
exam for comparison.  
  
  
Electronically signed by   
Tavon Davies MD on   
2023 at 2:35:07 PM  
  
  
* * * Final * * *                                           [facility]   
CARDIOLOGY  
   
                                                              
  
  
Echocardiography Report:   
Transthoracic Echo  
[facility]  
Date of service: 2023   
9:56:29 AM  
Accession #: [id number]  
  
Ordering physician: JULIANE MURPHY  
Indication: Palpitations,   
facial asymmetry, short of   
breath, vertigo  
  
Technologist: Tasha Tijerina   
[facility]  
Interpreting physician:   
Tavon Davies MD  
  
PATIENT:  
Name: MRS. ROBERT PALACIOS  
MRN: [id number]  
: 1980  
Age: 43 years  
Gender: F  
  
History of hypertension,   
diabetes mellitus and   
dyslipidemia.  
Primary rhythm: sinus.  
Height: 170.20 cm BSA: 1.90   
m  
Weight: 76.66 kg BMI: 26.5   
kg/m  
  
  
Heart rate 69 bpm  
Blood pressure 118/84 mmHg  
  
Agitated saline was   
administered to assess   
source of emboli.  
Color Doppler was utilized   
to interrogate the cardiac   
valves assessed and   
spectral  
Doppler was utilized to   
determine the flow   
velocities and pressure   
gradients  
reported in this exam.   
Myocardial strain analysis   
was performed in this exam   
to  
aid in the assessment of   
cardiac function.  
  
MEASUREMENTS:  
Value Indexed Normal  
Max aortic dimension 2.8 cm   
Ao < 3.8  
Left atrial volume 34 ml   
(biplane A-L) 18 ml/m Valarie   
<= 34  
LV ID (diastole) 4.7 cm   
(2D) 2.47 cm/m  
LV ID (systole) 2.9 cm (2D)   
1.52 cm/m  
IVS, leaflet tips 0.8 cm   
(2D)  
Posterior wall thickness   
1.1 cm (2D)  
Left ventricular mass 153 g   
(2D) 81 g/m  
Global peak long strain   
-22.9 %  
LV stroke volume 57 ml (2D   
biplane)  
LVOT stroke volume 73 ml 39   
ml/m  
LV end diastolic volume 81   
ml (2D biplane) 42.7 ml/m   
29<=EDVi<62  
LV end systolic volume 25   
ml (2D biplane) 13.0 ml/m  
Ejection Fraction 69 % (2D   
biplane) EF > 54  
  
  
FINDINGS:  
  
LEFT VENTRICLE  
The left ventricle is   
normal in size.  
Left ventricular systolic   
function is normal. Global   
LV myocardial strain is  
normal.  
Normal left ventricular   
diastolic function.  
Mitral annular lateral   
E/e': 6.2. Mitral annular   
septal E/e': 8.2.  
Wall Motion:  
All scored segments are   
normal.  
  
  
  
RIGHT VENTRICLE  
The right ventricle is   
normal in size.  
Right ventricular systolic   
function is normal. RV   
systolic tissue Doppler   
velocity  
is 11.1 cm/s. Tricuspid   
annular displacement is 2.0   
cm.  
Estimated right ventricular   
systolic pressure is 27   
mmHg consistent with normal  
pulmonary artery pressures.   
Estimated right atrial   
pressure is 3 mmHg based on   
IVC  
assessment.  
  
LEFT ATRIUM  
The left atrial cavity is   
normal in size.  
  
RIGHT ATRIUM  
The right atrial cavity is   
normal in size.  
Inferior Vena Cava:  
The inferior vena cava   
appears normal measuring   
1.0 cm. The vessel   
decreases  
greater than 50 percent   
with inspiration.  
  
MITRAL VALVE  
There is trace mitral valve   
regurgitation. There is no   
calcification. The peak  
mitral valve gradient is 3   
mmHg. The mean mitral valve   
gradient is 1 mmHg. The  
pressure half time is 63   
msec. The peak mitral E/A   
ratio is 1.13. The average  
mitral E/e' ratio is 7.2.   
The mitral flow   
deceleration time is 217   
msec.  
  
TRICUSPID VALVE  
There is mild (1+)   
tricuspid valve   
regurgitation. There is no   
calcification.  
  
AORTIC VALVE  
There is no aortic valve   
regurgitation. Tricuspid   
aortic valve. There is no  
calcification. The peak   
gradient is 5 mmHg (peak   
velocity = 112.0 cm/s). The   
mean  
gradient is 3 mmHg. The   
LVOT mean velocity is 70.7   
cm/s. The LVOT diameter is   
2.0  
cm. The aortic VTI is 25.6   
cm. The mean velocity in   
the aortic valve is 77.9   
cm/s.  
The dimensionless valve   
index is 0.91. AV area is   
2.86 cm (1.50 cm /m ) by  
continuity, VTI. The LVOT   
stroke volume index is 39   
ml/m .  
  
PULMONIC VALVE  
There is trace (trace - 1+)   
pulmonic valve   
regurgitation. There is no  
calcification. The peak   
gradient is 2 mmHg.  
  
AORTA  
The visualized aorta is   
normal in size.  
Measurements - Sinus: 2.8   
cm. Sinotubular junction   
2.1 cm. Mid ascending aorta   
2.5  
cm.  
  
INTERATRIAL SEPTUM  
There is no patent foramen   
ovale as detected by saline   
contrast with valsalva.  
There is no evidence of   
intracardiac shunting as   
detected by agitated saline  
contrast with valsalva.  
  
PERICARDIUM  
There is a trivial   
pericardial effusion.  
                                                            [facility]   
CARDIOLOGY  
   
                                                                  [facility] Breast Screeningon 2023   
   
                                                            IMPRESSION: NEGATIVE  
There is no mammographic   
evidence of malignancy. A 1   
year screening  
mammogram is recommended.  
  
The false-negative rate of   
mammography is   
approximately 10%.  
Management of a palpable   
abnormality must be based   
upon clinical grounds.  
  
  
Etelvina Li M.D.  
ear/penrad:2023   
13:34:00  
  
Imaging Technologist: Madison PIKE(BRENNA)(JONATHON),   
[facility]  
letter sent: Mammography   
Normal  
BI-RADS: 1 Negative  
  
  
  
  
: Samia  
Transcribe Date/Time: Sep   
20 2023 12:57P  
  
Dictated by : ETELVINA LI MD  
  
This examination was   
interpreted and the report   
reviewed and  
electronically signed by:  
ETELVINA LI MD on Sep   
20 2023 1:34PM EST  
  
                                                            [facility]   
RADIOLOGY  
   
                                                            * * *Final Report* *  
 *  
  
DATE OF EXAM: Sep 20 2023   
1:21PM  
  
[facility] 0581 - [place] SCREENING /   
ACCESSION # [id number]  
  
PROCEDURE REASON: 20000,   
Z12.31 BILATERAL SCREENING   
MAMMOGRAM WITH CAD  
  
* * * * Physician   
Interpretation * * * *  
  
BILATERAL DIGITAL SCREENING   
MAMMOGRAM WITH CAD:   
2023  
Ordering Physician: Jenna Chavarria  
CLINICAL: 48227, Z12.31   
Bilateral Screening   
Mammogram With Cad.  
  
Comparison is made to exams   
dated: 2022 mammogram,   
2020  
mammogram, and 2020   
mammogram - [facility].  
There are scattered   
fibroglandular elements in   
both breasts that could  
obscure a lesion on   
mammography.  
Current study was also   
evaluated with a Computer   
Aided Detection (CAD)  
system.  
No significant masses,   
calcifications, or other   
findings are seen in  
either breast.  
There has been no   
significant interval   
change.  
                                                            [facility]   
RADIOLOGY  
   
                                                            Provider, Stephanie Kindra  
[facility] - 2023   
Formatting of this note   
might be different from the   
original.  
* * *Final Report* * *  
  
DATE OF EXAM: Sep 20 2023   
1:21PM  
  
W 0581 - [facility] SCREENING /   
ACCESSION # [id number]  
  
PROCEDURE REASON: 70921,   
Z12.31 BILATERAL SCREENING   
MAMMOGRAM WITH CAD  
  
* * * * Physician   
Interpretation * * * *  
  
BILATERAL DIGITAL SCREENING   
MAMMOGRAM WITH CAD:   
2023  
Ordering Physician: Jenna Chavarria  
CLINICAL: 76229, Z12.31   
Bilateral Screening   
Mammogram With Cad.  
  
Comparison is made to exams   
dated: 2022 mammogram,   
2020  
mammogram, and 2020   
mammogram - [facility].  
There are scattered   
fibroglandular elements in   
both breasts that could  
obscure a lesion on   
mammography.  
Current study was also   
evaluated with a Computer   
Aided Detection (CAD)  
system.  
No significant masses,   
calcifications, or other   
findings are seen in  
either breast.  
There has been no   
significant interval   
change.  
  
IMPRESSION  
IMPRESSION: NEGATIVE  
There is no mammographic   
evidence of malignancy. A 1   
year screening  
mammogram is recommended.  
  
The false-negative rate of   
mammography is   
approximately 10%.  
Management of a palpable   
abnormality must be based   
upon clinical grounds.  
  
  
Etelvina Li M.D.  
ear/penrad:2023   
13:34:00  
  
Imaging Technologist: Madison EVANS)(JONATHON),   
[facility]  
letter sent: Mammography   
Normal  
BI-RADS: 1 Negative  
  
  
  
  
: Samia  
Transcribe Date/Time: Sep   
20 2023 12:57P  
  
Dictated by : ETELVINA LI MD  
  
This examination was   
interpreted and the report   
reviewed and  
electronically signed by:  
ETELVINA LI MD on Sep   
20 2023 1:34PM EST  
  
  
                                                            [facility]  
   
                                                    Radiology Study   
observation   
(narrative)                                                     [facility]  
   
                                                    MG Breast ScreeningOrdered B  
y: [facility] Provider on 2023   
   
                                                                  [facility]  
   
                                                    GROUP A STREPTOCOCCUS BY PCR  
on 05-   
   
                                                    S. pyogenes DNA   
LINDA+probe Ql   
(Throat)        Not detected                    Not detected    [facility]  
   
                                                    RAPID GROUP A STREP RFLX TO   
PCRon 05-   
   
                                                    S. pyogenes Ag Ql   
(Throat)            Negative                                Negative   
Group A   
Strep Screen                            [facility] SCREENINGon 2022   
   
                                                                  [facility]  
   
                                                    US THYROID/PARATHYROIDon    
   
                                                                  [facility]  
   
                                                    FLUORO FOR SURGICAL PROCEDUR  
ESon 2020   
   
                                                    FLUORO FOR SURGICAL   
PROCEDURES                              FLUORO FOR SURGICAL   
PROCEDURES : 2020 3:00   
PM  
CLINICAL HISTORY: R52 Pain   
ICD10. Spinal stimulator   
placement  
COMPARISON: None available.  
Intraoperative fluoroscopy   
was provided for Dr. Davis's procedure.  
A total of 15.59 mGy of   
fluoroscopy was used, with   
1 fluoroscopic stills   
saved.  
No diagnostic images were   
obtained.  
Please see Dr. Davis's   
surgical notes for complete   
details.  
Interpreted by:  
Angel Pisano DO  
Signed by:  
Angel Pisano DO  
20  
Final result        Normal                                  [facility]  
   
                                                            FLUORO FOR SURGICAL   
PROCEDURES : 2020 3:00   
PM CLINICAL HISTORY: R52   
Pain ICD10. Spinal   
stimulator placement   
COMPARISON: None available.   
Intraoperative fluoroscopy   
was provided for Dr. Davsi's procedure. A total   
of 15.59 mGy of fluoroscopy   
was used, with 1   
fluoroscopic stills saved.   
No diagnostic images were   
obtained. Please see Dr. Davis's surgical notes for   
complete details.                                           [place], [place]  
   
                                                            Gualberto, Chpo Incoming R  
adiant   
Results From   
Face.come/Pacs -   
2020 4:46 PM EDT   
FLUORO FOR SURGICAL   
PROCEDURES : 2020 3:00   
PM  
  
CLINICAL HISTORY: R52 Pain   
ICD10. Spinal stimulator   
placement  
  
COMPARISON: None available.  
  
Intraoperative fluoroscopy   
was provided for Dr. Davis's procedure.  
  
A total of 15.59 mGy of   
fluoroscopy was used, with   
1 fluoroscopic stills   
saved.  
  
No diagnostic images were   
obtained.  
  
  
Please see Dr. Davis's   
surgical notes for complete   
details.  
  
                                                            [place]  
   
                                                    OPERATIVE REPORTon   
0   
   
                                        [place]ATIVE [place] [address]  
OPERATIVE REPORT  
PATIENT NAME: ROBERT GRACE : 1980  
MED REC NO: [id number] ROOM:  
ACCOUNT NO: [id number] ADMIT   
DATE: 2020  
PROVIDER: Kari Davis MD  
DATE OF PROCEDURE:   
2020  
PREPROCEDURE DIAGNOSES:   
Complex regional pain   
syndrome, peripheral  
neuropathy.  
POSTPROCEDURE DIAGNOSES:   
Complex regional pain   
syndrome, peripheral  
neuropathy.  
INDICATION FOR THE   
PROCEDURE: Severe low back   
and lower extremity pain.  
PROCEDURE PERFORMED:   
Revision of spinal cord   
stimulator leads.  
SURGEON: Kari Davis MD  
ANESTHESIA: MAC with local   
anesthesia.  
COMPLICATIONS: None.  
BLOOD LOSS: Minimal.  
OPERATIVE PROCEDURE: The   
patient was brought to the   
operating room  
after receiving 1 gm of   
Vancomycin intravenously,   
30 minutes before the  
procedure start. The   
patient was placed in the   
prone position and after  
applying all monitors and   
protecting all pressure   
points, the mid and  
the lower back and the left   
gluteal region were prepped   
and draped  
aseptically. Using the   
C-arm image intensifier,   
the location of the  
left spinal cord lead was   
identified outside the   
epidural space and it  
was wrapped around the IPG   
in the IPG pocket. An   
incision was made over  
the IPG pocket removing the   
old surgical scar. After   
local anesthetic  
infiltration using   
lidocaine 2% with epi,   
sharp and blunt dissection   
was  
used to deliver the IPG and   
spinal cord stimulator   
leads from the  
pocket. The spinal cord   
stimulator lead was   
disconnected from the IPG  
and another incision was   
made in the mid lumbar   
region removing the old  
surgical scar, after local   
anesthetic infiltration. A   
14-gauge 4-inch  
Tuohy needle was inserted   
and directed with   
interlaminar space at   
L2-L3.  
Epidural space was   
identified using loss of   
resistance technique with  
glass syringe and air.   
Negative aspiration for   
blood and CSF. The left  
spinal cord stimulator lead   
was placed. Through the   
Tuohy needle in the  
epidural space, after   
placing the lead Stylet and   
it was directed  
towards the top of T8 few   
millimeters from the   
midline towards the left  
side. The lead Stylet was   
removed with Tuohy needle   
and the lead was  
anchored in place using the   
plastic anchor with the   
middle screw and two  
2-0 silk sutures. The lead   
was tunneled than from the   
lumbar incision  
to the IPG pockets and   
connected to the IPG. The   
impedance for both  
leads the right and left   
one was checked and numbers   
were within normal  
limit. Hemostasis and   
irrigation was done in both   
incisions. The IPG  
was anchored in place in   
the pocket using two 2-0   
silk sutures. Both  
incisions were closed in   
layers using 3-0 Vicryl   
interrupted inverted  
suture for the subcutaneous   
layer and 4-0 Vicryl   
subcuticular continuous  
suture for the skin.   
Steri-Strips were applied   
and sterile dressing.  
The patient tolerated the   
procedure well and   
transferred to recovery  
area in stable condition,   
moving both lower   
extremities. The patient  
will follow up in two weeks   
for reevaluation.  
KARI DAVIS MD  
D: 2020 16:00:58 T:   
2020 23:54:29   
[id number]  
Job#: [id number] Doc#:   
[id number]  
CC:                 Normal                                  [facility]  
   
                                                    COVID-19, NAAon 2020   
   
                      COVID-19, LINDA Not Detected Normal     Not Detect [facility]  
   
                                        Comment on above:   Result Comment: This  
 nucleic acid amplification test was  
developed and its performance  
characteristics determined by [employer]. Nucleic acid amplification  
tests include PCR and TMA. This test has not  
been FDA cleared or approved. This test has  
been authorized by FDA under an Emergency  
Use Authorization (EUA). This test is only  
authorized for the duration of time the  
declaration that circumstances exist  
justifying the authorization of the  
emergency use of in vitro diagnostic tests  
for detection of SARS-CoV-2 virus and/or  
diagnosis of COVID-19 infection under  
section 564(b)(1) of the Act, 21 U.S.C.  
360bbb-3(b) (1), unless the authorization  
is terminated or revoked sooner.  
When diagnostic testing is negative, the  
possibility of a false negative result  
should be considered in the context of a  
patient's recent exposures and the presence  
of clinical signs and symptoms consistent  
with COVID-19. An individual without  
symptoms of COVID-19 and who is not shedding  
SARS-CoV-2 virus would expect to have a  
negative (not detected) result in this  
assay.  
Performed at: [facility]  
[address] [place] [address] [place] [id number]  
: Romelia Chavis MD, Phone: [phone]   
   
                                                            Performed By: #### I  
RCOV ####  
[address] [address]  
[phone]   
   
                                                    COVID-19, NAAon 2020   
   
                      Source Swab Anterior nares Normal                [facility]  
   
                                        Comment on above:   Performed By: #### I  
RCOV ####  
[address] [id number] [phone]   
   
                                                    OPERATIVE REPORTon   
0   
   
                                        OPERATIVE REPORT    [facility]  
[address] [place] [address]   
[id number]  
OPERATIVE REPORT  
PATIENT NAME: VAL GRACE : 1980  
MED REC NO: [id number] ROOM:  
ACCOUNT NO: [id number] ADMIT   
DATE: 2020  
PROVIDER: Kari Davis MD  
DATE OF PROCEDURE:   
2020  
OPERATION PERFORMED:   
Revision of spinal cord   
stimulator internal  
battery generator or IBG.  
PREPROCEDURE DIAGNOSIS:   
Severe low back pain.  
POSTPROCEDURE DIAGNOSIS:   
Severe low back pain.  
INDICATIONS FOR THE   
PROCEDURE: Pain at the site   
of the IBG.  
ANESTHESIA: MAC with local   
anesthesia.  
SURGEON: Kari Davis MD  
BLOOD LOSS: Minimal.  
COMPLICATIONS: None.  
OPERATIVE PROCEDURE: The   
patient was brought to the   
operating room  
after receiving 2 g of   
Ancef intravenously. She   
was placed in the prone  
position, and after   
applying all monitors and   
protecting the pressure  
points, the left lower back   
and gluteal region were   
prepped and draped  
aseptically. Using local   
anesthetic, 2% lidocaine   
with sodium  
bicarbonate was injected at   
the old surgical scar from   
the previous IBG  
placement and then an overt   
incision was made removing   
the old surgical  
scar. Sharp and blunt   
dissection was used to   
deliver the old IBG up to  
the pocket and disconnected   
it from the 2 leads. Sharp   
and blunt  
dissection then was used to   
increase the pocket size   
towards the lower  
part of the gluteal region   
and a new IBG was placed   
about one inch lower  
than the old one. The new   
IBG was anchored in place   
using two 2-0 silk  
sutures after hemostasis   
and bacitracin irrigation.   
The wound was  
closed in layers using 2-0   
Vicryl interrupted inverted   
suture for the  
subcutaneous layer and 4-0   
Vicryl subcuticular   
continuous suture for the  
skin. Steri-Strips and   
sterile dressing was   
applied. The patient  
tolerated the procedure   
well, transferred to   
recovery area in stable  
condition moving both lower   
extremities. No immediate   
complication.  
KARI DAVIS MD  
D: 2020 11:22:48 T:   
2020 14:15:23   
[id number]  
Job#: [id number] Doc#:   
[id number]  
CC:                 [facility]  
   
                                                    POCT Glucoseon 2020   
   
                      Glucose [Mass/Vol] 85 mg/dL   Normal          [facility]  
   
                                        Comment on above:   Performed By: #### P  
GLU ####  
[facility]  
[address] [place] [address] [phone]   
   
                      POC Performed on ACCU-CHEK  [facility]   
Center  
   
                                        Comment on above:   Performed By: #### P  
GLU ####  
[facility]  
[address] [place] [address]  
[phone]   
   
                          Glucose [Mass/Vol] 85 mg/dL                  60 - 115   
mg/dl                                   [place]  
   
                      Performed on ACCU-CHEK                        [facility],   
[place]  
   
                                                    Basic Metabolic Panelon    
   
                                                    Anion gap   
[Moles/Vol]     14 mmol/L       Normal          9-15            [facility]  
   
                                        Comment on above:   Performed By: #### B  
MP ####  
[facility]  
[address] [place] [address]  
[phone]   
   
                      Calcium [Mass/Vol] 9.2 mg/dL  Normal     8.5-9.9    [facility]  
   
                                        Comment on above:   Performed By: #### B  
MP ####  
[facility]  
[address] [place] [address]  
[phone]   
   
                      Chloride [Moles/Vol] 105 mmol/L Normal          [facility]  
   
                                        Comment on above:   Performed By: #### B  
MP ####  
[facility]  
[address] [place] [address]  
[phone]   
   
                      CO2 [Moles/Vol] 23 mmol/L  Normal     20-31      [facility]  
   
                                        Comment on above:   Performed By: #### B  
MP ####  
[facility]  
[address] [place] [address]  
[phone]   
   
                                                    Creatinine   
[Mass/Vol]      0.79 mg/dL      Normal          0.50-0.90       [facility]  
   
                                        Comment on above:   Performed By: #### B  
MP ####  
[facility]  
[address]  
[place] [address]  
[phone]   
   
                                                    GFR/1.73 sq M   
predicted among   
blacks MDRD   
(S/P/Bld) [Vol   
rate/Area]      mL/min/{1.73_m2} Normal          >60             [facility]  
   
                                        Comment on above:   Result Comment: >60   
mL/min/1.73m2 EGFR, calc. for ages 18 and   
older using the  
MDRD formula (not corrected for weight), is valid for stable  
renal function.   
   
                                                            Performed By: #### B  
MP ####  
[facility]  
[address] [place] [address]  
[phone]   
   
                                                    GFR/1.73 sq   
M.predicted MDRD   
(S/P/Bld) [Vol   
rate/Area]      mL/min/{1.73_m2} Normal          >60             [facility]  
   
                                        Comment on above:   Result Comment: >60   
mL/min/1.73m2 EGFR, calc. for ages 18 and   
older using the  
MDRD formula (not corrected for weight), is valid for stable  
renal function.   
   
                                                            Performed By: #### B  
MP ####  
[facility]  
[address] [place] [address]  
[phone]   
   
                      Glucose [Mass/Vol] 83 mg/dL   Normal     70-99      [facility]  
   
                                        Comment on above:   Performed By: #### B  
MP ####  
[facility]  
[address] [place] [address]  
[phone]   
   
                                                    Potassium   
[Moles/Vol]     4.5 mmol/L      Normal          3.4-4.9         [facility]  
   
                                        Comment on above:   Performed By: #### B  
MP ####  
[facility]  
[address] [place] [address]  
[phone]   
   
                      Sodium [Moles/Vol] 142 mmol/L Normal     135-144    [facility]  
   
                                        Comment on above:   Performed By: #### B  
MP ####  
[facility]  
[address] [place] [address]  
[phone]   
   
                                                    Urea nitrogen   
[Mass/Vol]      11 mg/dL        Normal          6-20            [facility]  
   
                                        Comment on above:   Performed By: #### B  
MP ####  
[facility]  
[address] [place] [address]  
[phone]   
   
                                                    CBC With Platelet and Differ  
entialon 2020   
   
                                                    Basophils (Bld)   
[#/Vol]         0.0 10*3/uL     Normal          0.0-0.2         [facility]  
   
                                        Comment on above:   Performed By: #### C  
BCWD ####  
[facility]  
[address] [place] [address]  
[phone]   
   
                                                    Basophils/100 WBC   
(Bld)           0.5 %           Normal                          [facility]  
   
                                        Comment on above:   Performed By: #### C  
BCWD ####  
[facility]  
[address]  
[place] [address]  
[phone]   
   
                                                    Eosinophils (Bld)   
[#/Vol]         0.1 10*3/uL     Normal          0.0-0.7         [facility]  
   
                                        Comment on above:   Performed By: #### C  
BCWD ####  
[facility]  
[address]  
[place] [address]  
[phone]   
   
                                                    Eosinophils/100 WBC   
(Bld)           0.8 %           Normal                          [facility]  
   
                                        Comment on above:   Performed By: #### C  
BCWD ####  
[facility]  
[address]  
[place] [address]  
[phone]   
   
                                                    Erythrocyte   
distribution width   
(RBC) [Ratio]   14.9 %          Critically high 11.5-14.5       [facility]  
   
                                        Comment on above:   Performed By: #### C  
BCWD ####  
[facility]  
[address]  
[place] [address]  
[phone]   
   
                                                    Hematocrit (Bld)   
[Volume fraction] 40.5 %          Normal          37.0-47.0       [facility]  
   
                                        Comment on above:   Performed By: #### C  
BCWD ####  
[facility]  
[address]  
[place] [address]  
[phone]   
   
                                                    Hemoglobin (Bld)   
[Mass/Vol]      13.4 g/dL       Normal          12.0-16.0       [facility]  
   
                                        Comment on above:   Performed By: #### C  
BCWD ####  
[facility]  
[address]  
[place] [address]  
[phone]   
   
                                                    Lymphocytes (Bld)   
[#/Vol]         3.0 10*3/uL     Normal          1.0-4.8         [facility]  
   
                                        Comment on above:   Performed By: #### C  
BCWD ####  
[facility]  
[address]  
[place] [address]  
[phone]   
   
                                                    Lymphocytes/100 WBC   
(Bld)           33.5 %          Normal                          [facility]  
   
                                        Comment on above:   Performed By: #### C  
BCWD ####  
[facility]  
[address]  
[place] [address]  
[phone]   
   
                                                    MCH (RBC) [Entitic   
mass]           30.7 pg         Normal          27.0-31.3       [facility]  
   
                                        Comment on above:   Performed By: #### C  
BCWD ####  
[facility]  
[address]  
[place] [address]  
[phone]   
   
                                                    MCHC (RBC)   
[Mass/Vol]      33.2 %          Normal          33.0-37.0       [facility]  
   
                                        Comment on above:   Performed By: #### C  
BCWD ####  
[facility]  
[address]  
[place] [address]  
[phone]   
   
                                                    MCV (RBC) [Entitic   
vol]            92.6 fL         Normal          82.0-100.0      [facility]  
   
                                        Comment on above:   Performed By: #### C  
BCWD ####  
[facility]  
[address]  
[place] [address]  
[phone]   
   
                                                    Monocytes (Bld)   
[#/Vol]         0.5 10*3/uL     Normal          0.2-0.8         [facility]  
   
                                        Comment on above:   Performed By: #### C  
BCWD ####  
[facility]  
[address]  
[place] [address]  
[phone]   
   
                                                    Monocytes/100 WBC   
(Bld)           5.4 %           Normal                          [facility]  
   
                                        Comment on above:   Performed By: #### C  
BCWD ####  
[facility]  
[address]  
[place] [address]  
[phone]   
   
                                                    Neutrophils (Bld)   
[#/Vol]         5.3 10*3/uL     Normal          1.4-6.5         [facility]  
   
                                        Comment on above:   Performed By: #### C  
BCWD ####  
[facility]  
[address]  
[place] [address]  
[phone]   
   
                                                    Neutrophils/100 WBC   
(Bld)           59.8 %          Normal                          [facility]  
   
                                        Comment on above:   Performed By: #### C  
BCWD ####  
[facility]  
[address]  
[place] [address]  
[phone]   
   
                                                    Platelets (Bld)   
[#/Vol]         249 10*3/uL     Normal          130-400         [facility]  
   
                                        Comment on above:   Performed By: #### C  
BCWD ####  
[facility]  
[address]  
[place] [address]  
[phone]   
   
                      RBC (Bld) [#/Vol] 4.37 10*6/uL Normal     4.20-5.40  [facility]  
   
                                        Comment on above:   Performed By: #### C  
BCWD ####  
[facility]  
[address] [place] [address]  
[phone]   
   
                      WBC (Bld) [#/Vol] 8.9 10*3/uL Normal     4.8-10.8   [facility]  
   
                                        Comment on above:   Performed By: #### C  
BCWD ####  
[facility]  
[address] [place] [address]  
[phone]   
   
                                                    Partial Thromboplastin Timeo  
n 2020   
   
                                                    aPTT Coag (Bld)   
[Time]          33.4 s          Normal          24.4-36.8       [facility]  
   
                                        Comment on above:   Performed By: #### P  
TT ####  
[facility]  
[address] [place] [address]  
[phone]   
   
                                                    Prothrombin Timeon   
0   
   
                                                    INR Coag (PPP)   
[Relative time] 1.1 {INR}       Normal                          [facility]  
   
                                        Comment on above:   Performed By: #### P  
T ####  
[facility]  
[address] [place] [address]  
[phone]   
   
                      PT Coag (PPP) [Time] 14.4 s     Normal     12.3-14.9  [facility]  
   
                                        Comment on above:   Performed By: #### P  
T ####  
[facility]  
[address] [place] [address]  
[phone]   
   
                                                    CNPNon 2018   
   
                                        CNPN                Telephone   
([facility])-------------------  
---------------------------  
---------------------------  
-------ROBERT LIMA   
([id number]) 1980 [facility]   
Time Provider   
Department18   
VONNIE TAVERAS (ALVAREZ)   
[facility] During your visit   
today, we recorded the   
following information about   
you:Vonnie Taveras RN,   
RN 2018 1:14 PM   
SignedLeft message   
regarding reminder for   
stress test tomorrow and   
given instructionsAllergies   
As of Date: 2018   
Noted Allergy   
ReactionCODEINE 2005   
9 - VomitingGABAPENTIN   
2016 12 - Other: See   
Comments Comments:   
Headache/migrainePENICILLIN  
S 2005 14 - Unknown   
Comments: childhood   
reactionDate Reviewed:   
2018Reviewed by:   
Meggan Bullock Ma - Fully   
AssessedReason for Visit:   
Reminder Call   
[[id number]]Prescriptions as of   
2018 Sig:   
PROMETHAZINE-DM 6.25 MG-15   
MG* Take 5 mL by mouth four   
times* HYDROCODONE 7.5   
MG-ACETAMINOP* DULOXETINE   
60 MG CAPSULE,ABEBA* take 1   
capsule by mouth once *   
STIOLTO RESPIMAT 2.5   
MCG-2.5 * inhale 1 puff by   
mouth twice * OMEPRAZOLE 40   
MG CAPSULE,ABEBA* take 1   
capsule by mouth twice*   
VENTOLIN HFA 90   
MCG/ACTUATION* inhale 2   
puffs by mouth every*   
ATENOLOL 25 MG TABLET take   
1 tablet by mouth once d*   
ATORVASTATIN 20 MG TABLET   
take 1 tablet by mouth once   
d* LORATADINE 10 MG TABLET   
Take 1 tablet by mouth once   
d* BUPROPION  MG TAB   
take 1 tablet by mouth once   
d* CYANOCOBALAMIN (VIT   
B-12) 1,0* inject 1   
milliliter INTO THE *   
LYRICA 150 MG CAPSULE Take   
1 tablet by mouth twice *   
HYDROXYZINE HCL 25 MG   
TABLET Take 1 tablet by   
mouth every * Patient not   
taking: Reported on   
2018 TIZANIDINE 4 MG   
TABLET Take 4 mg by mouth   
three time* CELECOXIB 200   
MG CAPSULE Take 200 mg by   
mouth twice da* MAGNESIUM   
OXIDE 500 MG CAPSULE Take 1   
capsule by mouth once *   
AMITRIPTYLINE 50 MG TABLET   
Take 1 tablet by mouth   
daily * PREGABALIN 100 MG   
CAPSULE Take 1 capsule by   
mouth twice* BIOTIN 5,000   
MCG-SILICON DIOX* Take 1   
tablet by mouth once d*   
FOLIC ACID 400 MCG TABLET   
Take 1 tablet by mouth once   
d* PYRIDOXINE (VITAMIN B6)   
100 M* Take 1 tablet by   
mouth once d* BLOOD SUGAR   
DIAGNOSTIC STRIPS Test   
blood sugar(s) 1 times d*   
POLYETHYLENE GLYCOL 3350 17   
G* Take 17 g by mouth once   
daily. ETANERCEPT 50 MG/ML   
(0.98 ML)* Inject   
subcutaneously every *   
LANCETS Use as instructed   
HYDROXYCHLOROQUINE 200 MG   
TAB* Take 1 tablet by mouth   
twice * ASPIRIN 81 MG   
TABLET,DELAYED * Take 1   
tablet by mouth once d*   
BUPRENORPHINE 15 MCG/HOUR   
WEE* Apply 1 Patch as   
directed onc*   
More...Problem List As Of   
Date 2018 Noted   
Resolved NO SHOW [[id number]]   
INVALID FOR* More... Lump   
or mass in breast [N63.0]   
INVALID FOR* Priority: C   
Fibroadenosis of breast   
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   
Priority: C Diabetic eye   
exam (HCC) [Z01.00, E11.9]   
INVALID FOR* Priority: A   
More... Anxiety [F41.9]   
INVALID FOR* Priority: A   
More... Malignant neoplasm   
of cervix uteri,   
unspecified* Priority: C   
More... Fibromyalgia   
[M79.7] Priority: B   
Rheumatoid arthritis (HCC)   
[M06.9] Priority: A More...   
Raynaud disease [I73.00]   
Priority: B Restless legs   
[G25.81] Priority: A Mass   
of left side of neck   
[R22.1] INVALID FOR*   
Routine gynecological   
examination [Z01.419]   
INVALID FOR* Priority: E   
Dysphagia [R13.10] INVALID   
FOR* Smoker [F17.200]   
INVALID FOR* Priority: C   
Mixed hyperlipidemia   
[E78.2] INVALID FOR*   
Priority: A Tachycardia   
[R00.0] INVALID FOR*   
Priority: A More... COPD,   
mild ([facility]) [J44.9] INVALID   
FOR* Priority: A Essential   
hypertension [I10] INVALID   
FOR* Priority: A More...   
Constipation [K59.00]   
INVALID FOR* Fatigue   
[R53.83] INVALID FOR*   
Hoarse voice quality   
[R49.0] INVALID FOR*   
Gastroesophageal reflux   
disease without   
esophag*INVALID FOR*   
Priority: A RSD lower limb   
[G90.529] INVALID FOR*   
Priority: B More... Type 2   
diabetes mellitus without   
complication, *INVALID FOR*   
Priority: A More...   
Depression [F32.9] INVALID   
FOR* Priority: A Neoplasm   
of uncertain behavior of   
neck [D48.7] INVALID FOR*   
Encounter for gynecological   
examination without*INVALID   
FOR* Adjustment disorder   
with mixed anxiety and   
depr*INVALID FOR*   
Environmental and seasonal   
allergies [J30.89] INVALID   
FOR*Encounter Number:   
[id number]   
Status:Closed by GELACIO,   
VONNIE on 18  [facility]  
  
  
  
Vital Signs  
  
  
                          Date Time    Vital Sign   Value        Performing   
Clinician                               Facility  
   
                                                    2025   
11:          Body height         170.2 cm            Juliane Temsic   
APRN.CNP  
Work Phone:   
[phone]                          [facility]  
   
                                                    2025   
11:                              Body mass index (BMI)   
[Ratio]                   25.37 kg/m2               Juliane Temsic   
APRN.CNP  
Work Phone:   
[phone]                          [facility]  
   
                                                    2025   
11:          Body temperature    97.11 [degF]        Juliane Temsic   
APRN.CNP  
Work Phone:   
[phone]                          [facility]  
   
                                                    2025   
11:          Body weight         73.48 kg            Juliane Temsic   
APRN.CNP  
Work Phone:   
[phone]                          [facility]  
   
                                                    2025   
11:                              Diastolic blood   
pressure                  89 mm[Hg]                 Juliane Temsic   
APRN.CNP  
Work Phone:   
[phone]                          [facility]  
   
                                                    2025   
11:          Heart rate          72 /min             Juliane Temsic   
APRN.CNP  
Work Phone:   
[phone]                          [facility]  
   
                                                    2025   
11:          Respiratory rate    16 /min             Juliane Temsic   
APRN.CNP  
Work Phone:   
[phone]                          [facility]  
   
                                                    2025   
11:                              SaO2% (BldA) [Mass   
fraction]                 98 %                      Juliane Temsic   
APRN.CNP  
Work Phone:   
[phone]                          [facility]  
   
                                                    2025   
11:                              Systolic blood   
pressure                  129 mm[Hg]                Juliane Temsic   
APRN.CNP  
Work Phone:   
[phone]                          [facility]  
   
                                                    2025   
10:          Body temperature    97.39 [degF]        Tee Alfredoauro   
APRN.CNP  
Work Phone:   
[phone]                          [facility]  
   
                                                    2025   
10:                              Diastolic blood   
pressure                  79 mm[Hg]                 Tee Dimauro   
APRN.CNP  
Work Phone:   
[phone]                          [facility]  
   
                                                    2025   
10:          Heart rate          78 /min             Tee Villanueva   
APRN.CNP  
Work Phone:   
[phone]                          [facility]  
   
                                                    2025   
10:          Respiratory rate    18 /min             Tee Villanueva   
APRN.CNP  
Work Phone:   
[phone]                          [facility]  
   
                                                    2025   
10:                              SaO2% (BldA) [Mass   
fraction]                 99 %                      Tee Villanueva   
APRN.CNP  
Work Phone:   
[phone]                          [facility]  
   
                                                    2025   
10:                              Systolic blood   
pressure                  127 mm[Hg]                Tee Villanueva   
APRN.CNP  
Work Phone:   
[phone]                          [facility]  
   
                                                    2024   
13:          Body height         170.2 cm            Reginald Pedroza MD  
Work Phone:   
[phone]                          [facility]  
   
                                                    2024   
13:                              Body mass index (BMI)   
[Ratio]                   25.37 kg/m2               Reginald Pedroza MD  
Work Phone:   
[phone]                          [facility]  
   
                                                    2024   
13:          Body temperature    98.49 [degF]        Reginald Pedroza MD  
Work Phone:   
[phone]                          [facility]  
   
                                                    2024   
13:          Body weight         73.48 kg            Reginald Pedroza MD  
Work Phone:   
[phone]                          [facility]  
   
                                                    2024   
13:                              Diastolic blood   
pressure                  67 mm[Hg]                 Regniald Pedroza MD  
Work Phone:   
[phone]                          [facility]  
   
                                                    2024   
13:          Heart rate          76 /min             Reginald Pedroza MD  
Work Phone:   
[phone]                          [facility]  
   
                                                    2024   
13:                              Systolic blood   
pressure                  106 mm[Hg]                Reginald Pedroza MD  
Work Phone:   
[phone]                          [facility]  
   
                                                    2024   
14:          Body height         170.2 cm            Juliane Murphy   
APRN.CNP  
Work Phone:   
[phone]                          [facility]  
   
                                                    2024   
14:                              Body mass index (BMI)   
[Ratio]                   25.59 kg/m2               Juliane Temsic   
APRN.CNP  
Work Phone:   
[phone]                          [facility]  
   
                                                    2024   
14:          Body temperature    97.9 [degF]         Juliane Temsic   
APRN.CNP  
Work Phone:   
[phone]                          [facility]  
   
                                                    2024   
14:          Body weight         74.12 kg            Juliane Temsic   
APRN.CNP  
Work Phone:   
[phone]                          [facility]  
   
                                                    2024   
14:                              Diastolic blood   
pressure                  76 mm[Hg]                 Juliane Temsic   
APRN.CNP  
Work Phone:   
[phone]                          [facility]  
   
                                                    2024   
14:          Heart rate          86 /min             Juliane Temsic   
APRN.CNP  
Work Phone:   
[phone]                          [facility]  
   
                                                    2024   
14:          Respiratory rate    16 /min             Juliane Temsic   
APRN.CNP  
Work Phone:   
[phone]                          [facility]  
   
                                                    2024   
14:                              SaO2% (BldA) [Mass   
fraction]                 96 %                      Juliane Temsic   
APRN.CNP  
Work Phone:   
[phone]                          [facility]  
   
                                                    2024   
14:                              Systolic blood   
pressure                  108 mm[Hg]                Juliane Temsic   
APRN.CNP  
Work Phone:   
[phone]                          [facility]  
   
                                                    10-   
14:          Body height         170.2 cm            Juliane Temsic   
APRN.CNP  
Work Phone:   
[phone]                          [facility]  
   
                                                    10-   
14:                              Body mass index (BMI)   
[Ratio]                   25.59 kg/m2               Juliane Temsic   
APRN.CNP  
Work Phone:   
[phone]                          [facility]  
   
                                                    10-   
14:          Body temperature    98.4 [degF]         Juliane Temsic   
APRN.CNP  
Work Phone:   
[phone]                          [facility]  
   
                                                    10-   
14:          Body weight         74.12 kg            Juliane Temsic   
APRN.CNP  
Work Phone:   
[phone]                          [facility]  
   
                                                    10-   
14:                              Diastolic blood   
pressure                  60 mm[Hg]                 Juliane Temsic   
APRN.CNP  
Work Phone:   
[phone]                          [facility]  
   
                                                    10-   
14:          Heart rate          76 /min             Juliane Temsic   
APRN.CNP  
Work Phone:   
[phone]                          [facility]  
   
                                                    10-   
14:          Respiratory rate    16 /min             Juliane Temsic   
APRN.CNP  
Work Phone:   
[phone]                          [facility]  
   
                                                    10-   
14:                              SaO2% (BldA) [Mass   
fraction]                 97 %                      Juliane Temsic   
APRN.CNP  
Work Phone:   
[phone]                          [facility]  
   
                                                    10-   
14:                              Systolic blood   
pressure                  104 mm[Hg]                Juliane Temsic   
APRN.CNP  
Work Phone:   
[phone]                          [facility]  
   
                                                    2024   
12:          Body height         170.2 cm            Judie Delgado MD  
Work Phone:   
[phone]                          [facility]  
   
                                                    2024   
12:                              Body mass index (BMI)   
[Ratio]                   25.22 kg/m2               Judie Delgado MD  
Work Phone:   
[phone]                          [facility]  
   
                                                    2024   
12:          Body weight         73.03 kg            Judie Delgado MD  
Work Phone:   
[phone]                          [facility]  
   
                                                    2024   
12:                              Diastolic blood   
pressure                  91 mm[Hg]                 Judie Delgado MD  
Work Phone:   
[phone]                          [facility]  
   
                                                    2024   
12:          Heart rate          74 /min             Judie Delgado MD  
Work Phone:   
[phone]                          [facility]  
   
                                                    2024   
12:                              SaO2% (BldA) [Mass   
fraction]                 98 %                      Judie Delgado MD  
Work Phone:   
[phone]                          [facility]  
   
                                                    2024   
12:                              Systolic blood   
pressure                  128 mm[Hg]                Judie Delgado MD  
Work Phone:   
[phone]                          [facility]  
   
                                                    2024   
08:          Body height         170.2 cm            Reginald Pedroza MD  
Work Phone:   
[phone]                          [facility]  
   
                                                    2024   
08:                              Body mass index (BMI)   
[Ratio]                   25.72 kg/m2               Reginald Pedroza MD  
Work Phone:   
[phone]                          [facility]  
   
                                                    2024   
08:          Body temperature    98.71 [degF]        Reginald Pedroza MD  
Work Phone:   
[phone]                          [facility]  
   
                                                    2024   
08:          Body weight         74.48 kg            Reginald Pedroza MD  
Work Phone:   
[phone]                          [facility]  
   
                                                    2024   
08:                              Diastolic blood   
pressure                  65 mm[Hg]                 Reginald Pedroza MD  
Work Phone:   
[phone]                          [facility]  
   
                                                    2024   
08:          Heart rate          69 /min             Reginald Pedroza MD  
Work Phone:   
[phone]                          [facility]  
   
                                                    2024   
08:                              Systolic blood   
pressure                  122 mm[Hg]                Reginald Pedroza MD  
Work Phone:   
[phone]                          [facility]  
   
                                                    2024   
11:                              Body mass index (BMI)   
[Ratio]                   25.22 kg/m2               Pascual Austin MD  
Work Phone:   
[phone]                          [facility]  
   
                                                    2024   
11:          Body weight         73.03 kg            Pascual Austin MD  
Work Phone:   
[phone]                          [facility]  
   
                                                    2024   
11:                              Diastolic blood   
pressure                  86 mm[Hg]                 Pascual Austin MD  
Work Phone:   
[phone]                          [facility]  
   
                                                    2024   
11:          Heart rate          84 /min             Pascual Austin MD  
Work Phone:   
[phone]                          [facility]  
   
                                                    2024   
11:          Respiratory rate    14 /min             Pascual Austin MD  
Work Phone:   
[phone]                          [facility]  
   
                                                    2024   
11:                              Systolic blood   
pressure                  130 mm[Hg]                Pascual Austin MD  
Work Phone:   
[phone]                          [facility]  
   
                                                    2024   
13:                              Diastolic blood   
pressure                  86 mm[Hg]                 Juliane Temsic   
APRN.CNP  
Work Phone:   
[phone]                          [facility]  
   
                                                    2024   
13:                              Systolic blood   
pressure                  138 mm[Hg]                Juliane Temsic   
APRN.CNP  
Work Phone:   
[phone]                          [facility]  
   
                                                    2024   
13:          Body height         170.2 cm            Juliane Temsic   
APRN.CNP  
Work Phone:   
[phone]                          [facility]  
   
                                                    2024   
13:                              Body mass index (BMI)   
[Ratio]                   25.56 kg/m2               Juliane Temsic   
APRN.CNP  
Work Phone:   
[phone]                          [facility]  
   
                                                    2024   
13:          Body temperature    98.6 [degF]         Juliane Temsic   
APRN.CNP  
Work Phone:   
[phone]                          [facility]  
   
                                                    2024   
13:          Body weight         74.03 kg            Juliane Temsic   
APRN.CNP  
Work Phone:   
[phone]                          [facility]  
   
                                                    2024   
13:          Heart rate          80 /min             Juliane Temsic   
APRN.CNP  
Work Phone:   
[phone]                          [facility]  
   
                                                    2024   
13:          Respiratory rate    16 /min             Juliane Temsic   
APRN.CNP  
Work Phone:   
[phone]                          [facility]  
   
                                                    2024   
13:                              SaO2% (BldA) [Mass   
fraction]                 99 %                      Juliane Temsic   
APRN.CNP  
Work Phone:   
[phone]                          [facility]  
   
                                                    2024   
14:          Body height         170.2 cm            Juliane Temsic   
APRN.CNP  
Work Phone:   
[phone]                          [facility]  
   
                                                    2024   
14:                              Body mass index (BMI)   
[Ratio]                   26.4 kg/m2                Juliane Temsic   
APRN.CNP  
Work Phone:   
[phone]                          [facility]  
   
                                                    2024   
14:          Body temperature    98.6 [degF]         Juliane Temsic   
APRN.CNP  
Work Phone:   
[phone]                          [facility]  
   
                                                    2024   
14:          Body weight         76.45 kg            Juliane Temsic   
APRN.CNP  
Work Phone:   
[phone]                          [facility]  
   
                                                    2024   
14:                              Diastolic blood   
pressure                  78 mm[Hg]                 Juliane Temsic   
APRN.CNP  
Work Phone:   
[phone]                          [facility]  
   
                                                    2024   
14:          Heart rate          74 /min             Juliane Temsic   
APRN.CNP  
Work Phone:   
[phone]                          [facility]  
   
                                                    2024   
14:          Respiratory rate    14 /min             Juliane Temsic   
APRN.CNP  
Work Phone:   
[phone]                          [facility]  
   
                                                    2024   
14:                              SaO2% (BldA) [Mass   
fraction]                 99 %                      Juliane Temsic   
APRN.CNP  
Work Phone:   
[phone]                          [facility]  
   
                                                    2024   
14:                              Systolic blood   
pressure                  110 mm[Hg]                Juliane Temsic   
APRN.CNP  
Work Phone:   
[phone]                          [facility]  
   
                                                    2024   
13:                              Body mass index (BMI)   
[Ratio]                   27.1 kg/m2                Juliane Temsic   
APRN.CNP  
Work Phone:   
[phone]                          [facility]  
   
                                                    2024   
13:          Body weight         78.47 kg            Juliane Temsic   
APRN.CNP  
Work Phone:   
[phone]                          [facility]  
   
                                                    2024   
13:                              Diastolic blood   
pressure                  72 mm[Hg]                 Juliane Temsic   
APRN.CNP  
Work Phone:   
[phone]                          [facility]  
   
                                                    2024   
13:          Heart rate          84 /min             Juliane Temsic   
APRN.CNP  
Work Phone:   
[phone]                          [facility]  
   
                                                    2024   
13:          Respiratory rate    20 /min             Juliane Temsic   
APRN.CNP  
Work Phone:   
[phone]                          [facility]  
   
                                                    2024   
13:                              Systolic blood   
pressure                  116 mm[Hg]                Juliane Temsic   
APRN.CNP  
Work Phone:   
[phone]                          [facility]  
   
                                                    2024   
14:          Body height         170.2 cm            Juliane Temsic   
APRN.CNP  
Work Phone:   
[phone]                          [facility]  
   
                                                    2024   
14:          Body temperature    98.2 [degF]         Juliane Temsic   
APRN.CNP  
Work Phone:   
[phone]                          [facility]  
   
                                                    2024   
14:          Body weight         77.66 kg            Juliane Temsic   
APRN.CNP  
Work Phone:   
[phone]                          [facility]  
   
                                                    2024   
14:                              Diastolic blood   
pressure                  82 mm[Hg]                 Juliane Temsic   
APRN.CNP  
Work Phone:   
[phone]                          [facility]  
   
                                                    2024   
14:          Heart rate          69 /min             Juliane Temsic   
APRN.CNP  
Work Phone:   
[phone]                          [facility]  
   
                                                    2024   
14:          Respiratory rate    16 /min             Juliane Temsic   
APRN.CNP  
Work Phone:   
[phone]                          [facility]  
   
                                                    2024   
14:                              SaO2% (BldA) [Mass   
fraction]                 99 %                      Juliane Temsic   
APRN.CNP  
Work Phone:   
[phone]                          [facility]  
   
                                                    2024   
14:                              Systolic blood   
pressure                  128 mm[Hg]                Juliane Temsic   
APRN.CNP  
Work Phone:   
[phone]                          [facility]  
   
                                                    2024   
11:          Body height         170.2 cm            Juliane Temsic   
APRN.CNP  
Work Phone:   
[phone]                          [facility]  
   
                                                    2024   
11:          Body temperature    97.81 [degF]        Juliane Temsic   
APRN.CNP  
Work Phone:   
[phone]                          [facility]  
   
                                                    2024   
11:          Body weight         76.66 kg            Juliane Temsic   
APRN.CNP  
Work Phone:   
[phone]                          [facility]  
   
                                                    2024   
11:                              Diastolic blood   
pressure                  82 mm[Hg]                 Juliane Temsic   
APRN.CNP  
Work Phone:   
[phone]                          [facility]  
   
                                                    2024   
11:          Heart rate          76 /min             Juliane Temsic   
APRN.CNP  
Work Phone:   
[phone]                          [facility]  
   
                                                    2024   
11:          Respiratory rate    16 /min             Juliane Temsic   
APRN.CNP  
Work Phone:   
[phone]                          [facility]  
   
                                                    2024   
11:                              SaO2% (BldA) [Mass   
fraction]                 97 %                      Juliane Temsic   
APRN.CNP  
Work Phone:   
[phone]                          [facility]  
   
                                                    2024   
11:                              Systolic blood   
pressure                  126 mm[Hg]                Juliane Temsic   
APRN.CNP  
Work Phone:   
[phone]                          [facility]  
   
                                                    2024   
22:          Body temperature    98.24 [degF]        JESÚS VALDES MD  
Work Phone:   
[phone]                           [facility]  
   
                                                    2024   
22:                              Diastolic Blood   
Pressure Non-Invasive     79 mm[Hg]                 JESÚS VALDES MD  
Work Phone:   
[phone]                           [facility]  
   
                                                    2024   
22:          Heart rate          75 /min             JESÚS VALDES MD  
Work Phone:   
[phone]                           [facility]  
   
                                                    2024   
22:          Respiratory rate    16 /min             JESÚS VALDES MD  
Work Phone:   
[phone]                           [facility]  
   
                                                    2024   
22:                              Systolic Blood   
Pressure Non-Invasive     118 mm[Hg]                JESÚS VALDES MD  
Work Phone:   
[phone]                           [facility]  
   
                                                    2024   
19:          Body temperature    99.68 [degF]        JESÚS VALDES MD  
Work Phone:   
[phone]                           [facility]  
   
                                                    2024   
19:                              Diastolic Blood   
Pressure Non-Invasive     93 mm[Hg]                 JESÚS VALDES MD  
Work Phone:   
[phone]                           [facility]  
   
                                                    2024   
19:          Heart rate          93 /min             JESÚS VALDES MD  
Work Phone:   
[phone]                           [facility]  
   
                                                    2024   
19:          Respiratory rate    18 /min             JESÚS VALDES MD  
Work Phone:   
[phone]                           [facility]  
   
                                                    2024   
19:                              Systolic Blood   
Pressure Non-Invasive     141 mm[Hg]                JESÚS VALDES MD  
Work Phone:   
[phone]                           [facility]  
   
                                                    2024   
10:          Body temperature    99.3 [degF]         Jeffrey Alvarez PA-C  
Work Phone:   
[phone]                          [facility]  
   
                                                    2024   
10:          Body weight         76.66 kg            Jeffrey Alvarez PA-C  
Work Phone:   
[phone]                          [facility]  
   
                                                    2024   
10:                              Diastolic blood   
pressure                  88 mm[Hg]                 Jeffrey Alvarez PA-C  
Work Phone:   
[phone]                          [facility]  
   
                                                    2024   
10:          Heart rate          88 /min             Jeffrey Alvarez PA-C  
Work Phone:   
[phone]                          [facility]  
   
                                                    2024   
10:          Respiratory rate    18 /min             Jeffrey Alvarez PA-C  
Work Phone:   
[phone]                          [facility]  
   
                                                    2024   
10:                              SaO2% (BldA) [Mass   
fraction]                 98 %                      Jeffrey Alvarez PA-C  
Work Phone:   
[phone]                          [facility]  
   
                                                    2024   
10:                              Systolic blood   
pressure                  125 mm[Hg]                Jeffrey Alvarez PA-C  
Work Phone:   
[phone]                          [facility]  
   
                                                    10-   
09:          Body height         170.2 cm            Juliane Murphy   
APRN.CNP  
Work Phone:   
[phone]                          [facility]  
   
                                                    10-   
09:          Body temperature    98.2 [degF]         Juliane Temsic   
APRN.CNP  
Work Phone:   
[phone]                          [facility]  
   
                                                    10-   
09:          Body weight         75.12 kg            Juliane Temsic   
APRN.CNP  
Work Phone:   
[phone]                          [facility]  
   
                                                    10-   
09:                              Diastolic blood   
pressure                  78 mm[Hg]                 Juliane Temsic   
APRN.CNP  
Work Phone:   
[phone]                          [facility]  
   
                                                    10-   
09:          Heart rate          74 /min             Juliane Temsic   
APRN.CNP  
Work Phone:   
[phone]                          [facility]  
   
                                                    10-   
09:          Respiratory rate    16 /min             Juliane Temsic   
APRN.CNP  
Work Phone:   
[phone]                          [facility]  
   
                                                    10-   
09:                              SaO2% (BldA) [Mass   
fraction]                 100 %                     Juliane Temsic   
APRN.CNP  
Work Phone:   
[phone]                          [facility]  
   
                                                    10-   
09:                              Systolic blood   
pressure                  108 mm[Hg]                Juliane Temsic   
APRN.CNP  
Work Phone:   
[phone]                          [facility]  
   
                                                    05-   
11:          Body temperature    97.9 [degF]         Nick Capps MD  
Work Phone:   
[phone]                          [facility]  
   
                                                    05-   
11:          Body weight         78.02 kg            Nick Capps MD  
Work Phone:   
[phone]                          [facility]  
   
                                                    05-   
11:                              Diastolic blood   
pressure                  83 mm[Hg]                 Nick Capps MD  
Work Phone:   
[phone]                          [facility]  
   
                                                    05-   
11:          Heart rate          88 /min             Nick Capps MD  
Work Phone:   
[phone]                          [facility]  
   
                                                    05-   
11:          Respiratory rate    18 /min             Nick Capps MD  
Work Phone:   
[phone]                          [facility]  
   
                                                    05-   
11:                              SaO2% (BldA) [Mass   
fraction]                 99 %                      Nick Capps MD  
Work Phone:   
[phone]                          [facility]  
   
                                                    05-   
11:                              Systolic blood   
pressure                  131 mm[Hg]                Nick Capps MD  
Work Phone:   
[phone]                          [facility]  
   
                                                    2023   
12:          Body height         170.2 cm            Jenna Chavarria MD  
Work Phone:   
[phone]                          [facility]  
   
                                                    2023   
12:          Body temperature    98.2 [degF]         Jenna Chavarria MD  
Work Phone:   
[phone]                          [facility]  
   
                                                    2023   
12:          Body weight         78.02 kg            Jenna Chavarria MD  
Work Phone:   
[phone]                          [facility]  
   
                                                    2023   
12:                              Diastolic blood   
pressure                  71 mm[Hg]                 Jenna Chavarria MD  
Work Phone:   
[phone]                          [facility]  
   
                                                    2023   
12:          Heart rate          72 /min             Jenna Chavarria MD  
Work Phone:   
[phone]                          [facility]  
   
                                                    2023   
12:                              SaO2% (BldA) [Mass   
fraction]                 100 %                     Jenna Chavarria MD  
Work Phone:   
[phone]                          [facility]  
   
                                                    2023   
12:                              Systolic blood   
pressure                  109 mm[Hg]                Jenna Chavarria MD  
Work Phone:   
[phone]                          [facility]  
   
                                                    2022   
08:          Body height         170.2 cm            Daniel Peabody MD  
Work Phone:   
[phone]                          [facility]  
   
                                                    2022   
08:          Body temperature    98.8 [degF]         Daniel Peabody MD  
Work Phone:   
[phone]                          [facility]  
   
                                                    2022   
08:          Body weight         81.19 kg            Daniel Peabody MD  
Work Phone:   
[phone]                          [facility]  
   
                                                    2022   
08:                              Diastolic blood   
pressure                  64 mm[Hg]                 Daniel Peabody MD  
Work Phone:   
[phone]                          [facility]  
   
                                                    2022   
08:          Heart rate          77 /min             Daniel Peabody MD  
Work Phone:   
[phone]                          [facility]  
   
                                                    2022   
08:                              SaO2% (BldA) [Mass   
fraction]                 96 %                      Daniel Peabody MD  
Work Phone:   
[phone]                          [facility]  
   
                                                    2022   
08:                              Systolic blood   
pressure                  108 mm[Hg]                Daniel Peabody MD  
Work Phone:   
[phone]                          [facility]  
   
                                                    2022   
10:          Body height         167.6 cm            Jenna Chavarria MD  
Work Phone:   
[phone]                          [facility]  
   
                                                    2022   
10:          Body temperature    97.7 [degF]         Jenna Chavarria MD  
Work Phone:   
[phone]                          [facility]  
   
                                                    2022   
10:          Body weight         81.19 kg            Jenna Chavarria MD  
Work Phone:   
[phone]                          [facility]  
   
                                                    2022   
10:                              Diastolic blood   
pressure                  74 mm[Hg]                 Jenna Chavarria MD  
Work Phone:   
[phone]                          [facility]  
   
                                                    2022   
10:          Heart rate          81 /min             Jenna Chavarria MD  
Work Phone:   
[phone]                          [facility]  
   
                                                    2022   
10:                              Systolic blood   
pressure                  113 mm[Hg]                Jenna Chavarria MD  
Work Phone:   
[phone]                          [facility]  
   
                                                    2020   
16:      BP Diastolic    66 mm[Hg]       [facility]  
,   
[place]  
   
                                                    2020   
16:      BP Systolic     105 mm[Hg]      [facility]  
,   
[place]  
   
                                                    2020   
16:      Pulse (Heart Rate) 65 /min         [facility] [place],   
[place]  
   
                                                    2020   
16:      Pulse Oximetry  99 %            [facility]  
,   
[place]  
   
                                                    2020   
16:      Respiratory Rate 14 /min         [facility]  
[place],   
[place]  
   
                                                    2020   
16:      Body Temperature 97.9 [degF]     [facility],   
[place]  
   
                                                    2020   
11:      BMI (Body Mass Index) 22.71 kg/m2     [facility],   
[place]  
   
                                                    2020   
11:      Body weight     65.77 kg        [facility]  
,   
[place]  
   
                                                    2020   
11:      Height          170.2 cm        [facility]  
,   
[place]  
   
                                                    2020   
14:      BP Diastolic    64 mm[Hg]       [facility]  
,   
[place]  
   
                                                    2020   
14:      BP Systolic     108 mm[Hg]      [facility]  
,   
[place]  
   
                                                    2020   
14:      Pulse (Heart Rate) 65 /min         [facility] [place],   
[place]  
   
                                                    2020   
14:      Pulse Oximetry  98 %            [facility]  
,   
[place]  
   
                                                    2020   
14:      Body Temperature 98.49 [degF]    [facility],   
[place]  
   
                                                    2020   
14:      Respiratory Rate 9 /min          [facility],   
[place]  
   
                                                    2020   
14:      BMI (Body Mass Index) 23.34 kg/m2     [facility],   
[place]  
   
                                                    2020   
14:      Body weight     67.59 kg        [facility]  
,   
[place]  
   
                                                    2020   
14:      Height          170.2 cm        [place], [place]  
  
  
  
Encounters  
  
  
                          Encounter Date Encounter Type Care Provider Facility  
   
                                                    Start: 2025  
End: 2025     ambulatory          JULIANE MURPHY    Facility:[id number]  
   
                                                    Start: 2025  
End: 2025           Telephone encounter       Juliane Murphy APRN.CNP  
Work Phone:   
[phone]                          [facility]  
   
                                                    Start: 2025  
End: 2025                         E-mail encounter from   
caregiver                               Juliane Murphy APRN.CNP  
Work Phone:   
[phone]                          [facility]  
   
                                                    Start: 2025  
End: 2025                         Patient encounter   
procedure                               Juliane Murphy   
APRN.CNP  
Work Phone:   
[phone]                          [facility]  
   
                                        Comment on above:   Referral   
   
                                                    Start: 2025  
End: 2025           Telephone encounter       Juliane Murphy   
APRN.CNP  
Work Phone:   
[phone]                          [facility]  
   
                                        Comment on above:   Gastro referral faxe  
d to [facility]   
   
                                                            referral info given   
via [employer]   
   
                                                            Referral Information  
 (Gastroenterology)   
   
                                                    Start: 2025  
End: 2025                         Office outpatient visit   
25 minutes                              Juliane SHARMA Tatyanaryan   
APRELI.CNP  
Work Phone:   
[phone]                          [facility]  
   
                                        Comment on above:   Influenza A (Primary  
 Dx);  
Chest discomfort;  
Gastroesophageal reflux disease without esophagitis;  
Dysphagia, unspecified type   
   
                                                    Start: 2025  
End: 2025     ambulatory          JULIANEMIGUEL MURPHY    Facility:[id number]  
   
                                                    Start: 2025  
End: 2025     ambulatory          Alejandrina Hernandez RN   [facility]  
e  
   
                                                    Start: 2025  
End: 2025     Follow-up encounter Alejandrina Hernandez RN   [facility]  
e  
   
                                        Comment on above:   Primary Care Coordin  
ator Ed Follow Up   
   
                                                    Start: 2025  
End: 01-           Telephone encounter       Juliane Murphy   
APRELI.CNP  
Work Phone:   
[phone]                          [facility]  
   
                                        Comment on above:   Patient Update   
   
                                                    Start: 2025  
End: 2025                         Emergency department   
patient visit             JULIANE MURPHY          Facility:[id number]  
   
                                                    Start: 2025  
End: 2025                         Patient encounter   
procedure                               Tee Villanueva   
APRN.CNP  
Work Phone:   
[phone]                          [facility]  
   
                                        Comment on above:   Chest pain, unspecif  
ied type (Primary Dx)   
   
                                                    Start: 2025  
End: 2025     ambulatory          SELF                Facility:[id number]  
   
                                                    Start: 2024  
End: 2024     ambulatory          Alejandrina Hernandez RN   [facility]  
e  
   
                                                    Start: 2024  
End: 2024     Follow-up encounter Alejandrina Hernandez RN   [facility]  
e  
   
                                        Comment on above:   Primary Care Coordin  
[facility] Ed Follow Up   
   
                                                    Start: 2024  
End: 2024                         Patient encounter   
procedure                               Reginald Pedroza MD  
Work Phone:   
[phone]                          [facility]   
Arthritis and   
Rheumatology [place]  
   
                                        Comment on above:   Seronegative rheumat  
oid arthritis (HCC) (Primary Dx)   
   
                                                    Start: 2024  
End: 2024     ambulatory          JULIANEMIGUEL BREENSAAD    Facility:[place]   
General  
   
                                                    Start: 2024  
End: 2024                         Emergency department   
patient visit             [facility] J[facility] MUR[facility]       Facility:A  
   
                                                    Start: 2024  
End: 2024                         Office outpatient visit   
25 minutes                              Juliane Murphy   
APRN.CNP  
Work Phone:   
[phone]                          [facility]  
   
                                        Comment on above:   Generalized anxiety   
disorder with panic attacks (Primary Dx);  
Multiple joint pain;  
SOB (shortness of breath);  
Acute diarrhea   
   
                                                    Start: 2024  
End: 2025           Telephone encounter       Judie Delgado MD  
Work Phone:   
[phone]                          [facility]  
   
                                                    Start: 2024  
End: 2024           Telephone encounter       Reginald Pedroza MD  
Work Phone:   
[phone]                          [facility]   
Arthritis and   
Rheumatology [place]  
   
                                        Comment on above:   Medication Problem   
   
                                                    Start: 2024  
End: 2024                         Patient encounter   
procedure                               [facility] León Narayanan  
Work Phone:   
[phone]                          Pulmonary Function   
Lab  
   
                                                    Start: 2024  
End: 2024     ambulatory          JUDIE DELGADO       Pulmonary Function   
Lab  
   
                                        Comment on above:   Spirometry   
   
                                                    Start: 2024  
End: 2024                         Office outpatient visit   
25 minutes                              Juliane Murphy   
APRN.CNP  
Work Phone:   
[phone]                          [facility]  
   
                                        Comment on above:   Peripheral vertigo o  
f both ears (Primary Dx);  
Encounter for follow-up examination;  
Eustachian tube dysfunction, bilateral   
   
                                                    Start: 2024  
End: 2024     ambulatory          Alejandrina Hernandez RN   [facility]  
e  
   
                                                    Start: 2024  
End: 2024     Follow-up encounter Alejandrina Hernandez RN   [facility]  
e  
   
                                        Comment on above:   Primary Care Coordin  
ator Ed Follow Up   
   
                                                    Start: 11-  
End: 11-                         Emergency department   
patient visit             TAVON VILLALBA MD            Facility:A  
   
                                                    Start: 10-  
End: 10-                         Office outpatient visit   
25 minutes                              Juliane Murphy   
APRN.CNP  
Work Phone:   
[phone]                          [facility]   
[place]  
   
                                        Comment on above:   Rheumatoid arthritis  
 of multiple sites with negative   
rheumatoid   
factor (HCC) (Primary Dx);  
Chronic pain syndrome;  
COPD, mild (HCC);  
Breast pain, left;  
Abnormal findings on diagnostic imaging of breast;  
Generalized anxiety disorder with panic attacks;  
Encounter for immunization;  
Screening for cervical cancer   
   
                                                    Start: 10-  
End: 10-     ambulatory          JULIANE MURPHY    Facility:[id number]  
   
                                                    Start: 10-  
End: 10-           ambulatory                Reginald Pedroza MD  
Work Phone:   
[phone]                          [facility]   
Arthritis and   
Rheumatology [place]  
   
                                                    Start: 10-  
End: 10-                         Patient encounter   
procedure                               Reginald Pedroza MD  
Work Phone:   
[phone]                          [facility]   
Arthritis and   
Rheumatology [place]  
   
                                        Comment on above:   [place]   
   
                                                    Start: 10-  
End: 10-     ambulatory          REGINALD PEDROZA     Facility:[facility]  
   
                                                    Start: 10-  
End: 10-                         Subsequent hospital   
visit by physician        [facility] [address]    Radiology  
   
                                        Comment on above:   Inflammatory arthrit  
is [M19.90]   
   
                                                    Start: 2024  
End: 10-                         Patient encounter   
procedure                               Judie Delgado MD  
Work Phone:   
[phone]                          [facility]  
   
                                        Comment on above:   [facility]   
   
                                                    Start: 2024  
End: 10-           ambulatory                Judie Delgado MD  
Work Phone:   
[phone]                          [facility]  
   
                                                    Start: 2024  
End: 2024                         Subsequent hospital   
visit by physician        [facility] [place]          [facility]   
[facility]  
   
                                        Comment on above:   Thyromegaly [E01.0]   
   
                                                    Start: 2024  
End: 2024                         Patient encounter   
procedure                               Judie Delgado MD  
Work Phone:   
[phone]                          [facility]  
   
                                        Comment on above:   Chronic obstructive   
pulmonary disease, unspecified COPD type   
(HCC)   
(Primary Dx);  
COPD, mild (HCC);  
Smoker   
   
                                                    Start: 2024  
End: 2024     ambulatory          JUDIE DELGADO       Facility:[id number]  
   
                                                    Start: 2024  
End: 10-           Telephone encounter       Karel Robertson DO  
Work Phone:   
[phone]                          Hematology/Oncology  
   
                                        Comment on above:   New Patient   
   
                                                    Start: 09-  
End: 09-           Telephone encounter       Juliane Murphy APRN.CNP  
Work Phone:   
[phone]                          [facility]  
   
                                        Comment on above:   Referral Request (He  
matology / Oncology)   
   
                                                    Start: 2024  
End: 2024           Telephone encounter       Reginald Pedroza MD  
Work Phone:   
[phone]                          [facility]   
Arthritis and   
Rheumatology [place]  
   
                                        Comment on above:   Results   
   
                                                    Start: 2024  
End: 2024     ambulatory          JULIANE MURPHY    Facility:[id number]  
   
                                                    Start: 2024  
End: 2024     Telephone encounter Lizzy Young    Radiology  
   
                                        Comment on above:   Appointment   
   
                                                    Start: 2024  
End: 2024                         Patient encounter   
procedure                               Reginald Pedroza MD  
Work Phone:   
[phone]                          [facility]   
Arthritis and   
Rheumatology [place]  
   
                                        Comment on above:   Recurrent infections  
 (Primary Dx);  
Inflammatory arthritis;  
Multiple joint pain;  
Seasonal allergic rhinitis due to pollen   
   
                                                    Start: 2024  
End: 2024     ambulatory          JULIANE MURPHY    Facility:[facility]  
   
                                                    Start: 2024  
End: 2024                         Office outpatient new 45   
minutes                                 Pascual Austin MD  
Work Phone:   
[phone]                          [facility]   
Surgery  
   
                                        Comment on above:   Abnormal findings on  
 diagnostic imaging of breast (Primary   
Dx);  
Breast pain, left;  
History of abnormal mammogram;  
Mass of upper inner quadrant of left breast   
   
                                                    Start: 2024  
End: 2024     ambulatory          PASCUAL AUSTIN Facility:[id number]  
[id number]  
   
                                                    Start: 2024  
End: 2024           Patient encounter status  Juliane Breensaad   
APRN.CNP  
Work Phone:   
[phone]                          [facility]  
Work Phone:   
[phone]  
   
                                                    Start: 2024  
End: 2024                         Periodic preventive med   
est patient 40-64yrs                    Juliane ZACHARY Katherine   
APRN.CNP  
Work Phone:   
[phone]                          [facility]   
[place]  
   
                                        Comment on above:   Wellness examination  
 (Primary Dx);  
Environmental and seasonal allergies;  
Acute otitis media, left;  
Thyromegaly;  
Fatigue, unspecified type;  
Multiple joint pain;  
SOB (shortness of breath);  
Breast pain, left   
   
                                                    Start: 2024  
End: 2024     ambulatory          JULIANE MURPHY    Facility:[id number]  
   
                                Start: 2024 Telephone encounter Juliane Murphy   
APRN.CNP  
Work Phone:   
[phone]                          [facility] [facility]   
[place]  
   
                                        Comment on above:   Results   
   
                                                    Start: 2024  
End: 2024                         Subsequent hospital   
visit by physician                      Too Quevedo  
Work Phone:   
[phone]                          [facility]   
[place]  
   
                                        Comment on above:   SOB (shortness of br  
eath) [R06.02]   
   
                                                    Start: 2024  
End: 2024     ambulatory          JULIANE SHARMA JAMSHIDSAAD    Facility:[id number]  
   
                                                    Start: 2024  
End: 2024                         Office outpatient visit   
25 minutes                              Juliane Murphy   
APRN.CNP  
Work Phone:   
[phone]                          [facility]  
   
                                        Comment on above:   Breast pain, left (P  
rimary Dx);  
Mass of upper inner quadrant of left breast;  
History of abnormal mammogram;  
SOB (shortness of breath);  
Fatigue, unspecified type;  
COPD, mild (HCC)   
   
                          Start: 2024 ambulatory   JULIANE K TEMSIC Facili  
ty:[id number]  
   
                                                    Start: 2024  
End: 2024                         Subsequent hospital   
visit by physician        [facility]           [facility]  
   
                                        Comment on above:   Breast pain, left [N  
64.4]   
   
                                        Start: 2024   Patient encounter   
procedure                 [facility]cf Provider              [facility]   
Department  
   
                                Start: 2024 ambulatory      Juliane SHARMA Tems  
ic   
APRN.CNP  
Work Phone:   
[phone]                          [facility]   
[place]  
   
                                        Start: 2024   Patient encounter   
procedure                               Juliane Joesic   
APRN.CNP  
Work Phone:   
[phone]                          [facility]   
[place]  
   
                                        Comment on above:   Cardiologist   
   
                                Start: 2024 Telephone encounter Juliane Joesic   
APRN.CNP  
Work Phone:   
[phone]                          [facility]   
[place]  
   
                                        Comment on above:   Referral Information  
 (cardiology)   
   
                                                    Start: 2024  
End: 2024                         Office outpatient visit   
15 minutes                              Juliane Joesic   
APRN.CNP  
Work Phone:   
[phone]                          [facility]   
[place]  
   
                                        Comment on above:   Mass of upper inner   
quadrant of left breast (Primary Dx);  
Breast pain, left;  
History of abnormal mammogram   
   
                                                    Start: 2024  
End: 2024     ambulatory          JULIANE JOESIC    Facility:[id number]  
   
                                Start: 06- ambulatory      Juliane K Tems  
ic   
APRN.CNP  
Work Phone:   
[phone]                          [facility]   
[place]  
   
                                        Start: 06-   Patient encounter   
procedure                               Juliane Joesic   
APRN.CNP  
Work Phone:   
[phone]                          [facility]   
[place]  
   
                                        Comment on above:   Mammogram   
   
                                Start: 2024 Refill          Juliane Joes  
ic   
APRN.CNP  
Work Phone:   
[phone]                          [facility]  
   
                                        Comment on above:   Refill Request   
   
                                                    Start: 2024  
End: 2024     ambulatory          JULIANE SAHRMA TEMSIC    Facility:[id number]  
   
                                                    Start: 2024  
End: 2024                         Office outpatient visit   
40 minutes                              Juliane Murphy   
APRN.CNP  
Work Phone:   
[phone]                          [facility]  
   
                                        Comment on above:   Vertigo (Primary Dx)  
;  
BENY (generalized anxiety disorder);  
Palpitations;  
Sensation of chest tightness;  
Abnormal Holter exam;  
Essential hypertension;  
Mixed hyperlipidemia;  
Primary insomnia;  
Multiple joint pain;  
Vitamin B12 deficiency;  
Diabetes mellitus type 2, diet-controlled ([facility]);  
Screening for HIV (human immunodeficiency virus);  
Immunity status testing   
   
                                                    Start: 2024  
End: 2024           Patient encounter status  Juliane Murphy   
APRN.CNP  
Work Phone:   
[phone]                          [facility]  
   
                                                    Start: 2024  
End: 2024     ambulatory          JULIANE MURPHY    Facility:[id number]  
   
                                        Start: 2024   Encounter for antibo  
dy   
response examination      [facility]  
   
                                Start: 2024 Telephone encounter Juliane Murphy APRN.CNP  
Work Phone:   
[phone]                          [facility]  
   
                                        Comment on above:   Podiatry referral to  
 Michael Donovan DPM   
   
                                                    Start: 2024  
End: 2024                         Office outpatient visit   
15 minutes                              Juliane Murphy   
APRN.CNP  
Work Phone:   
[phone]                          [facility]  
   
                                        Comment on above:   Chronic ankle pain,   
bilateral (Primary Dx);  
Chronic foot pain, left;  
Rheumatoid arthritis of multiple sites with negative rheumatoid   
factor ([facility])   
   
                                                    Start: 2024  
End: 2024     ambulatory          JULIANE MURPHY    Facility:[id number]  
   
                                Start: 2024 ambulatory      Juliane Bhat  
ic   
APRN.CNP  
Work Phone:   
[phone]                          [facility]  
   
                                        Comment on above:   X-rays and allergy m  
edicine   
   
                                Start: 2024 Refill          Juliane Bhat  
ic   
APRN.CNP  
Work Phone:   
[phone]                          [facility]   
[place]  
   
                                        Comment on above:   Refill Request   
   
                                Start: 2024 Refill          Juliane Bhat  
ic   
APRN.CNP  
Work Phone:   
[phone]                          Family Medicine   
[place]  
   
                                        Comment on above:   Refill Request   
   
                                Start: 2024 Refill          Marilu BILLY.CNP  
Work Phone:   
[phone]                          Pulmonary Medicine  
   
                                        Comment on above:   Refill Request   
   
                                                    Start: 2024  
End: 2024                         Office outpatient visit   
15 minutes                              Juliane Murphy   
APRN.CNP  
Work Phone:   
[phone]                          [facility]  
   
                                        Comment on above:   Sinobronchitis (Prim  
rosemary Dx);  
Other acute nonsuppurative otitis media of left ear, recurrence not   
specified   
   
                                                    Start: 2024  
End: 2024     ambulatory          JULIANE MURPHY    Facility:[id number]  
   
                                Start: 2024 Telephone encounter Juliane Murphy   
APRN.CNP  
Work Phone:   
[phone]                          [facility]  
   
                                        Comment on above:   Results   
   
                                                    Start: 2024  
End: 2024                         Emergency department   
patient visit             NOT RECORDED PHYSICIAN    Facility:A  
   
                                                    Start: 2024  
End: 2024                         Emergency department   
patient visit                           JESÚS VALDES MD  
Work Phone:   
[phone]                           [facility]  
Work Phone:   
[phone]  
   
                                        Start: 2024   Patient encounter   
procedure                               Julio C Cooper MD  
Work Phone:   
[phone]                          [facility]  
   
                                        Comment on above:   Palpitation (Primary  
 Dx)   
   
                                                    Start: 2024  
End: 2024                         Patient encounter   
procedure                               Jeffrey Alvarez PA-C  
Work Phone:   
[phone]                          [facility]  
   
                                        Comment on above:   Rhinosinusitis (Prim  
rosemary Dx);  
Acute suppurative otitis media of left ear without spontaneous   
rupture of tympanic membrane, recurrence not specified   
   
                                                    Start: 2024  
End: 2024     ambulatory          SELF                Facility:[id number]  
   
                                                    Start: 2024  
End: 2024           ambulatory                Will Velazquez PA-C  
Work Phone:   
[phone]                          Telemedicine  
   
                                        Comment on above:   Treatment not availa  
ble (Primary Dx)   
   
                                                    Start: 2024  
End: 2024                         Telemedicine   
consultation with   
patient                                 Will Velazquez   
YAIMA  
Work Phone:   
[phone]                          [facility]   
MAIN  
   
                                                    Start: 2024  
End: 2024     ambulatory          JULIANE K TEMSIC    Facility:[id number]  
   
                                Start: 2024 ambulatory      Juliane K Tems  
ic   
APRN.CNP  
Work Phone:   
[phone]                          [facility]  
   
                                        Comment on above:   Glucose monitor   
   
                                Start: 2024 Refill          Juliane K Tems  
ic   
APRN.CNP  
Work Phone:   
[phone]                          [facility]  
   
                                        Comment on above:   Refill Request   
   
                                Start: 2024 Refill          Juliane K Tems  
ic   
APRN.CNP  
Work Phone:   
[phone]                          [facility]  
   
                                        Comment on above:   Refill Request   
   
                                                    Start: 2023  
End: 2023                         Subsequent hospital   
visit by physician                      [facility] [address] [place] [address]  
Work Phone:   
[phone]                          [facility]  
   
                                        Comment on above:   Vertigo [R42]   
   
                                Start: 10- Refill          Marilu DEANN.CNP  
Work Phone:   
[phone]                          Family Practice  
   
                                        Comment on above:   Refill Request   
   
                                Start: 10- Refill          Jenna romero MD  
Work Phone:   
[phone]                          [facility]  
   
                                        Comment on above:   Refill Request   
   
                                                    Start: 10-  
End: 10-                         Office outpatient new 30   
minutes                                 Juliane K Temsic   
APRN.CNP  
Work Phone:   
[phone]                          [facility]  
   
                                        Comment on above:   Encounter to establi  
sh care (Primary Dx);  
Chronic midline low back pain with bilateral sciatica;  
Fibromyalgia;  
Diabetes mellitus type 2, diet-controlled (HCC);  
COPD, mild (HCC);  
Essential hypertension;  
Malignant neoplasm of cervix, unspecified site (HCC);  
Mixed hyperlipidemia;  
Chronic insomnia;  
Tobacco abuse   
   
                                                    Start: 2023  
End: 2023                         Subsequent hospital   
visit by physician                      [facility]   
[facility] [place] [facility]                         [facility]   
[place]  
   
                                        Comment on above:   Encounter for screen  
ing mammogram for breast cancer [Z12.31]   
   
                                Start: 2023 Refill          Marvin minor MD  
Work Phone:   
[phone]                          Family Practice  
   
                                        Comment on above:   Refill Request   
   
                                Start: 2023 Telephone encounter Jenna Chavarria MD  
Work Phone:   
[phone]                          Family Practice  
   
                                        Comment on above:   Medication Request (  
[facility] Atorvastatin 20 mg)   
   
                                Start: 2023 Refill          Marilu Brigette A  
PRN.CNP  
Work Phone:   
[phone]                          [facility]   
[place]  
   
                                        Comment on above:   Refill Request   
   
                                Start: 2023 Refill          Marilu Brigette A  
PRN.CNP  
Work Phone:   
[phone]                          [facility]   
[place]  
   
                                        Comment on above:   Refill Request   
   
                                Start: 2023 ambulatory      Jenna romero MD  
Work Phone:   
[phone]                          Internal Medicine   
[place]  
   
                                Start: 2023 Refill          Jenna romero MD  
Work Phone:   
[phone]                          [facility]  
   
                                        Comment on above:   Refill Request   
   
                                Start: 2023 ambulatory      Jenna romero MD  
Work Phone:   
[phone]                          [facility]  
   
                                        Comment on above:   Nurse Triage Call   
   
                                Start: 05- ambulatory      Bang Goldberg   
APRN.CNP  
Work Phone:   
[phone]                          Telemedicine  
   
                                        Comment on above:   Viral upper respirat  
ory tract infection with cough (Primary   
Dx)   
   
                                                            Nurse Triage Call (U  
[place] ); URI   
   
                                                    Start: 05-  
End: 05-                         Office outpatient visit   
15 minutes                              Nick Capps MD  
Work Phone:   
[phone]                          [facility]   
[place]  
   
                                        Comment on above:   Sore throat (Primary  
 Dx);  
Viral pharyngitis;  
Acute bacterial conjunctivitis of left eye   
   
                                Start: 2023 Refill          Jennifer Chaudhary APRN.CNP  
Work Phone:   
[phone]                          [facility]   
[place]  
   
                                        Comment on above:   Refill Request   
   
                                Start: 2023 Refill          Jenna romero MD  
Work Phone:   
[phone]                          [facility]  
   
                                        Comment on above:   Refill Request   
   
                                Start: 2023 Refill          Marilu Brigette A  
PRN.CNP  
Work Phone:   
[phone]                          [facility]   
[place]  
   
                                        Comment on above:   Refill Request   
   
                                                    Start: 2023  
End: 2023                         Patient encounter   
procedure                               Jenna Chavarria MD  
Work Phone:   
[phone]                          Family Practice  
   
                                        Comment on above:   Mid sternal chest pa  
in (Primary Dx);  
Vapes nicotine containing substance;  
Adjustment disorder with mixed anxiety and depressed mood;  
Vitamin B 12 deficiency;  
Screening for lipid disorders;  
Eczema, unspecified type   
   
                                Start: 2023 Refill          Jenna romero MD  
Work Phone:   
[phone]                          [facility]  
   
                                        Comment on above:   Refill Request   
   
                                                            Refill Request; Refi  
ll Request   
   
                                                    Start: 2023  
End: 2023           [facility]           Jenna Chavarria MD  
Work Phone:   
[phone]                          Family Practice  
   
                                        Comment on above:   Adjustment disorder   
with anxious mood (Primary Dx);  
Folliculitis   
   
                                Start: 2022 ambulatory      Jenna romero MD  
Work Phone:   
[phone]                          Family Practice  
   
                                        Comment on above:   Painful rash   
   
                                Start: 2022 Refill          Phuong Wong   
APRN.CNP  
Work Phone:   
[phone]                          [facility]  
   
                                        Comment on above:   Refill Request   
   
                                Start: 2022 Refill          Marilu Brigette A  
PRN.CNP  
Work Phone:   
[phone]                          [facility]  
   
                                        Comment on above:   Refill Request   
   
                                Start: 2022 Refill          Phuong Wong   
APRN.CNP  
Work Phone:   
[phone]                          [facility]  
   
                                        Comment on above:   Refill Request   
   
                                Start: 2022 Refill          Jenna romero MD  
Work Phone:   
[phone]                          [facility]  
   
                                        Comment on above:   Refill Request   
   
                                Start: 2022 Refill          Jenna romero MD  
Work Phone:   
[phone]                          [facility]  
   
                                        Comment on above:   Refill Request   
   
                                Start: 2022 Refill          Jenna romero MD  
Work Phone:   
[phone]                          [facility]  
   
                                        Comment on above:   Refill Request   
   
                                                    Start: 2022  
End: 2022           ambulatory                Samantha Olivier MD  
Work Phone:   
[phone]                          Endocrinology  
   
                                        Comment on above:   Weight gain (Primary  
 Dx);  
Diabetes mellitus type 2, diet-controlled (HCC);  
Snoring;  
Menopause   
   
                                                    Start: 2022  
End: 2022                         Telemedicine   
consultation with   
patient                                 Samantha Olivier MD  
Work Phone:   
[phone]                          [facility]  
   
                                Start: 2022 Refill          Jenna romero MD  
Work Phone:   
[phone]                          [facility]  
   
                                        Comment on above:   Refill Request   
   
                                Start: 2022 Refill          Marvin De Los Santos MD  
Work Phone:   
[phone]                          Pulmonary Medicine  
   
                                        Comment on above:   Refill Request   
   
                                Start: 2022 ambulatory      Jenna romero MD  
Work Phone:   
[phone]                          [facility]  
   
                                        Start: 2022   Patient encounter   
procedure                               Jenna Chavarria MD  
Work Phone:   
[phone]                          Family Practice  
   
                                        Comment on above:   Appointment   
   
                                Start: 2022 ambulatory      Jenna romero MD  
Work Phone:   
[phone]                          Family Practice  
   
                                        Comment on above:   Weight   
   
                                Start: 2022 Telephone encounter Jenna Chavarria MD  
Work Phone:   
[phone]                          Family Practice  
   
                                        Comment on above:   Release Of Medical R  
ecords (request from [facility]   
Endocrinology)   
   
                                                    Start: 2022  
End: 2022                         Patient encounter   
procedure                               Daniel P Peabody MD  
Work Phone:   
[phone]                          General Surgery  
   
                                        Comment on above:   Oral phase dysphagia  
 (Primary Dx);  
Thyromegaly   
   
                                Start: 2022 Telephone encounter Jenna Chavarria MD  
Work Phone:   
[phone]                          Family Practice  
   
                                        Comment on above:   request for LOV and   
TSH labs (from [facility])   
   
                                Start: 2022 ambulatory      Jenna romero MD  
Work Phone:   
[phone]                          [facility]  
   
                                        Start: 2022   Patient encounter   
procedure                               Jenna Chavarria MD  
Work Phone:   
[phone]                          [facility]  
   
                                        Comment on above:   Referral Request (en  
docrinologist Dr. Ruiz fax number   
[phone])   
   
                                Start: 2022 Documentation procedure Mammog  
tanja   
Coordinator                             [facility]   
MAIN  
   
                                Start: 2022 Letter encounter Mammography   
Coordinator                             [facility]   
Department  
   
                                                    Start: 2022  
End: 2022                         Subsequent hospital   
visit by physician        Screen Mammo [facility]     Mammogram  
   
                                        Comment on above:   Encounter for screen  
ing mammogram for breast cancer [Z12.31]   
   
                                                            Thyroid nodule [E04.  
1]   
   
                                Start: 2022 Refill          Vijaya MORRISSEY  
CNP  
Work Phone:   
[phone]                          [facility]  
   
                                        Comment on above:   Refill Request   
   
                                Start: 2022 ambulatory      Jenna romero MD  
Work Phone:   
[phone]                          Family Practice  
   
                                        Comment on above:   Blood work   
   
                                                    Start: 2022  
End: 2022                         Patient encounter   
procedure                               Jenna Chavarria MD  
Work Phone:   
[phone]                          Family Practice  
   
                                        Comment on above:   Hot flashes due to m  
enopause (Primary Dx);  
Thyroid nodule;  
Essential hypertension;  
Diabetes mellitus type 2, diet-controlled (HCC);  
Mixed hyperlipidemia   
   
                                Start: 2022 ambulatory      Jenna romero MD  
Work Phone:   
[phone]                          Family Practice  
   
                                        Comment on above:   Nurse Triage Call (s  
ore throat )   
   
                                Start: 2022 ambulatory      Jenna romero MD  
Work Phone:   
[phone]                          Family Practice  
   
                                        Comment on above:   Strep throat   
   
                                                    Start: 2020  
End: 2020                         Patient encounter   
procedure                 KARI DAVIS             [facility]  
   
                                                    Start: 2020  
End: 2020                         Subsequent hospital   
visit by physician                      Kari Davis  
Work Phone:   
[phone]                          [facility]  
   
                                                    Start: 2020  
End: 2020                         Patient encounter   
procedure                 JENNA CHAVARRIA          [facility]  
   
                                                    Start: 2020  
End: 2020                         Subsequent hospital   
visit by physician                      Kari Davis  
Work Phone:   
[phone]                          [facility]   
Radiology  
   
                                        Comment on above:   Pain   
   
                                                    Start: 2020  
End: 09-                         Patient encounter   
procedure                 JENNA CHAVARRIA          [facility]  
   
                                                    Start: 2020  
End: 2020                         Subsequent hospital   
visit by physician        Jenna Chavarria            [facility]  
   
                                                    Start: 2020  
End: 2020                         Patient encounter   
procedure                 [facility]             [facility]  
   
                                                    Start: 2020  
End: 2020                         Subsequent hospital   
visit by physician                      Kari Davis  
Work Phone:   
[phone]                          [facility]  
   
                                                    Start: 2020  
End: 2020                         Patient encounter   
procedure                 KARI COOPER [facility]  
   
                                        Start: 2019   Emergency department  
   
patient visit             [facility]  
   
                                Start: 10- Patient encounter status Ghassan Chavarria MD  
Work Phone:   
[phone]                          [facility]  
Work Phone:   
[phone]  
  
  
  
Procedures  
  
  
                          Date         Procedure    Procedure Detail Performing   
Clinician  
   
                                        Start: 2024   Brncdilat rspse spmt  
ry   
pre&post-brncdilat admn                             Judie Delgado MD  
Work Phone:   
[phone]  
   
                                        Start: 10-   Us compl joint r-t w  
/image   
documentation                                       Reginald Pedroza MD  
Work Phone:   
[phone]  
   
                                        Start: 2024   Us soft tissue head   
& neck   
real time imge docm                                 Juliane Murphy   
APRN.CNP  
Work Phone:   
[phone]  
   
                                        Start: 2024   Radiologic exam ches  
t 2   
views                                               Juliane Murphy   
APRN.CNP  
Work Phone:   
[phone]  
   
                                        Start: 2024   Us breast uni real t  
quinten   
with image limited                                  Juliane Murphy   
APRN.CNP  
Work Phone:   
[phone]  
   
                                        Start: 2024   Digital breast   
tomosynthesis bilateral                             Juliane Murphy   
APRN.CNP  
Work Phone:   
[phone]  
   
                          Start: 2024 HOLTER REPORT              Juliane Murphy   
APRN.CNP  
Work Phone:   
[phone]  
   
                                        Start: 2023   Echo tthrc r-t 2d   
w/wom-mode compl spec&colr   
d                                                   Juliane K Temsic   
APRN.CNP  
Work Phone:   
[phone]  
   
                                        Start: 2023   Screening mammograph  
y bi   
2-view breast inc cad                               Bulk Order Provider  
   
                                        Start: 05-   Iadna streptococcus   
group a   
amplified probe tq                                  Ncik Capps MD  
Work Phone:   
[phone]  
   
                                        Start: 2022   Us soft tissue head   
& neck   
real time imge docm                                 Jenna Chavarria MD  
Work Phone:   
[phone]  
   
                                                    Start: 2022  
End: 2022                         Screening mammography bi   
2-view breast inc cad                               Bulk Order Provider  
   
                          Start: 2020 DISCHARGE PATIENT              OSAMA  
 MALAK  
   
                                        Start: 2020   FLUORO FOR SURGICAL   
PROCEDURES                                          OSAMA MALAK  
   
                                        Start: 2020   Fluoroscopy during   
operation                                           Osama A Leftyak  
Work Phone:   
[phone]  
   
                          Start: 2020 ASSESS                    OSAMA ELISABETH  
K  
   
                          Start: 2020 BEDREST                   OSAMA ELISABETH  
K  
   
                          Start: 2020 NEURO/VASCULAR CHECKS              O  
MARILYN MALAK  
   
                          Start: 2020 NURSING COMMUNICATION              O  
MARILYN MALAK  
   
                          Start: 2020 INCENTIVE SPIROMETRY RT               
 OSAMA MALAK  
   
                                        Start: 2020   Gluc bld gluc mntr d  
ev   
cleared fda spec home use                           OSAMA MALAK  
   
                          Start: 2020 DISCHARGE PATIENT              OSAMA  
 MALAK  
   
                          Start: 2020 INCENTIVE SPIROMETRY RT               
 OSAMA MALAK  
   
                                        Start: 2020   Gluc bld gluc mntr d  
ev   
cleared fda spec home use                           Unknown Provider Result  
   
                          Start: 2020 Continuous pulse oximetry             
   OSAMA MALAK  
   
                          Start: 2020 ASSESS                    OSAMA ELISABETH  
K  
   
                                        Start: 2020   ENCOURAGE DEEP BREAT  
SAI   
AND COUGHING                                        OSAMA MALAK  
   
                          Start: 2020 INCENTIVE SPIROMETRY RT               
 OSAMA MALAK  
   
                          Start: 2020 NEURO/VASCULAR CHECKS              O  
MARILYN MALAK  
   
                          Start: 2020 NURSING COMMUNICATION              O  
MARILYN MALAK  
   
                          Start: 2020 BEDREST                   OSAMA ELISABETH  
K  
   
                                        Start: 2020   INITIATE OXYGEN THER  
APY   
PROTOCOL                                            OSAMA MALAK  
   
                          Start: 2020 NOTIFY PHYSICIAN (SPECIFY)            
    OSAMA MALAK  
   
                          Start: 2020 PULSE OXIMETRY SPOT CHECK             
   OSAMA MALAK  
   
                          Start: 2020 VITAL SIGNS               OSAMA ELISABETH  
K  
   
                          Start: 2020 Mammography               Jenna lemus MD  
Work Phone:   
[phone]  
  
  
  
Plan of Treatment  
  
  
                          Date         Care Activity Detail       Author  
   
                                                    Start:   
2043                              PNEUMOCOCCAL (3 - PPSV23   
if available, else PCV20)               PNEUMOCOCCAL (3 - PPSV23   
if available, else   
PCV20)                                  [facility]  
   
                                                    Start:   
2043                              PNEUMOCOCCAL (3 - PPSV23   
or PCV20)                               PNEUMOCOCCAL (3 - PPSV23   
or PCV20)                               [facility]  
   
                                                    Start:   
2043                              PNEUMOCOCCAL (4 - PPSV23   
or PCV20)                               PNEUMOCOCCAL (4 - PPSV23   
or PCV20)                               [facility]  
   
                                                    Start:   
2043          Pneumococcal vaccination                     [facility]  
  
   
                                                    Start:   
2028          Pneumococcal vaccination                     [facility]  
   
                                                    Start:   
2028                Shingles Vaccine (1 of 2) Shingles Vaccine (1 of   
2)                                      [place]  
   
                                                    Start:   
2028                              Screening for malignant   
neoplasm of colon                       Colorectal Cancer   
Screening                               [facility]  
   
                                                    Comment on   
above:                                  Postponed from 2023 (Postponed - N  
ot Clinically Indicated)   
   
                                                    Start:   
2026          HPV TESTING         HPV TESTING         [facility]  
   
                                                    Start:   
2026                              Screening for malignant   
neoplasm of cervix        HPV Testing               [facility]  
   
                                                    Start:   
2026                              Annual PCP Team Chronic   
Disease Visit                           Annual PCP Team Chronic   
Disease Visit                           [facility]  
   
                                                    Start:   
2026          BP Controlled (<130/80) BP Controlled (<130/80) [facility]  
   
                                                    Start:   
2025          BP Controlled (<130/80) BP Controlled (<130/80) [facility]  
   
                                                    Start:   
2025                              Annual PCP Team Chronic   
Disease Visit                           Annual PCP Team Chronic   
Disease Visit                           [facility]  
   
                                                    Start:   
2025                              Annual PCP Team Chronic   
Disease Visit                           Annual PCP Team Chronic   
Disease Visit                           [facility]  
   
                                                    Start:   
2025          BP Controlled (<130/80) BP Controlled (<130/80) [facility]  
   
                                                    Start:   
10-                              Annual PCP Team Chronic   
Disease Visit                           Annual PCP Team Chronic   
Disease Visit                           [facility]  
   
                                                    Start:   
10-          BP Controlled (<130/80) BP Controlled (<130/80) [facility]  
   
                                                    Start:   
2025          BP Controlled (<130/80) BP Controlled (<130/80) [facility]  
in  
   
                                                    Start:   
2025  
End: 2025                         Patient encounter   
procedure                               2025 10:00 AM EDT   
Appointment [facility]   
[facility] [address] [place] [address]   
[phone] ultra sound   
of breast                               [facility]  
   
                                                    Comment on   
above:                                  ultra sound of breast   
   
                                                    Start:   
2025  
End: 2025           US Breast - left limited  US BREAST LTD LEFT   
Radiology Routine   
Abnormal findings on   
diagnostic imaging of   
breast Expected:   
2025, Expires:   
2025                              [facility]  
   
                                                    Comment on   
above:                                  Expected: 2025, Expires:    
   
                                                    Start:   
2025                              Annual PCP Team Chronic   
Disease Visit                           Annual PCP Team Chronic   
Disease Visit                           [facility]  
   
                                                    Start:   
2025                              Annual PCP Team Chronic   
Disease Visit                           Annual PCP Team Chronic   
Disease Visit                           [facility]  
   
                                                    Start:   
2025          BP Controlled (<130/80) BP Controlled (<130/80) [facility]  
   
                                                    Start:   
2025                              Screening for malignant   
neoplasm of breast        Mammogram Screening       [facility]  
   
                                                    Start:   
2025                              Annual PCP Team Chronic   
Disease Visit                           Annual PCP Team Chronic   
Disease Visit                           [facility]  
   
                                                    Start:   
2025          BP Controlled (<130/80) BP Controlled (<130/80) [facility]  
   
                                                    Start:   
2025                Hepatitis B screening     Urine Albumin:Creatinine   
Ratio                                   [facility]  
   
                                                    Start:   
2025                              Hepatitis B surface   
antibody level            LDL Cholesterol           [facility]  
   
                                                    Start:   
2025                              Annual PCP Team Chronic   
Disease Visit                           Annual PCP Team Chronic   
Disease Visit                           [facility]  
   
                                                    Start:   
2025          BP Controlled (<130/80) BP Controlled (<130/80) [facility]  
   
                                                    Start:   
2025                              Annual PCP Team Chronic   
Disease Visit                           Annual PCP Team Chronic   
Disease Visit                           [facility]  
   
                                                    Start:   
2025  
End: 2025                         Patient encounter   
procedure                               2025 12:50 PM EST   
Office Visit [facility]   
[address] [place] [id number] [phone] Judie Delgado MD [address]   
[place] [address]   
[phone] (Work)   
[phone] (Fax) 2   
month follow up                         [facility]  
   
                                                    Comment on   
above:                                  2 month follow up   
   
                                                    Start:   
2025                              Annual PCP Team Chronic   
Disease Visit                           Annual PCP Team Chronic   
Disease Visit                           [facility]  
   
                                                    Start:   
2025  
End: 2025                         Patient encounter   
procedure                               2025 10:00 AM EST   
Appointment [facility]   
[facility] [address] [place] [address]   
[phone] 6 month   
follow up ultrasound                    [facility]  
   
                                                    Comment on   
above:                                  6 month follow up ultrasound   
   
                                                    Start:   
2025  
End: 2025           US Breast - left limited  US BREAST LTD LEFT   
Radiology Routine   
Abnormal findings on   
diagnostic imaging of   
breast Expected:   
2025, Expires:   
2025                              [facility]  
Work Phone:   
[phone]  
   
                                                    Comment on   
above:                                  Expected: 2025, Expires:    
   
                                                    Start:   
2025  
End: 2025                         Patient encounter   
procedure                               2025 3:20 PM EST   
Office Visit [facility]   
Arthritis and   
Rheumatology [address]   
[id number] [phone]   
Reginald Pedroza MD   
[address] [place], [place]   
[id number] [phone]   
(Work) [phone]   
(Fax) 6 week fu                         [facility]   
Arthritis and   
Rheumatology [place]  
   
                                                    Comment on   
above:                                  6 week fu   
   
                                                    Start:   
2025  
End: 2025                         Patient encounter   
procedure                               2025 3:20 PM EST   
Office Visit [facility]   
[facility] [address]   
[place] [address] [phone] Juliane Murphy APRN.CNP [address] [place], [place] [address] [phone]   
(Work) [phone]   
(Fax) 3 Month Follow Up                 [facility]  
   
                                                    Comment on   
above:                                  3 Month Follow Up   
   
                                                    Start:   
2025                              Annual PCP Team Chronic   
Disease Visit                           Annual PCP Team Chronic   
Disease Visit                           [facility]  
   
                                                    Start:   
2025          BP Controlled (<130/80) BP Controlled (<130/80) [facility]  
   
                                                    Start:   
2024                              Annual PCP Team Chronic   
Disease Visit                           Annual PCP Team Chronic   
Disease Visit                           [facility]  
   
                                                    Start:   
2024  
End: 2025                         CBC W Auto Differential   
panel - Blood                           COMPLETE BLOOD COUNT AND   
DIFFERENTIAL Lab Routine   
Seronegative rheumatoid   
arthritis (HCC)   
Expected: 2024,   
Expires: 2025                     [facility]  
Work Phone:   
[phone]  
   
                                                    Comment on   
above:                                  Expected: 2024, Expires:    
   
                                                    Start:   
2024  
End: 2025                         Comprehensive metabolic   
2000 panel - Serum or   
Plasma                                  COMPREHENSIVE METABOLIC   
PANEL Lab Routine   
Seronegative rheumatoid   
arthritis (HCC)   
Expected: 2024,   
Expires: 2025                     [facility]  
   
                                                    Comment on   
above:                                  Expected: 2024, Expires:    
   
                                                    Start:   
2024  
End: 2024                         Patient encounter   
procedure                               2024 1:40 PM EST   
Office Visit [facility]   
Arthritis and   
Rheumatology [address]   
[id number] [phone]   
Reginald Pedroza MD   
[address] [place], [place]   
[id number] [phone]   
(Work) [phone]   
(Fax) 3 month fu                        [facility]   
Arthritis and   
Rheumatology [place]  
   
                                                    Comment on   
above:                                  3 month fu   
   
                                                    Start:   
2024  
End: 2024                         Patient encounter   
procedure                               2024 11:40 AM EST   
Office Visit [facility] [address]   
[place] [address] [phone] Juliane Murphy APRN.CNP [address] [place], [place] [address] [phone]   
(Work) [phone]   
(Fax) Possible Anxiety                  [facility]  
   
                                                    Comment on   
above:                                  Possible Anxiety   
   
                                                    Start:   
2024  
End: 2024                         Patient encounter   
procedure                               2024 11:20 AM EST   
Office Visit [facility]   
[address] [place] [id number] [phone] Judie Delgado MD [address]   
[place] [address]   
[phone] (Work)   
[phone] (Fax) 2   
month follow up                         [facility]  
   
                                                    Comment on   
above:                                  2 month follow up   
   
                                                    Start:   
2024  
End: 2024     ambulatory                              Pulmonary Function   
Lab  
   
                                                    Comment on   
above:                                  Dr. Delgado   
   
                                                    Start:   
2024                              Hemoglobin A1c   
measurement               HbA1C                     [facility]  
   
                                                    Start:   
10-  
End: 10-                         Patient encounter   
procedure                               10/29/2024 2:40 PM EDT   
[facility] [address]   
[place] [id number] [phone] Juliane Murphy, HELLEN.CNP [address]   
[place] [address] [phone]   
(Work) [phone]   
(Fax) 3 Month Follow   
Up/PAP/HPV                              [facility]  
   
                                                    Comment on   
above:                                  3 Month Follow Up/PAP/HPV   
   
                                                    Start:   
10-  
End: 10-                         Patient encounter   
procedure                               10/21/2024 3:00 PM EDT   
Appointment Radiology   
[facility] [place], [place] [id number] [phone] US   
HAND/WRIST SYNOVIAL   
SCREEN RT+LT                            Radiology  
   
                                                    Comment on   
above:                                  US HAND/WRIST SYNOVIAL SCREEN RT+LT   
   
                                                    Start:   
10-                              Annual PCP Team Chronic   
Disease Visit                           Annual PCP Team Chronic   
Disease Visit                           [facility]  
   
                                                    Start:   
10-          BP Controlled (<130/80) BP Controlled (<130/80) [facility]  
in  
   
                                                    Start:   
2024          Mammography         Mammogram Screening [facility]  
   
                                                    Start:   
2024                              Screening for malignant   
neoplasm of breast        Mammogram Screening       [facility]  
   
                                                    Start:   
2024  
End: 2024                         Patient encounter   
procedure                               2024 12:30 PM EDT   
Appointment [facility]   
[facility] [address]   
[place] [address]   
[phone] US   
THYROID/PARATHYROID                     [facility]  
   
                                                    Comment on   
above:                                  US THYROID/PARATHYROID   
   
                                                    Start:   
2024                              Hepatitis B surface   
antibody level            LDL Cholesterol           [facility]  
   
                                                    Start:   
2024  
End: 2024                         Patient encounter   
procedure                               2024 1:10 PM EDT   
Office Visit [facility]   
[address] [place] [id number] [phone] Judie Delgado MD [address]   
[place] [address]   
[phone] (Work)   
[phone] (Fax) New   
Consult-R06.02   
(ICD-10-CM) - SOB   
(shortness of breath),   
J44.9 (ICD-10-CM) -   
COPD, mild (HCC)                        [facility]  
   
                                                    Comment on   
above:                                  New Consult-R06.02 (ICD-10-CM) - SOB (sh  
ortness of breath), J44.9   
(ICD-10-CM) - COPD, mild (HCC)   
   
                                                    Start:   
2024  
End: 2024                         Patient encounter   
procedure                               2024 7:20 AM EDT   
Office Visit [facility]   
Arthritis and   
Rheumatology [address]   
[address] [phone]   
Reginald Pedroza MD   
[address] [place]   
[address] [phone]   
(Work) [phone]   
(Fax) New   
patient-Multiple joint   
pain                                    [facility]   
Arthritis and   
Rheumatology [place]  
   
                                                    Comment on   
above:                                  New patient-Multiple joint pain   
   
                                                    Start:   
2024  
End: 2024                         ALGN RESP DISEASE PROF   
REG 5                                   ALGN RESP DISEASE PROF   
REG 5 Lab Routine   
Seasonal allergic   
rhinitis due to pollen   
Expected: 2024,   
Expires: 2024                     [facility]  
   
                                                    Comment on   
above:                                  Expected: 2024, Expires:    
   
                                                    Start:   
2024  
End: 2024           ASHLIE BY IFA SCREEN         ASHLIE BY IFA SCREEN Lab   
Routine Inflammatory   
arthritis Expected:   
2024, Expires:   
2024                              [facility]  
   
                                                    Comment on   
above:                                  Expected: 2024, Expires:    
   
                                                    Start:   
2024  
End: 2024                         Angiotensin converting   
enzyme [Enzymatic   
activity/volume] in Serum   
or Plasma                               ACE/ANGIOTENSIN BLD Lab   
Routine Inflammatory   
arthritis Expected:   
2024, Expires:   
2024                              [facility]  
   
                                                    Comment on   
above:                                  Expected: 2024, Expires:    
   
                                                    Start:   
2024  
End: 2024           ANTI NEUTRO CYTO AB       ANTI NEUTRO CYTO AB Lab   
Routine Inflammatory   
arthritis Expected:   
2024, Expires:   
2024                              [facility]  
   
                                                    Comment on   
above:                                  Expected: 2024, Expires:    
   
                                                    Start:   
2024  
End: 2024                         C reactive protein   
[Mass/volume] in Serum or   
Plasma                                  C-REACTIVE PROTEIN Lab   
Routine Inflammatory   
arthritis Expected:   
2024, Expires:   
2024                              [facility]  
   
                                                    Comment on   
above:                                  Expected: 2024, Expires:    
   
                                                    Start:   
2024  
End: 2024                         Cyclic citrullinated   
peptide IgG Ab   
[Units/volume] in Serum   
or Plasma                               CCP ANTIBODY IGG Lab   
Routine Inflammatory   
arthritis Expected:   
2024, Expires:   
2024                              [facility]  
   
                                                    Comment on   
above:                                  Expected: 2024, Expires:    
   
                                                    Start:   
2024  
End: 2024           DIPHTHER/TETANUS AB       DIPHTHER/TETANUS AB Lab   
Routine Recurrent   
infections Expected:   
2024, Expires:   
2024                              [facility]  
   
                                                    Comment on   
above:                                  Expected: 2024, Expires:    
   
                                                    Start:   
2024  
End: 2024                         Erythrocyte sedimentation   
rate                                    SEDIMENTATION RATE,   
WESTERGREN Lab Routine   
Inflammatory arthritis   
Expected: 2024,   
Expires: 2024                     [facility]  
Work Phone:   
[phone]  
   
                                                    Comment on   
above:                                  Expected: 2024, Expires:    
   
                                                    Start:   
2024  
End: 2024                         IgA [Mass/volume] in   
Serum or Plasma                         IMMUNOGLOBULIN A Lab   
Routine Inflammatory   
arthritis Expected:   
2024, Expires:   
2024                              [facility]  
   
                                                    Comment on   
above:                                  Expected: 2024, Expires:    
   
                                                    Start:   
2024  
End: 2024           IGG SUBCLASSES BLD        IGG SUBCLASSES BLD Lab   
Routine Recurrent   
infections Expected:   
2024, Expires:   
2024                              [facility]  
   
                                                    Comment on   
above:                                  Expected: 2024, Expires:    
   
                                                    Start:   
2024  
End: 2024                         PNEUMOCOCCAL IGG ABS, 14   
SEROTYPES                               PNEUMOCOCCAL IGG ABS, 14   
SEROTYPES Lab Routine   
Recurrent infections   
Expected: 2024,   
Expires: 2024                     [facility]  
   
                                                    Comment on   
above:                                  Expected: 2024, Expires:    
   
                                                    Start:   
2024  
End: 2024                         Rheumatoid factor   
[Units/volume] in Serum   
or Plasma                               RHEUMATOID FACTOR Lab   
Routine Inflammatory   
arthritis Expected:   
2024, Expires:   
2024                              [facility]  
   
                                                    Comment on   
above:                                  Expected: 2024, Expires:    
   
                                                    Start:   
2024  
End: 2024                         Tissue transglutaminase   
IgA Ab [Units/volume] in   
Serum                                   TRANSGLUTAMINASE IGA Lab   
Routine Inflammatory   
arthritis Expected:   
2024, Expires:   
2024                              [facility]  
   
                                                    Comment on   
above:                                  Expected: 2024, Expires:    
   
                                                    Start:   
2024  
End: 2024                         Urate [Mass/volume] in   
Serum or Plasma                         URIC ACID Lab Routine   
Inflammatory arthritis   
Expected: 2024,   
Expires: 2024                     [facility]  
   
                                                    Comment on   
above:                                  Expected: 2024, Expires:    
   
                                                    Start:   
2024  
End: 2024                         Patient encounter   
procedure                               2024 8:20 AM EDT   
Office Visit [facility]   
Arthritis and   
Rheumatology [place] [address] [phone]   
Reginald Pedroza MD   
[address] [place], [place]   
[id number] [phone]   
(Work) [phone]   
(Fax) New   
patient-Multiple joint   
pain                                    [facility]   
Arthritis and   
Rheumatology [place]  
   
                                                    Comment on   
above:                                  New patient-Multiple joint pain   
   
                                                    Start:   
2024                              Covid-19 Vaccine (5 -   
2024-25 season)                         Covid-19 Vaccine (5 -   
2024-25 season)                         [facility]  
   
                                                    Start:   
2024          Influenza [facility]  
[facility]  
   
                                                    Start:   
2024  
End: 2024                         Patient encounter   
procedure                               2024 2:00 PM EDT   
Office Visit [facility] [address]   
[place] [phone] Juliane Murphy APRN.CNP [address] [place], [place] [id number] [phone]   
(Work) [phone]   
(Fax) Annual Wellness                   [facility]  
   
                                                    Comment on   
above:                                  Annual Wellness   
   
                                                    Start:   
2024  
End: 10-                         CBC W Auto Differential   
panel - Blood                           COMPLETE BLOOD COUNT AND   
DIFFERENTIAL Lab Routine   
SOB (shortness of   
breath) Fatigue,   
unspecified type   
Expected: 2024,   
Expires: 10/21/2024                     [facility]  
   
                                                    Comment on   
above:                                  Expected: 2024, Expires: 10/21/202  
4   
   
                                                    Start:   
2024  
End: 10-                         Comprehensive metabolic   
2000 panel - Serum or   
Plasma                                  COMPREHENSIVE METABOLIC   
PANEL Lab Routine SOB   
(shortness of breath)   
Fatigue, unspecified   
type Expected:   
2024, Expires:   
10/21/2024                              [facility]  
   
                                                    Comment on   
above:                                  Expected: 2024, Expires: 10/21/202  
4   
   
                                                    Start:   
2024  
End: 10-                         Ferritin [Mass/volume] in   
Serum or Plasma                         FERRITIN Lab Routine SOB   
(shortness of breath)   
Fatigue, unspecified   
type Expected:   
2024, Expires:   
10/21/2024                              [facility]  
   
                                                    Comment on   
above:                                  Expected: 2024, Expires: 10/21/202  
4   
   
                                                    Start:   
2024  
End: 10-                         Folate [Mass/volume] in   
Serum or Plasma                         FOLATE, SERUM Lab   
Routine SOB (shortness   
of breath) Fatigue,   
unspecified type   
Expected: 2024,   
Expires: 10/21/2024                     [facility]  
   
                                                    Comment on   
above:                                  Expected: 2024, Expires: 10/21/202  
4   
   
                                                    Start:   
2024  
End: 10-                         Iron and Iron binding   
capacity panel - Serum or   
Plasma                                  IRON AND TIBC Lab   
Routine SOB (shortness   
of breath) Fatigue,   
unspecified type   
Expected: 2024,   
Expires: 10/21/2024                     [facility]  
   
                                                    Comment on   
above:                                  Expected: 2024, Expires: 10/21/202  
4   
   
                                                    Start:   
2024  
End: 2024                         Patient encounter   
procedure                                           [facility]  
   
                                                    Comment on   
above:                                  DIAGNOSTIC MAMMO BILATERAL & US BREAST L  
EFT/RIGHT, Breast pain, left   
[N64.4], History of abnormal mammogram [Z87.898], Mass of upper inner   
quadrant of left breast [N63.22], ORDER IN EPIC   
   
                                                    Start:   
2024  
End: 2024                         Patient encounter   
procedure                               2024 2:20 PM EDT   
[facility] [address]   
[place] [phone] [phone] Juliane Murphy, APRN.CNP [address] [place], [place] [address] [phone]   
(Work) [phone]   
(Fax) Breast Pain                       [facility]  
   
                                                    Comment on   
above:                                  Breast Pain   
   
                                                    Start:   
2024                              DTaP/Tdap/Td vaccine (2 -   
Td)                                     DTaP/Tdap/Td vaccine (2   
- Td)                                   [facility] [place]  
   
                                                    Start:   
2024                              Urine microalbumin   
profile                                             [facility]  
   
                                                    Start:   
2024                              Hemoglobin A1c   
measurement               HbA1C                     [facility]  
   
                                                    Start:   
2024                              Hemoglobin   
A1c/Hemoglobin.total in   
Blood                     HbA1C                     [facility]  
   
                                                    Start:   
2024                              ANNUAL PCP TEAM CHRONIC   
DISEASE VISIT                           ANNUAL PCP TEAM CHRONIC   
DISEASE VISIT                           [facility]  
   
                                                    Start:   
2024                              ANNUAL PCP TEAM CHRONIC   
DISEASE VISIT                           ANNUAL PCP TEAM CHRONIC   
DISEASE VISIT                           [facility]  
   
                                                    Start:   
2024          BP CONTROLLED (<130/80) BP CONTROLLED (<130/80) [facility]  
   
                                                    Start:   
2024                              ANNUAL PCP TEAM CHRONIC   
DISEASE VISIT                           ANNUAL PCP TEAM CHRONIC   
DISEASE VISIT                           [facility]  
   
                                                    Start:   
2023                              Covid-19 Vaccine (4 -   
2023-24 season)                         Covid-19 Vaccine (4 -   
2023-24 season)                         [facility]  
   
                                                    Start:   
2023          Influenza vaccination                     [facility]  
   
                                                    Start:   
2023  
End: 10-                         Comprehensive metabolic   
2000 panel - Serum or   
Plasma                                  COMP METABOLIC PANEL Lab   
Routine Mixed   
hyperlipidemia Essential   
hypertension Expected:   
2023, Expires:   
10/21/2023                              [facility]  
Work Phone:   
[phone]  
   
                                                    Comment on   
above:                                  Expected: 2023, Expires: 10/21/202  
3   
   
                                                    Start:   
2023  
End: 10-           Hemoglobin A1c in Blood   HGB A1C Lab Routine Type   
2 diabetes mellitus   
without complication,   
without long-term   
current use of insulin   
(HCC) Expected:   
2023, Expires:   
10/21/2023                              [facility]  
Work Phone:   
[phone]  
   
                                                    Comment on   
above:                                  Expected: 2023, Expires: 10/21/202  
3   
   
                                                    Start:   
2023  
End: 10-                         Lipid 1996 panel - Serum   
or Plasma                               LIPID PANEL BASIC Lab   
Routine Mixed   
hyperlipidemia Expected:   
2023, Expires:   
10/21/2023                              [facility]  
Work Phone:   
[phone]  
   
                                                    Comment on   
above:                                  Expected: 2023, Expires: 10/21/202  
3   
   
                                                    Start:   
2023          BP CONTROLLED (<130/80) BP CONTROLLED (<130/80) [facility]  
   
                                                    Start:   
2023          Mammography         MAMMOGRAM           [facility]  
   
                                                    Start:   
2023                              ANNUAL PCP TEAM CHRONIC   
DISEASE VISIT                           ANNUAL PCP TEAM CHRONIC   
DISEASE VISIT                           [facility]  
   
                                                    Start:   
2023          BP CONTROLLED (<130/80) BP CONTROLLED (<130/80) [facility]  
inic  
   
                                                    Start:   
2023          COLOGUARD (FIT-DNA) COLOGUARD (FIT-DNA) [facility]  
   
                                                    Start:   
2023          Colonoscopy         COLONOSCOPY         [facility]  
   
                                                    Start:   
2023                              COLORECTAL CANCER   
SCREENING                               COLORECTAL CANCER   
SCREENING                               [facility]  
   
                                                    Start:   
2023          CT COLONOGRAPHY     CT COLONOGRAPHY     [facility]  
   
                                                    Start:   
2023          FECAL OCCULT BLOOD  FECAL OCCULT BLOOD  [facility]  
   
                                                    Start:   
2023                              Screening for malignant   
neoplasm of colon                                   [facility]  
   
                                                    Start:   
2023          SIGMOIDOSCOPY       SIGMOIDOSCOPY       [facility]  
   
                                                    Start:   
2023  
End: 04-                         Basic metabolic 2000   
panel - Serum or Plasma                 BASIC METABOLIC PNL Lab   
Routine Mid sternal   
chest pain Expected:   
2023, Expires:   
04/15/2023                              [facility]  
Work Phone:   
[phone]  
   
                                                    Comment on   
above:                                  Expected: 2023, Expires: 04/15/202  
3   
   
                                                    Start:   
2023  
End: 04-                         CBC panel - Blood by   
Automated count                         CBC Lab Routine Vitamin   
B 12 deficiency   
Expected: 2023,   
Expires: 04/15/2023                     [facility]  
Work Phone:   
[phone]  
   
                                                    Comment on   
above:                                  Expected: 2023, Expires: 04/15/202  
3   
   
                                                    Start:   
2023  
End: 04-                         Cobalamin (Vitamin B12)   
[Mass/volume] in Serum or   
Plasma                                  VITAMIN B12 BLOOD Lab   
Routine Vitamin B 12   
deficiency Expected:   
2023, Expires:   
04/15/2023                              [facility]  
Work Phone:   
[phone]  
   
                                                    Comment on   
above:                                  Expected: 2023, Expires: 04/15/202  
3   
   
                                                    Start:   
2023  
End: 04-                         Lipid 1996 panel - Serum   
or Plasma                               LIPID PANEL BASIC Lab   
Routine Screening for   
lipid disorders   
Expected: 2023,   
Expires: 04/15/2023                     [facility]  
Work Phone:   
[phone]  
   
                                                    Comment on   
above:                                  Expected: 2023, Expires: 04/15/202  
3   
   
                                                    Start:   
11-  
End: 01-                         Lipid 1996 panel - Serum   
or Plasma                               LIPID PANEL BASIC Lab   
Routine Mixed   
hyperlipidemia Expected:   
11/15/2022, Expires:   
01/15/2023                              [facility]  
Work Phone:   
[phone]  
   
                                                    Comment on   
above:                                  Expected: 11/15/2022, Expires: 01/15/202  
3   
   
                                                    Start:   
10-                              Hemoglobin   
A1c/Hemoglobin.total in   
Blood                     HBA1C                     [facility]  
   
                                                    Start:   
2022          Influenza vaccination                     [facility]  
   
                                                    Start:   
2022  
End: 2022           Hemoglobin A1c in Blood   HGB A1C Lab Routine   
Weight gain Expected:   
2022, Expires:   
2022                              [facility]  
Work Phone:   
[phone]  
   
                                                    Comment on   
above:                                  Expected: 2022, Expires:   
2   
   
                                                    Start:   
2022  
End: 2022                         Insulin [Units/volume] in   
Serum or Plasma                         INSULIN ASSAY BLOOD Lab   
Routine Weight gain   
Expected: 2022,   
Expires: 2022                     [facility]  
Work Phone:   
[phone]  
   
                                                    Comment on   
above:                                  Expected: 2022, Expires:   
2   
   
                                                    Start:   
06-  
End: 08-                         Comprehensive metabolic   
2000 panel - Serum or   
Plasma                                  COMP METABOLIC PANEL Lab   
Routine Essential   
hypertension Mixed   
hyperlipidemia Expected:   
06/10/2022, Expires:   
08/10/2022                              [facility]  
Work Phone:   
[phone]  
   
                                                    Comment on   
above:                                  Expected: 06/10/2022, Expires: 08/10/202  
2   
   
                                                    Start:   
06-  
End: 08-                         Lipid 1996 panel - Serum   
or Plasma                               LIPID PANEL BASIC Lab   
Routine Mixed   
hyperlipidemia Expected:   
06/10/2022, Expires:   
08/10/2022                              [facility]  
Work Phone:   
[phone]  
   
                                                    Comment on   
above:                                  Expected: 06/10/2022, Expires: 08/10/202  
2   
   
                                                    Start:   
2022                              ANNUAL PCP TEAM CHRONIC   
DISEASE VISIT                           ANNUAL PCP TEAM CHRONIC   
DISEASE VISIT                           [facility]  
   
                                                    Start:   
2022          BP CONTROLLED (<130/80) BP CONTROLLED (<130/80) [facility]  
   
                                                    Start:   
2022                              Hepatitis B surface   
antibody level            LDL CHOLESTEROL           [facility]  
   
                                                    Start:   
2022                              COVID-19 VACCINE (4 -   
Booster for Moderna   
series)                                 COVID-19 VACCINE (4 -   
Booster for Moderna   
series)                                 [facility]  
   
                                                    Start:   
2022                              COVID-19 VACCINE (4 -   
Moderna series)                         COVID-19 VACCINE (4 -   
Moderna series)                         [facility]  
   
                                                    Start:   
2022          PAP TESTING         PAP TESTING         [facility]  
   
                                                    Start:   
2022                              Screening for malignant   
neoplasm of cervix                                  [facility]  
   
                                                    Start:   
2021                              Hemoglobin   
A1c/Hemoglobin.total in   
Blood                     HBA1C                     [facility]  
   
                                                    Start:   
2021          Mammography         MAMMOGRAM           [facility]  
   
                                                    Start:   
10-          Glaucoma screening  Dilated Retinal Exam [facility]  
   
                                                    Start:   
10-                              Hepatitis C antibody,   
confirmatory test         DILATED RETINAL EXAM      [facility]  
   
                                                    Start:   
10-                              3 comp foot exam   
completed                 DIABETIC FOOT EXAM        [facility]  
   
                                                    Start:   
10-          Diabetic foot examination Diabetic Foot Exam  [facility]  
   
                                                    Start:   
2020          Influenza vaccination Flu vaccine (#1)    [place], [place]  
   
                                                    Start:   
2019                Hepatitis B screening     URINE ALBUMIN:CREATININE   
RATIO                                   [facility]  
   
                                                    Start:   
1999          Cervical cancer screen Cervical cancer screen [place], [place]  
   
                                                    Start:   
1999                              Screening for malignant   
neoplasm of cervix        Cervical cancer screen    [place], [place]  
   
                                                    Start:   
1997                              HEPATITIS B (1 of 3 -   
Risk 3-dose series)                     HEPATITIS B (1 of 3 -   
Risk 3-dose series)                     [facility]  
   
                                                    Start:   
1997                              Hepatitis B Vaccine (1 of   
3 - 19+ 3-dose series)                  Hepatitis B Vaccine (1   
of 3 - 19+ 3-dose   
series)                                 [facility]  
   
                                                    Start:   
1997                Shingrix Vaccine (1 of 2) Shingrix Vaccine (1 of   
2)                                      [facility]  
   
                                                    Start:   
1996          BP CONTROLLED (<130/80) BP CONTROLLED (<130/80) [facility]  
   
                                                    Start:   
1996          HIV SCREENING       HIV SCREENING       [facility]  
   
                                                    Start:   
1996          HIV screening       HIV Screening       [facility]  
   
                                                    Start:   
1993          HIV screen          HIV screen          [place], [place]  
   
                                                    Start:   
1993          HIV screening       HIV screen          [facility], [place]  
   
                                                    Start:   
1988          Lipid panel         Lipid screen        [facility], [place]  
   
                                                    Start:   
1988          Lipid screen        Lipid screen        [facility] [place]  
   
                                                    Start:   
1978                              HEPATITIS B (1 of 3 -   
3-dose series)                          HEPATITIS B (1 of 3 -   
3-dose series)                          [facility]  
   
                                                    Start:   
1978                              Hepatitis B Vaccine (1 of   
3 - 3-dose series)                      Hepatitis B Vaccine (1   
of 3 - 3-dose series)                   [facility]  
   
                                                            Comprehensive metabo  
lic   
2000 panel - Serum or   
Plasma                                  COMP METABOLIC PANEL Lab   
Today Essential   
hypertension Diabetes   
mellitus type 2,   
diet-controlled (HCC)   
Ordered: 2022                     [facility]  
Work Phone:   
[phone]  
   
                                                    Comment on   
above:                                  Ordered: 2022   
   
                                                ECG COMPLETE    ECG COMPLETE ECG  
 Routine   
Mid sternal chest pain   
Ordered: 2023                     [facility]  
Work Phone:   
[phone]  
   
                                                    Comment on   
above:                                  Ordered: 2023   
   
                                                            Follitropin   
[Units/volume] in Serum   
or Plasma                               FSH BLD Lab Today Hot   
flashes due to menopause   
Ordered: 2022                     [facility]  
Work Phone:   
[phone]  
   
                                                    Comment on   
above:                                  Ordered: 2022   
   
                                                            Hemoglobin   
A1c/Hemoglobin.total in   
Blood                                   HGB A1C Lab Today   
Diabetes mellitus type   
2, diet-controlled (HCC)   
Ordered: 2022                     [facility]  
Work Phone:   
[phone]  
   
                                                    Comment on   
above:                                  Ordered: 2022   
   
                                                Incentive spirometry Incentive s  
pirometry   
Respiratory Care Routine   
Every 2hr while awake   
until discontinued   
starting 2020                     [facility]  
   
                                                    Comment on   
above:                                  Every 2hr while awake until discontinued  
 starting 2020   
   
                                                            Initiate Oxygen Ther  
apy   
Protocol                                Initiate Oxygen Therapy   
Protocol Respiratory   
Care Routine Daily until   
discontinued starting   
2020                              [facility] [place]  
   
                                                    Comment on   
above:                                  Daily until discontinued starting 2020   
   
                                                LIPID PANEL BASIC LIPID PANEL BA  
SIC Lab   
Today Mixed   
hyperlipidemia Ordered:   
2022                              [facility]  
Work Phone:   
[phone]  
   
                                                    Comment on   
above:                                  Ordered: 2022   
   
                                                      
End: 10-                         LUNG DIFFUSION CAPACITY   
(DLCO)                                  LUNG DIFFUSION CAPACITY   
(DLCO) PFT Routine   
Chronic obstructive   
pulmonary disease,   
unspecified COPD type   
(HCC) 1 Occurrences   
starting 2024   
until 10/18/2025                        [facility]  
   
                                                    Comment on   
above:                                  1 Occurrences starting 2024 until   
10/18/2025   
   
                                                      
End: 10-           LUNG VOLUMES              LUNG VOLUMES PFT Routine   
Chronic obstructive   
pulmonary disease,   
unspecified COPD type   
(HCC) 1 Occurrences   
starting 2024   
until 10/18/2025                        [facility]  
   
                                                    Comment on   
above:                                  1 Occurrences starting 2024 until   
10/18/2025   
   
                                                            Lutropin [Units/volu  
me]   
in Serum or Plasma                      LUTEINIZING HORMONE Lab   
Today Hot flashes due to   
menopause Ordered:   
2022                              [facility]  
Work Phone:   
[phone]  
   
                                                    Comment on   
above:                                  Ordered: 2022   
   
                                                      
End: 2024           ALYSON SCREENING             ALYSON SCREENING Radiology   
Routine Encounter for   
screening mammogram for   
breast cancer 1   
Occurrences starting   
2023 until   
2024                              [facility]  
Work Phone:   
[phone]  
   
                                                    Comment on   
above:                                  1 Occurrences starting 2023 until   
2024   
   
                                                            Phase I & II - meter  
ed   
glucose                                             [facility], [place]  
   
                                                    Comment on   
above:                                  As Needed until discontinued starting    
   
                                                            As Needed until disc  
ontinued starting 2020   
   
                                                      
End: 2020           Pulse Oximetry Spot Check Pulse Oximetry Spot   
Check Respiratory Care   
Routine One Time for 1   
Occurrences starting   
2020 until   
2020                              [facility], [place]  
   
                                                    Comment on   
above:                                  One Time for 1 Occurrences starting  until 2020   
   
                                                      
End: 2025                         SPIROMETRY - BASELINE AND   
POST DILATOR                            SPIROMETRY - BASELINE   
AND POST DILATOR PFT   
Routine SOB (shortness   
of breath) COPD, mild   
(HCC) 1 Occurrences   
starting 2024   
until 2025                        [facility]  
   
                                                    Comment on   
above:                                  1 Occurrences starting 2024 until   
2025   
   
                                                      
End: 10-                         SPIROMETRY - BASELINE AND   
POST DILATOR                            SPIROMETRY - BASELINE   
AND POST DILATOR PFT   
Routine Chronic   
obstructive pulmonary   
disease, unspecified   
COPD type (HCC) 1   
Occurrences starting   
2024 until   
10/18/2025                              [facility]  
Work Phone:   
[phone]  
   
                                                    Comment on   
above:                                  1 Occurrences starting 2024 until   
10/18/2025   
   
                                                      
End: 2024           STRESS ECHO TREADMILL     STRESS ECHO TREADMILL   
Cardiology Routine Mid   
sternal chest pain Vapes   
nicotine containing   
substance 1 Occurrences   
starting 2023   
until 2024                        [facility]  
Work Phone:   
[phone]  
   
                                                    Comment on   
above:                                  1 Occurrences starting 2023 until   
2024   
   
                                                            Thyrotropin   
[Units/volume] in Serum   
or Plasma                               TSH BLD Lab Today Hot   
flashes due to menopause   
Ordered: 2022                     [facility]  
Work Phone:   
[phone]  
   
                                                    Comment on   
above:                                  Ordered: 2022   
   
                                                            URINE FREE CORTISOL   
BY   
LC-MS/MS                                URINE FREE CORTISOL BY   
LC-MS/MS Lab Routine   
Weight gain Ordered:   
2022                              [facility]  
Work Phone:   
[phone]  
   
                                                    Comment on   
above:                                  Ordered: 2022   
   
                                                      
End: 2025           US Breast - right limited US BREAST LTD RIGHT   
Radiology Routine Breast   
pain, left History of   
abnormal mammogram Mass   
of upper inner quadrant   
of left breast 1   
Occurrences starting   
2024 until   
2025                              [facility]  
Work Phone:   
[phone]  
   
                                                    Comment on   
above:                                  1 Occurrences starting 2024 until   
2025   
   
                                                      
End: 2023                         Us soft tissue head &   
neck real time imge docm                US THYROID/PARATHYROID   
Radiology Routine   
Thyroid nodule 1   
Occurrences starting   
2022 until   
2023                              [facility]  
Work Phone:   
[phone]  
   
                                                    Comment on   
above:                                  1 Occurrences starting 2022 until   
2023   
   
                                                      
End: 10-           US Upper extremity - left US HAND/WRIST SYNOVIAL   
SCREEN LEFT Radiology   
Routine Inflammatory   
arthritis 1 Occurrences   
starting 2024   
until 10/04/2025                        [facility]  
   
                                                    Comment on   
above:                                  1 Occurrences starting 2024 until   
10/04/2025   
   
                                                      
End: 10-                         US Upper extremity -   
right                                   US HAND/WRIST SYNOVIAL   
SCREEN RIGHT Radiology   
Routine Inflammatory   
arthritis 1 Occurrences   
starting 2024   
until 10/04/2025                        [facility]  
   
                                                    Comment on   
above:                                  1 Occurrences starting 2024 until   
10/04/2025   
   
                                                      
End: 2025           XR Chest PA and Lateral   XR CHEST 2V FRONTAL/LAT   
Radiology Routine SOB   
(shortness of breath)   
COPD, mild (HCC) 1   
Occurrences starting   
2024 until   
2025                              [facility]  
Work Phone:   
[phone]  
   
                                                    Comment on   
above:                                  1 Occurrences starting 2024 until   
2025   
   
                                                                 [facility]  
  
  
  
Immunizations  
  
  
                      Immunization Date Immunization Notes      Care Provider [facility]  
   
                                        10-          influenza, seasonal,  
   
injectable                              Alejandrina Hernandez RN   [facility]  
   
                                        10-          influenza, injectabl  
e,   
quadrivalent,   
preservative free                                   Ujliane Temsic   
APRN.CNP  
Work Phone:   
[phone]                          [facility]  
   
                                        10-          influenza virus vacc  
ine,   
unspecified formulation                     [facility]        [facility]  
   
                                        11-          influenza, injectabl  
e,   
quadrivalent,   
preservative free                                   Juliane Temsic   
APRN.CNP  
Work Phone:   
[phone]                          [facility]  
   
                                        2021          COVID-19 vaccine, fu  
ll   
dose (MODERNA)                                      Jenna Chavarria MD  
Work Phone:   
[phone]                          [facility]  
   
                                        2021          COVID-19 vaccine, fu  
ll   
dose (MODERNA)                                      Jenna Chavarria MD  
Work Phone:   
[phone]                          [facility]  
   
                                        10-          influenza, injectabl  
e,   
quadrivalent,   
preservative free                                   Jenna Chavarria MD  
Work Phone:   
[phone]                          [facility]  
   
                                        10-          influenza virus vacc  
ine,   
unspecified formulation                             Juliane Temsic   
APRN.CNP  
Work Phone:   
[phone]                          [facility]  
   
                                        2019          influenza, injectabl  
e,   
quadrivalent,   
preservative free                                   Jenna Chavarria MD  
Work Phone:   
[phone]                          [facility]  
   
                                        10-          influenza, injectabl  
e,   
quadrivalent,   
preservative free                                   Jenna Chavarria MD  
Work Phone:   
[phone]                          [facility]  
   
                                        10-          influenza, seasonal,  
   
injectable                                          Jenna Chavarria MD  
Work Phone:   
[phone]                          [facility]  
   
                                        10-          influenza, injectabl  
e,   
quadrivalent, contains   
preservative                                        Jenna Chavarria MD  
Work Phone:   
[phone]                          [facility]  
Work Phone:   
[phone]  
   
                                        10-          pneumococcal conjuga  
te   
vaccine, 13 valent                                  Jenna Chavarria MD  
Work Phone:   
[phone]                          [facility]  
Work Phone:   
[phone]  
   
                                        2015          pneumococcal conjuga  
te   
vaccine, 13 valpriscilla Chavarria MD  
Work Phone:   
[phone]                          [facility]  
Work Phone:   
[phone]  
   
                                        2014          pneumococcal   
polysaccharide vaccine,   
23 valent                                           Jenna Chavarria MD  
Work Phone:   
[phone]                          [facility]  
Work Phone:   
[phone]  
   
                                        2014          tetanus toxoid, redu  
ines   
diphtheria toxoid, and   
acellular pertussis   
vaccine, adsorbed                                   Jenna Chavarria MD  
Work Phone:   
[phone]                          [facility]  
Work Phone:   
[phone]  
   
                                        2011          pneumococcal   
polysaccharide vaccine,   
23 valent                               [facility]         [facility]  
Work Phone:   
[phone]  
  
  
  
Payers  
  
  
                          Date         Payer Category Payer        Policy ID  
   
                          2023   Unknown                   [id number]  
   
                                2015      Medicaid        [employer]  
AID   
[employer]   
[id number] 2015-Present   
[phone] [address]   
[place] [address] Medicaid               [id number]   
[id number]  
[id number]  
   
                          2015   Medicaid                  [id number]  
[id number]  
[id number]  
   
                                2015      Unknown         [employer] [facility]   
MEDICAID xxxxxxxxxxx   
2015-Present [phone]   
CLAIMS DEPARTMENT [address]   
[place] [address]                        xxxxxxxxxxx   
[id number]  
[id number]  
   
                          2015   Unknown                   [id number]   
[id number]  
[id number]  
   
                          1978   Unknown                   [id number]   
[id number]  
   
                          1978   Unknown                   [id number]   
[id number]  
   
                          1978   Unknown                   [id number]   
[id number]  
   
                          1978   Unknown                   [id number]   
[id number]  
   
                          1978   Unknown                   [id number]   
[id number]  
   
                          1978   Unknown                   [id number]   
[id number]  
   
                          1978   Unknown                   [id number]   
[id number]  
   
                          1978   Unknown                   [id number]   
[id number]  
   
                          1978   Unknown                   [id number]   
[id number]  
   
                                       Unknown                     
  
  
  
Social History  
  
  
                          Date         Type         Detail       Facility  
   
                                                    Start:   
1993  
End: 2024                         Tobacco smoking status   
NHIS                      Current every day smoker  [place], [place]  
   
                                                    Start:   
1993          History of tobacco use Cigarette Smoker    [place], [place]  
   
                                                    Start:   
2020  
End: 10-                         Cigarettes smoked current   
(pack per day) - Reported                           [facility]  
   
                                                    Start:   
2020  
End: 2020           Alcohol intake            Current non-drinker of   
alcohol (finding)                       [place], [place]  
   
                                                    Start:   
2020  
End: 2023     History SDOH Financial 5                   [place], [place]  
   
                                                    Start:   
2020  
End: 2023     History SDOH Food Worry 1                   [employer]  
, [place]  
   
                                                    Start:   
2020  
End: 2023                         History SDOH Transport   
Med                       2                         [facility] [place], [place]  
   
                                                    Start:   
10-                Tobacco Comment           Pt has switched to vapor   
cigarrettes                             [facility] [place]  
   
                                                    Start:   
1978          Sex Assigned At Birth Not on file         [facility] [place],   
[place]  
   
                                                    Start:   
2020  
End: 2024     Tobacco use and exposure Never used          [facility]  
[place] [place]  
   
                                                    Start:   
2021  
End: 2025     Alcohol intake      Ex-drinker (finding) [facility]  
   
                                                    Start:   
2020  
End: 2022                         History SDOH Social   
Connections Phone         4                         [facility]  
   
                                                    Start:   
2020                              History SDOH Social   
Connections Living        3                         [facility]  
   
                                                    Start:   
2020  
End: 2022                         History SDOH Physical   
Activity DPW              7                         [facility]  
   
                                                    Start:   
2020                              History SDOH Physical   
Activity MPS              10                        [facility]  
   
                                                    Start:   
2020  
End: 2023           Tobacco Comment           No smoker in childhood   
home. Spouse of 6 years   
a smoker.                               [facility]  
   
                                                    Start:   
2022  
End: 2022                         Exposure to SARS-CoV-2   
(event)                   Not sure                  [facility]  
   
                                                    Start:   
2023                              History SDOH Alcohol Std   
Drinks                    0                         [facility]  
   
                                                    Start:   
2023                              History SDOH Social   
Connections Get Together  98                        [facility]  
   
                                                    Start:   
2023  
End: 10-                         Social connection and   
isolation panel                                     [facility]  
   
                                                            How often do you get  
   
together with friends or   
relatives?                Patient refused           [facility]  
   
                                                            Do you belong to any  
   
clubs or organizations   
such as Quaker groups,   
unions, fraternal or   
athletic groups, or   
school groups?            No                        [facility]  
   
                                                            Are you now ,  
   
, ,   
, never    
or living with a partner?                   [facility]  
   
                                                            How often to you hav  
e a   
drink containing alcohol? Never                     [facility]  
   
                                                            (I/We) worried alfredo  
er   
(my/our) food would run   
out before (I/we) got   
money to buy more.        Never true                [facility]  
   
                                       History of tobacco use Passive smoker [facility]  
   
                                                    Start:   
10-          Education           19                  [facility]  
   
                                                    Start:   
10-                Tobacco Comment           Currently down to 1-2   
cigarettes per day                      [facility]  
   
                                                    Start:   
10-                Gender identity           Identifies as female   
gender (finding)                        [facility]  
   
                                                    Start:   
10-          Sexual orientation  Heterosexual (finding) [facility]  
   
                                                    Start:   
2024                Tobacco Comment           Currently down to 4   
cigarettes in the past 3   
weeks                                   [facility]  
   
                                                    Start:   
2024                              Tobacco smoking status   
NHIS                      Ex-smoker                 [facility]  
   
                                                    Start:   
1993          History of tobacco use Current smoker      [facility]  
   
                                                            Do you belong to any  
   
clubs or organizations   
such as Quaker groups,   
unions, fraternal or   
athletic groups, or   
school groups?            Yes                       [facility]  
   
                                                            Are you now ,  
   
, ,   
, never    
or living with a partner? Living with partner       [facility]  
   
                                                            Do you feel stress -  
   
tense, restless, nervous,   
or anxious, or unable to   
sleep at night because   
your mind is troubled all   
the time - these days   
[OSQ]                     To some extent            [facility]  
   
                                                    Start:   
1978          Sex Assigned At Birth Female              [facility]  
  
  
  
Medical Equipment  
  
  
                                Procedure Code  Equipment Code  Equipment   
Original Text                           Equipment   
Identifier                              Dates  
   
                                                                Kit Lead Trial 5  
0   
Cm Contact 8              [id number]                Start:   
2018  
   
                                                                Kit Lead Trial 5  
0   
Cm Contact 8              [id number]                Start:   
2018  
   
                                                                Kit Ipg Implant   
Permanent Senza -   
[id number]                   [id number]                Start:   
2020  
   
                                                                Nevro Blue Lead   
Kit, 50cm With   
5mm Spacing               [id number]                Start:   
10-  
   
                                                                Nevro Blue Lead   
Kit, 50 Cm With   
5mm Spacing               [id number]                Start:   
10-  
   
                                                                Nevro Senza Ipg   
Kit                       [id number]                Start:   
10-  
   
                                                                N300 Lead [place]  
   
Kit                       [id number]                Start:   
2020  
   
                                            Nevro 1058-50b X2 [id number] Start:  
   
10-  
   
                                                                Nevro Senza   
[id number]                  [id number]               Start:   
2020  
   
                                                                 [id number],   
[id number],   
[id number]                              Start:   
2014  
End:   
2022  
   
                                        Comment on above:   Test blood sugar(s)   
1 times daily. Dx: E11.9. Insulin: No   
   
                                                            Use as instructed   
   
                                                            Check glucose daily   
. Dx: 11.9.   
   
                                                            1 Strip once daily.   
Test blood sugar once daily and as needed.   
  
  
  
Clinical Notes 2022 to 2025  
 Telephone Encounter - Leanna Burton LPN - 2025 12:38 PM ESTTelephone   
Encounter - Leanna Burton LPN - 2025 12:38 PM ESTPatient 
InstructionsPatient InstructionsPatient Instructions  
  
                                Note Date & Type Note            Facility  
   
                                2025 Note                 [facility]  
nt  
   
                                                    2025 Telephone   
encounter Note                          Formatting of this note might be   
different from the original.  
Per patient's request Referral   
faxed to [facility]   
fax#[phone]. Patient Robert   
notified and she thanked me for   
the call.  
  
Leanna Burton LPN  
2025 12:39 PM  
  
Electronically signed by Leanna Burton LPN at 2025 12:39   
PM EST  
                                        [facility]  
   
                                                    2025 Miscellaneous   
Notes                                   Formatting of this note might be   
different from the original.  
Per patient's request Referral   
faxed to [facility]   
fax#[phone]. Patient Robert   
notified and she thanked me for   
the call.  
  
Leanna Burton LPN  
2025 12:39 PM  
  
Electronically signed by Leanna Burton LPN at 2025 12:39   
PM EST  
documented in this encounter            [facility]  
   
                                                    2025 Telephone   
encounter Note                          Formatting of this note might be   
different from the original.  
Items addressed in this   
encounter:  
Cursogram Encounter  
  
referral info given via [employer]  
  
Able to close encounter.  
  
Lisa Hoang MA  
2025 7:34 AM  
  
  
7:34 AM  
  
  
Electronically signed by   
Lisa Hoang MA at 2025   
7:37 AM EST  
                                        [facility]  
   
                                                    2025 Miscellaneous   
Notes                                   Formatting of this note might be   
different from the original.  
Items addressed in this   
encounter:  
Cursogram Encounter  
  
referral info given via [employer]  
  
Able to close encounter.  
  
Lisa Hoang MA  
2025 7:34 AM  
  
  
7:34 AM  
  
  
Electronically signed by   
Lisa Hoang MA at 2025   
7:37 AM EST  
documented in this encounter            [facility]  
   
                                                    2025 Telephone   
encounter Note                          Formatting of this note might be   
different from the original.  
Items addressed in this   
encounter:  
Fax/Forms  
  
Gastro referral faxed to [facility]   
[facility]  
Ph [phone]  
Fax [phone]  
  
Faxed via [employer], fax   
confirmation received  
  
Able to close encounter.  
  
Lisa Hoang MA  
2025 7:11 AM  
  
  
7:11 AM  
  
  
Electronically signed by   
Lisa Hoang MA at 2025   
7:22 AM EST  
                                        [facility]  
   
                                                    2025 Miscellaneous   
Notes                                   Formatting of this note might be   
different from the original.  
Items addressed in this   
encounter:  
Fax/Forms  
  
Gastro referral faxed to [facility]   
[facility]  
Ph [phone]  
Fax [phone]  
  
Faxed via [employer], fax   
confirmation received  
  
Able to close encounter.  
  
Lisa Hoang MA  
2025 7:11 AM  
  
  
7:11 AM  
  
  
Electronically signed by   
Lisa Hoang MA at 2025   
7:22 AM EST  
documented in this encounter            [facility]  
   
                                        2025 Instructions   
  
  
Juliane Murphy APRN.CNP -   
2025 11:19 AM ESTFormatting   
of this note might be different   
from the original.  
Return for follow-up visit   
scheduled 2025.  
  
Stop omeprazole. Start   
esomeprazole (Nexium) 40 mg twice   
a day 30-60 minutes before a   
meal.  
  
Trial use of OTC probiotic   
(Culturelle, Align, Floragen).  
  
Consult placed to [facility] in [place]. They   
will contact you within 2 weeks   
for an appointment. Please notify   
my office if you do not secure an   
appointment within that   
timeframe.  
  
AVOID to improve GERD:  
Tomato products  
Citrus fruits  
Onions (cooked or uncooked)  
Spicy foods  
Chocolate  
Caffeine  
Coffee  
Carbonated drinks  
Cigarette smoke  
Laying down too soon after eating  
Weight gain  
NSAIDs (i.e., aspirin/Abi,   
ibuprofen/Motrin/Advil, naproxen   
sodium/Aleve)  
Tylenol is safe for over the   
counter pain control**  
  
To prevent GERD:  
Elevate head of bed while   
sleeping  
Try to eat at least 2-3 hrs   
before lying down. Nap in a   
chair.  
Lose weight if overweight  
Don't over eat (smaller meals)  
Eat high protein, low fat meals  
Eat small, frequent meals   
throughout the day  
Avoid large amounts of food or   
liquids in a single setting  
Avoid tight clothes or belts  
Avoid foods known to give you   
heartburn/GERD  
  
Continue following with counselor   
at [facility] to address anxiety.  
  
Take all medications as   
prescribed.  
Continue to follow with   
specialist providers.  
Healthy diet and regular aerobic   
exercise recommended.  
  
Please call the office at   
[phone] Option 4 or send a   
direct message via [employer] with   
any questions related to your   
visit today. Time throughout the   
day is reserved for patients with   
scheduled appointments. Please   
kindly allow 2-3 business days   
for staff to respond to phone   
calls or C[facility] messages.  
Electronically signed by Juliane Murphy APRN.CNP at 2025   
11:25 AM EST  
  
documented in this encounter            [facility]  
   
                                2025 Note                 [facility]  
nter  
   
                                                    2025 History of   
Present illness Narrative               Formatting of this note is   
different from the original.  
This note was created using   
Listen Edition.  
Subjective  
Robert Palacios is a 44 year   
old female presenting today for   
ER follow-up and complaint of   
 chest pain  with concern for   
gastric involvement.  
  
Patient was treated on 2025   
at [facility] ER with chief complaint   
cough and congestion x 1 day with   
intermittent chest discomfort for   
several months. Patient is   
established with [facility] Dr. Anaya and   
has undergone outpatient workup.   
Reports recent stress testing was   
negative and pain is felt to be   
noncardiac in nature. Patient has   
been told several times that pain   
may be related to anxiety but she   
does not feel this is correct.   
Patient was seen in urgent care   
and referred to ER for additional   
evaluation and treatment. Viral   
upper respiratory panel was   
positive for influenza A. Patient   
was prescribed Tamiflu and   
discharged home in stable   
condition. ECG completed in ER   
demonstrated NSR with poor   
anterior R wave progression and   
nonspecific T wave abnormality   
with prolonged QT interval.   
Patient was recommended to return   
to her cardiologist for   
discussion of abnormal ECG.  
  
Patient is here today for ER   
follow-up and to discuss ongoing   
 chest pain.  Overall cough and   
chest congestion have   
significantly improved since   
being treated with Tamiflu.   
Reports mild ongoing chest   
tightness. Had an appointment to   
see her cardiologist on Wednesday   
of this week but reports she was   
turned away due to positive   
influenza A testing. Has yet to   
reschedule with cardiology for   
follow-up to discuss ECG changes.   
Patient does not feel she is   
experiencing anxiety, although   
she recently established with a   
counselor at [facility] and feels   
sessions have been helpful.  
  
Is concerned symptoms are gastric   
in nature. Has not been eating   
well this week related to upper   
respiratory symptoms. Reports   
attempting to eat half of a   
cheeseburger yesterday and   
becoming nauseated without   
vomiting. Reports feeling as if   
something is  caught in her   
throat  and her  thyroid is   
strangling [her].  Recent thyroid   
ultrasound completed in 2024 demonstrated borderline   
thyromegaly without discrete   
nodules or masses. Patient with   
known history of acid reflux.   
Continues omeprazole 40 mg twice   
daily and reports compliance. Is   
not currently established with a   
gastroenterologist. Has not   
previously trialed alternate   
reflux medications. Reports   
 chest pain  and indicates mid   
epigastric area below the sternum   
improves with standing and is   
worse at night. Denies vomiting,   
constipation. Did have an   
isolated episode of diarrhea this   
morning. Is not experiencing pain   
elsewhere in her abdomen or other   
gastrointestinal symptoms.   
Patient is  sick of going to the   
ER  and is requesting referral to   
gastroenterology for further   
evaluation. Is agreeable to   
trialing alternate reflux   
medication.  
  
PAST MEDICAL HISTORY  
Diagnosis Date  
Anemia  
Anxiety 2014  
Anxiety and depression  
COPD, mild (HCC) 2014  
Depression 2014  
Fibroadenosis of breast  
Fibromyalgia  
GERD (gastroesophageal reflux   
disease) 2014  
HTN (hypertension) 2014  
Hyperlipidemia 2014  
Lump or mass in breast 2009  
Malignant neoplasm of cervix   
uteri, unspecified site  
Precancerous, not cervical cancer  
NO SHOW 2007  
With primary care and specialty   
offices.  
Osteoarthritis of multiple joints  
PMH - PAST MEDICAL HISTORY OF  
diabetes  
Prediabetes  
Raynaud disease  
Raynaud disease  
Restless legs  
Rheumatoid arthritis (HCC)  
RSD lower limb  
bilateral legs  
Smoker 2014  
Tachycardia 2014  
Possible SVT  
Type II or unspecified type   
diabetes mellitus without mention   
of complication, uncontrolled   
2005  
Diet controlled.  
  
ACTIVE PROBLEM LIST  
No Show  
Lump Or Mass in Breast  
Fibroadenosis of Breast  
Anxiety  
Malignant Neoplasm of Cervix   
(Hcc)  
Fibromyalgia  
Rheumatoid Arthritis of Multiple   
Sites With Negative Rheumatoid   
Factor (Hcc)  
Raynaud Disease  
Restless Legs  
Mass of Left Side of Neck  
Routine Gynecological Examination  
Dysphagia  
Smoker  
Mixed Hyperlipidemia  
Tachycardia  
Copd, Mild (Hcc)  
Essential Hypertension  
Constipation  
Fatigue  
Hoarse Voice Quality  
Gastroesophageal Reflux Disease   
Without Esophagitis  
Rsd Lower Limb  
Diabetes Mellitus Type 2,   
Diet-Controlled (Hcc)  
Depression  
Neoplasm of Uncertain Behavior of   
Neck  
Encounter for Gynecological   
Examination Without Abnormal   
Finding  
Adjustment Disorder With Mixed   
Anxiety and Depressed Mood  
Environmental and Seasonal   
Allergies  
Bilateral Low Back Pain With   
Bilateral Sciatica  
Sacroiliitis (Hcc)  
Menopause  
Degenerative Disc Disease, Lumbar  
Thyroid Nodule  
Snoring  
Pain  
Folliculitis  
Loss of Hair  
Chronic Insomnia  
Chronic Midline Low Back Pain   
With Bilateral Sciatica  
Vertigo  
Tinnitus of Both Ears  
Sob (Shortness of Breath)  
Palpitations  
Facial Asymmetry  
Chronic Ankle Pain, Bilateral  
Chronic Foot Pain, Left  
Generalized Anxiety Disorder With   
Panic Attacks  
Vitamin B12 Deficiency  
Multiple Joint Pain  
Chest Discomfort  
Abnormal Holter Exam  
History of Abnormal Mammogram  
Breast Pain, Left  
Thyromegaly  
Chronic Pain Syndrome  
Abnormal Findings On Diagnostic   
Imaging of Breast  
Peripheral Vertigo of Both Ears  
Eustachian Tube Dysfunction,   
Bilateral  
  
  
Current Outpatient Medications  
Medication Sig Dispense Refill  
oseltamivir (TAMIFLU) 75 mg   
capsule Take 1 capsule by mouth   
two times a day for 5 days. 10   
capsule 0   
albuterol HFA (PROVENTIL HFA,   
VENTOLIN HFA) 90 mcg/actuation   
inhaler Inhale 2 Puffs as   
instructed every 4 hours as   
needed for wheezing/shortness of   
breath. 1 Each 0  
methotrexate 2.5 mg tablet Take 4   
tablets by mouth every . as   
directed. 48 tablet 0  
folic acid 1 mg tablet Take 1   
tablet by mouth once daily. 90   
tablet 0  
ondansetron (ZOFRAN) 4 mg tablet   
Take 4 mg by mouth every 8 hours   
as needed for nausea/vomiting.  
meclizine 25 mg chewable   
tablet(s) Take 1 tablet by mouth   
every 8 hours as needed for   
nausea/vomiting or dizziness. 60   
tablet 1  
LORazepam (ATIVAN) 0.5 mg Take 1   
tablet by mouth once daily as   
needed for up to 90 days. 30   
tablet 2  
tiotropium-olodaterol (STIOLTO   
RESPIMAT) 2.5-2.5 mcg/actuation   
inhaler Inhale 2 Puffs as   
instructed once daily. 2 inh a   
day 4 g 1  
meloxicam (MOBIC) 15 mg tablet   
Take 1 tablet by mouth once   
daily. (Patient taking   
differently: Take 15 mg by mouth   
once daily. prn) 30 tablet 5  
cetirizine (ZYRTEC) 10 mg tablet   
Take 1 tablet by mouth once   
daily. 90 tablet 3  
omeprazole (PRILOSEC) 40 mg   
capsule Take 1 capsule by mouth   
two times a day. 180 capsule 3  
Blood-Glucose Meter Test One time   
a day and as needed. Insulin Dep?   
Yes E11.9 DM 2 1 Each 0  
Blood Glucose Control, Normal   
soln Use as instructed 1 Each 0  
blood sugar diagnostic (BLOOD   
GLUCOSE TEST) test strip 1 Strip   
once daily. Test blood sugar once   
daily and as needed. 100 Strip 2  
atenolol (TENORMIN) 25 mg tablet   
take 1 tablet by mouth once daily   
30 tablet 11  
atorvastatin (LIPITOR) 20 mg   
tablet Take 1 tablet by mouth   
once daily. 90 tablet 3  
blood sugar diagnostic (FREESTYLE   
LITE STRIPS) test strip Test   
blood sugar(s) 1 times daily. Dx:   
E11.9. Insulin: No 50 Strip 11  
Lancets lancets Check glucose   
daily . Dx: 11.9. 100 Each 3  
  
No current facility-administered   
medications for this visit.  
  
Medications were reviewed and   
verified.  
  
If pain assessment is 0, no   
action needed.  
If pain assessment is positive,   
please see assessment and plain.  
  
Social History  
  
Tobacco Use  
Smoking status: Every Day  
Current packs/day: 0.25  
Average packs/day: 0.3 packs/day   
for 31.4 years (7.9 ttl pk-yrs)  
Types: Cigarettes  
Start date: 1993  
Passive exposure: Past  
Smokeless tobacco: Never  
Vaping Use  
Vaping status: Some Days  
Substances: Nicotine  
Substance Use Topics  
Alcohol use: Not Currently  
Drug use: Not Currently  
Types: Marijuana  
  
  
FAMILY HISTORY  
Problem Relation Age of Onset  
Hypertension Mother  
Heart Mother  
Diabetes Mother  
COPD Mother  
Alcohol/Drug Father  
Allergies Brother  
Alcohol/Drug Sister  
No Known Problems Paternal   
Grandmother  
No Known Problems Paternal   
Grandfather  
No Known Problems Son  
No Known Problems Son  
No Known Problems Daughter  
Diabetes Brother  
Hypoglycemic  
Breast Cancer Maternal Aunt  
No Known Problems Maternal   
Grandmother  
No Known Problems Maternal   
Grandfather  
Breast Cancer Maternal Aunt  
  
Review of Systems  
Constitutional: Positive for   
appetite change. Negative for   
activity change, chills,   
diaphoresis, fatigue and fever.  
HENT: Positive for trouble   
swallowing (Feels as if something   
is catching in her throat and   
that thyroid is strangling her).   
Negative for congestion,   
postnasal drip, rhinorrhea, sore   
throat and voice change.  
Respiratory: Positive for cough   
(Dry, nonproductive, worse at   
night) and chest tightness.   
Negative for choking, shortness   
of breath, wheezing and stridor.  
Cardiovascular: Positive for   
chest pain.  
Gastrointestinal: Positive for   
abdominal pain (Isolated to mid   
epigastric area. Improves with   
standing. Worse at nighttime) and   
nausea (Isolated episode   
yesterday after eating without   
vomiting). Negative for abdominal   
distention, anal bleeding, blood   
in stool, constipation, diarrhea   
and vomiting.  
Endocrine: Negative for cold   
intolerance and heat intolerance.  
Musculoskeletal: Negative for   
neck pain.  
Neurological: Negative for   
weakness.  
Psychiatric/Behavioral: Negative   
for dysphoric mood, sleep   
disturbance and suicidal ideas.   
The patient is not   
nervous/anxious.  
  
  
Objective  
/89   Pulse 72   Temp (Src)   
97.1 (Temporal)   Resp 16   Ht 5'   
7  (1.70m)   Wt 162 lb (73.5kg)     
SpO2 98%   BMI 25.37 kg/(m^2).  
  
  
  
Physical Exam  
Vitals and nursing note reviewed.  
Constitutional:  
General: She is not in acute   
distress.  
Appearance: Normal appearance.   
She is well-developed,   
well-groomed and overweight. She   
is not ill-appearing or   
toxic-appearing.  
HENT:  
Head: Normocephalic and   
atraumatic.  
Right Ear: External ear normal.  
Left Ear: External ear normal.  
Nose: Nose normal. No congestion   
or rhinorrhea.  
Right Turbinates: Not pale.  
Left Turbinates: Not pale.  
Mouth/Throat:  
Lips: Pink.  
Mouth: Mucous membranes are   
moist.  
Pharynx: Oropharynx is clear.   
Uvula midline. No pharyngeal   
swelling, posterior oropharyngeal   
erythema or postnasal drip.  
Comments: Tonsils are surgically   
absent.  
Eyes:  
General: Lids are normal. Vision   
grossly intact. Gaze aligned   
appropriately.  
Neck:  
Thyroid: Thyromegaly (Borderline)   
present. No thyroid mass or   
thyroid tenderness.  
Trachea: Trachea and phonation   
normal. No tracheal tenderness or   
abnormal tracheal secretions.  
Cardiovascular:  
Rate and Rhythm: Normal rate and   
regular rhythm.  
Pulses: Normal pulses.  
Heart sounds: Normal heart   
sounds.  
Pulmonary:  
Effort: Pulmonary effort is   
normal. No respiratory distress.  
Breath sounds: Normal breath   
sounds. No decreased breath   
sounds, wheezing, rhonchi or   
rales.  
Abdominal:  
General: Bowel sounds are normal.   
There is no distension.  
Palpations: Abdomen is soft.   
There is no hepatomegaly,   
splenomegaly or mass.  
Tenderness: There is no abdominal   
tenderness. There is no guarding   
or rebound.  
Hernia: No hernia is present.  
Musculoskeletal:  
General: Normal range of motion.  
Cervical back: Normal range of   
motion and neck supple. No edema,   
erythema or rigidity. No pain   
with movement or muscular   
tenderness. Normal range of   
motion.  
Right lower leg: No edema.  
Left lower leg: No edema.  
Lymphadenopathy:  
Cervical: No cervical adenopathy.  
Skin:  
General: Skin is warm and dry.  
Findings: No rash.  
Neurological:  
General: No focal deficit   
present.  
Mental Status: She is alert and   
oriented to person, place, and   
time.  
Motor: No weakness.  
Gait: Gait normal.  
Psychiatric:  
Mood and Affect: Mood is anxious.   
Mood is not depressed.  
Speech: Speech normal.  
Behavior: Behavior normal.   
Behavior is cooperative.  
  
  
  
Assessment & Plan  
ASSESSMENT/PLAN:  
1. Influenza A - ICD9: 487.1,   
ICD10: J10.1 (primary diagnosis)  
Resolving. Patient largely   
asymptomatic. No cough noted on   
exam, lungs clear to   
auscultation, and patient   
afebrile.  
  
2. Chest discomfort - ICD9:   
786.59, ICD10: R07.89  
Atypical chest pain, symptoms are   
not consistent with cardiac   
ischemia due to nonexertional   
nature of symptom, accompanying   
GI symptoms, and localization of   
the pain possible etiology   
include GERD,   
Costochondritis/chest wall pain,   
musculoskeletal, and Anxiety  
Not likely to be cardiac in   
nature. Patient to contact   
cardiologist related to ECG   
changes identified during ER   
visit. Smoking cessation   
recommended. Patient declines to   
pursue at this time.  
- Electrocardiogram: An ECG on   
2025 in [facility] ER   
showed normal sinus rhythm at 86   
BPM, MI interval 138 ms, QRS   
duration 78 ms with poor anterior   
R-wave progression, nonspecific T   
wave abnormality, prolonged QT   
interval 522 ms  
- Oxygen saturation 98%  
- Stress testing recently   
completed per patient's   
cardiologist and unremarkable per   
patient.  
- Follow up 2 weeks  
  
3. Gastroesophageal reflux   
disease without esophagitis -   
ICD9: 530.81, ICD10: K21.9  
Abdominal exam benign without   
significant tenderness.  
- Discussed lifestyle   
modifications including losing   
weight, limiting caffeine, no   
meals three hours before sleep,   
and head of bed elevation  
- CBC, CMP recently completed in   
ER and largely unremarkable with   
exception of mildly elevated   
bilirubin at 1.2 mg/dL  
- Stop omeprazole  
- Begin treatment with Nexium 40   
mg twice daily  
- Refer for GI consult  
- Follow up in 2 weeks  
- ESOMEPRAZOLE MAGNESIUM 40 MG   
CAPSULE,DELAYED RELEASE  
- CONSULT TO GASTROENTEROLOGY  
  
4. Dysphagia, unspecified type -   
ICD9: 787.20, ICD10: R13.10  
See plan #3. No evidence of   
dysphagia on examination.   
Tolerating secretions without   
difficulty. Patient to establish   
with gastroenterology for   
evaluation and treatment.   
Discussed recommendation for EGD.  
- CONSULT TO GASTROENTEROLOGY  
  
Robert Palacios will return   
in 2 week(s) for previously   
scheduled follow-up or sooner as   
needed. Discussed need for   
continued follow-up with all   
specialist providers, taking all   
medications as prescribed,   
increasing activity level as   
tolerated, and lowering sodium   
and processed food intake at   
today's visit. Patient instructed   
to call the office or send a   
message via [facility] with any   
questions related to this visit.  
  
Please Note: This office note has   
been created using waygum, a speech recognition   
software program, and may contain   
errors including punctuation,   
grammar, spelling, gender, and   
inappropriate words or phrases   
that pertain to the system.  
  
DATE: 2025  
SIGNATURE: HELLEN Reyes.CNP  
Electronically signed by Juliane Murphy APRN.CNP at 2025   
4:58 PM EST  
Formatting of this note might be   
different from the original.  
Robert is here today to discuss   
her ongoing chest pain  
She thinks it might be gastro   
related  
She hasn't been eating much this   
week as she has not been feeling   
good with URI/Influenza A  
Yesterday she started feeling   
better so she tried to eat a   
cheeseburger and could only eat   
half and then she felt nauseated  
Robert said her chest has constant   
discomfort  
She has been coughing a lot  
She feels like something is   
caught in her throat  
  
Cardiology called her today to   
find out where she had an EKG  
She has still not been scheduled   
with them  
  
Leanna Burton LPN  
2025 11:07 AM  
  
  
Electronically signed by Leanna Burton LPN at 2025 4:58   
PM EST  
documented in this encounter            [facility]  
   
                                2025 Note                 [facility]  
nter  
   
                                2025 Note                 [facility] [place]  
   
                                                    2025 History of   
Present illness Narrative               Formatting of this note is   
different from the original.  
[facility] Chart   
Review  
  
Provider Action/FYI  
  
  
Patient identified by name and   
date of birth:YES  
  
Patient identified for Care   
Coordination from: [facility] ED   
discharge report  
  
Was patient contacted?: No: PCP   
office contacted patient  
  
Patient discharged from [facility]   
on 25  
  
ED discharge summary:  
The patient is having chest   
discomfort which is more chronic   
with some acute component of   
coughing. Will check for COVID   
and flu, check chest x-ray for   
pneumonia pneumothorax, and rule   
out ACS. Considered pulmonary   
embolism but given the duration   
of her symptoms I think that   
would be unlikely and there is no   
tachycardia, tachypnea nor   
hypoxia, and no clinical features   
of DVT.  
  
The patient's EKG showed sinus   
rhythm with some nonspecific   
changes which were cited on prior   
studies but no ST elevation. Her   
troponin was negative x 2, so no   
sign of ACS. CBC and blood   
chemistries were unremarkable.   
Chest x-ray showed no signs of   
pneumonia. But her COVID and flu   
testing was positive for   
influenza. So I think that does   
explain her current symptoms of   
cough and some of the discomfort   
in her chest, but certainly that   
would not explain several months   
of chest pain. But she has   
already had stress testing that   
was unremarkable reportedly. So I   
think at this point it is   
unlikely that she has unstable   
angina causing her symptoms. I do   
think she may benefit from   
Tamiflu since her influenza   
symptoms just darted yesterday. I   
recommend symptomatic treatment.   
And I do recommend close   
follow-up with her primary care   
provider or her cardiologist,   
since she does have some   
prolongation of the QT interval   
today.  
  
  
Outreach Plan:Follow up call   
needed:Akua Hernandez RN  
  
  
Electronically signed by Alejandrina Hernandez RN at 2025 10:44 AM   
EST  
documented in this encounter            [facility]  
   
                                                    2025 Telephone   
encounter Note                          Formatting of this note might be   
different from the original.  
Thank you for the update!  
  
GHANSHYAM ReyesCNP  
Electronically signed by Juliane Murphy APRN.CNP at 2025   
9:52 AM EST  
                                        [facility]  
   
                                                    2025 Miscellaneous   
Notes                                   Formatting of this note might be   
different from the original.  
Thank you for the update!  
  
Juliane Murphy APRN.CNP  
Electronically signed by Juliane Murphy APRN.CNP at 2025   
9:52 AM EST  
Formatting of this note might be   
different from the original.  
Per Juliane:  
  
Patient seen in ER 2025 with   
complaint of chest pain, cough,   
and congestion. Diagnosed with   
influenza A. Prescribed Tamiflu.  
  
Please contact patient to see how   
she is feeling. EKG in ER did   
show some nonspecific changes.   
Patient stated in 2024 she   
had an appointment to establish   
with Dr. Anaya of   
[facility]. Please   
confirm patient is still   
following and recommend she   
schedule appointment with her   
cardiologist to discuss EKG   
findings.  
  
HELLEN Reyes.CNP  
  
I spoke with patient Robert and   
advised her of all information   
and instructions per Juliane's   
note. Patient did voice   
understanding and is agreeable to   
follow up with cardiology. She is   
starting to feel better and has   
been taking otc TheraFlu.  
  
Leanna Burton LPN  
2025 9:20 AM  
  
  
Electronically signed by Leanna Burton LPN at 2025 9:21   
AM EST  
documented in this encounter            [facility]  
   
                                                    2025 Telephone   
encounter Note                          Formatting of this note might be   
different from the original.  
Per Juliane:  
  
Patient seen in ER 2025 with   
complaint of chest pain, cough,   
and congestion. Diagnosed with   
influenza A. Prescribed Tamiflu.  
  
Please contact patient to see how   
she is feeling. EKG in ER did   
show some nonspecific changes.   
Patient stated in 2024 she   
had an appointment to establish   
with Dr. Anaya of   
[facility]. Please   
confirm patient is still   
following and recommend she   
schedule appointment with her   
cardiologist to discuss EKG   
findings.  
  
HELLEN Reyes.CNP  
  
I spoke with patient Robert and   
advised her of all information   
and instructions per Juliane's   
note. Patient did voice   
understanding and is agreeable to   
follow up with cardiology. She is   
starting to feel better and has   
been taking otc TheraFlu.  
  
Leanna Burton LPN  
2025 9:20 AM  
  
  
Electronically signed by Leanna Burton LPN at 2025 9:21   
AM EST  
                                        [facility]  
   
                                        2025 Note     SARS-COV-2 (AGENT OF  
 COVID-19)   
RNA:  
Not detected  
  
INFLUENZA A RNA:  
Detected  
  
INFLUENZA B RNA:  
Not detected  
  
RESPIRATORY SYNCYTIAL VIRUS (RSV)   
RNA:  
Not detected                            [facility]  
   
                                        Comment on above:   Performed By: #### 9  
[id number] ####[facility]   
LABORATORYCLIA [id number] [place]   
[address] [place]   
   
                                        2025 Instructions   
  
  
Tee Villanueva APRN.CNP -   
2025 11:46 AM ESTFormatting   
of this note might be different   
from the original.  
Proceed to the ER immediately.   
Follow-up with your primary care   
as indicated.  
Electronically signed by Tee Villanueva APRN.CNP at 2025   
11:46 AM EST  
  
documented in this encounter            [facility]  
   
                                2025 Note                 [facility]  
nt  
   
                                                    2025 History of   
Present illness Narrative               Formatting of this note is   
different from the original.  
Robert Palacios is a 44 year   
old female who presents with   
Cough (Cough, sinus drainage,   
chest pain/congestion since   
November, got worse last night)  
  
Patient presents with cough,   
sinus drainage, and chest pain.   
States she has been having   
instances of chest pain since   
November. She was seen in the ER   
on 2024 where she   
had a workup and was diagnosed   
with anxiety. She states last   
night her chest pain was around a   
7 or 8 and that currently it is   
at about a 2. She states she did   
take an Ativan this morning it   
did not help. She states she   
feels she is coming down with a   
respiratory virus. She is   
coughing but not to the point   
that she attributes to her chest   
pain.  
  
Cough  
Associated symptoms include chest   
pain. Pertinent negatives include   
no chills, no headaches and no   
shortness of breath.  
  
PAST MEDICAL HISTORY  
Diagnosis Date  
Anemia  
Anxiety 2014  
Anxiety and depression  
COPD, mild (HCC) 2014  
Depression 2014  
Fibroadenosis of breast  
Fibromyalgia  
GERD (gastroesophageal reflux   
disease) 2014  
HTN (hypertension) 2014  
Hyperlipidemia 2014  
Lump or mass in breast 2009  
Malignant neoplasm of cervix   
uteri, unspecified site  
Precancerous, not cervical cancer  
NO SHOW 2007  
With primary care and specialty   
offices.  
Osteoarthritis of multiple joints  
PMH - PAST MEDICAL HISTORY OF  
diabetes  
Prediabetes  
Raynaud disease  
Raynaud disease  
Restless legs  
Rheumatoid arthritis (HCC)  
RSD lower limb  
bilateral legs  
Smoker 2014  
Tachycardia 2014  
Possible SVT  
Type II or unspecified type   
diabetes mellitus without mention   
of complication, uncontrolled   
2005  
Diet controlled.  
  
ACTIVE PROBLEM LIST  
No Show  
Lump Or Mass in Breast  
Fibroadenosis of Breast  
Anxiety  
Malignant Neoplasm of Cervix   
(Hcc)  
Fibromyalgia  
Rheumatoid Arthritis of Multiple   
Sites With Negative Rheumatoid   
Factor (Hcc)  
Raynaud Disease  
Restless Legs  
Mass of Left Side of Neck  
Routine Gynecological Examination  
Dysphagia  
Smoker  
Mixed Hyperlipidemia  
Tachycardia  
Copd, Mild (Hcc)  
Essential Hypertension  
Constipation  
Fatigue  
Hoarse Voice Quality  
Gastroesophageal Reflux Disease   
Without Esophagitis  
Rsd Lower Limb  
Diabetes Mellitus Type 2,   
Diet-Controlled (Hcc)  
Depression  
Neoplasm of Uncertain Behavior of   
Neck  
Encounter for Gynecological   
Examination Without Abnormal   
Finding  
Adjustment Disorder With Mixed   
Anxiety and Depressed Mood  
Environmental and Seasonal   
Allergies  
Bilateral Low Back Pain With   
Bilateral Sciatica  
Sacroiliitis (Hcc)  
Menopause  
Degenerative Disc Disease, Lumbar  
Thyroid Nodule  
Snoring  
Pain  
Folliculitis  
Loss of Hair  
Chronic Insomnia  
Chronic Midline Low Back Pain   
With Bilateral Sciatica  
Vertigo  
Tinnitus of Both Ears  
Sob (Shortness of Breath)  
Palpitations  
Facial Asymmetry  
Chronic Ankle Pain, Bilateral  
Chronic Foot Pain, Left  
Generalized Anxiety Disorder With   
Panic Attacks  
Vitamin B12 Deficiency  
Multiple Joint Pain  
Chest Discomfort  
Abnormal Holter Exam  
History of Abnormal Mammogram  
Breast Pain, Left  
Thyromegaly  
Chronic Pain Syndrome  
Abnormal Findings On Diagnostic   
Imaging of Breast  
Peripheral Vertigo of Both Ears  
Eustachian Tube Dysfunction,   
Bilateral  
  
  
Current Outpatient Medications  
Medication Sig Dispense Refill  
cetirizine (ZYRTEC) 10 mg tablet   
Take 1 tablet by mouth once   
daily. 90 tablet 3  
omeprazole (PRILOSEC) 40 mg   
capsule Take 1 capsule by mouth   
two times a day. 180 capsule 3  
Blood-Glucose Meter Test One time   
a day and as needed. Insulin Dep?   
Yes E11.9 DM 2 1 Each 0  
blood sugar diagnostic (BLOOD   
GLUCOSE TEST) test strip 1 Strip   
once daily. Test blood sugar once   
daily and as needed. 100 Strip 2  
atenolol (TENORMIN) 25 mg tablet   
take 1 tablet by mouth once daily   
30 tablet 11  
atorvastatin (LIPITOR) 20 mg   
tablet Take 1 tablet by mouth   
once daily. 90 tablet 3  
blood sugar diagnostic (FREESTYLE   
LITE STRIPS) test strip Test   
blood sugar(s) 1 times daily. Dx:   
E11.9. Insulin: No 50 Strip 11  
methotrexate 2.5 mg tablet Take 4   
tablets by mouth every . as   
directed. (Patient not taking:   
Reported on 2025) 48 tablet   
0  
folic acid 1 mg tablet Take 1   
tablet by mouth once daily.   
(Patient not taking: Reported on   
2025) 90 tablet 0  
ondansetron (ZOFRAN) 4 mg tablet   
Take 4 mg by mouth every 8 hours   
as needed for nausea/vomiting.  
meclizine 25 mg chewable   
tablet(s) Take 1 tablet by mouth   
every 8 hours as needed for   
nausea/vomiting or dizziness. 60   
tablet 1  
LORazepam (ATIVAN) 0.5 mg Take 1   
tablet by mouth once daily as   
needed for up to 90 days. 30   
tablet 2  
tiotropium-olodaterol (STIOLTO   
RESPIMAT) 2.5-2.5 mcg/actuation   
inhaler Inhale 2 Puffs as   
instructed once daily. 2 inh a   
day (Patient not taking: Reported   
on 2024) 4 g 1  
albuterol HFA (PROVENTIL HFA,   
VENTOLIN HFA) 90 mcg/actuation   
inhaler Inhale 2 Puffs as   
instructed every 4 hours as   
needed for wheezing/shortness of   
breath. (Patient not taking:   
Reported on 2025) 1 Each 1  
meloxicam (MOBIC) 15 mg tablet   
Take 1 tablet by mouth once   
daily. (Patient taking   
differently: Take 15 mg by mouth   
once daily. prn) 30 tablet 5  
Blood Glucose Control, Normal   
soln Use as instructed 1 Each 0  
Lancets lancets Check glucose   
daily . Dx: 11.9. 100 Each 3  
  
No current facility-administered   
medications for this visit.  
  
Social History  
  
Tobacco Use  
Smoking status: Every Day  
Current packs/day: 0.25  
Average packs/day: 0.3 packs/day   
for 31.4 years (7.9 ttl pk-yrs)  
Types: Cigarettes  
Start date: 1993  
Passive exposure: Past  
Smokeless tobacco: Never  
Vaping Use  
Vaping status: Some Days  
Substances: Nicotine  
Substance Use Topics  
Alcohol use: Not Currently  
Drug use: Not Currently  
Types: Marijuana  
  
  
Alcohol Use: Not Currently  
  
Tobacco Use: Types: Cigarettes  
  
FAMILY HISTORY  
Problem Relation Age of Onset  
Hypertension Mother  
Heart Mother  
Diabetes Mother  
COPD Mother  
Alcohol/Drug Father  
Allergies Brother  
Alcohol/Drug Sister  
No Known Problems Paternal   
Grandmother  
No Known Problems Paternal   
Grandfather  
No Known Problems Son  
No Known Problems Son  
No Known Problems Daughter  
Diabetes Brother  
Hypoglycemic  
Breast Cancer Maternal Aunt  
No Known Problems Maternal   
Grandmother  
No Known Problems Maternal   
Grandfather  
Breast Cancer Maternal Aunt  
  
Review of Systems  
Constitutional: Negative for   
chills and fever.  
Respiratory: Positive for cough.   
Negative for shortness of breath.  
Cardiovascular: Positive for   
chest pain. Negative for   
palpitations.  
Gastrointestinal: Positive for   
nausea. Negative for abdominal   
pain and vomiting.  
Neurological: Negative for   
dizziness and headaches.  
All other systems reviewed and   
are negative.  
  
/79   Pulse 78   Temp 97.4   
  Resp 18   SpO2 99%  
  
  
  
Physical Exam  
Constitutional:  
General: She is not in acute   
distress.  
Appearance: Normal appearance.   
She is not ill-appearing.  
HENT:  
Head: Normocephalic and   
atraumatic.  
Nose: Nose normal.  
Cardiovascular:  
Rate and Rhythm: Normal rate and   
regular rhythm.  
Pulses: Normal pulses.  
Heart sounds: Normal heart   
sounds.  
Pulmonary:  
Effort: Pulmonary effort is   
normal.  
Breath sounds: Normal breath   
sounds.  
Musculoskeletal:  
General: Normal range of motion.  
Cervical back: Normal range of   
motion and neck supple.  
Skin:  
General: Skin is warm and dry.  
Neurological:  
General: No focal deficit   
present.  
Mental Status: She is alert and   
oriented to person, place, and   
time.  
Psychiatric:  
Mood and Affect: Mood normal.  
  
  
  
ASSESSMENT/PLAN:  
1. Chest pain, unspecified type -   
ICD9: 786.50, ICD10: R07.9  
I discussed with the patient the   
many causes of chest pain. Hers   
is mildly reproducible with   
palpation of the midline chest.   
It does not worsen with deep   
breath. Her physical exam is   
unremarkable. She does not appear   
to be anxious. She states that   
she does not feel particularly   
anxious. I requested we do an EKG   
and that the patient would likely   
need to go to the ER. She   
declined the EKG stating she just   
wanted to go to the ER if we were   
unable to help her here. I   
explained that we should do an   
EKG and she should leave for the   
ER in an ambulance. She refused.   
I discussed the risks of driving   
herself to the ER as well as not   
having the EKG done and she   
insisted that she just wanted to   
go to the emergency room. She did   
not know which ER she was going   
to go to. She previously went to   
[facility] where she had her   
chest pain workup but she was   
unsure whether she would go there   
again, [facility], or   
one of the ERs in [place]. Before   
she left I explained 1 additional   
time of the risks of not having   
the EKG done prior to leaving and   
that she should leave in an   
ambulance. The patient again   
declined.  
  
Tee Villanueva  
  
Electronically signed by Tee Villanueva APRN.CNP at 2025   
11:49 AM EST  
documented in this encounter            [facility]  
   
                                2024 Note                 [facility]  
nter  
   
                                                    2024 History of   
Present illness Narrative               Formatting of this note is   
different from the original.  
[facility] Chart   
Review  
  
Provider Action/FYI  
  
  
Patient identified by name and   
date of birth:YES  
  
Patient identified for Care   
Coordination from: Notify ED   
discharge report  
  
Was patient contacted?: No:   
Patient has scheduled follow up   
appointment within 7 days of ED   
visit.  
  
Patient discharged from [place]   
ED on 24  
  
ED discharge summary:  
Encounter Diagnosis  
Recurrent chest pain (Discharge   
Diagnosis) - 24  
Anxiety disorder, unspecified   
(Discharge Diagnosis) - 24  
  
Outreach Plan:Follow up call   
needed:No  
  
Alejandrina Hernandez RN  
  
  
Electronically signed by Alejandrina Hernandez RN at 2024 9:56 AM   
EST  
documented in this encounter            [facility]  
   
                                        2024 Note     HNO ID: [id number]  
Author: REGINALD PEDROZA MD  
Service: ?  
Author Type: Physician  
Type: Progress Notes  
Filed: 2024 14:07  
Note Text:  
This note was created using   
ClickOnriter.  
Subjective  
Robert Palacios is a 44 year   
old female.  
Of the plaquenil  
Hand are hurting  
On plaquenil some improvement  
But could not tolerate  
Nausea and chest pain  
Hand pains swollen and pain  
Back also hurt  
Equal  
Neck pain off and on  
Review of Systems  
Objective  
/67   Pulse 76   Temp 36.9   
?C (98.5 ?F) (Temporal)   Ht   
170.2 cm (5'  
7 )   Wt 73.5 kg (162 lb)   LMP   
(LMP Unknown)   BMI 25.37 kg/m?  
Physical Exam  
Vitals reviewed.  
Constitutional:  
General: She is not in acute   
distress.  
Appearance: She is not   
ill-appearing or toxic-appearing.  
Cardiovascular:  
Rate and Rhythm: Normal rate and   
regular rhythm.  
Heart sounds: Normal heart   
sounds. No murmur heard.  
No friction rub. No gallop.  
Pulmonary:  
Effort: No respiratory distress.  
Breath sounds: Normal breath   
sounds. No stridor. No wheezing   
or rhonchi.  
Abdominal:  
General: There is no distension.  
Palpations: Abdomen is soft.   
There is no mass.  
Tenderness: There is no abdominal   
tenderness.  
Hernia: No hernia is present.  
Musculoskeletal:  
Right shoulder: Normal.  
Left shoulder: Normal.  
Right elbow: Normal.  
Left elbow: Normal.  
Right wrist: Normal.  
Left wrist: Normal.  
Right hand: Normal.  
Left hand: Normal.  
Cervical back: No rigidity or   
tenderness.  
Thoracic back: Normal.  
Lumbar back: Normal.  
Right hip: Normal.  
Left hip: Normal.  
Right knee: Normal.  
Left knee: Normal.  
Right lower leg: No edema.  
Left lower leg: No edema.  
Right ankle: Normal.  
Left ankle: Normal.  
Right foot: Normal.  
Left foot: Normal.  
Comments: No clear synovitis  
Synovitis seen only on US images   
in her  
Lymphadenopathy:  
Cervical: No cervical adenopathy.  
Skin:  
Findings: No rash.  
Assessment and Plan  
First visit 2024 lt handed  
[facility] , [facility]   
diagnosis Dr. Tess Yang   
2024  
Moves to [place]   
RA seronegative  
 HIV Hep B ab 2019 Hep C neg  
2020 ASHLIE neg , 2019 RF CCP ASHLIE   
neg  
 cold agglutin neg cryo neg  
 TPO neg  
 SPEP IgG 550 , 2019 SPEP IgG   
800  
2019   
 alpha 1 anti trypsin 129   
normla  
 IgG 737 IgG1 367 low , IgG2   
278 Low , IgG3 88 IgG4 2.2 low  
 Post vaccination diptheria   
ab. tetanus ab. normal  
2024 aug pre/ post vaccination   
pneumococcal ab.low in serotype :   
2023 RF ASHLIE neg CCP 28 (<20 ) ,   
esr 5 crp normal uric 4.1  
 IgA, Transglutaminase Ab:   
neg, IgA (mg/dl): 126  
 ACE 36  
 cANCA pANCA neg  
 RAST NE 5 neg  
 - ESR < 10 .  
8501-0077 CRP normal  
 chest xr normal  
2019 hand xr bl normal  
2019 feet xr bl normal  
2019 bilateral knee xr : Lateral   
patellar tilt bilaterally.  
2016 CT neck saliva gland normal  
 US bilateral wrist hand  
3RD PIP: Hypertrophy: Moderate.   
Power Doppler: None.  
4TH MCP: Hypertrophy: Moderate.   
Power Doppler: None.  
4TH PIP: Hypertrophy: None. Power   
Doppler: None.  
3RD PIP: Hypertrophy: Moderate.   
Power Doppler: None.  
4TH PIP: Hypertrophy: Moderate.   
Power Doppler: None.  
5TH PIP: Hypertrophy: Moderate.   
Power Doppler: None.  
Synovial hypertrophy without   
hyperemia at the bilateral PIP   
joints as described  
above.  
 EMG rt upper and lower ext   
normal  
((  sept Chronic   
leukocytosis? Last high wbc in   
2019 [id number] and rest normal  
)  
((2024 sept RA diagnosis 2004 [place]   
Rheum hand pain, knee pain back   
pain,  
plaquenil 8514-1963 not sure if   
helped), enbrel 0481-0190 helped   
a lot ) ( no  
use of arava mtx ) ( prednisone   
used past no memory )  
(( to  no tt )) ((2004 no tt ))  
((2024 Raynaud's Phenomenon   
 or before, hand and feet   
triphasic  
changes  
((2024 left side of face   
swells up 2023 onset, left side   
gland swells up  
visually apparent, happens about   
once a week, at time can last one   
one week,  
uses warm rag, no medicine used   
for this ) ( age 25 loss of   
teeth, failed root  
canals )  
((2024 : exam nail capillary   
normal, nasal septal perforation,   
no saliva  
gland swelling )  
TT  
LABS NOT SUPPORTIVE OF   
Immunodeficiency  
10/26/24 plaquenil 300 mg ( never   
took ) , used plaquenil 200 mg   
24  
message sternal pain indigestion   
was told to hold 10 day ( after   
stopping it all  
better )  
24 MTx 10 mg ( max 15 mg )   
24 ( labs in 5 week ) [place]   
next visit  
, I will see visit after .  
Drug and disease monitoring  
2024 cmp cbc normal  
 tsh normal  
2024 : iron 55, tibc 294 %   
sat 19 low , ferritin 6.3 ( PCP   
addressing )  
 folic normal B12 763  
Fibromyalgia  
((2024 or before   
diagnosis )  
TT  
2024  
Jaw pain (( 2024 no bruxism,   
off and on, life long, at time   
years and  
years without it, jaw pops left ,   
TMJ diagnosis )  
2024  
Neck pain (( 2024 or   
before, Pain from the cervical   
spine is  
radiating to the shoulders. , at   
time Cervical radiculopathy   
bilateral rt (more content not   
included)...                            [facility]  
   
                                                    2024 History of   
Present illness Narrative               Formatting of this note might be   
different from the original.  
This note was created using   
NoteWriter.  
Subjective  
Robert Palacios is a 44 year   
old female.  
  
Of the plaquenil  
Hand are hurting  
On plaquenil some improvement  
But could not tolerate  
Nausea and chest pain  
  
Hand pains swollen and pain  
  
Back also hurt  
Equal  
  
Neck pain off and on  
  
Review of Systems  
  
Objective  
/67   Pulse 76   Temp 36.9   
C (98.5 F) (Temporal)   Ht 170.2   
cm (5' 7 )   Wt 73.5 kg (162 lb)   
  LMP (LMP Unknown)   BMI 25.37   
kg/m  
Physical Exam  
Vitals reviewed.  
Constitutional:  
General: She is not in acute   
distress.  
Appearance: She is not   
ill-appearing or toxic-appearing.  
Cardiovascular:  
Rate and Rhythm: Normal rate and   
regular rhythm.  
Heart sounds: Normal heart   
sounds. No murmur heard.  
No friction rub. No gallop.  
Pulmonary:  
Effort: No respiratory distress.  
Breath sounds: Normal breath   
sounds. No stridor. No wheezing   
or rhonchi.  
Abdominal:  
General: There is no distension.  
Palpations: Abdomen is soft.   
There is no mass.  
Tenderness: There is no abdominal   
tenderness.  
Hernia: No hernia is present.  
Musculoskeletal:  
Right shoulder: Normal.  
Left shoulder: Normal.  
Right elbow: Normal.  
Left elbow: Normal.  
Right wrist: Normal.  
Left wrist: Normal.  
Right hand: Normal.  
Left hand: Normal.  
Cervical back: No rigidity or   
tenderness.  
Thoracic back: Normal.  
Lumbar back: Normal.  
Right hip: Normal.  
Left hip: Normal.  
Right knee: Normal.  
Left knee: Normal.  
Right lower leg: No edema.  
Left lower leg: No edema.  
Right ankle: Normal.  
Left ankle: Normal.  
Right foot: Normal.  
Left foot: Normal.  
Comments: No clear synovitis  
Synovitis seen only on US images   
in her  
Lymphadenopathy:  
Cervical: No cervical adenopathy.  
Skin:  
Findings: No rash.  
  
Assessment and Plan  
  
First visit 2024 lt handed  
[facility] seen 2019, no RA   
diagnosis Dr. Tess Yang   
2024  
Moves to [place]   
  
RA seronegative  
 HIV Hep B ab 2019 Hep C neg  
2020 ASHLIE neg , 2019 RF CCP ASHLIE   
neg  
2020 cold agglutin neg cryo neg  
2020 TPO neg  
2020 SPEP IgG 550 , 2019 SPEP IgG   
800  
2019   
 alpha 1 anti trypsin 129   
normla  
 IgG 737 IgG1 367 low , IgG2   
278 Low , IgG3 88 IgG4 2.2 low  
 Post vaccination diptheria   
ab. tetanus ab. normal  
2024 aug pre/ post vaccination   
pneumococcal ab.low in serotype :   
2023 RF ASHLIE neg CCP 28 (<20 ) ,   
esr 5 crp normal uric 4.1  
 IgA, Transglutaminase Ab:   
neg, IgA (mg/dl): 126  
 ACE 36  
 cANCA pANCA neg  
 RAST NE 5 neg  
 - ESR < 10 .  
7601-4219 CRP normal  
 chest xr normal  
2019 hand xr bl normal  
 feet xr bl normal  
 bilateral knee xr : Lateral   
patellar tilt bilaterally.  
2016 CT neck saliva gland normal  
 US bilateral wrist hand  
3RD PIP: Hypertrophy: Moderate.   
Power Doppler: None.  
4TH MCP: Hypertrophy: Moderate.   
Power Doppler: None.  
4TH PIP: Hypertrophy: None. Power   
Doppler: None.  
3RD PIP: Hypertrophy: Moderate.   
Power Doppler: None.  
4TH PIP: Hypertrophy: Moderate.   
Power Doppler: None.  
5TH PIP: Hypertrophy: Moderate.   
Power Doppler: None.  
Synovial hypertrophy without   
hyperemia at the bilateral PIP   
joints as described above.  
 EMG rt upper and lower ext   
normal  
(( 2024 Chronic   
leukocytosis? Last high wbc in   
2019 47644 and rest normal )  
((2024 RA diagnosis  [place]   
Rheum hand pain, knee pain back   
pain, plaquenil 6775-8218 not   
sure if helped), enbrel 1660-4213   
helped a lot ) ( no use of arava   
mtx ) ( prednisone used past no   
memory )  
(( to  no tt )) ((2004 no tt ))  
((2024 Raynaud's Phenomenon   
2010 or before, hand and feet   
triphasic changes  
((2024 left side of face   
swells up 2023 onset, left side   
gland swells up visually   
apparent, happens about once a   
week, at time can last one one   
week, uses warm rag, no medicine   
used for this ) ( age 25 loss of   
teeth, failed root canals )  
((2024 : exam nail capillary   
normal, nasal septal perforation,   
no saliva gland swelling )  
TT  
LABS NOT SUPPORTIVE OF   
Immunodeficiency  
10/26/24 plaquenil 300 mg ( never   
took ) , used plaquenil 200 mg   
24 message sternal pain   
indigestion was told to hold 10   
day ( after stopping it all   
better )  
24 MTx 10 mg ( max 15 mg )   
24 ( labs in 5 week ) [place]   
next visit , I will see visit   
after .  
  
Drug and disease monitoring  
2024 cmp cbc normal  
 tsh normal  
  
2024 : iron 55, tibc 294 %   
sat 19 low , ferritin 6.3 ( PCP   
addressing )  
 folic normal B12 763  
  
Fibromyalgia  
((2024 or before   
diagnosis )  
TT  
2024  
  
Jaw pain (( 2024 no bruxism,   
off and on, life long, at time   
years and years without it, jaw   
pops left , TMJ diagnosis )  
2024  
  
Neck pain (( 2024 or   
before, Pain from the cervical   
spine is radiating to the   
shoulders. , at time Cervical   
radiculopathy bilateral rt > left   
)  
2024  
  
Mid back pain (( 2024   
or before ))  
  
Chronic low back pain (( 2024 onset 2004, Lumbar   
radiculopathy bilateral )  
TT  
PTdone, last 2024  
aquatic therapy not done,   
2024  
Chiropractor manipulation not   
done, Massage therapy not done   
2024  
, Acupuncture not done 2024  
Failed epidural nerve block no   
better 2024  
Pain mgt seen past 2024  
Spinal cord stimulator 2019   
placed L4 2024  
  
bilateral shoulder pain (( 2024 from CS , never   
dislocated, Pain in the joint   
while patient is sleeping. Has   
full range of motion ) (rt   
shoulder pain > left shoulder s )   
( no elbow pains, no wrist pains   
))  
TT  
2024 no inj done so far   
2024  
  
Hand pain ((  onset , initial   
diagnosis of RA ) (( bilateral   
hand numb, able to shake this   
off, worse in am, EMG done rt   
side normal , hand numb since   
 )  
TT  
2024  
  
HIp pain (( 2024 has battery   
left buttock and attributes to   
same )  
TT  
2024  
  
Knee pain (( 2024 bilateral   
 onset, at time swell up, rt   
> left , never inj )  
TT  
2024  
  
Ankle feet pains (( 2024   
2004 onset, and worse ,   
Raynaud's Phenomenon triphasic,   
hurt even when warm, I nthe cold   
feet go numb )  
2024  
  
Diabetes Mellitus II    
controlled diet 2024  
HTN  onset 2024  
Heat palpitation beta blocker   
2004 started 2024  
hyperlipidemia 2024  
No MI STroke 2024  
  
COPD  
( 2024 no asthma )  
Allergic Rhinitis and Allergic   
Conjunctivitis ( never skin   
tested )  
Pneumonia 2024 just one   
2024  
Sinus infection recurrent ( with   
grandson exposure nov to April   
just non stop )) 2024  
Recurrent ear infection ( no   
tubes in ear )  
 FVC 3.82 97%, FEV1 3.15 99%,   
ratio 101, normal  
2024 FVC 4.29 115% FEV1 3.4   
113 ratio 97 no change albuterol,   
RV 1.44 77% TLC 5.3 96% WILLIAN) 74   
DLCO/Va 91  
Mild decrease in RV observe   
2024  
2016 PCV 13 done  
  
Sinus headache  
((  sept multiple time a   
week, onset  )  
 nasal septal perforation ((   
denies cocaine use, denies   
steroid use, used afrin ))  
 RAST NE 5 neg  
 aANCA pANCA neg  
 IgG 737, IgG1 367 low , IgG2   
238 low IgG3 88 IgG4 2.2  
TT  
2024 (( no headache before   
this ))  
  
Dizziness  
(( 2024 off and on , years,   
, couple of yrs, not sure what   
causes, mostly with activity,   
momentary dizzy only while   
standing will grab some thing and   
resolves )  
  
anxiety ( intermittent )  
No depression 2024 no PTSD   
2024  
Psychologist / psychiatrist seen,   
counseling done.  or before   
2024  
  
GERD ( no EGD or Colonoscopy done   
)  
((Wt loss :: 2024 to   
present 2024 17 lbs, low   
grade nausea, loss of appetite )  
TT  
PPI 2004 2024  
  
Pain mgt PAIN MGT once a year   
2024 (( [facility] )  
Cymbalta used 2019 or prior not   
effective OFF  
Codeine nausea vomit OFF  
gabapentin headache and quit OFF  
  
Buspar used  not effective   
went OFF  
Lyrica 150 mg bid  helpedquit   
 (( son addict and wanted to   
get rid of all med )) OFF  
[place] prn  quit  helped (   
son addict and wanted to get rid   
of all med )) OFF  
Zanaflex 8 mg tid  ( went   
off , helped initially and then   
off )) OFF  
  
MObic  started partially   
effective 2024 mobc 15 mg.  
Med Marijuana  started   
2024  
  
Brief Personal and family   
history:  
Hysterectomy ( still with ovary )   
24  
Started smoking age 13 2024   
1/5 ppd 2024  
No ETOH 2024  
Custody of grand son 4 Cystic   
fibrosis (32 yr old sons son ) ,   
17 yr old son 2024 at home (   
lives with boyfriend ) 2024  
(( Only time she uses med   
marijuana is when grandson goes   
to his other grandparents )  
32 yr old son with addiction   
issue 2024  
One son 25 2024  
Daughter 27 2024  
2 brother no med issue she is   
aware of 2024  
1 sister med issue not aware, no   
contact 30 yr 2024  
Father dead age 40 Suicide   
2024  
Mother dead age 60 cause of death   
? 2024  
  
MTX Risk and benefit of   
methotrexate discussed in detail   
with patient. Use the pill empty   
stomach. Do not use bactrim while   
on this pill. Stressed the total   
need for avoidance of alcohol.   
Possible side effects include but   
are not limited to rashes, GI   
distress, alopecia, infections,   
hematologic and hepatic toxicity,   
painful mucosal ulcerations.   
Photosensitivity can be there.   
Flu like symptoms, feeling tired   
on the day of ingestion. Stressed   
the need for close laboratory   
monitoring to access for   
hematologic and hepatic side   
effects. Blood test to be done   
initially every 4 weeks and then   
if doing good to be prolonged to   
every 8-12 weeks and not beyond   
that. ( American College of   
Rheumatology recommends   
monitoring CBC, Creatinine and   
LFT at least every three months)   
. Take pill on Tuesday, get all   
lab done on Monday if possible.   
To safe money can get   
methotrexate vials, draw the   
fluid out in the right amount,   
put in little bit of juice and   
swallow. Methotrexate more   
effective in injections then   
pill.  
Made aware of the teratogenic   
effect of methotrexate both in   
the man and women. Patient make   
clear that grave risk of fetal   
disease and malformation if a   
baby is conceived by parents   
using this medication. Both men   
and women need to be off   
methotrexate for 6 months before   
attempting any contraception. (   
BD syringe 27  )  
Electronically signed by Reginald Pedroza MD at 2024 2:07   
PM EST  
documented in this encounter            [facility]  
   
                                        2024 Instructions   
  
  
Juliane Murphy APRN.CNP -   
2024 11:22 AM ESTFormatting   
of this note might be different   
from the original.  
Return for follow-up visit in   
January as scheduled or sooner as   
needed.  
  
- Continue to hold   
hydroxychloroquine per Dr. Pedroza.   
Return for follow-up with Dr. Pedroza on 2024 to discuss   
alternate treatment options for   
joint pain. Discuss request to   
return to treatment with Enbrel.  
  
- Continue lorazepam as needed   
for severe anxiety. Use vagus   
nerve stimulation to reduce   
anxiety (bear down like having a   
bowel movement, humming, splash   
cold water on face, or take a   
cold shower).  
  
- Continue to follow with   
pulmonology for results of recent   
lung function testing and   
medication management. Keep March   
appointment to discuss COPD   
diagnosis.  
  
- If symptoms of stomach upset   
persist past 5 days or become   
severe, return for sooner   
follow-up or seek treatment via   
urgent care. Eat small, more   
frequent meals, bland foods, and   
increase hydration to prevent   
diarrhea.  
  
Take all medications as   
prescribed.  
Continue to follow with   
specialist providers.  
Healthy diet and regular aerobic   
exercise recommended.  
  
Please call the office at   
[phone] Option 4 or send a   
direct message via [employer] with   
any questions related to your   
visit today. Time throughout the   
day is reserved for patients with   
scheduled appointments. Please   
kindly allow 2-3 business days   
for staff to respond to phone   
calls or [employer] messages.  
Electronically signed by Juliane Murphy APRN.CNP at 2024   
11:42 AM EST  
  
documented in this encounter            [facility]  
   
                                2024 Note                 [facility]  
nter  
   
                                                    2024 History of   
Present illness Narrative               Formatting of this note might be   
different from the original.  
AMBULATORY TELEPHONE VISIT  
  
I have communicated my name and   
active [place] licensure. The   
patient's identity and physical   
location were verified at the   
time of this visit. Robert Palacios or their legal   
representative has been informed   
of the risks and benefits of --   
and alternatives to -- treatment   
through a remote evaluation and   
consents to proceed with the   
evaluation remotely.  
  
Persons Present: patient  
  
Chief Complaint/Reason: Worsening   
anxiety  
  
HPI: Patient presents by   
telephone today to discuss   
symptoms of worsening anxiety.   
Follows with rheumatology Dr. Pedroza for management of chronic   
joint pain, fibromyalgia. Was   
initiated on hydroxychloroquine   
in May. Recently experienced   
worsening of anxiety symptoms.   
Contacted her rheumatologist and   
was advised to hold   
hydroxychloroquine for 10 days   
prior to resuming to assess for   
symptom improvement. Patient has   
currently been holding the   
medication for 8 days and is   
feeling much better. Noticed a   
reduction in anxiety after day 5   
without medication. Reports   
increased joint pain and swelling   
of her hands since stopping the   
medication. Does not plan to   
restart hydroxychloroquine at   
this time. Has follow-up   
scheduled with Dr. Pedroza on   
2024 and plans to discuss   
restarting treatment with Enbrel,   
which has been effective for her   
in the past.  
  
Patient also reports   
discontinuation of her Trelegy   
inhaler because it  made no   
difference in her breathing.    
Follows with Dr. Delgado of   
pulmonology for management of   
COPD. Patient reports diagnosis   
was given 8 years ago. Per recent   
lung function testing and review   
of medical records, pulmonology   
feels patient does not have   
active COPD but may be in the   
early stages of development. She   
continues to smoke cigarettes at   
this time. Declines to pursue   
cessation. Follow-up with Dr. Delgado is scheduled for 2025, however, patient is   
attempting to secure a sooner   
appointment to discuss spirometry   
results.  
  
Patient also notes she has   
developed diarrhea which began at   
4:00 this morning. Denies   
presence of blood or mucus in   
stool, fever, chills, myalgias,   
nausea, vomiting. Reports her   
grandson all liver experienced   
acute GI illness approximately 48   
hours ago. Patient has partial   
custody of her grandson and notes   
exposure during period of   
illness. Has been drinking Sprite   
and able to keep down fluids. No   
active nausea or emesis at this   
time. Patient is otherwise   
feeling well.  
  
Data Reviewed: Most recent labs  
Outside chart from Dr. Pedroza, Dr. Delgado reviewed.  
  
Assessment/Plan:  
ASSESSMENT/PLAN:  
1. Generalized anxiety disorder   
with panic attacks - ICD9:   
300.02, 300.01, ICD10: F41.1,   
F41.0 (primary diagnosis)  
Stable. Symptoms have improved   
since discontinuation of   
hydroxychloroquine. Patient   
elects to continue holding   
medication at this time. May   
continue lorazepam once daily as   
needed with severe anxiety   
symptoms. Also recommend trialing   
vagus nerve stimulation to reduce   
anxiety which was discussed with   
patient in detail during visit   
today.  
  
2. Multiple joint pain - ICD9:   
719.49, ICD10: M25.50  
Worsened since discontinuation of   
hydroxychloroquine. Patient   
advised to keep upcoming   
follow-up with Dr. Pedroza on   
2024 to discuss alternate   
treatment options for management   
of joint pain.  
  
3. SOB (shortness of breath) -   
ICD9: 786.05, ICD10: R06.02  
Stable. Patient to continue   
following with pulmonology.   
Discussed need for maintenance   
inhaler or additional   
surveillance with pulmonology at   
upcoming follow-up.  
  
4. Acute diarrhea - ICD9: 787.91,   
ICD10: R19.7  
Increase water intake to prevent   
dehydration. Avoid Imodium due to   
viral nature of illness. Consume   
small, frequent meals with bland   
foods. If symptoms persist past 5   
days or become severe, patient to   
return for sooner follow-up or   
seek treatment via higher level   
of care.  
  
Total Time Spent: 30 minutes  
  
Robert Palacios will return   
in 1 month(s) for chronic   
conditions or sooner as needed.   
Patient instructed to call the   
office or send a message via   
Cursogram with any questions   
related to this visit.  
  
Please Note: This office note has   
been created using waygum, a speech recognition   
software program, and may contain   
errors including punctuation,   
grammar, spelling, gender, and   
inappropriate words or phrases   
that pertain to the system.  
  
DATE: 2024  
SIGNATURE: HELLEN Reyes.CNP  
Electronically signed by Juliane Murphy APRN.CNP at 2024   
11:53 AM EST  
Formatting of this note might be   
different from the original.  
Robert is participating in this   
virtual telephone visit   
appointment from her home in   
[facility]  
  
Patient is having anxiety   
exacerbation  
  
Per Dr Alaniz she has been holding   
the hydroxychloroquine  
After day 5 she did notice a   
difference for the better,   
patient is feeling less anxious  
This is day 8 now that she has   
not been taking it  
She also stopped her Trelegy   
inhaler because it made no   
difference in her breathing  
Patient had a breathing test done   
per pulmnology and she is unsure   
if she has copd or not  
  
Patient also said she is not   
feeling very good today as she   
thinks she is getting sick  
  
Leanna Burton LPN  
2024 11:06 AM  
  
Electronically signed by Leanna Burton LPN at 2024 11:53   
AM EST  
documented in this encounter            [facility]  
   
                                2024 Note                 [facility]  
nter  
   
                                                    2024 Telephone   
encounter Note                          Formatting of this note might be   
different from the original.  
Patient called in and stated that   
she is looking for results for   
her PFT that were done on   
24. She stated that she can   
see the results in my chart but   
not sure what they actually mean,   
she would like a call back, also   
stated that if she doesn't have   
COPD then it needs to be removed   
of her reason of treatment.  
  
Ave Wilks  
  
Electronically signed by Ave Wilks at 2024 9:42 AM EST  
                                        [facility]  
   
                                                    2024 Miscellaneous   
Notes                                   Formatting of this note might be   
different from the original.  
Patient called in and stated that   
she is looking for results for   
her PFT that were done on   
24. She stated that she can   
see the results in my chart but   
not sure what they actually mean,   
she would like a call back, also   
stated that if she doesn't have   
COPD then it needs to be removed   
of her reason of treatment.  
  
Ave Wilks  
  
Electronically signed by Ave Wilks at 2024 9:42 AM EST  
documented in this encounter            [facility]  
   
                                                    2024 Telephone   
encounter Note                          Formatting of this note might be   
different from the original.  
Pt reports that since starting   
plaquenil she has been with   
indigestion and slight pressure   
of the sternal area. Her PCP   
encouraged her to call this   
office. Pt never took the 1.5   
tabs daily has been on just 1 tab   
daily.  
  
Last visit was 24 next visit   
24  
  
Ondina Fitch LPN  
Electronically signed by   
Ondina Fitch LPN at   
2024 2:15 PM EST  
                                        [facility]  
   
                                                    2024 Miscellaneous   
Notes                                   Formatting of this note might be   
different from the original.  
Pt reports that since starting   
plaquenil she has been with   
indigestion and slight pressure   
of the sternal area. Her PCP   
encouraged her to call this   
office. Pt never took the 1.5   
tabs daily has been on just 1 tab   
daily.  
  
Last visit was 24 next visit   
24  
  
Ondina Fitch LPN  
Electronically signed by   
Ondina Fitch LPN at   
2024 2:15 PM EST  
documented in this encounter            [facility]  
   
                                2024 Note                 [facility]  
nt  
   
                                                    2024 History of   
Present illness Narrative               Formatting of this note might be   
different from the original.  
PULM FUNCTION:  
Provider: Judie Delgado MD  
Assisting Tech: Ruth Leo,   
RRT  
Spirometry w/BD: 1  
DLCO: 1  
LV - Box: 1  
  
  
Electronically signed by   
Ruth Leo, RRT at   
2024 11:27 AM EST  
documented in this encounter            [facility]  
   
                                        2024 Instructions   
  
  
Juliane Murphy APRN.ALBERTA -   
2024 2:26 PM ESTFormatting   
of this note might be different   
from the original.  
Return for follow-up visit in   
January or sooner as needed.  
  
Restart cetirizine for eustachian   
tube dysfunction.  
  
Refill of meclizine sent to   
[facility].  
  
Call [phone], Option 1 to   
schedule vestibular therapy.   
Please request appointment with   
Shweta at [facility] on [address].  
  
If symptoms do not improve,   
consider establishing with ENT.   
Referral in place from ER to Dr. Santi [facility]:  
[address]   
[address] [place] [address]  
[phone]  
  
Take all medications as   
prescribed.  
Continue to follow with   
specialist providers.  
Healthy diet and regular aerobic   
exercise recommended.  
  
Please call the office at   
[phone] Option 4 or send a   
direct message via Digistrivehart with   
any questions related to your   
visit today. Time throughout the   
day is reserved for patients with   
scheduled appointments. Please   
kindly allow 2-3 business days   
for staff to respond to phone   
calls or Digistrivehart messages.  
Electronically signed by Juliane Murphy APRN.CNP at 2024   
2:35 PM EST  
  
documented in this encounter            [facility]  
   
                                2024 Note                 [facility]  
nt  
   
                                                    2024 History of   
Present illness Narrative               Formatting of this note is   
different from the original.  
This note was created using   
NoteWriter.  
Subjective  
Robert Palacios is a 44 year   
old female presenting today   
related to ER follow-up. Patient   
presented to [facility] ER   
on 2024 for evaluation of   
dizziness, described as  the room   
is spinning  with associated with   
nausea. Symptom onset 1 hour   
prior to arrival in ER. Denied   
headache, vision changes, or   
other cardiovascular symptoms.   
Did endorse mild congestion   
consistent with early onset of   
URI. Historically, vertigo has   
been associated with otitis   
media. EKG unremarkable in ER.   
Symptoms resolved fully with   
administration of meclizine and   
Zofran. Imaging and lab   
assessment were deferred.   
Prescriptions for home-going   
meclizine and Zofran were   
provided alongside ENT referral   
to Dr. Santi P[facility] should symptoms persist.  
  
Patient is currently feeling well   
with rare as needed use of   
meclizine. She is no longer   
requiring use of Zofran and   
nausea has fully resolved.   
Complains of left ear discomfort   
and  fluid in both ears.  Denied   
congestion, fever, or other   
symptoms of upper respiratory   
illness. Was previously   
prescribed cetirizine for   
management of allergy symptoms   
and eustachian tube dysfunction.   
Is not currently using cetirizine   
at this time.  
  
PAST MEDICAL HISTORY  
Diagnosis Date  
Anemia  
Anxiety 2014  
Anxiety and depression  
COPD, mild (HCC) 2014  
Depression 2014  
Fibroadenosis of breast  
Fibromyalgia  
GERD (gastroesophageal reflux   
disease) 2014  
HTN (hypertension) 2014  
Hyperlipidemia 2014  
Lump or mass in breast 2009  
Malignant neoplasm of cervix   
uteri, unspecified site  
Precancerous, not cervical cancer  
NO SHOW 2007  
With primary care and specialty   
offices.  
Osteoarthritis of multiple joints  
PMH - PAST MEDICAL HISTORY OF  
diabetes  
Prediabetes  
Raynaud disease  
Raynaud disease  
Restless legs  
Rheumatoid arthritis (HCC)  
RSD lower limb  
bilateral legs  
Smoker 2014  
Tachycardia 2014  
Possible SVT  
Type II or unspecified type   
diabetes mellitus without mention   
of complication, uncontrolled   
2005  
Diet controlled.  
  
ACTIVE PROBLEM LIST  
No Show  
Lump Or Mass in Breast  
Fibroadenosis of Breast  
Anxiety  
Malignant Neoplasm of Cervix   
(Hcc)  
Fibromyalgia  
Rheumatoid Arthritis of Multiple   
Sites With Negative Rheumatoid   
Factor (Hcc)  
Raynaud Disease  
Restless Legs  
Mass of Left Side of Neck  
Routine Gynecological Examination  
Dysphagia  
Smoker  
Mixed Hyperlipidemia  
Tachycardia  
Copd, Mild (Hcc)  
Essential Hypertension  
Constipation  
Fatigue  
Hoarse Voice Quality  
Gastroesophageal Reflux Disease   
Without Esophagitis  
Rsd Lower Limb  
Diabetes Mellitus Type 2,   
Diet-Controlled (Hcc)  
Depression  
Neoplasm of Uncertain Behavior of   
Neck  
Encounter for Gynecological   
Examination Without Abnormal   
Finding  
Adjustment Disorder With Mixed   
Anxiety and Depressed Mood  
Environmental and Seasonal   
Allergies  
Bilateral Low Back Pain With   
Bilateral Sciatica  
Sacroiliitis (Hcc)  
Menopause  
Degenerative Disc Disease, Lumbar  
Thyroid Nodule  
Snoring  
Pain  
Folliculitis  
Loss of Hair  
Chronic Insomnia  
Chronic Midline Low Back Pain   
With Bilateral Sciatica  
Vertigo  
Tinnitus of Both Ears  
Sob (Shortness of Breath)  
Palpitations  
Facial Asymmetry  
Chronic Ankle Pain, Bilateral  
Chronic Foot Pain, Left  
Beny (Generalized Anxiety   
Disorder)  
Vitamin B12 Deficiency  
Multiple Joint Pain  
Chest Discomfort  
Abnormal Holter Exam  
History of Abnormal Mammogram  
Breast Pain, Left  
Thyromegaly  
  
  
Current Outpatient Medications  
Medication Sig Dispense Refill  
meclizine 25 mg chewable   
tablet(s) Take 25 mg by mouth   
three times a day as needed for   
nausea/vomiting or dizziness.  
ondansetron (ZOFRAN) 4 mg tablet   
Take 4 mg by mouth every 8 hours   
as needed for nausea/vomiting.  
hydrOXYchloroQUINE (PLAQUENIL)   
200 mg tablet Take 1.5 tablets by   
mouth once daily. 45 tablet 5  
LORazepam (ATIVAN) 0.5 mg Take 1   
tablet by mouth once daily as   
needed for up to 90 days. 30   
tablet 2  
tiotropium-olodaterol (STIOLTO   
RESPIMAT) 2.5-2.5 mcg/actuation   
inhaler Inhale 2 Puffs as   
instructed once daily. 2 inh a   
day 4 g 1  
fluticasone-umeclidin-vilanter   
(TRELEGY ELLIPTA) 200-62.5-25 mcg   
inhalation powder Inhale 1 Puff   
as instructed once daily. 1 Each   
5  
albuterol HFA (PROVENTIL HFA,   
VENTOLIN HFA) 90 mcg/actuation   
inhaler Inhale 2 Puffs as   
instructed every 4 hours as   
needed for wheezing/shortness of   
breath. 1 Each 1  
meloxicam (MOBIC) 15 mg tablet   
Take 1 tablet by mouth once   
daily. 30 tablet 5  
cetirizine (ZYRTEC) 10 mg tablet   
Take 1 tablet by mouth once   
daily. 90 tablet 3  
omeprazole (PRILOSEC) 40 mg   
capsule Take 1 capsule by mouth   
two times a day. 180 capsule 3  
Blood-Glucose Meter Test One time   
a day and as needed. Insulin Dep?   
Yes E11.9 DM 2 1 Each 0  
Blood Glucose Control, Normal   
soln Use as instructed 1 Each 0  
blood sugar diagnostic (BLOOD   
GLUCOSE TEST) test strip 1 Strip   
once daily. Test blood sugar once   
daily and as needed. 100 Strip 2  
atenolol (TENORMIN) 25 mg tablet   
take 1 tablet by mouth once daily   
30 tablet 11  
atorvastatin (LIPITOR) 20 mg   
tablet Take 1 tablet by mouth   
once daily. 90 tablet 3  
blood sugar diagnostic (FREESTYLE   
LITE STRIPS) test strip Test   
blood sugar(s) 1 times daily. Dx:   
E11.9. Insulin: No 50 Strip 11  
Lancets lancets Check glucose   
daily . Dx: 11.9. 100 Each 3  
  
No current facility-administered   
medications for this visit.  
  
Medications were reviewed and   
verified.  
  
If pain assessment is 0, no   
action needed.  
If pain assessment is positive,   
please see assessment and plain.  
  
Social History  
  
Tobacco Use  
Smoking status: Every Day  
Current packs/day: 0.25  
Average packs/day: 0.3 packs/day   
for 31.3 years (7.8 ttl pk-yrs)  
Types: Cigarettes  
Start date: 1993  
Passive exposure: Past  
Smokeless tobacco: Never  
Vaping Use  
Vaping status: current everyday   
user  
Substances: Nicotine  
Substance Use Topics  
Alcohol use: Not Currently  
Drug use: Not Currently  
Types: Marijuana  
  
  
FAMILY HISTORY  
Problem Relation Age of Onset  
Hypertension Mother  
Heart Mother  
Diabetes Mother  
COPD Mother  
Alcohol/Drug Father  
Allergies Brother  
Alcohol/Drug Sister  
No Known Problems Paternal   
Grandmother  
No Known Problems Paternal   
Grandfather  
No Known Problems Son  
No Known Problems Son  
No Known Problems Daughter  
Diabetes Brother  
Hypoglycemic  
Breast Cancer Maternal Aunt  
No Known Problems Maternal   
Grandmother  
No Known Problems Maternal   
Grandfather  
Breast Cancer Maternal Aunt  
  
Review of Systems  
Constitutional: Positive for   
fatigue (At baseline). Negative   
for activity change, appetite   
change, chills, diaphoresis and   
fever.  
HENT: Positive for ear pain   
(Left). Negative for congestion,   
ear discharge, hearing loss,   
postnasal drip, rhinorrhea, sinus   
pressure, sinus pain, sneezing,   
sore throat, tinnitus, trouble   
swallowing and voice change.  
Eyes: Negative for visual   
disturbance.  
Respiratory: Positive for   
shortness of breath (Mild   
intermittent with exertion at   
baseline). Negative for cough,   
chest tightness and wheezing.  
Cardiovascular: Negative for   
chest pain, palpitations   
(Historical- none currently) and   
leg swelling.   
Gastrointestinal: Negative for   
abdominal distention, abdominal   
pain, nausea and vomiting.  
Neurological: Positive for   
dizziness (Mild transient-   
controlled w/ meclizine).   
Negative for syncope, weakness,   
light-headedness and headaches.  
Psychiatric/Behavioral: The   
patient is not nervous/anxious.  
  
  
Objective  
/76   Pulse 86   Temp (Src)   
97.9 (Temporal)   Resp 16   Ht 5'   
7  (1.70m)   Wt 163 lb 6.4 oz   
(74.1kg)   SpO2 96%   BMI 25.59   
kg/(m^2).  
  
  
Physical Exam  
Vitals and nursing note reviewed.  
Constitutional:  
General: She is not in acute   
distress.  
Appearance: Normal appearance.   
She is well-groomed and normal   
weight. She is not ill-appearing   
or diaphoretic.  
HENT:  
Head: Normocephalic and   
atraumatic.  
Jaw: There is normal jaw   
occlusion.  
Right Ear: Hearing, ear canal and   
external ear normal. No drainage,   
swelling or tenderness. A middle   
ear effusion is present. There is   
no impacted cerumen. No foreign   
body. Tympanic membrane is not   
injected, scarred, erythematous   
or bulging.  
Left Ear: Hearing, ear canal and   
external ear normal. No drainage,   
swelling or tenderness. A middle   
ear effusion is present. There is   
no impacted cerumen. No foreign   
body. Tympanic membrane is not   
injected, scarred, erythematous   
or bulging.  
Nose: Nose normal. No congestion   
or rhinorrhea.  
Right Turbinates: Pale.  
Left Turbinates: Pale.  
Right Sinus: No maxillary sinus   
tenderness or frontal sinus   
tenderness.  
Left Sinus: No maxillary sinus   
tenderness or frontal sinus   
tenderness.  
Eyes:  
General: Lids are normal. Vision   
grossly intact. Gaze aligned   
appropriately.  
Extraocular Movements:   
Extraocular movements intact.  
Conjunctiva/sclera: Conjunctivae   
normal.  
Cardiovascular:  
Rate and Rhythm: Normal rate and   
regular rhythm.  
Pulses: Normal pulses.  
Heart sounds: Normal heart   
sounds.  
Pulmonary:  
Effort: Pulmonary effort is   
normal. No respiratory distress.  
Breath sounds: Normal breath   
sounds. No decreased breath   
sounds, wheezing, rhonchi or   
rales.  
Abdominal:  
General: Bowel sounds are normal.   
There is no distension.  
Palpations: Abdomen is soft.  
Tenderness: There is no abdominal   
tenderness.  
Musculoskeletal:  
General: Normal range of motion.  
Cervical back: Normal range of   
motion and neck supple.  
Right lower leg: No edema.  
Left lower leg: No edema.  
Lymphadenopathy:  
Cervical: No cervical adenopathy.  
Skin:  
General: Skin is warm and dry.  
Capillary Refill: Capillary   
refill takes less than 2 seconds.  
Findings: No rash.  
Neurological:  
General: No focal deficit   
present.  
Mental Status: She is alert and   
oriented to person, place, and   
time.  
Motor: No weakness.  
Gait: Gait normal.  
Psychiatric:  
Mood and Affect: Mood normal.  
Behavior: Behavior normal.   
Behavior is cooperative.  
  
  
  
Assessment & Plan  
ASSESSMENT/PLAN:  
1. Peripheral vertigo of both   
ears - ICD9: 386.10, ICD10:   
H81.393 (primary diagnosis)  
Continue meclizine as needed.   
Patient to schedule vestibular   
therapy appointment with Shweta at   
[facility].   
Management of eustachian tube   
dysfunction per plan #3. ENT   
referral in place per ER to [facility] Dr. Easton Pond. Patient to establish care   
with ENT for ongoing treatment if   
vestibular therapy ineffective.  
- MECLIZINE 25 MG CHEWABLE TABLET  
- CONSULT TO PHYSICAL THERAPY  
  
2. Encounter for follow-up   
examination - ICD9: V67.9, ICD10:   
Z09  
Complete.  
  
3. Eustachian tube dysfunction,   
bilateral - ICD9: 381.81, ICD10:   
H69.93  
Restart treatment with cetirizine   
to reduce symptoms of peripheral   
vertigo. See plan #1.  
  
Robert Palacios will return   
in 2 month(s) for surveillance of   
chronic conditions or sooner as   
needed. Discussed need for   
continued follow-up with all   
specialist providers, taking all   
medications as prescribed,   
increasing activity level as   
tolerated, and lowering sodium   
and processed food intake at   
today's visit. Patient instructed   
to call the office or send a   
message via Cursogram with any   
questions related to this visit.  
  
Please Note: This office note has   
been created using waygum, a speech recognition   
software program, and may contain   
errors including punctuation,   
grammar, spelling, gender, and   
inappropriate words or phrases   
that pertain to the system.  
  
DATE: 2024  
SIGNATURE: Juliane Murphy APRN.CNP  
Electronically signed by Juliane Murphy APRN.CNP at 2024   
10:23 PM EST  
Formatting of this note might be   
different from the original.  
Robert is here today for a   
transition of care [facility] follow up due to   
vertigo.  
She was nauseated and vertigo  
She was hooked up to an EKG right   
away and told it was not her   
heart  
She was given Zofran which did   
help  
  
She is currently taking Meclizine   
to keep the vertigo away  
Robert said her left ear is   
starting to bother her and she   
has fluid in both ears  
  
Leanna Burton LPN  
2024 2:17 PM  
  
Electronically signed by Leanna Burton LPN at 2024 10:23   
PM EST  
documented in this encounter            [facility]  
   
                                2024 Note                 [facility]  
[facility]  
   
                                2024 Note                 [facility]  
nt  
   
                                                    2024 History of   
Present illness Narrative               Formatting of this note is   
different from the original.  
[facility] Chart   
Review  
  
Provider Action/FYI  
  
  
Patient identified by name and   
date of birth:YES  
  
Outreach made: No: Outreach not   
necessary  
  
Patient identified for Care   
Coordination from: Notify report:   
ED discharge: Patient discharged   
from [facility] ED on   
11/15/24 and has a TCM   
appointment scheduled.. ED   
Summary:  
  
Patient presents to the emergency   
room for evaluation of vertigo,   
started about an hour prior to   
arrival. No trauma no fever no   
headache no vision changes no   
speech changes no weakness. She   
is overall well-appearing, stable   
vitals, she is neurologically   
intact. Her symptoms are very   
much peripheral in nature   
including the room spinning   
sensation and  
nausea. She does not have any   
risk factors for intracranial   
process, we did do a EKG which  
was unremarkable did not show any   
acute changes. Also she just had   
a stress test recently because   
she was having palpitations that   
was completely normal. I   
certainly do not think there is   
any cardiac cause. She has had a   
little bit of congestion in the   
last day or so so  
it is likely viral in nature. We   
decided to start with Zofran and   
meclizine and then if no  
improvement or resolution then we   
will go ahead and get labs and   
imaging. On reevaluation at 930   
she looked much more comfortable   
and states that her symptoms are   
completely resolved. We discussed   
symptom management, did give her   
prescription for Zofran and  
meclizine as needed and then gave   
her ENT follow-up if she   
continues to have issues, she   
states that in this past year she   
has had vertigo couple of times   
so she is not really sure what is   
causing it. I gave her strict ER   
return precautions. She   
verbalized understanding is  
agreeable to plan. At this time I   
do not think we need any imaging   
or any further workup given that   
her symptoms have resolved and it   
does appear to be classically   
peripheral vertigo, patient   
verbalized understanding is   
agreeable to plan. Patient is   
discharged stable condition  
  
Outreach Plan:Follow up call   
needed:No  
  
Alejandrina Hernandez RN  
Electronically signed by Alejandrina Hernandez RN at 2024 10:59 AM   
EST  
documented in this encounter            [facility]  
   
                                        10- Instructions   
  
  
Juliane Murphy APRN.CNP -   
10/29/2024 3:24 PM EDTFormatting   
of this note might be different   
from the original.  
Return for follow-up visit in 3   
month(s) or sooner as needed.  
  
Restart short term course of   
lorazepam taken once daily only   
when needed for management of   
severe anxiety. One month supply   
with two refills sent. Goal for   
treatment to be complete at end   
of three months. If anxiety   
continues, consider trialing   
daily medication for anxiety   
prevention.  
  
Take all medications as   
prescribed.  
Continue to follow with   
specialist providers.  
Healthy diet and regular aerobic   
exercise recommended.  
  
Please call the office at   
[phone] Vsnkto [phone] or send a   
direct message via [facility] with   
any questions related to your   
visit today. Time throughout the   
day is reserved for patients with   
scheduled appointments. Please   
kindly allow 2-3 business days   
for staff to respond to phone   
calls or [facility] messages.  
  
Electronically signed by Juliane Murphy APRN.CNP at 10/29/2024   
3:31 PM EDT  
  
documented in this encounter            [facility]  
   
                                10- Note                 [facility]  
nter  
   
                                                    10- History of   
Present illness Narrative               Formatting of this note is   
different from the original.  
This note was created using   
[employer].  
Subjective  
Robert Palacios is a 44 year   
old female presenting today   
related to 3-month follow-up of   
chronic health conditions.  
  
Patient follows with Dr. Pedroza of   
rheumatology for management of   
rheumatoid arthritis. Joints of   
bilateral hands noted to be   
nodular at baseline. Reports   
increased stiffness and aching   
pain of bilateral hands and   
swelling which is worse in the   
morning. Symptoms occasionally   
wake her from sleep. Reports   
limited range of motion and has   
been dropping items frequently.   
Patient was recently initiated on   
Plaquenil 75 mg once daily and   
completed ultrasound of bilateral   
hands. Reports Dr. Pedroza has also   
discussed treatment with   
methotrexate or Enbrel, which she   
experienced success with in the   
past. Requires office visit to   
discuss medication management and   
is planning to address at   
upcoming appointment scheduled   
for 2024. Readdress need   
for annual dilated eye exam in   
relation to colonic Plaquenil   
therapy and risk of retinal   
complications. Patient follows   
locally with Dr. Oakley of   
[place]. Recently completed   
serum evaluation of IgG subclass   
with deficiencies and subclass 1,   
2, and 4. IgG subclass 3 within   
normal range. Patient plans to   
discuss results with her   
rheumatologist.  
  
Continues to follow with pain   
 Dr. Davis   
with chronic use of Norco.   
Patient also uses medical   
marijuana when she is not in the   
company of her grandson of whom   
she has custody.  
  
Patient established with Dr. Delgado of pulmonology in   
September for management of COPD.   
Patient was previously managed   
with Anoro Ellipta. Dr. Delgado   
has stopped treatment and lieu of   
Trelegy trial. Patient reports   
medication was too expensive and   
she was instead initiated on   
Stiolto and is tolerating well.   
Has upcoming appointment   
2024 with pulmonology and   
is scheduled for updated lung   
function testing.  
  
Established with Dr. Austin of   
[facility] general surgery in August   
related to mass of upper inner   
quadrant of left breast, history   
of abnormal mammogram, and left   
breast pain. Dr. Austin has   
ordered surveillance ultrasounds   
of the left breast for completion   
in February and 2025 with   
attention to subareolar terminal   
ducts.  
  
Anxiety has recently worsened.   
Dosed buspirone initially with as   
needed administration with later   
transition to scheduled dosing.   
This did not improve symptoms.   
Reports she  worries all the   
time  and describes self as a   
nervous person. Experiences   
increased anxiety while driving   
or riding in a vehicle with   
symptoms of panic attack. Denies   
symptoms of depression but does   
note feeling chronically   
overwhelmed. Reports previous   
success with lorazepam for   
anxiety management And is   
requesting short-term course.   
Declines to initiate preventative   
SSRI.  
  
Benefits, risks, and rationale   
for administration of influenza   
vaccine discussed with patient in   
detail today. Patient consents to   
administration of recommended   
vaccination. VIS provided. Had   
previously elected to update   
cervical cancer screening today.   
Now declines, reporting upcoming   
appointment to establish with a   
new gynecologist at [facility] next month.  
  
PAST MEDICAL HISTORY  
Diagnosis Date  
Anemia  
Anxiety 2014  
Anxiety and depression  
COPD, mild (HCC) 2014  
Depression 2014  
Fibroadenosis of breast  
Fibromyalgia  
GERD (gastroesophageal reflux   
disease) 2014  
HTN (hypertension) 2014  
Hyperlipidemia 2014  
Lump or mass in breast 2009  
Malignant neoplasm of cervix   
uteri, unspecified site  
Precancerous, not cervical cancer  
NO SHOW 2007  
With primary care and specialty   
offices.  
Osteoarthritis of multiple joints  
PMH - PAST MEDICAL HISTORY OF  
diabetes  
Prediabetes  
Raynaud disease  
Raynaud disease  
Restless legs  
Rheumatoid arthritis (HCC)  
RSD lower limb  
bilateral legs  
Smoker 2014  
Tachycardia 2014  
Possible SVT  
Type II or unspecified type   
diabetes mellitus without mention   
of complication, uncontrolled   
2005  
Diet controlled.  
  
ACTIVE PROBLEM LIST  
No Show  
Lump Or Mass in Breast  
Fibroadenosis of Breast  
Anxiety  
Malignant Neoplasm of Cervix   
(Hcc)  
Fibromyalgia  
Rheumatoid Arthritis of Multiple   
Sites With Negative Rheumatoid   
Factor (Hcc)  
Raynaud Disease  
Restless Legs  
Mass of Left Side of Neck  
Routine Gynecological Examination  
Dysphagia  
Smoker  
Mixed Hyperlipidemia  
Tachycardia  
Copd, Mild (Hcc)  
Essential Hypertension  
Constipation  
Fatigue  
Hoarse Voice Quality  
Gastroesophageal Reflux Disease   
Without Esophagitis  
Rsd Lower Limb  
Diabetes Mellitus Type 2,   
Diet-Controlled (Hcc)  
Depression  
Neoplasm of Uncertain Behavior of   
Neck  
Encounter for Gynecological   
Examination Without Abnormal   
Finding  
Adjustment Disorder With Mixed   
Anxiety and Depressed Mood  
Environmental and Seasonal   
Allergies  
Bilateral Low Back Pain With   
Bilateral Sciatica  
Sacroiliitis (Hcc)  
Menopause  
Degenerative Disc Disease, Lumbar  
Thyroid Nodule  
Snoring  
Pain  
Folliculitis  
Loss of Hair  
Chronic Insomnia  
Chronic Midline Low Back Pain   
With Bilateral Sciatica  
Vertigo  
Tinnitus of Both Ears  
Sob (Shortness of Breath)  
Palpitations  
Facial Asymmetry  
Chronic Ankle Pain, Bilateral  
Chronic Foot Pain, Left  
Beny (Generalized Anxiety   
Disorder)  
Vitamin B12 Deficiency  
Multiple Joint Pain  
Chest Discomfort  
Abnormal Holter Exam  
History of Abnormal Mammogram  
Breast Pain, Left  
Thyromegaly  
  
  
Current Outpatient Medications  
Medication Sig Dispense Refill  
hydrOXYchloroQUINE (PLAQUENIL)   
200 mg tablet Take 1.5 tablets by   
mouth once daily. 45 tablet 5  
tiotropium-olodaterol (STIOLTO   
RESPIMAT) 2.5-2.5 mcg/actuation   
inhaler Inhale 2 Puffs as   
instructed once daily. 2 inh a   
day 4 g 1  
fluticasone-umeclidin-vilanter   
(TRELEGY ELLIPTA) 200-62.5-25 mcg   
inhalation powder Inhale 1 Puff   
as instructed once daily. 1 Each   
5  
albuterol HFA (PROVENTIL HFA,   
VENTOLIN HFA) 90 mcg/actuation   
inhaler Inhale 2 Puffs as   
instructed every 4 hours as   
needed for wheezing/shortness of   
breath. 1 Each 1  
meloxicam (MOBIC) 15 mg tablet   
Take 1 tablet by mouth once   
daily. 30 tablet 5  
cetirizine (ZYRTEC) 10 mg tablet   
Take 1 tablet by mouth once   
daily. 90 tablet 3  
omeprazole (PRILOSEC) 40 mg   
capsule Take 1 capsule by mouth   
two times a day. 180 capsule 3  
Blood-Glucose Meter Test One time   
a day and as needed. Insulin Dep?   
Yes E11.9 DM 2 1 Each 0  
Blood Glucose Control, Normal   
soln Use as instructed 1 Each 0  
blood sugar diagnostic (BLOOD   
GLUCOSE TEST) test strip 1 Strip   
once daily. Test blood sugar once   
daily and as needed. 100 Strip 2  
atenolol (TENORMIN) 25 mg tablet   
take 1 tablet by mouth once daily   
30 tablet 11  
atorvastatin (LIPITOR) 20 mg   
tablet Take 1 tablet by mouth   
once daily. 90 tablet 3  
blood sugar diagnostic (FREESTYLE   
LITE STRIPS) test strip Test   
blood sugar(s) 1 times daily. Dx:   
E11.9. Insulin: No 50 Strip 11  
Lancets lancets Check glucose   
daily . Dx: 11.9. 100 Each 3  
  
No current facility-administered   
medications for this visit.  
  
Medications were reviewed and   
verified.  
  
If pain assessment is 0, no   
action needed.  
If pain assessment is positive,   
please see assessment and plain.  
  
Social History  
  
Tobacco Use  
Smoking status: Every Day  
Current packs/day: 0.25  
Average packs/day: 0.3 packs/day   
for 31.2 years (7.8 ttl pk-yrs)  
Types: Cigarettes  
Start date: 1993  
Passive exposure: Past  
Smokeless tobacco: Never  
Vaping Use  
Vaping status: current everyday   
user  
Substances: Nicotine  
Substance Use Topics  
Alcohol use: Not Currently  
Drug use: Not Currently  
Types: Marijuana  
  
  
FAMILY HISTORY  
Problem Relation Age of Onset  
Hypertension Mother  
Heart Mother  
Diabetes Mother  
COPD Mother  
Alcohol/Drug Father  
Allergies Brother  
Alcohol/Drug Sister  
No Known Problems Paternal   
Grandmother  
No Known Problems Paternal   
Grandfather  
No Known Problems Son  
No Known Problems Son  
No Known Problems Daughter  
Diabetes Brother  
Hypoglycemic  
Breast Cancer Maternal Aunt  
No Known Problems Maternal   
Grandmother  
No Known Problems Maternal   
Grandfather  
Breast Cancer Maternal Aunt  
  
Review of Systems  
Constitutional: Positive for   
appetite change (Decreased but is   
still eating/drinking normally)   
and fatigue (Worsened from   
baseline over last few weeks).   
Negative for activity change and   
unexpected weight change (Weight   
has stabillized).  
HENT: Negative for congestion and   
trouble swallowing.  
Eyes: Negative for visual   
disturbance.  
Respiratory: Positive for   
shortness of breath (Intermittent   
w/ activity). Negative for cough,   
chest tightness and wheezing.  
Breathing controlled with swap   
back to Stiolto. Following with   
Dr. Delgado for management.  
Cardiovascular: Negative for   
chest pain, palpitations and leg   
swelling.  
Gastrointestinal: Positive for   
nausea (Intermittent). Negative   
for abdominal distention,   
abdominal pain, blood in stool,   
constipation, diarrhea and   
vomiting.  
Endocrine: Negative for   
polydipsia, polyphagia and   
polyuria.  
Genitourinary: Negative.  
Left breast pain ongoing.   
Following with Dr. Austin for   
surveillance.  
Musculoskeletal: Positive for   
arthralgias (Stiff and painful   
hands bilaterally- swollen worse   
in AM- wakes her from sleep at   
times- limited ROM- drops items   
frequently) and joint swelling   
(Hands). Negative for back pain,   
gait problem and myalgias.  
Established with Dr. Pedroza for RA   
management. Starting Plaquenil.   
Pain management Norco and medical   
marijuana per Dr. Davis of [facility].  
Skin: Negative for rash.  
Allergic/Immunologic: Negative.  
Neurological: Negative for   
dizziness, syncope, weakness,   
light-headedness and headaches.  
Hematological: Negative. Negative   
for adenopathy.  
Psychiatric/Behavioral: Negative   
for dysphoric mood, self-injury,   
sleep disturbance and suicidal   
ideas. The patient is   
nervous/anxious (Worse recently).  
  
  
Objective  
/60   Pulse 76   Temp (Src)   
98.4 (Temporal)   Resp 16   Ht 5'   
7  (1.70m)   Wt 163 lb 6.4 oz   
(74.1kg)   SpO2 97%   BMI 25.59   
kg/(m^2).  
  
  
  
Physical Exam  
Vitals and nursing note reviewed.  
Constitutional:  
General: She is not in acute   
distress.  
Appearance: Normal appearance.   
She is well-developed. She is not   
ill-appearing.  
HENT:  
Head: Normocephalic and   
atraumatic.  
Right Ear: External ear normal.  
Left Ear: External ear normal.  
Nose: Nose normal.  
Mouth/Throat:  
Lips: Pink.  
Eyes:  
General: Lids are normal. Vision   
grossly intact. Gaze aligned   
appropriately.  
Extraocular Movements:   
Extraocular movements intact.  
Conjunctiva/sclera: Conjunctivae   
normal.  
Pupils: Pupils are equal, round,   
and reactive to light.  
Neck:  
Thyroid: Thyromegaly (Mild)   
present. No thyroid mass or   
thyroid tenderness.  
Trachea: Trachea normal.  
Cardiovascular:  
Rate and Rhythm: Normal rate and   
regular rhythm.  
Pulses: Normal pulses. No   
decreased pulses.  
Radial pulses are 2+ on the right   
side and 2+ on the left side.  
Heart sounds: Normal heart   
sounds.  
Pulmonary:  
Effort: Pulmonary effort is   
normal. No tachypnea or   
respiratory distress.  
Breath sounds: Normal breath   
sounds. No decreased breath   
sounds, wheezing, rhonchi or   
rales.  
Abdominal:  
General: Bowel sounds are normal.   
There is no distension.  
Palpations: Abdomen is soft.  
Tenderness: There is no abdominal   
tenderness. There is no guarding.  
Musculoskeletal:  
Right hand: Deformity (Nodular)   
and bony tenderness present. No   
tenderness. Decreased range of   
motion. Normal strength.  
Left hand: Deformity (Nodular)   
and bony tenderness present. No   
tenderness. Decreased range of   
motion. Normal strength.  
Cervical back: Normal range of   
motion and neck supple.  
Right lower leg: No edema.  
Left lower leg: No edema.  
Lymphadenopathy:  
Cervical: No cervical adenopathy.  
Skin:  
General: Skin is warm and dry.  
Capillary Refill: Capillary   
refill takes less than 2 seconds.  
Findings: No rash or wound.  
Neurological:  
General: No focal deficit   
present.  
Mental Status: She is alert and   
oriented to person, place, and   
time.  
Cranial Nerves: Cranial nerves   
2-12 are intact.  
Sensory: Sensation is intact.  
Motor: Motor function is intact.   
No weakness.  
Coordination: Coordination is   
intact.  
Gait: Gait is intact. Gait   
normal.  
Psychiatric:  
Mood and Affect: Mood normal.  
Speech: Speech normal.  
Behavior: Behavior normal.   
Behavior is cooperative.  
  
  
  
Assessment & Plan  
ASSESSMENT/PLAN:  
1. Rheumatoid arthritis of   
multiple sites with negative   
rheumatoid factor (HCC) - ICD9:   
714.0, ICD10: M06.09 (primary   
diagnosis)  
Improving. Continue present   
medications per rheumatology.   
Continue following with   
specialist for management.  
  
2. Chronic pain syndrome - ICD9:   
338.4, ICD10: G89.4  
Stable. Continue present   
medications per pain management.   
Continue following with   
specialist for management.  
  
3. COPD, mild (HCC) - ICD9: 496,   
ICD10: J44.9  
Stable. Continue present   
medications per pulmonology.   
Continue following with   
specialist for management.  
  
4. Breast pain, left - ICD9:   
611.71, ICD10: N64.4  
Improving. Continue following   
with general surgery breast   
specialist for ongoing   
surveillance. Encouraged   
compliance with ultrasounds   
scheduled for February and 2025.  
  
5. Abnormal findings on   
diagnostic imaging of breast -   
ICD9: 793.89, ICD10: R92.8  
See plan #4.  
  
6. Generalized anxiety disorder   
with panic attacks - ICD9:   
300.02, 300.01, ICD10: F41.1,   
F41.0  
Symptoms recently increased.   
Patient declines to trial   
addition of daily SSRI for   
prevention. Restart short-term   
course of as needed lorazepam for   
management of severe anxiety   
symptoms. Discussed that if   
patient requires long-term   
lorazepam use past 3 months,   
trial of daily preventative   
anxiety medication strongly   
recommended.  
- LORAZEPAM 0.5 MG TABLET  
  
7. Encounter for immunization -   
ICD9: V03.89, ICD10: Z23  
Agreeable to administration of   
annual influenza vaccine. VIS   
provided.  
- INFLUENZA VACCINE, AGE   
6MO-64YR, TRIVALENT (AFLURIA,   
FLULAVAL, FLUVIRIN, FLUZONE)  
  
8. Screening for cervical cancer   
- ICD9: V76.2, ICD10: Z12.4  
Patient declines to pursue   
planned cervical cancer screening   
today. Reports upcoming   
appointment to establish with   
gynecology next month. Plans to   
pursue cervical cancer screening   
via specialist provider.  
  
Robert Palacios will return   
in 3 month(s) for surveillance of   
chronic conditions or sooner as   
needed. Discussed need for   
continued follow-up with all   
specialist providers, taking all   
medications as prescribed,   
increasing activity level as   
tolerated, and lowering sodium   
and processed food intake at   
today's visit. Patient instructed   
to call the office or send a   
message via [facility] with any   
questions related to this visit.  
  
Please Note: This office note has   
been created using [employer], a speech recognition   
software program, and may contain   
errors including punctuation,   
grammar, spelling, gender, and   
inappropriate words or phrases   
that pertain to the system.  
  
DATE: 2024  
SIGNATURE: HELLEN Reyes.CNP  
Electronically signed by Juliane Murphy APRN.CNP at 2024   
12:06 AM EST  
Formatting of this note might be   
different from the original.  
Robert is here today for a 3 month   
check up for her chronic health   
conditions  
Patient was going to get a pap   
test today and has changed her   
mind because she has an   
appointment with OBGYN next month   
and she is going to get it done   
there  
  
No refills needed  
  
Leanna Burton LPN  
2024 2:43 PM  
  
Electronically signed by Leanna Burton LPN at 2024 12:06   
AM EST  
documented in this encounter            [facility]  
   
                                                    10- Telephone   
encounter Note                          Formatting of this note might be   
different from the original.  
Noted  
erythromycin  
Electronically signed by Reginald Pedroza MD at 10/29/2024 12:30   
PM EDT  
                                        [facility]  
   
                                                    10- Miscellaneous   
Notes                                   Formatting of this note might be   
different from the original.  
Noted  
erythromycin  
Electronically signed by Reginald Pedroza MD at 10/29/2024 12:30   
PM EDT  
documented in this encounter            [facility]  
   
                                10- Note                 [facility]  
nt  
   
                                                    10- Telephone   
encounter Note                          Formatting of this note might be   
different from the original.  
Called and spoke to pt and she   
stated that she talked to her ins   
company and they stated that she   
can get trilogy if she has failed   
anoro and albuterol hfa ventolin  
It does not say in her office   
notes that she failed both of   
these  
Pt stated that she is going to   
call her ins company again and   
see what will be covered and call   
the office back or is there   
something else that can be called   
in that is like Trilogy as pt   
asked  
Electronically signed by Maria Del Carmen Baldwin MA at 10/14/2024 10:40 AM   
EDT  
                                        [facility]  
   
                                                    10- Miscellaneous   
Notes                                   Formatting of this note might be   
different from the original.  
Called and spoke to pt and she   
stated that she talked to her ins   
company and they stated that she   
can get trilogy if she has failed   
anoro and albuterol hfa ventolin  
It does not say in her office   
notes that she failed both of   
these  
Pt stated that she is going to   
call her ins company again and   
see what will be covered and call   
the office back or is there   
something else that can be called   
in that is like Trilogy as pt   
asked  
Electronically signed by Maria Del Carmen Baldwin MA at 10/14/2024 10:40 AM   
EDT  
Formatting of this note might be   
different from the original.  
Patient called in to find out if   
anything happened with the PA for   
her Trelegy. Patient stated there   
was a PA that was started, wanted   
to know if anything came of it.  
  
Alejandrina Kruger RN  
  
Electronically signed by Alejandrina Kruger RN at 10/14/2024 10:06 AM   
EDT  
Formatting of this note might be   
different from the original.  
Can you please check what is   
covered by her insurance  
Electronically signed by Judie Delgado MD at 2024 2:48 PM   
EDT  
documented in this encounter            [facility]  
   
                                                    10- Telephone   
encounter Note                          Formatting of this note might be   
different from the original.  
Patient called in to find out if   
anything happened with the PA for   
her Trelegy. Patient stated there   
was a PA that was started, wanted   
to know if anything came of it.  
  
Alejandrina Kruger RN  
  
Electronically signed by Alejandrina Kruger RN at 10/14/2024 10:06 AM   
EDT  
                                        [facility]  
   
                                                    2024 Telephone   
encounter Note                          Formatting of this note might be   
different from the original.  
Can you please check what is   
covered by her insurance  
Electronically signed by Judie Delgado MD at 2024 2:48 PM   
EDT  
                                        [facility]  
   
                                                    2024 History of   
Present illness Narrative               Formatting of this note might be   
different from the original.  
Radiology Service Progress Note  
  
PATIENT NAME: Robert Palacios  
MRN: [id number]  
  
DATE OF SERVICE: 2024  
TIME: 12:13 PM  
PATIENT IDENTITY VERIFICATION   
COMPLETED USING TWO (2)   
IDENTIFIERS: Name and Date of   
Birth confirmed by patient   
verbally.  
FALL SCREENING: Has the patient   
had 2 falls in the last year or 1   
fall with injury or currently   
using an Ambulatory Assistive   
Device (Walker, Cane, Wheelchair,   
Crutches, etc.)? No  
PATIENT GENDER DATA: Female.   
Pregnancy status: Pregnant: No   
Breastfeeding status: NO.  
PATIENT RELEVANT IMPLANT DATA   
REVIEWED: Not Applicable  
PATIENT PRESENTS WITH AN   
IMPLANTABLE OR ATTACHED MEDICAL   
DEVICE: No  
  
RADIOLOGY DEPARTMENT: Ultrasound  
  
PERIPHERAL IV DATA: Not   
applicable  
  
SIGNED BY: Adri Gutiérrez RDMS  
2024 12:13 PM  
  
  
Electronically signed by Adri Gutiérrez RDMS at 2024   
12:13 PM EDT  
documented in this encounter            [facility]  
   
                                2024 Note                 [facility]  
[facility]  
   
                                        2024 Instructions   
  
  
Judie Delgado MD - 2024   
1:23 PM EDTFormatting of this   
note might be different from the   
original.  
My Plan and Interventions:  
  
1. Return to office in 2 months.  
  
2. Tests to be ordered today:PFTs  
  
3. New medications today:Trelegy   
Ellipta  
  
4. Special Instructions:call if   
has concerns  
  
5. Summary of today's visit: COPD  
  
6. Referrals today: none  
  
Copy to Referring physician: yes  
  
Thank you for allowing me to   
participate in this patient's   
care.  
  
Electronically signed by Judie Delgado MD at 2024 1:23 PM   
EDT  
  
documented in this encounter            [facility]  
   
                                2024 Note                 [facility]  
nt  
   
                                                    2024 History of   
Present illness Narrative               Formatting of this note is   
different from the original.  
Images from the original note   
were not included.  
  
  
[facility]  
Pulmonary New Patient Consult   
Note  
  
Patient Name: Robert Palacios  
MRN: [id number]  
  
PRIMARY CARE PHYSICIAN: HELLEN Reyes.CNP  
  
REFERRING PHYSICIAN: No ref.   
provider found  
  
REASON FOR CONSULT: Shortness of   
breath  
  
My final recommendations will be   
communicated to the requesting   
health care provider by way of   
the shared medical record for   
internal providers or letter via   
the [employer] [place] [employer]   
for external providers.  
  
CHIEF COMPLAINT: COPD/shortness   
of breath  
  
HISTORY OF PRESENT ILLNESS: Robert Palacios is a 44 year old   
female with a history of   
COPD/bullous emphysema, RA,   
Smoker, diet-controlled diabetes,   
restless legs, fibromyalgia, GERD   
comes in for evaluation of   
shortness of breath. This patient   
is here for first Pulmonary   
office visit and consultation. I   
reviewed available objective data   
including imaging as available.   
Patient is here for evaluation of   
shortness of breath. States that   
she was told that she had   
COPD/bullous emphysema many years   
ago. States that she never had CT   
chest done. Denies any shortness   
of breath at rest but has chest   
tightness most of the time. Also   
has a dyspnea on exertion.   
Usually has a dry cough which is   
nonproductive and unable to bring   
up any phlegm. Denies any   
wheezing. No fever chills or   
night sweats. She has been   
gradually losing weight which is   
unintentional.  
She is currently on an oral   
Ellipta and as needed albuterol.   
Used albuterol when she was sick   
in February but not using it as   
needed basis. On Anoro she tries   
to take most of the days. States   
that her shortness of breath and   
chest tightness got worse since   
she got sick in February. States   
that she had pneumonia or   
bronchitis and that she went to   
the emergency room but left AMA   
and received prednisone and   
antibiotic through her primary   
care physician. Stopped taking   
steroids as she had tachycardia   
but completed course of   
antibiotics. Since then she is   
having increased dyspnea on   
exertion and chest tightness.  
She is an active Smoker,   
currently smoking 4-5 cig per   
day. 1.5 pack per day in the past   
for 28yrs.  
Occasionally has chest pain and   
has pedal edema by the end of the   
day. She was evaluated by   
cardiology yesterday and a stress   
test was ordered. She also   
follows with rheumatology for   
rheumatoid arthritis.  
She does have 4 dogs at home.  
She does give history of seasonal   
allergies.  
  
  
PAST MEDICAL HISTORY  
Diagnosis Date  
Anemia  
Anxiety 2014  
Anxiety and depression  
COPD, mild (HCC) 2014  
Depression 2014  
Fibroadenosis of breast  
Fibromyalgia  
GERD (gastroesophageal reflux   
disease) 2014  
HTN (hypertension) 2014  
Hyperlipidemia 2014  
Lump or mass in breast 2009  
Malignant neoplasm of cervix   
uteri, unspecified site  
Precancerous, not cervical cancer  
NO SHOW 2007  
With primary care and specialty   
offices.  
Osteoarthritis of multiple joints  
PMH - PAST MEDICAL HISTORY OF  
diabetes  
Prediabetes  
Raynaud disease  
Raynaud disease  
Restless legs  
Rheumatoid arthritis (HCC)  
RSD lower limb  
bilateral legs  
Smoker 2014  
Tachycardia 2014  
Possible SVT  
Type II or unspecified type   
diabetes mellitus without mention   
of complication, uncontrolled   
2005  
Diet controlled.  
  
PAST SURGICAL HISTORY  
Procedure Laterality Date  
BIOPSY BREAST OPEN INCISIONAL   
2009  
right breast  
BX BREAST NEEDLE CORE W/O IMAGING   
GUIDANCE SPX 2006  
right breast  
PAST SURGICAL HISTORY OF   
10/16/2018  
Spinal Cord Stimulator Implant  
PAST SURGICAL HISTORY OF   
2020  
spinal [facility]  
[facility]   
[facility]  
TITANIUM CLIP PLACEMENT  
TONSILLECTOMY PRIMARY/SECONDARY   
<AGE 10  
Tonsillectomy  
TOTAL ABDOMINAL HYSTERECT W/WO   
RMVL TUBE OVARY 10/07/1993  
Dr. Delgado  
US BREAST NEEDLE CORE BIOPSY RT   
2009  
  
FAMILY HISTORY  
Problem Relation Age of Onset  
Hypertension Mother  
Heart Mother  
Diabetes Mother  
COPD Mother  
Alcohol/Drug Father  
Allergies Brother  
Alcohol/Drug Sister  
No Known Problems Paternal   
Grandmother  
No Known Problems Paternal   
Grandfather  
No Known Problems Son  
No Known Problems Son  
No Known Problems Daughter  
Diabetes Brother  
Hypoglycemic  
Breast Cancer Maternal Aunt  
No Known Problems Maternal   
Grandmother  
No Known Problems Maternal   
Grandfather  
Breast Cancer Maternal Aunt  
  
No reported family hx of ILD   
fibrosis, PAH, Tb, lung cancer,   
A1AT deficiency  
  
Social History  
  
Tobacco Use  
Smoking status: Every Day  
Current packs/day: 0.25  
Average packs/day: 0.3 packs/day   
for 31.1 years (7.8 ttl pk-yrs)  
Types: Cigarettes  
Start date: 1993  
Passive exposure: Past  
Smokeless tobacco: Never  
Vaping Use  
Vaping status: current everyday   
user  
Substances: Nicotine  
Substance Use Topics  
Alcohol use: Not Currently  
Drug use: Not Currently  
Types: Marijuana  
  
Ambulatory, see vaccine HX,  
Occupation :   
  
ALLERGIES:  
ALLERGIES  
Allergen Reactions  
Codeine Vomiting  
Gabapentin Other: See Comments  
Headache/migraine  
Penicillins Unknown  
childhood reaction  
  
CURRENT OUTPATIENT MEDICATIONS:  
meloxicam (MOBIC) 15 mg tablet   
Take 1 tablet by mouth once   
daily.  
cetirizine (ZYRTEC) 10 mg tablet   
Take 1 tablet by mouth once   
daily.  
omeprazole (PRILOSEC) 40 mg   
capsule Take 1 capsule by mouth   
two times a day.  
ANORO ELLIPTA 62.5-25   
mcg/actuation inhaler INHALE 1   
PUFF BY MOUTH INTO THE LUNGS ONCE   
A DAY  
Blood-Glucose Meter Test One time   
a day and as needed. Insulin Dep?   
Yes E11.9 DM 2  
Blood Glucose Control, Normal   
soln Use as instructed  
blood sugar diagnostic (BLOOD   
GLUCOSE TEST) test strip 1 Strip   
once daily. Test blood sugar once   
daily and as needed.  
atenolol (TENORMIN) 25 mg tablet   
take 1 tablet by mouth once daily  
atorvastatin (LIPITOR) 20 mg   
tablet Take 1 tablet by mouth   
once daily.  
albuterol HFA (PROVENTIL HFA,   
VENTOLIN HFA) 90 mcg/actuation   
inhaler Inhale 2 Puffs as   
instructed every 4 hours as   
needed for wheezing/shortness of   
breath.  
blood sugar diagnostic (FREESTYLE   
LITE STRIPS) test strip Test   
blood sugar(s) 1 times daily. Dx:   
E11.9. Insulin: No  
Lancets lancets Check glucose   
daily . Dx: 11.9.  
  
REVIEW OF SYSTEMS  
Constitutional:No fevers, sweats,   
chills, nightsweats, change in   
appetite, change in weight,   
change in energy.  
HEENT:Negative for frequent or   
significant headaches  
RESPIRATORY: Cough; dry, Dyspnea,   
Shortness of breath  
CARDIOVASCULAR: Decreasing   
exercise tolerence, Shortness of   
breath, Negative for chest pain,   
leg swelling or palpitations.  
GASTROINTESTINAL: Negative for   
abdominal discomfort, blood in   
stools or black stools or change   
in bowel habits  
GENITOURINARY: No history of   
dysuria, frequency or   
incontinence  
GYN: Negative for abnormal   
vaginal bleeding, abnormal   
vaginal discharge  
MUSCULOSKELETAL: Has RA  
NEUROLOGIC:Negative for focal   
numbness or weakness, headaches   
and dizziness or syncope.  
SKIN:Negative for lesions, rash,   
and itching.  
PSYCHIATRIC: Negative for sleep   
disturbance, mood disorder and   
recent psychosocial stressors.  
HEMATOLOGIC/LYMPHATIC/IMMUNOLOGIC  
: No lymphadenopathy  
ENDOCRINE: Negative for cold or   
heat intolerance, polyuria,   
polydipsia and goiter.  
The remainder of the ROS was   
negative.  
  
PHYSICAL EXAMINATION:  
VITAL SIGNS: /91   Pulse 74   
  Ht 5' 7  (1.70m)   Wt 161 lb   
(73.0kg)   SpO2 98%   BMI 25.21   
kg/(m^2).  
  
  
General appearance: well   
appearing, alert, and in no acute   
distress  
Skin: skin color, texture, turgor   
normal, no rashes or lesions  
Eyes: Anicteric sclera. Pupils   
are equally round and reactive to   
light. Extraocular movements are   
intact.  
ENT: No oral or nasal erythema,   
bleeding, lesions, striae  
Heme/Lymph:Negative  
Lungs: Very diminished breath   
sounds bilaterally, lungs clear   
to auscultation no wheezing or   
rhonchi  
Heart: RRR without murmur,   
gallop, or rubs. No ectopy  
GI: Normal abdominal exam  
Extremities: No deformities,   
edema, skin discoloration,   
clubbing or cyanosis. Good   
capillary refill.  
Neuro: Gait normal. Reflexes   
normal and symmetric. Sensation   
grossly intact.  
  
LAST LAB RESULTS:  
  
Labs:  
CBC with diff:  
WBC 7.85 2024  
RBC 4.45 2024  
Hemoglobin 12.5 2024  
Hematocrit 38.4 2024  
MCV 86.3 2024  
MCH 28.1 2024  
MCHC 32.6 2024  
RDW-CV 14.2 2024  
Platelet Count 337 2024  
MPV 10.6 2024  
Neut% 62.6 2024  
Lymph% 28.7 2024  
Mono% 6.5 2024  
Eosin% 1.5 2024  
Baso% 0.6 2024  
Abs Neut (ANC) 4.91 2024  
Abs Mono 0.51 2024  
Abs Eosin 0.12 2024  
Abs Baso 0.05 2024  
  
  
DATA:  
Diagnostic tests reviewed for   
today's visit, films/specimens   
were personally reviewed by me:  
  
OTHER TESTING:  
Echo:2023:  
CONCLUSIONS:  
- Exam indication: Palpitations,   
facial asymmetry, short of   
breath, vertigo  
- The left ventricle is normal in   
size. Left ventricular systolic   
function is  
normal. EF = 69 5% (2D biplane)   
Normal left ventricular diastolic   
function.  
- The right ventricle is normal   
in size. Right ventricular   
systolic function is  
normal.  
- There is no patent foramen   
ovale as detected by saline   
contrast with valsalva.  
Addendum  
  
1. See computer-generated data   
above  
  
2. Normal right and left   
ventricular systolic function if   
ejection greater equal  
to 60 to 65%  
  
3. Small pericardial effusion   
noted  
  
4. Concentric left ventricular   
perjury (mild)  
  
5. Mild to moderate tricuspid   
regurgitation  
  
6. Borderline elevated pulmonary   
pressure  
  
7. Negative bubble study for   
intracardiac shunt  
- The patient has not had a prior   
CC echocardiographic exam for   
comparison.  
  
Chest xray:24:  
No acute abnormalities.  
  
CT CHEST:  
PFT:  
  
IMPRESSIONS:  
  
ASSESSMENT/PLAN:  
1. Chronic obstructive pulmonary   
disease, unspecified COPD type   
(HCC) - ICD9: 496, ICD10: J44.9   
(primary diagnosis)  
Gives a history of a COPD/bullous   
emphysema. States that she never   
had a CT chest done. Not sure if   
she really has bullous emphysema.   
Will get PFTs first. Recent chest   
x-ray was within normal limits.   
Continues to have significant   
shortness of breath and chest   
tightness. She is currently on   
Anoro Ellipta and as needed   
albuterol but she is not using   
albuterol as needed. Will switch   
Anoro to Trelegy Ellipta and also   
advised to use albuterol every 4   
hours as needed  
- SPIROMETRY - BASELINE AND POST   
DILATOR  
- LUNG VOLUMES  
- LUNG DIFFUSION CAPACITY (DLCO)  
- TRELEGY ELLIPTA 200 MCG-62.5   
MCG-25 MCG POWDER FOR INHALATION  
  
2. COPD, mild (HCC) - ICD9: 496,   
ICD10: J44.9  
  
- ALBUTEROL SULFATE HFA 90   
MCG/ACTUATION AEROSOL INHALER  
  
3. Smoker - ICD9: 305.1, ICD10:   
F17.200  
- Cessation encouraged.  
- Physiologic and physical   
aspects of tobacco addiction as   
well as strategies for quitting   
were discussed.  
- Counseling was given focusing   
on the harmful effects of this   
addiction especially given the   
patient's medical condition(s)   
which will be worsened because of   
the chemicals in tobacco.  
- Counseling was given 10   
minutes.  
- Recommended to called   
1-800-QUIT NOW  
  
MD Judie Urbano MD  
  
Medications reviewed  
Education provided today   
regarding the stated disease   
states  
  
Patient Instructions:  
See AVS  
  
  
Written and verbal health   
teaching given to patient,   
patient verbalizes understanding   
and agrees with treatment plan.   
Followup discussed.  
  
Electronically Signed:  
Judie Delgado MD  
2024  
  
  
Electronically signed by Judie Delgado MD at 2024 1:35 PM   
EDT  
Formatting of this note is   
different from the original.  
[facility] PULM COPD ASSESSMENT TEST   
(CAT)  
  
Question 2024 12:50 PM EDT -   
Filed by Patient  
Coughin  
Phlegm (mucus): 2  
Chest tightness: 3  
When I walk up a hill or a flight   
of stairs . . . 3  
Home activities: 2  
Leaving home: 3  
Sleeping: 3  
Energy: 3  
COPD Assessment Test Score   
(range: 0 - 40) 23  
  
Electronically signed by Meggan Franco MA at 2024 1:35   
PM EDT  
documented in this encounter            [facility]  
   
                                2024 Note                 [facility]  
nter  
   
                                                    2024 Telephone   
encounter Note                          Formatting of this note might be   
different from the original.  
Thank you. I will monitor for   
results of thyroid US.  
  
Juliane Murphy APRN.CNP  
Electronically signed by Juliane Murphy APRN.CNP at 2024   
3:43 PM EDT  
                                        [facility]  
   
                                                    2024 Miscellaneous   
Notes                                   Formatting of this note might be   
different from the original.  
Thank you. I will monitor for   
results of thyroid US.  
  
Juliane Murphy APRN.CNP  
Electronically signed by Juliane Murphy APRN.CNP at 2024   
3:43 PM EDT  
Formatting of this note might be   
different from the original.  
I spoke with patient Robert and   
told her that Dr Robertson won't take   
her as a patient due to reasons   
listed below . I advised patient   
to go back to her Rheumatologist   
and she agreed. She did schedule   
her thyroid ultrasound for next   
week on 24.  
  
Leanna Burton LPN  
2024 1:58 PM  
  
Electronically signed by Leanna Burton LPN at 2024 1:59   
PM EDT  
Formatting of this note might be   
different from the original.  
Hello-  
  
Thank you for the update. I will   
also have my staff inform patient   
regarding need to revisit lab   
results with her rheumatologist.  
  
HELLEN Reyes.ALBERTA  
Electronically signed by Juliane Murphy APRN.CNP at 2024   
11:08 AM EDT  
Formatting of this note might be   
different from the original.  
Please inform patient that   
referral has been refused by   
hematology Dr. Robertson directly per   
message above. Patient is   
directed back to her   
rheumatologist to discuss   
questions regarding recent lab   
results. Patient does not have   
lymph node enlargement, abnormal   
CBC suggestive of lymphoma, or   
other indication to see   
hematology at this time. She does   
have mild enlargement of her   
thyroid gland. Thyroid function   
testing has been consistently   
normal. Patient was referred for   
thyroid ultrasound but has yet to   
complete testing. She may call   
[phone] to schedule   
ultrasound. I will monitor for   
results and communicate them to   
patient once reviewed. If patient   
has questions about her labs   
ordered per her rheumatologist,   
she needs to contact Dr. Pedroza to   
discuss results.  
  
HELLEN Reyes.ALBERTA  
Electronically signed by Juliane Murphy APRN.CNP at 2024   
11:06 AM EDT  
Formatting of this note might be   
different from the original.  
Spoke with Dr. Robertson, he informed   
if pt. Has questions concerning   
lab results she needs to contact   
her rheumatologist and have them   
explain those results to her. He   
does not see pts. For   
unintentional weight loss.  
Left message on pts. Voicemail   
with instructions to contact her   
PCP and or Rheumatologist as we   
will not be scheduling her an   
appt.  
  
Paris Narvaez LPN  
  
Electronically signed by Paris Narvaez LPN at 2024 10:31   
AM EDT  
Formatting of this note might be   
different from the original.  
Patient is being referred to   
Hematology. She saw Dr Wiseman about   
5 years ago.  
  
DX : Unintentional Weight Loss  
  
Insurance: [employer]  
  
Referred by: Juliane Murphy  
  
Patient requesting Dr Robertson  
  
Please advise  
Electronically signed by Erin Napoles at 2024 9:05 AM EDT  
documented in this encounter            [facility]  
   
                                                    2024 Telephone   
encounter Note                          Formatting of this note might be   
different from the original.  
I spoke with patient Robert and   
told her that Dr Robertson won't take   
her as a patient due to reasons   
listed below . I advised patient   
to go back to her Rheumatologist   
and she agreed. She did schedule   
her thyroid ultrasound for next   
week on 24.  
  
Leanna Burton LPN  
2024 1:58 PM  
  
Electronically signed by Leanna Burton LPN at 2024 1:59   
PM EDT  
                                        [facility]  
   
                                                    2024 Telephone   
encounter Note                          Formatting of this note might be   
different from the original.  
Hello-  
  
Thank you for the update. I will   
also have my staff inform patient   
regarding need to revisit lab   
results with her rheumatologist.  
  
Juliane Murphy APRN.CNP  
Electronically signed by Juliane Murphy APRN.CNP at 2024   
11:08 AM EDT  
                                        [facility]  
   
                                                    2024 Telephone   
encounter Note                          Formatting of this note might be   
different from the original.  
Please inform patient that   
referral has been refused by   
hematology Dr. Robertson directly per   
message above. Patient is   
directed back to her   
rheumatologist to discuss   
questions regarding recent lab   
results. Patient does not have   
lymph node enlargement, abnormal   
CBC suggestive of lymphoma, or   
other indication to see   
hematology at this time. She does   
have mild enlargement of her   
thyroid gland. Thyroid function   
testing has been consistently   
normal. Patient was referred for   
thyroid ultrasound but has yet to   
complete testing. She may call   
[phone] to schedule   
ultrasound. I will monitor for   
results and communicate them to   
patient once reviewed. If patient   
has questions about her labs   
ordered per her rheumatologist,   
she needs to contact Dr. Pedroza to   
discuss results.  
  
Juliane Murphy APRN.CNP  
Electronically signed by Juliane Murphy APRN.CNP at 2024   
11:06 AM EDT  
                                        [facility]  
   
                                                    2024 Telephone   
encounter Note                          Formatting of this note might be   
different from the original.  
Spoke with Dr. Masci, he informed   
if pt. Has questions concerning   
lab results she needs to contact   
her rheumatologist and have them   
explain those results to her. He   
does not see pts. For   
unintentional weight loss.  
Left message on pts. Voicemail   
with instructions to contact her   
PCP and or Rheumatologist as we   
will not be scheduling her an   
appt.  
  
Paris Narvaez LPN  
  
Electronically signed by Paris Narvaez LPN at 2024 10:31   
AM EDT  
                                        [facility]  
   
                                                    2024 Telephone   
encounter Note                          Formatting of this note might be   
different from the original.  
Patient is being referred to   
Hematology. She saw Dr Wiseman about   
5 years ago.  
  
DX : Unintentional Weight Loss  
  
Insurance: [employer]  
  
Referred by: Juilane Murphy  
  
Patient requesting Dr Robertson  
  
Please advise  
Electronically signed by Erin Napoles at 2024 9:05 AM EDT  
                                        [facility]  
   
                                                    09- Telephone   
encounter Note                          Formatting of this note might be   
different from the original.  
Referral placed to Dr. Robertson per   
patient request.  
  
Patient has not yet completed   
thyroid ultrasound. Does she   
intend to do so?  
  
Juliane Murphy APRN.CNP  
Electronically signed by Juliane Murphy APRN.CNP at 09/10/2024   
8:39 PM EDT  
                                        [facility]  
   
                                        09- Note     Addended by: JULIANE MURPHY   
on: 9/10/2024 08:39 PM  
  
Modules accepted: Orders  
  
  
Electronically signed by Juliane Murphy APRN.CNP at 09/10/2024   
8:39 PM EDT  
                                        [facility]  
   
                                                    09- Miscellaneous   
Notes                                   Formatting of this note might be   
different from the original.  
Referral placed to Dr. Robertson per   
patient request.  
  
Patient has not yet completed   
thyroid ultrasound. Does she   
intend to do so?  
  
Juliane Murphy APRN.CNP  
Electronically signed by Juliane Murphy APRN.CNP at 09/10/2024   
8:39 PM EDT  
Addended by: JULIANE MURPHY   
on: 9/10/2024 08:39 PM  
  
Modules accepted: Orders  
  
  
Electronically signed by Juliane Murphy APRN.CNP at 09/10/2024   
8:39 PM EDT  
Addended by: JULIANE MURPHY   
on: 9/10/2024 08:38 PM  
  
Modules accepted: Orders  
  
  
Electronically signed by Juliane Murphy APRN.CNP at 09/10/2024   
8:38 PM EDT  
Formatting of this note might be   
different from the original.  
Robert Palacios called   
today.  
  
: 1980  
  
Allergies: Codeine, Gabapentin,   
and Penicillins  
  
Phone Number: [phone] (home)   
[phone] (cell)  
  
Reason for call:  
Patient is asking for a referral   
to hematology/oncology / Dr Robertson   
of [place]. Robert said she had a   
bunch of labs done for   
Rheumatology (see in chart) and   
she still does not have any   
answers. She is tired of not   
feeling good and having swelling   
in her lymph nodes in her neck.   
Dr Robertson said he will see her and   
his fax# is [phone].  
  
Patient last appointment:   
2024  
  
The patients preferred pharmacy   
has been captured for this   
encounter? no  
  
Leanna Burton LPN  
Electronically signed by Leanna Burton LPN at 09/10/2024 2:25   
PM EDT  
documented in this encounter            [facility]  
   
                                        09- Note     Addended by: JULIANE MURPHY   
on: 9/10/2024 08:38 PM  
  
Modules accepted: Orders  
  
  
Electronically signed by Juliane Murphy APRN.CNP at 09/10/2024   
8:38 PM EDT  
                                        [facility]  
   
                                                    09- Telephone   
encounter Note                          Formatting of this note might be   
different from the original.  
Robert Palacios called   
today.  
  
: 1980  
  
Allergies: Codeine, Gabapentin,   
and Penicillins  
  
Phone Number: [phone] (home)   
[phone] (cell)  
  
Reason for call:  
Patient is asking for a referral   
to hematology/oncology / Dr Butts. Robert said she had a   
bunch of labs done for   
Rheumatology (see in chart) and   
she still does not have any   
answers. She is tired of not   
feeling good and having swelling   
in her lymph nodes in her neck.   
Dr Robertson said he will see her and   
his fax# is [phone].  
  
Patient last appointment:   
2024  
  
The patients preferred pharmacy   
has been captured for this   
encounter? no  
  
Leanna Burton LPN  
Electronically signed by Leanna Burton LPN at 09/10/2024 2:25   
PM EDT  
                                        [facility]  
   
                                                    2024 Telephone   
encounter Note                          Formatting of this note might be   
different from the original.  
Please let her know   
immunodeficiency testing all   
negative  
No immuno deficiency  
  
Blood test for RA positive ( [facility]   
) low titer  
  
Waiting on her US hand prior to   
any additional med  
  
Inderprit MD Yovany  
Electronically signed by Reginald Pedroza MD at 2024 7:12   
PM EDT  
                                        [facility]  
   
                                                    2024 Miscellaneous   
Notes                                   Formatting of this note might be   
different from the original.  
Please let her know   
immunodeficiency testing all   
negative  
No immuno deficiency  
  
Blood test for RA positive ( CCP   
) low titer  
  
Waiting on her US hand prior to   
any additional med  
  
Inderprit MD Yovany  
Electronically signed by Reginald Pedroza MD at 2024 7:12   
PM EDT  
Formatting of this note might be   
different from the original.  
Pt asking about recent lab   
results- specifically   does any   
of those results show what my   
issue is .  
  
Ondina Fitch LPN  
Electronically signed by   
Ondina Fitch LPN at   
2024 12:09 PM EDT  
documented in this encounter            [facility]  
   
                                                    2024 Telephone   
encounter Note                          Formatting of this note might be   
different from the original.  
Pt asking about recent lab   
results- specifically   does any   
of those results show what my   
issue is .   
  
Ondina Fitch LPN  
Electronically signed by   
Ondina Fitch LPN at   
2024 12:09 PM EDT  
                                        [facility]  
   
                                                    2024 Telephone   
encounter Note                          Formatting of this note might be   
different from the original.  
PT scheduled for MSK US on   
10/21/24 at 3 PM at [facility]  
Electronically signed by   
Lizzy Young at 2024   
12:42 PM EDT  
                                        [facility]  
   
                                                    2024 Miscellaneous   
Notes                                   Formatting of this note might be   
different from the original.  
PT scheduled for MSK US on   
10/21/24 at 3 PM at [facility].  
Electronically signed by   
Lizzy Young at 2024   
12:42 PM EDT  
Formatting of this note might be   
different from the original.  
Called patient on 2024 at 11:47 AM to schedule   
their MSK US exam. No answer,   
left VM, 1st attempt.  
Electronically signed by   
Lizzy Young at 2024   
11:47 AM EDT  
Formatting of this note might be   
different from the original.  
Visit Type: MSK SYN  
  
Visit Length: 45, 50 OR 60   
MINUTES  
  
Order Name/Protocol: US   
HAND/WRIST SYNOVIAL SCREEN RT+LT  
  
Preferred Provider: N/A  
  
Comment: N/A  
  
Location: ANY FACILITY  
  
Slot held: N/A  
Electronically signed by   
Lizzy Young at 2024   
9:42 AM EDT  
documented in this encounter            [facility]  
   
                                                    2024 Telephone   
encounter Note                          Formatting of this note might be   
different from the original.  
Called patient on 2024 at 11:47 AM to schedule   
their MSK US exam. No answer,   
left VM, 1st attempt.  
Electronically signed by   
Lizzy Young at 2024   
11:47 AM EDT  
                                        [facility]  
   
                                                    2024 Telephone   
encounter Note                          Formatting of this note might be   
different from the original.  
Visit Type: MSK SYN  
  
Visit Length: 45, 50 OR 60   
MINUTES  
  
Order Name/Protocol: US   
HAND/WRIST SYNOVIAL SCREEN RT+LT  
  
Preferred Provider: N/A  
  
Comment: N/A  
  
Location: ANY FACILITY  
  
Slot held: N/A  
Electronically signed by   
Lizzy Young at 2024   
9:42 AM EDT  
                                        [facility]  
   
                                        2024 Note     HNO ID: [id number]  
Author: REGINALD PEDROZA MD  
Service: ?  
Author Type: Physician  
Type: Progress Notes  
Filed: 2024 08:33  
Note Text:  
This note was created using   
ClickOnriter.  
Subjective  
Robertcarmen Palacios is a 44 year   
old female.  
Not feeling good  
Worse since 2024  
Has to force herself to do any   
thing  
Wt loss  
180 and now down to 164 2024  
Hurt most back and legs  
Morning stiffness is lasting 4   
hours plus, vague possibly day   
long, not  
recovering from stiffness.  
Review of Systems  
Objective  
/65   Pulse 69   Temp 37.1   
?C (98.7 ?F)   Ht 170.2 cm (5'   
7 )   Wt  
74.5 kg (164 lb 3.2 oz)   LMP   
(LMP Unknown)   BMI 25.72 kg/m?  
Physical Exam  
Vitals reviewed.  
Constitutional:  
General: She is not in acute   
distress.  
Appearance: She is not   
ill-appearing or toxic-appearing.  
HENT:  
Right Ear: There is no impacted   
cerumen.  
Left Ear: There is no impacted   
cerumen.  
Nose: No congestion or   
rhinorrhea.  
Mouth/Throat:  
Pharynx: No oropharyngeal exudate   
or posterior oropharyngeal   
erythema.  
Eyes:  
General:  
Right eye: No discharge.  
Left eye: No discharge.  
Cardiovascular:  
Rate and Rhythm: Normal rate and   
regular rhythm.  
Heart sounds: Normal heart   
sounds. No murmur heard.  
No friction rub. No gallop.  
Pulmonary:  
Effort: No respiratory distress.  
Breath sounds: Normal breath   
sounds. No stridor. No wheezing   
or rhonchi.  
Abdominal:  
General: There is no distension.  
Palpations: Abdomen is soft.   
There is no mass.  
Tenderness: There is no abdominal   
tenderness.  
Hernia: No hernia is present.  
Musculoskeletal:  
General: No swelling, tenderness,   
deformity or signs of injury.  
Right shoulder: Normal.  
Left shoulder: Normal.  
Right elbow: Normal.  
Left elbow: Normal.  
Right wrist: Normal.  
Left wrist: Normal.  
Right hand: Normal.  
Left hand: Normal.  
Cervical back: No rigidity or   
tenderness.  
Thoracic back: Normal.  
Lumbar back: Normal.  
Right hip: Normal.  
Left hip: Normal.  
Right knee: Normal.  
Left knee: Normal.  
Right ankle: Normal.  
Left ankle: Normal.  
Right foot: Normal.  
Left foot: Normal.  
Comments: Tenderness CS TS LS  
Decreased range of motion CS LS  
bilateral shoulder passive range   
of motion is near normal , active   
range of  
motion is mod limited  
Wrist not tender  
Mcp not tender  
Pip 2-5 low grade swelling  
Dip pip enlargement  
Ankle feet normal  
There was no sclerodactyly noted   
on exam either and there was also   
no loss of  
digital pulp. There is no   
periungual erythema.  
Lymphadenopathy:  
Cervical: No cervical adenopathy.  
Skin:  
Coloration: Skin is not jaundiced   
or pale.  
Findings: No bruising or   
erythema.  
Neurological:  
Mental Status: She is oriented to   
person, place, and time.  
Cranial Nerves: No cranial nerve   
deficit.  
Sensory: No sensory deficit.  
Motor: No weakness.  
Coordination: Coordination   
normal.  
Assessment and Plan  
First visit 2024 lt handed  
[facility] , no RA   
diagnosis Dr. Tess Yang   
2024  
Moves to [place] 2014  
Polyarthralgia  
 HIV Hep B ab 2019 Hep C neg  
2020 ASHLIE neg , 2019 RF CCP ASHLEI   
neg  
 cold agglutin neg cryo neg  
 TPO neg  
2020 SPEP IgG 550 , 2019 SPEP IgG   
800  
2019   
2014 alpha 1 anti trypsin 129   
normla  
 IgG 737 IgG1 367 low , IgG2   
278 Low , IgG3 88 IgG4 2.2 low  
 Post vaccination diptheria   
ab. tetanus ab. normal  
2024 aug pre/ post vaccination   
pneumococcal ab.low in serotype :   
2023 RF ASHLIE neg CCP 28 (<20 ) ,   
esr 5 crp normal uric 4.1  
 IgA, Transglutaminase Ab:   
neg, IgA (mg/dl): 126  
 ACE 36  
 cANCA pANCA neg  
 RAST NE 5 neg  
 - ESR < 10 .  
[id number] CRP normal  
 chest xr normal  
2019 hand xr bl normal  
 feet xr bl normal  
 bilateral knee xr : Lateral   
patellar tilt bilaterally.  
2016 CT neck saliva gland normal  
 US bilateral wrist hand  
3RD PIP: Hypertrophy: Moderate.   
Power Doppler: None.  
4TH MCP: Hypertrophy: Moderate.   
Power Doppler: None.  
4TH PIP: Hypertrophy: None. Power   
Doppler: None.  
3RD PIP: Hypertrophy: Moderate.   
Power Doppler: None.  
4TH PIP: Hypertrophy: Moderate.   
Power Doppler: None.  
5TH PIP: Hypertrophy: Moderate.   
Power Doppler: None.  
Synovial hypertrophy without   
hyperemia at the bilateral PIP   
joints as described  
above.  
 EMG rt upper and lower ext   
normal  
(( 2024 Chronic   
leukocytosis? Last high wbc in   
2019 34785 and rest normal  
)  
((2024 RA diagnosis 2004 [place]   
Rheum hand pain, knee pain back   
pain,  
plaquenil [id number] not sure if   
helped), enbrel [id number] helped   
a lot ) ( no  
use of arava mtx ) ( prednisone   
used past no memory )  
((2024 Raynaud's Phenomenon   
2010 or before, hand and feet   
triphasic  
changes  
((2024 left side of face   
swells up 2023 onset, left side   
gland swells up  
visually apparent, happens about   
once a week, at time can last one   
one week,  
uses warm rag, no medicine used   
for this ) ( age 25 loss of   
teeth, failed root  
canals )  
((2024 : exam nail capillary   
normal, nasal septa (more content   
not included)...                        [facility]  
   
                                                    2024 History of   
Present illness Narrative               Formatting of this note might be   
different from the original.  
This note was created using   
Click[employer]riter.  
Subjective  
Robert Palacios is a 44 year   
old female.  
  
Not feeling good  
Worse since 2024  
Has to force herself to do any   
thing  
  
Wt loss  
180 and now down to 164 2024  
  
Hurt most back and legs  
Morning stiffness is lasting 4   
hours plus, vague possibly day   
long, not recovering from   
stiffness.  
  
Review of Systems  
  
Objective  
/65   Pulse 69   Temp 37.1   
C (98.7 F)   Ht 170.2 cm (5' 7 )   
  Wt 74.5 kg (164 lb 3.2 oz)     
LMP (LMP Unknown)   BMI 25.72   
kg/m  
Physical Exam  
Vitals reviewed.  
Constitutional:  
General: She is not in acute   
distress.  
Appearance: She is not   
ill-appearing or toxic-appearing.  
HENT:  
Right Ear: There is no impacted   
cerumen.  
Left Ear: There is no impacted   
cerumen.  
Nose: No congestion or   
rhinorrhea.  
Mouth/Throat:  
Pharynx: No oropharyngeal exudate   
or posterior oropharyngeal   
erythema.  
Eyes:  
General:  
Right eye: No discharge.  
Left eye: No discharge.  
Cardiovascular:  
Rate and Rhythm: Normal rate and   
regular rhythm.  
Heart sounds: Normal heart   
sounds. No murmur heard.  
No friction rub. No gallop.  
Pulmonary:  
Effort: No respiratory distress.  
Breath sounds: Normal breath   
sounds. No stridor. No wheezing   
or rhonchi.  
Abdominal:  
General: There is no distension.  
Palpations: Abdomen is soft.   
There is no mass.  
Tenderness: There is no abdominal   
tenderness.  
Hernia: No hernia is present.  
Musculoskeletal:  
General: No swelling, tenderness,   
deformity or signs of injury.  
Right shoulder: Normal.  
Left shoulder: Normal.  
Right elbow: Normal.  
Left elbow: Normal.  
Right wrist: Normal.  
Left wrist: Normal.  
Right hand: Normal.  
Left hand: Normal.  
Cervical back: No rigidity or   
tenderness.  
Thoracic back: Normal.  
Lumbar back: Normal.  
Right hip: Normal.  
Left hip: Normal.  
Right knee: Normal.  
Left knee: Normal.  
Right ankle: Normal.  
Left ankle: Normal.  
Right foot: Normal.  
Left foot: Normal.  
Comments: Tenderness CS TS LS  
Decreased range of motion CS LS  
bilateral shoulder passive range   
of motion is near normal , active   
range of motion is mod limited  
Wrist not tender  
Mcp not tender  
Pip 2-5 low grade swelling  
Dip pip enlargement  
Ankle feet normal  
There was no sclerodactyly noted   
on exam either and there was also   
no loss of digital pulp. There is   
no periungual erythema.  
Lymphadenopathy:  
Cervical: No cervical adenopathy.  
Skin:  
Coloration: Skin is not jaundiced   
or pale.  
Findings: No bruising or   
erythema.  
Neurological:  
Mental Status: She is oriented to   
person, place, and time.  
Cranial Nerves: No cranial nerve   
deficit.  
Sensory: No sensory deficit.  
Motor: No weakness.  
Coordination: Coordination   
normal.  
  
Assessment and Plan  
  
First visit 2024 lt handed  
[facility] [place] [facility] , no RA   
diagnosis Dr. Tess Yang   
2024  
Moves to [place]   
  
Polyarthralgia  
 HIV Hep B ab 2019 Hep C neg  
2020 ASHLIE neg , 2019 RF CCP ASHLIE   
neg  
 cold agglutin neg cryo neg  
 TPO neg  
 SPEP IgG 550 , 2019 SPEP IgG   
800  
   
 alpha 1 anti trypsin 129   
normla  
 - ESR < 10 .  
[id number] CRP normal  
 chest xr normal  
2019 hand xr bl normal  
2019 feet xr bl normal  
2019 bilateral knee xr : Lateral   
patellar tilt bilaterally.  
2016 CT neck saliva gland normal  
 EMG rt upper and lower ext   
normal  
(( 2024 Chronic   
leukocytosis? Last high wbc in   
2019 25681 and rest normal )  
((2024 RA diagnosis 2004 [place]   
Rheum hand pain, knee pain back   
pain, plaquenil [phone] not   
sure if helped), enbrel [phone]   
helped a lot ) ( no use of arava   
mtx ) ( prednisone used past no   
memory )  
((2024 Raynaud's Phenomenon   
2010 or before, hand and feet   
triphasic changes  
((2024 left side of face   
swells up 2023 onset, left side   
gland swells up visually   
apparent, happens about once a   
week, at time can last one one   
week, uses warm rag, no medicine   
used for this ) ( age 25 loss of   
teeth, failed root canals )  
((2024 : exam nail capillary   
normal, nasal septal perforation,   
no saliva gland swelling )  
2024 get testing done on her,   
past history of RA tt and   
responded to tt, get USG on hand   
for now, immunodef concern in her   
plus wt loss 2024  
  
Drug and disease monitoring  
 cmp cbc normal  
 tsh normal  
  
 jul : iron 55, tibc 294 %   
sat 19 low , ferritin 6.3 ( PCP   
addressing )  
 folic normal B12 763  
  
Fibromyalgia  
((2024 or before   
diagnosis )  
TT  
2024  
  
Jaw pain (( 2024 no bruxism,   
off and on, life long, at time   
years and years without it, jaw   
pops left , TMJ diagnosis )  
2024  
  
Neck pain (( 2024 or   
before, Pain from the cervical   
spine is radiating to the   
shoulders. , at time Cervical   
radiculopathy bilateral rt > left   
)  
2024  
  
Mid back pain (( 2024   
or before ))  
  
Chronic low back pain (( 2024 onset , Lumbar   
radiculopathy bilateral )  
TT  
PTdone, last 2024  
aquatic therapy not done,   
2024  
Chiropractor manipulation not   
done, Massage therapy not done   
2024  
, Acupuncture not done 2024  
Failed epidural nerve block no   
better 2024  
Pain mgt seen past 2024  
Spinal cord stimulator 2019   
placed L4 2024  
  
bilateral shoulder pain (( 2024 from CS , never   
dislocated, Pain in the joint   
while patient is sleeping. Has   
full range of motion ) (rt   
shoulder pain > left shoulder s )   
( no elbow pains, no wrist pains   
))  
TT  
2024 no inj done so far   
2024  
  
Hand pain (( 2004 onset , initial   
diagnosis of RA ) (( bilateral   
hand numb, able to shake this   
off, worse in am, EMG done rt   
side normal , hand numb since   
 )  
TT  
2024  
  
HIp pain (( 2024 has battery   
left buttock and attributes to   
same )  
TT  
2024  
  
Knee pain (( 2024 bilateral   
 onset, at time swell up, rt   
> left , never inj )  
TT  
2024  
  
Ankle feet pains (( 2024   
2004 onset, and worse ,   
Raynaud's Phenomenon triphasic,   
hurt even when warm, I nthe cold   
feet go numb )  
2024  
  
Diabetes Mellitus II    
controlled diet 2024  
HTN  onset 2024  
Heat palpitation beta blocker   
2004 started 2024  
hyperlipidemia 2024  
No MI STroke 2024  
  
COPD  
( 2024 no asthma )  
Allergic Rhinitis and Allergic   
Conjunctivitis ( never skin   
tested )  
Pneumonia 2024 just one   
2024  
Sinus infection recurrent ( with   
grandson exposure nov to April   
just non stop )) 2024  
Recurrent ear infection ( no   
tubes in ear )  
 FVC 3.82 97%, FEV1 3.15 99%,   
ratio 101, normal  
 PCV 13 done  
  
Sinus headache  
(( 2024 multiple time a   
week, onset  )  
 nasal septal perforation ((   
denies cocaine use, denies   
steroid use, used afrin ))  
TT  
2024 (( no headache before   
this ))  
  
Dizziness  
(( 2024 off and on , years,   
, couple of yrs, not sure what   
causes, mostly with activity,   
momentary dizzy only while   
standing will grab some thing and   
resolves )  
  
anxiety ( intermittent )  
No depression 2024 no PTSD   
2024  
Psychologist / psychiatrist seen,   
counseling done.  or before   
2024  
  
GERD ( no EGD or Colonoscopy done   
)  
((Wt loss :: 2024 to   
present 2024 17 lbs, low   
grade nausea, loss of appetite )  
TT  
PPI 2004 2024  
  
Pain mgt PAIN MGT once a year   
2024 (( [facility] )  
Cymbalta used 2019 or prior not   
effective OFF  
Codeine nausea vomit OFF  
gabapentin headache and quit OFF  
  
Buspar used  not effective   
went OFF  
Lyrica 150 mg bid  helpedquit   
 (( son addict and wanted to   
get rid of all med )) OFF  
[place] prn  quit  helped (   
son addict and wanted to get rid   
of all med )) OFF  
Zanaflex 8 mg tid  ( went   
off , helped initially and then   
off )) OFF  
  
MObic  started partially   
effective 2024 mobc 15 mg.  
Med Marijuana  started   
2024  
  
Brief Personal and family   
history:  
Started smoking age 13 2024   
1/5 ppd 2024  
No ETOH 2024  
Custody of grand son 4 Cystic   
fibrosis (32 yr old sons son ) ,   
17 yr old son 2024 at home (   
lives with boyfriend ) 2024  
32 yr old son with addiction   
issue 2024  
One son 25 2024  
Daughter 27 2024  
2 brother no med issue she is   
aware of 2024  
1 sister med issue not aware, no   
contact 30 yr 2024  
Father dead age 40 Suicide   
2024  
Mother dead age 60 cause of death   
? 2024  
  
I spent a total of 60 minutes on   
the date of the service which   
included face-to-face patient   
care.  
Electronically signed by Reginald Pedroza MD at 2024 9:19   
AM EDT  
documented in this encounter            [facility]  
   
                                                    2024 History and   
physical note                           Formatting of this note is   
different from the original.  
Images from the original note   
were not included.  
  
  
2024  
  
Robert Palacios  
1980  
[id number]  
  
CC: Abnormal feelings in the left   
breast  
  
HPI:  
The patient is a 44 year old   
female who presents mostly due to   
increased tenderness in her left   
nipple areolar area and central   
breast. She became aware of this   
when her grandson bumped up   
against her left breast by   
accident. She palpated the area   
and did feel tenderness but did   
not feel any palpable findings.   
Neither did she have any visible   
findings. She then felt  like   
something broke loose  but has   
not had any erythema or nipple   
discharge. She underwent   
mammography which was negative   
and ultrasound evaluation which   
showed ectatic ducts versus a   
complex cyst but no suspicious   
findings. She has not noted any   
progression of her symptoms and   
presents for my evaluation today   
on that basis.  
As an aside she has numerous   
concerns regarding all other body   
systems and describes feelings of   
depression and anxiety with   
weight loss and numerous symptoms   
throughout her review of systems   
which is more positive than   
negative. Overall she states that   
she feels like she is  losing my   
shit.  She is planning to have   
additional primary care   
evaluation in order to delve into   
these other issues.  
  
PAST MEDICAL HISTORY  
PAST MEDICAL HISTORY  
No date: Anemia  
2014: Anxiety  
No date: Anxiety and depression  
2014: COPD, mild (HCC)  
2014: Depression  
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  
No date: Fibromyalgia  
2014: GERD   
(gastroesophageal reflux disease)  
2014: HTN (hypertension)  
2014: Hyperlipidemia  
2009: Lump or mass in   
breast  
No date: Malignant neoplasm of   
cervix uteri, unspecified site  
Comment: Precancerous, not   
cervical cancer  
2007: NO SHOW  
Comment: With primary care and   
specialty offices.  
No date: Osteoarthritis of   
multiple joints  
No date: PMH - PAST MEDICAL   
HISTORY OF  
Comment: diabetes  
No date: Prediabetes  
No date: Raynaud disease  
No date: Raynaud disease  
No date: Restless legs  
No date: Rheumatoid arthritis   
(HCC)  
No date: RSD lower limb  
Comment: bilateral legs  
2014: Smoker  
2014: Tachycardia  
Comment: Possible SVT  
2005: Type II or   
unspecified type diabetes   
mellitus without  
mention of complication,   
uncontrolled  
Comment: Diet controlled.  
OB-GYN HISTORY  
Age of menarche is 11  
First full-term pregnancy at age   
12  
 T0  L3  
SAB1 IAB0 Ectopic0 Multiple0 Live   
Births0  
Comment:  x 3  
Did not breast-feed  
  
PAST SURGICAL HISTORY  
PAST SURGICAL HISTORY  
2009: BIOPSY BREAST OPEN   
INCISIONAL  
Comment: right breast  
2006: BX BREAST NEEDLE CORE   
W/O IMAGING GUIDANCE SPX  
Comment: right breast  
10/16/2018: PAST SURGICAL HISTORY   
OF  
Comment: Spinal Cord Stimulator   
Implant  
2020: PAST SURGICAL HISTORY   
OF  
Comment: spinal cord stimulator   
revision  
2009: PLCMT LOCALZTN   
CLIP,PERC,DURING BREAST BX  
Comment: TITANIUM CLIP PLACEMENT  
No date: TONSILLECTOMY   
PRIMARY/SECONDARY Comment:   
Tonsillectomy  
10/07/1993: TOTAL ABDOMINAL   
HYSTERECT W/WO RMVL TUBE OVARY  
Comment: Dr. Delgado  
2009: US BREAST NEEDLE CORE   
BIOPSY RT  
FAMILY HISTORY  
FAMILY HISTORY  
Problem Relation Age of Onset  
Hypertension Mother  
Heart Mother  
Diabetes Mother  
COPD Mother  
Alcohol/Drug Father  
Allergies Brother  
Alcohol/Drug Sister  
No Known Problems Paternal   
Grandmother  
No Known Problems Paternal   
Grandfather  
No Known Problems Son  
No Known Problems Son  
No Known Problems Daughter  
Diabetes Brother  
Hypoglycemic  
Breast Cancer Maternal Aunt  
No Known Problems Maternal   
Grandmother  
No Known Problems Maternal   
Grandfather  
Breast Cancer Maternal Aunt  
  
SOCIAL HISTORY  
Social History  
  
Tobacco Use  
Smoking status: Every Day  
Packs/day: 0.25  
Years: 20.00  
Additional pack years: 0.00  
Total pack years: 5.00  
Types: Cigarettes  
Start date: 1993  
Passive exposure: Past  
Smokeless tobacco: Never  
Vaping Use  
Vaping Use: current everyday user  
Substances: Nicotine  
Substance Use Topics  
Alcohol use: Not Currently  
Drug use: Not Currently  
Types: Marijuana  
  
MEDICATIONS  
Current Outpatient Medications  
Medication Sig Dispense Refill  
cetirizine (ZYRTEC) 10 mg tablet   
Take 1 tablet by mouth once   
daily. 90 tablet 3  
meloxicam (MOBIC) 7.5 mg tablet   
take 1 tablet by mouth once daily   
30 tablet 5  
omeprazole (PRILOSEC) 40 mg   
capsule Take 1 capsule by mouth   
two times a day. 180 capsule 3  
ANORO ELLIPTA 62.5-25   
mcg/actuation inhaler INHALE 1   
PUFF BY MOUTH INTO THE LUNGS ONCE   
A DAY 1 Each 11  
Blood-Glucose Meter Test One time   
a day and as needed. Insulin Dep?   
Yes E11.9 DM 2 1 Each 0  
Blood Glucose Control, Normal   
soln Use as instructed 1 Each 0  
blood sugar diagnostic (BLOOD   
GLUCOSE TEST) test strip 1 Strip   
once daily. Test blood sugar once   
daily and as needed. 100 Strip 2  
atenolol (TENORMIN) 25 mg tablet   
take 1 tablet by mouth once daily   
30 tablet 11  
atorvastatin (LIPITOR) 20 mg   
tablet Take 1 tablet by mouth   
once daily. 90 tablet 3  
cyanocobalamin (VITAMIN B-12) 100   
mcg tab Take 100 mcg by mouth   
once daily.  
albuterol HFA (PROVENTIL HFA,   
VENTOLIN HFA) 90 mcg/actuation   
inhaler Inhale 2 Puffs as   
instructed every 4 hours as   
needed for wheezing/shortness of   
breath. 1 Each 1  
blood sugar diagnostic (FREESTYLE   
LITE STRIPS) test strip Test   
blood sugar(s) 1 times daily. Dx:   
E11.9. Insulin: No 50 Strip 11  
Lancets lancets Check glucose   
daily . Dx: 11.9. 100 Each 3  
  
No current facility-administered   
medications for this visit.  
  
ALLERGIES  
Codeine, Gabapentin, and   
Penicillins  
  
REVIEW OF SYSTEMS:  
As mentioned in HPI  
  
The patient denies any recent   
illnesses except as above.  
HEENT: Head-the patient denies   
any dramatic change in frequent   
headaches though they have   
increased somewhat, eyes-patient   
wears glasses and denies any   
acute visual changes though she   
does describe some blurriness,   
ears-patient denies any changes   
in her usual hearing difficulty   
or her chronic tinnitus,   
throat-patient complains of a   
persistently sore throat.  
Neuro: Patient denies dizziness,   
syncope, seizures, strokes, or   
amaurosis fugax.  
Respiratory: Patient frequently   
has some degree of shortness of   
breath with exertion or at rest.   
They have no orthopnea, cough, or   
history of hemoptysis. Does not   
snore significantly and denies   
paroxysmal nocturnal dyspnea.  
Cardiac: Patient currently has a   
variety of chest pain, pressure,   
squeezing, heaviness, tightness,   
or palpitations which she   
questions may be on the basis of   
anxiety.  
Gastrointestinal: The patient has   
frequent nausea, but no vomiting,   
diarrhea, constipation,   
hematochezia, melena, or change   
in heartburn. She does complain   
of feeling gassy.  
Genitourinary: The patient denies   
dysuria, hematuria or any change   
in nocturia.  
Musculoskeletal: Patient   
complains of diffuse muscle,   
bone, or joint type pain.  
  
PHYSICAL EXAM:  
/86   Pulse 84   Resp 14     
Wt 161 lb (73.0kg)  
  
  
GENERAL: Alert and oriented. No   
acute distress.  
SKIN: Warm and dry. No jaundice.  
HEAD: Normocephalic and   
atraumatic.  
EYES: Pupils equal, round, and   
reactive to light. Extraocular   
movements intact. No scleral   
icterus.  
PHARYNX: Clear. She has upper and   
lower dentures.  
NECK: No cervical or   
supraclavicular lymphadenopathy.   
Thyroid palpably normal. Carotids   
without bruits. Supple neck.  
HEART: Normal sinus rhythm   
without gallop or murmur.  
LUNGS: Clear bilaterally with no   
wheezes, or rhonchi.  
BREASTS: Relatively symmetric   
bilaterally. No skin changes are   
noted on either side. There are   
no focal dominant suspicious   
masses palpable on either side.   
There is no nipple discharge.  
ABDOMEN: Soft and non tender. No   
distention. Normal bowel sounds.   
No mass or organomegaly. There   
seems to be a small   
infraumbilical scar the patient   
disagrees.  
EXTREMITIES: No cyanosis or   
edema.  
LYMPHATIC: There is no cervical,   
supraclavicular, or axillary   
adenopathy.  
NEUROLOGIC: Cranial nerves II-XII   
grossly intact. Grossly intact   
motor and sensory exam.  
  
DIAGNOSTICS:  
I reviewed all of her current and   
previous available breast imaging   
studies personally. Her 2024 mammograms are fairly dense   
overall but do not show any focal   
area changed from her prior   
studies. The area described on   
the ultrasound seems to my eye   
most consistent with subareolar   
ectatic ducts rather than any   
focal suspicious finding. The   
Radiology recommendation for 6   
month follow up ultrasound is   
reasonable.  
  
IMPRESSION:  
(R92.8) Abnormal findings on   
diagnostic imaging of breast   
(primary encounter diagnosis)  
(N64.4) Breast pain, left  
(Z87.898) History of abnormal   
mammogram  
(N63.22) Mass of upper inner   
quadrant of left breast  
  
PLAN:  
We discussed at length the   
anatomic details of the   
subareolar space and its   
complexity. We reviewed her   
details of imaging studies and   
the recommendations per radiology   
likely of not just one 6 month   
study but a series of 6-month   
follow-up ultrasounds. She   
understands and agrees with this   
plan.  
  
Consultation requested by HELLEN Baig, CNP for an opinion   
regarding left breast complaints   
and findings. My final   
recommendations will be   
communicated back to the   
requesting physician by way of   
shared Medical record or letter   
to requesting physician via [place]   
mail.  
  
Pascual Austin MD  
  
Electronically signed by Pascual Austin MD at   
2024 11:58 AM EDT  
                                        [facility]  
   
                                                    2024 History and   
physical note                           Formatting of this note is   
different from the original.  
Images from the original note   
were not included.  
  
  
2024  
  
Robert Palacios  
1980  
[id number]  
  
CC: Abnormal feelings in the left   
breast  
  
HPI:  
The patient is a 44 year old   
female who presents mostly due to   
increased tenderness in her left   
nipple areolar area and central   
breast. She became aware of this   
when her grandson bumped up   
against her left breast by   
accident. She palpated the area   
and did feel tenderness but did   
not feel any palpable findings.   
Neither did she have any visible   
findings. She then felt  like   
something broke loose  but has   
not had any erythema or nipple   
discharge. She underwent   
mammography which was negative   
and ultrasound evaluation which   
showed ectatic ducts versus a   
complex cyst but no suspicious   
findings. She has not noted any   
progression of her symptoms and   
presents for my evaluation today   
on that basis.  
As an aside she has numerous   
concerns regarding all other body   
systems and describes feelings of   
depression and anxiety with   
weight loss and numerous symptoms   
throughout her review of systems   
which is more positive than   
negative. Overall she states that   
she feels like she is  losing my   
shit.  She is planning to have   
additional primary care   
evaluation in order to delve into   
these other issues.  
  
PAST MEDICAL HISTORY  
PAST MEDICAL HISTORY  
No date: Anemia  
2014: Anxiety  
No date: Anxiety and depression  
2014: COPD, mild (HCC)  
2014: Depression  
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  
No date: Fibromyalgia  
2014: GERD   
(gastroesophageal reflux disease)  
2014: HTN (hypertension)  
2014: Hyperlipidemia  
2009: Lump or mass in   
breast  
No date: Malignant neoplasm of   
cervix uteri, unspecified site  
Comment: Precancerous, not   
cervical cancer  
2007: NO SHOW  
Comment: With primary care and   
specialty offices.  
No date: Osteoarthritis of   
multiple joints  
No date: PMH - PAST MEDICAL   
HISTORY OF  
Comment: diabetes  
No date: Prediabetes  
No date: Raynaud disease  
No date: Raynaud disease  
No date: Restless legs  
No date: Rheumatoid arthritis   
([facility])  
No date: RSD lower limb  
Comment: bilateral legs  
2014: Smoker  
2014: Tachycardia  
Comment: Possible SVT  
2005: Type II or   
unspecified type diabetes   
mellitus without  
mention of complication,   
uncontrolled  
Comment: Diet controlled.  
OB-GYN HISTORY  
Age of menarche is 11  
First full-term pregnancy at age   
12  
 T0  L3  
SAB1 IAB0 Ectopic0 Multiple0 Live   
Births0  
Comment:  x 3  
Did not breast-feed  
  
PAST SURGICAL HISTORY  
PAST SURGICAL HISTORY  
2009: BIOPSY BREAST OPEN   
INCISIONAL  
Comment: right breast  
2006: BX BREAST NEEDLE CORE   
W/O IMAGING GUIDANCE SPX  
Comment: right breast  
10/16/2018: PAST SURGICAL HISTORY   
OF  
Comment: Spinal Cord Stimulator   
Implant  
2020: PAST SURGICAL HISTORY   
OF  
Comment: spinal cord stimulator   
revision  
2009: PLCMT LOCALZTN   
CLIP,PERC,DURING BREAST BX  
Comment: TITANIUM CLIP PLACEMENT  
No date: TONSILLECTOMY   
PRIMARY/SECONDARY <AGE 10  
Comment: Tonsillectomy  
10/07/1993: TOTAL ABDOMINAL   
HYSTERECT W/WO RMVL TUBE OVARY  
Comment: Dr. Delgado  
2009: US BREAST NEEDLE CORE   
BIOPSY RT  
FAMILY HISTORY  
FAMILY HISTORY  
Problem Relation Age of Onset  
Hypertension Mother  
Heart Mother  
Diabetes Mother  
COPD Mother  
Alcohol/Drug Father  
Allergies Brother  
Alcohol/Drug Sister  
No Known Problems Paternal   
Grandmother  
No Known Problems Paternal   
Grandfather  
No Known Problems Son  
No Known Problems Son  
No Known Problems Daughter  
Diabetes Brother  
Hypoglycemic  
Breast Cancer Maternal Aunt  
No Known Problems Maternal   
Grandmother  
No Known Problems Maternal   
Grandfather  
Breast Cancer Maternal Aunt  
  
SOCIAL HISTORY  
Social History  
  
Tobacco Use  
Smoking status: Every Day  
Packs/day: 0.25  
Years: 20.00  
Additional pack years: 0.00  
Total pack years: 5.00  
Types: Cigarettes  
Start date: 1993  
Passive exposure: Past  
Smokeless tobacco: Never  
Vaping Use  
Vaping Use: current everyday user  
Substances: Nicotine  
Substance Use Topics  
Alcohol use: Not Currently  
Drug use: Not Currently  
Types: Marijuana  
  
MEDICATIONS  
Current Outpatient Medications  
Medication Sig Dispense Refill  
cetirizine (ZYRTEC) 10 mg tablet   
Take 1 tablet by mouth once   
daily. 90 tablet 3  
meloxicam (MOBIC) 7.5 mg tablet   
take 1 tablet by mouth once daily   
30 tablet 5  
omeprazole (PRILOSEC) 40 mg   
capsule Take 1 capsule by mouth   
two times a day. 180 capsule 3  
ANORO ELLIPTA 62.5-25   
mcg/actuation inhaler INHALE 1   
PUFF BY MOUTH INTO THE LUNGS ONCE   
A DAY 1 Each 11  
Blood-Glucose Meter Test One time   
a day and as needed. Insulin Dep?   
Yes E11.9 DM 2 1 Each 0  
Blood Glucose Control, Normal   
soln Use as instructed 1 Each 0  
blood sugar diagnostic (BLOOD   
GLUCOSE TEST) test strip 1 Strip   
once daily. Test blood sugar once   
daily and as needed. 100 Strip 2  
atenolol (TENORMIN) 25 mg tablet   
take 1 tablet by mouth once daily   
30 tablet 11  
atorvastatin (LIPITOR) 20 mg   
tablet Take 1 tablet by mouth   
once daily. 90 tablet 3  
cyanocobalamin (VITAMIN B-12) 100   
mcg tab Take 100 mcg by mouth   
once daily.  
albuterol HFA (PROVENTIL HFA,   
VENTOLIN HFA) 90 mcg/actuation   
inhaler Inhale 2 Puffs as   
instructed every 4 hours as   
needed for wheezing/shortness of   
breath. 1 Each 1  
blood sugar diagnostic (FREESTYLE   
LITE STRIPS) test strip Test   
blood sugar(s) 1 times daily. Dx:   
E11.9. Insulin: No 50 Strip 11  
Lancets lancets Check glucose   
daily . Dx: 11.9. 100 Each 3  
  
No current facility-administered   
medications for this visit.  
  
ALLERGIES  
Codeine, Gabapentin, and   
Penicillins  
  
REVIEW OF SYSTEMS:  
As mentioned in HPI  
  
The patient denies any recent   
illnesses except as above.  
HEENT: Head-the patient denies   
any dramatic change in frequent   
headaches though they have   
increased somewhat, eyes-patient   
wears glasses and denies any   
acute visual changes though she   
does describe some blurriness,   
ears-patient denies any changes   
in her usual hearing difficulty   
or her chronic tinnitus,   
throat-patient complains of a   
persistently sore throat.  
Neuro: Patient denies dizziness,   
syncope, seizures, strokes, or   
amaurosis fugax.  
Respiratory: Patient frequently   
has some degree of shortness of   
breath with exertion or at rest.   
They have no orthopnea, cough, or   
history of hemoptysis. Does not   
snore significantly and denies   
paroxysmal nocturnal dyspnea.  
Cardiac: Patient currently has a   
variety of chest pain, pressure,   
squeezing, heaviness, tightness,   
or palpitations which she   
questions may be on the basis of   
anxiety.  
Gastrointestinal: The patient has   
frequent nausea, but no vomiting,   
diarrhea, constipation,   
hematochezia, melena, or change   
in heartburn. She does complain   
of feeling gassy.  
Genitourinary: The patient denies   
dysuria, hematuria or any change   
in nocturia.  
Musculoskeletal: Patient   
complains of diffuse muscle,   
bone, or joint type pain.  
  
PHYSICAL EXAM:  
/86   Pulse 84   Resp 14     
Wt 161 lb (73.0kg)  
  
  
GENERAL: Alert and oriented. No   
acute distress.  
SKIN: Warm and dry. No jaundice.  
HEAD: Normocephalic and   
atraumatic.  
EYES: Pupils equal, round, and   
reactive to light. Extraocular   
movements intact. No scleral   
icterus.  
PHARYNX: Clear. She has upper and   
lower dentures.  
NECK: No cervical or   
supraclavicular lymphadenopathy.   
Thyroid palpably normal. Carotids   
without bruits. Supple neck.  
HEART: Normal sinus rhythm   
without gallop or murmur.  
LUNGS: Clear bilaterally with no   
wheezes, or rhonchi.  
BREASTS: Relatively symmetric   
bilaterally. No skin changes are   
noted on either side. There are   
no focal dominant suspicious   
masses palpable on either side.   
There is no nipple discharge.  
ABDOMEN: Soft and non tender. No   
distention. Normal bowel sounds.   
No mass or organomegaly. There   
seems to be a small   
infraumbilical scar the patient   
disagrees.  
EXTREMITIES: No cyanosis or   
edema.  
LYMPHATIC: There is no cervical,   
supraclavicular, or axillary   
adenopathy.  
NEUROLOGIC: Cranial nerves II-XII   
grossly intact. Grossly intact   
motor and sensory exam.  
  
DIAGNOSTICS:  
I reviewed all of her current and   
previous available breast imaging   
studies personally. Her 2024 mammograms are fairly dense   
overall but do not show any focal   
area changed from her prior   
studies. The area described on   
the ultrasound seems to my eye   
most consistent with subareolar   
ectatic ducts rather than any   
focal suspicious finding. The   
Radiology recommendation for 6   
month follow up ultrasound is   
reasonable.  
  
IMPRESSION:  
(R92.8) Abnormal findings on   
diagnostic imaging of breast   
(primary encounter diagnosis)  
(N64.4) Breast pain, left  
(Z87.898) History of abnormal   
mammogram  
(N63.22) Mass of upper inner   
quadrant of left breast  
  
PLAN:  
We discussed at length the   
anatomic details of the   
subareolar space and its   
complexity. We reviewed her   
details of imaging studies and   
the recommendations per radiology   
likely of not just one 6 month   
study but a series of 6-month   
follow-up ultrasounds. She   
understands and agrees with this   
plan.  
  
Consultation requested by HELLEN Baig, CNP for an opinion   
regarding left breast complaints   
and findings. My final   
recommendations will be   
communicated back to the   
requesting physician by way of   
shared Medical record or letter   
to requesting physician via [place]   
mail.  
  
Pascual Austin MD  
  
Electronically signed by Pascual Austin MD at   
2024 11:58 AM EDT  
documented in this encounter            [facility]  
   
                                        2024 Instructions   
  
  
Juliane Murphy APRN.ALBERTA -   
2024 2:03 PM EDTFormatting   
of this note might be different   
from the original.  
Return for follow-up visit in 3   
month(s) or sooner as needed.  
  
Obtain repeat thyroid testing   
within next 3 months. Labs do not   
need to be completed while   
fasting. Please call [phone]   
to schedule updated thyroid   
ultrasound.  
  
Start azithromycin 2 tablets   
today and 1 tablet daily for 4   
additional days. Avoid   
hydroxyzine while taking this   
medication. Stop loratadine.   
Start cetirizine to improve ear   
pain, allergy symptoms.  
  
Keep appointments with   
rheumatology and pulmonology in   
September. Call to schedule   
appointment with breast   
specialist.  
  
Take all medications as   
prescribed.  
Continue to follow with   
specialist providers.  
Healthy diet and regular aerobic   
exercise recommended.  
  
Recommended vaccinations can be   
obtained via the [facility]   
Department located at (Tdap   
booster, Hepatitis B series):  
[address] [place] [address]  
Phone: [phone]  
  
Please call the office at   
[phone] Option 4 or send a   
message via [facility] with any   
questions related to your visit   
today.  
Electronically signed by Juliane Murphy APRN.CNP at 2024   
2:23 PM EDT  
  
documented in this encounter            [facility]  
   
                                                    2024 History of   
Present illness Narrative               Formatting of this note is   
different from the original.  
Images from the original note   
were not included.  
This note was created using   
Listen Edition.  
Subjective  
Robert Palacios is a 44 year   
old female presenting today   
related to annual wellness visit.   
Grandkarey Oquendo is present during   
visit today.  
  
Chronic medical conditions remain   
under adequate control. Patient   
reports upcoming appointment to   
establish care with rheumatology   
in September. She has continued   
to feel generally unwell. She   
describes symptoms as  similar to   
vertigo but not the same.  Also   
having neck pain and pain in her   
left ear, nausea, decreased   
appetite although dietary intake   
is the same and she is not eating   
less. Has lost 7# in last 6   
months. Recent x-ray and labs   
unremarkable. Patient has not yet   
scheduled an appointment to   
establish with the breast center   
related to breast pain but plans   
to call for an appointment this   
week. Also with appointment to   
establish with Dr. Delgado of   
pulmonology in September and   
plans to discuss need for updated   
PFTs.  
  
All open preventative health   
maintenance topics discussed with   
patient in detail. This includes   
risks, benefits, and rationale of   
vaccinations, cancer screening,   
healthy life style, and diet.   
Risks, benefits, and rationale   
for all recommended vaccinations   
discussed with patient at length   
(hepatitis B vaccine, Tdap   
booster). Patient declines all   
recommended vaccinations at this   
time. Per recent titers, patient   
is not immune to hepatitis B. She   
will consider hepatitis B series   
and Tdap booster in the future   
once she is feeling well.  
  
Diabetic foot exam updated today.   
See below for documentation.   
Patient received new glasses in   
February via [employer]. We   
will send for exam records.   
Patient is due for cervical   
cancer screening. [facility]   
[place]. Is planning to schedule   
back for Pap testing in the   
future.  
  
PAST MEDICAL HISTORY  
Diagnosis Date  
Anxiety 2014  
Anxiety and depression  
COPD, mild (HCC) 2014  
Depression 2014  
Fibroadenosis of breast  
Fibromyalgia  
GERD (gastroesophageal reflux   
disease) 2014  
HTN (hypertension) 2014  
Hyperlipidemia 2014  
Lump or mass in breast 3/9/2009  
Malignant neoplasm of cervix   
uteri, unspecified site  
Cervical cancer  
NO SHOW 3/2/2007  
With primary care and specialty   
offices.  
PMH - PAST MEDICAL HISTORY OF  
diabetes  
Raynaud disease  
Raynaud disease  
Restless legs  
Rheumatoid arthritis (HCC)  
RSD lower limb  
bilateral legs  
Smoker 2014  
Tachycardia 2014  
Possible SVT  
Type II or unspecified type   
diabetes mellitus without mention   
of complication, uncontrolled   
2005  
Diet controlled.  
  
ACTIVE PROBLEM LIST  
No Show  
Lump Or Mass in Breast  
Fibroadenosis of Breast  
Anxiety  
Malignant Neoplasm of Cervix   
(Hcc)  
Fibromyalgia  
Rheumatoid Arthritis of Multiple   
Sites With Negative Rheumatoid   
Factor (Hcc)  
Raynaud Disease  
Restless Legs  
Mass of Left Side of Neck  
Routine Gynecological Examination  
Dysphagia  
Smoker  
Mixed Hyperlipidemia  
Tachycardia  
Copd, Mild (Hcc)  
Essential Hypertension  
Constipation  
Fatigue  
Hoarse Voice Quality  
Gastroesophageal Reflux Disease   
Without Esophagitis  
Rsd Lower Limb  
Diabetes Mellitus Type 2,   
Diet-Controlled (Hcc)  
Depression  
Neoplasm of Uncertain Behavior of   
Neck  
Encounter for Gynecological   
Examination Without Abnormal   
Finding  
Adjustment Disorder With Mixed   
Anxiety and Depressed Mood  
Environmental and Seasonal   
Allergies  
Bilateral Low Back Pain With   
Bilateral Sciatica  
Sacroiliitis (Hcc)  
Menopause  
Degenerative Disc Disease, Lumbar  
Thyroid Nodule  
Snoring  
Pain  
Folliculitis  
Loss of Hair  
Chronic Insomnia  
Chronic Midline Low Back Pain   
With Bilateral Sciatica  
Vertigo  
Tinnitus of Both Ears  
Sob (Shortness of Breath)  
Palpitations  
Facial Asymmetry  
Chronic Ankle Pain, Bilateral  
Chronic Foot Pain, Left  
  
  
Current Outpatient Medications  
Medication Sig Dispense Refill  
meloxicam (MOBIC) 7.5 mg tablet   
take 1 tablet by mouth once daily   
30 tablet 5  
hydrOXYzine HCl (ATARAX) 25 mg   
tablet Take 1-2 tablets by mouth   
at bedtime as needed (sleep). 60   
tablet 5  
loratadine (CLARITIN) 10 mg   
tablet Take 1 tablet by mouth   
once daily. 30 tablet 5  
omeprazole (PRILOSEC) 40 mg   
capsule Take 1 capsule by mouth   
two times a day. 180 capsule 3  
ANORO ELLIPTA 62.5-25   
mcg/actuation inhaler INHALE 1   
PUFF BY MOUTH INTO THE LUNGS ONCE   
A DAY 1 Each 11  
Blood-Glucose Meter Test One time   
a day and as needed. Insulin Dep?   
Yes E11.9 DM 2 1 Each 0  
Blood Glucose Control, Normal   
soln Use as instructed 1 Each 0  
blood sugar diagnostic (BLOOD   
GLUCOSE TEST) test strip 1 Strip   
once daily. Test blood sugar once   
daily and as needed. 100 Strip 2  
atenolol (TENORMIN) 25 mg tablet   
take 1 tablet by mouth once daily   
30 tablet 11  
atorvastatin (LIPITOR) 20 mg   
tablet Take 1 tablet by mouth   
once daily. 90 tablet 3  
cyanocobalamin (VITAMIN B-12) 100   
mcg tab Take 100 mcg by mouth   
once daily.  
albuterol HFA (PROVENTIL HFA,   
VENTOLIN HFA) 90 mcg/actuation   
inhaler Inhale 2 Puffs as   
instructed every 4 hours as   
needed for wheezing/shortness of   
breath. 1 Each 1  
blood sugar diagnostic (FREESTYLE   
LITE STRIPS) test strip Test   
blood sugar(s) 1 times daily. Dx:   
E11.9. Insulin: No 50 Strip 11  
Lancets lancets Check glucose   
daily . Dx: 11.9. 100 Each 3  
  
No current facility-administered   
medications for this visit.  
  
Medications were reviewed and   
verified.  
  
Social History  
  
Tobacco Use  
Smoking status: Former  
Packs/day: 1.00  
Years: 20.00  
Additional pack years: 0.00  
Total pack years: 20.00  
Types: Cigarettes  
Start date: 1993  
Passive exposure: Past  
Smokeless tobacco: Never  
Tobacco comments:  
Currently down to 4 cigarettes in   
the past 3 weeks  
Vaping Use  
Vaping Use: current everyday user  
Substances: Nicotine  
Substance Use Topics  
Alcohol use: Not Currently  
Drug use: Not Currently  
Types: Marijuana  
  
  
FAMILY HISTORY  
Problem Relation Age of Onset  
Hypertension Mother  
Heart Mother  
Diabetes Mother  
COPD Mother  
Alcohol/Drug Father  
Allergies Brother  
Alcohol/Drug Sister  
No Known Problems Paternal   
Grandmother  
No Known Problems Paternal   
Grandfather  
No Known Problems Son  
No Known Problems Son  
No Known Problems Daughter  
Diabetes Brother  
Hypoglycemic  
Breast Cancer Maternal Aunt  
No Known Problems Maternal   
Grandmother  
No Known Problems Maternal   
Grandfather  
Breast Cancer Maternal Aunt  
  
Review of Systems  
Constitutional: Positive for   
appetite change (Decreased but is   
still eating/drinking normally),   
fatigue (Worsened from baseline   
over last few weeks) and   
unexpected weight change.   
Negative for activity change.  
Left breast pain ongoing.  
HENT: Negative for congestion and   
trouble swallowing.  
Eyes: Negative for visual   
disturbance.  
Respiratory: Positive for   
shortness of breath (Intermittent   
w/ activity). Negative for cough,   
chest tightness and wheezing.  
Cardiovascular: Negative for   
chest pain, palpitations and leg   
swelling.  
Gastrointestinal: Positive for   
nausea (Intermittent). Negative   
for abdominal distention,   
abdominal pain, blood in stool,   
constipation, diarrhea and   
vomiting.  
Endocrine: Negative for   
polydipsia, polyphagia and   
polyuria.  
Genitourinary: Negative.  
Musculoskeletal: Negative for   
arthralgias, back pain, gait   
problem and myalgias.  
Skin: Negative for rash.  
Allergic/Immunologic: Negative.  
Neurological: Negative for   
dizziness, syncope, weakness,   
light-headedness and headaches.  
Hematological: Negative. Negative   
for adenopathy.  
Psychiatric/Behavioral: Negative   
for dysphoric mood and sleep   
disturbance. The patient is not   
nervous/anxious.  
  
  
Objective  
/86   Pulse 80   Temp (Src)   
98.6 (Temporal)   Resp 16   Ht 5'   
7  (1.70m)   Wt 163 lb 3.2 oz   
(74.0kg)   SpO2 99%   BMI 25.55   
kg/(m^2).  
  
  
  
Physical Exam  
Vitals and nursing note reviewed.  
Constitutional:  
General: She is not in acute   
distress.  
Appearance: Normal appearance.   
She is well-developed. She is not   
ill-appearing, toxic-appearing or   
diaphoretic.  
HENT:  
Head: Normocephalic and   
atraumatic.  
Right Ear: Hearing, tympanic   
membrane, ear canal and external   
ear normal. No drainage, swelling   
or tenderness. No middle ear   
effusion. No mastoid tenderness.   
Tympanic membrane is not   
perforated, erythematous or   
bulging.  
Left Ear: Hearing, ear canal and   
external ear normal. No drainage,   
swelling or tenderness. A middle   
ear effusion is present. No   
mastoid tenderness. Tympanic   
membrane is injected and bulging.   
Tympanic membrane is not   
perforated or erythematous.  
Nose: Nose normal.  
Mouth/Throat:  
Lips: Pink.  
Eyes:  
General: Lids are normal. Vision   
grossly intact. Gaze aligned   
appropriately.  
Extraocular Movements:   
Extraocular movements intact.  
Conjunctiva/sclera: Conjunctivae   
normal.  
Pupils: Pupils are equal, round,   
and reactive to light.  
Neck:  
Thyroid: Thyromegaly (Mild)   
present. No thyroid mass or   
thyroid tenderness.  
Trachea: Trachea normal.  
Cardiovascular:  
Rate and Rhythm: Normal rate and   
regular rhythm.  
Pulses: Normal pulses. No   
decreased pulses.  
Radial pulses are 2+ on the right   
side and 2+ on the left side.  
Heart sounds: Normal heart   
sounds.  
Pulmonary:  
Effort: Pulmonary effort is   
normal. No tachypnea or   
respiratory distress.  
Breath sounds: Normal breath   
sounds. No decreased breath   
sounds, wheezing, rhonchi or   
rales.  
Abdominal:  
General: Bowel sounds are normal.   
There is no distension.  
Palpations: Abdomen is soft.  
Tenderness: There is no abdominal   
tenderness. There is no guarding.  
Musculoskeletal:  
General: Normal range of motion.  
Cervical back: Normal range of   
motion and neck supple.  
Right lower leg: No edema.  
Left lower leg: No edema.  
Lymphadenopathy:  
Cervical: No cervical adenopathy.  
Skin:  
General: Skin is warm and dry.  
Capillary Refill: Capillary   
refill takes less than 2 seconds.  
Findings: No rash or wound.  
Neurological:  
General: No focal deficit   
present.  
Mental Status: She is alert and   
oriented to person, place, and   
time.  
Cranial Nerves: Cranial nerves   
2-12 are intact.  
Sensory: Sensation is intact.  
Motor: Motor function is intact.   
No weakness.  
Coordination: Coordination is   
intact.  
Gait: Gait is intact. Gait   
normal.  
Psychiatric:  
Mood and Affect: Mood normal.  
Speech: Speech normal.  
Behavior: Behavior normal.   
Behavior is cooperative.  
  
  
Diabetic Foot Exam:  
  
Feet: Shoes and socks removed, no   
deformities, ulcers, calluses,   
normal distal pulses, trace DP   
distal pulses, and sensitive to   
10 gm microfilament  
  
Skin:  
warm, dry, no callouses or ulcer,   
and normal hair growth  
  
Vascular Pulses:  
Normal, 2+ posterial tibial, 2+   
dorsalis pedis  
  
SEMMES-AARON MONOFILAMENT   
TESTING  
  
Left Foot Right Foot  
Dorsal Surface Absent 7/ 9 sites   
Dorsal Surface Absent 6/ 9 sites  
Plantar Surface Absent 7/ 9 sites   
Plantar Surface Absent 6/ 9 sites  
  
  
Assessment & Plan  
ASSESSMENT/PLAN:  
1. Wellness examination - ICD9:   
V70.0, ICD10: Z00.00 (primary   
diagnosis)  
- Counseled on healthy diet and   
regular exercise  
- Pap with automatic HPV for ages   
30-65 recommended. Patient will   
schedule back for this.  
- Mammogram ordered - exam   
recommended once yearly  
- Smoking cessation encouraged;   
discussed risks to health and   
quitting strategies. Patient is   
not ready to quit and 4-10   
minutes of counseling given  
- Counseled patient on limiting   
alcohol intake to 1 drink per day  
- Discussed safe sex practices   
and avoidance of STIs  
- Patient counseled on and   
acknowledged vaccine   
benefits/risks/side effects: Hep   
B Vaccine and TdaP. Patient   
declines all recommended   
vaccinations at this time. She   
plans to update in the future   
when she is feeling well. Health   
apartment information provided.  
- Follow up for annual exam in   
one year  
  
2. Environmental and seasonal   
allergies - ICD9: 477.8, ICD10:   
J30.89  
Stop loratadine. Start cetirizine   
to improve ear pain, allergy   
symptoms.  
- CETIRIZINE 10 MG TABLET  
  
3. Acute otitis media, left -   
ICD9: 382.9, ICD10: H66.92  
- Will begin treatment with   
Zithromax pack as directed  
- The patient should also be   
given OTC decongestants prn and   
OTC cough and cold meds as needed   
for the first 5-7 days of   
treatment.  
- Supportive care with plenty of   
fluids, rest, and analgesia prn.  
- Follow up in 3-5 days if   
symptoms persist or worsen.  
- AZITHROMYCIN 250 MG TABLET  
  
4. Thyromegaly - ICD9: 240.9,   
ICD10: E01.0  
Update TFTs and thyroid   
ultrasound within next 3 months.  
- US THYROID/PARATHYROID  
- THYROID STIMULATING HORMONE  
- T4 FREE/FREE THYROXINE  
- T3, FREE  
  
5. Fatigue, unspecified type -   
ICD9: 780.79, ICD10: R53.83  
Update TFTs and thyroid   
ultrasound within next 3 months.  
- US THYROID/PARATHYROID  
- THYROID STIMULATING HORMONE  
- T4 FREE/FREE THYROXINE  
- T3, FREE  
  
6. Multiple joint pain - ICD9:   
719.49, ICD10: M25.50  
Keep appointment to establish   
with rheumatology in September.  
  
7. SOB (shortness of breath) -   
ICD9: 786.05, ICD10: R06.02  
Keep appointment to establish   
with pulmonology in September.  
  
8. Breast pain, left - ICD9:   
611.71, ICD10: N64.4  
Call breast center to schedule   
appointment to establish care.  
  
Robert Palacios will return   
in 3 month(s) for surveillance of   
chronic conditions or sooner as   
needed. Discussed need for   
continued follow-up with all   
specialist providers, taking all   
medications as prescribed,   
increasing activity level as   
tolerated, and lowering sodium   
and processed food intake at   
today's visit. Patient instructed   
to call the office or send a   
message via Cursogram with any   
questions related to this visit.  
  
Please Note: This office note has   
been created using waygum, a speech recognition   
software program, and may contain   
errors including punctuation,   
grammar, spelling, gender, and   
inappropriate words or phrases   
that pertain to the system.  
  
DATE: 2024  
SIGNATURE: Juliane Murphy APRN.CNP  
Electronically signed by Juliane Murphy APRN.CNP at 10/07/2024   
1:01 AM EDT  
Formatting of this note might be   
different from the original.  
Robert is here today for her   
annual wellness exam  
  
Robert has been having a lot of   
nausea and feeling ill  
  
Hepatitis B Vaccine(1 of 3 - 19+   
3-dose series) Never done  
Diabetic Foot Exam due on   
10/10/2020  
Dilated Retinal Exam due on   
10/24/2020  
Cervical Cancer Screening due on   
2022  
DTaP,Tdap,Td Vaccine(2 - Td or   
Tdap)-----declined  
  
Leanna Burton LPN  
2024 1:49 PM  
  
  
Electronically signed by Leanna Burton LPN at 10/07/2024 1:01   
AM EDT  
documented in this encounter            [facility]  
   
                                2024 Note                 [facility]  
nter  
   
                                2024 Note                 [facility]  
nter  
   
                                                    2024 History of   
Present illness Narrative               Formatting of this note might be   
different from the original.  
Radiology Service Progress Note  
  
PATIENT NAME: Robert Palacios  
MRN: [id number]  
  
DATE OF SERVICE: 2024  
TIME: 11:12 AM  
PATIENT IDENTITY VERIFICATION   
COMPLETED USING TWO (2)   
IDENTIFIERS: Name and Date of   
Birth confirmed by patient   
verbally.  
FALL SCREENING: Has the patient   
had 2 falls in the last year or 1   
fall with injury or currently   
using an Ambulatory Assistive   
Device (Walker, Cane, Wheelchair,   
Crutches, etc.)? No  
PATIENT GENDER DATA: Female.   
Pregnancy status: Pregnant: No   
Breastfeeding status: NO.  
PATIENT RELEVANT IMPLANT DATA   
REVIEWED: Not Applicable  
PATIENT PRESENTS WITH AN   
IMPLANTABLE OR ATTACHED MEDICAL   
DEVICE: No  
  
RADIOLOGY DEPARTMENT: General   
X-ray: Exam(s) Completed: Chest   
X-Ray  
  
PERIPHERAL IV DATA: Not   
applicable  
  
SIGNED BY: RT Daniel(R)  
2024 11:12 AM  
  
  
Electronically signed by Farhan Cobb RT(R) at 2024 11:13   
AM EDT  
documented in this encounter            [facility]  
   
                                2024 Note                 [facility]  
nter  
   
                                        2024 Instructions   
  
  
Juliane Murphy APRN.CNP -   
2024 11:15 AM EDTFormatting   
of this note might be different   
from the original.  
Return for annual wellness visit   
on Monday.  
  
Consult placed to [facility]- Dr. Madsen. The office will contact   
you within 2-4 weeks for an   
appointment. Please notify my   
office if you do not secure an   
appointment within that   
timeframe.  
  
Please call [phone] to   
schedule lung function testing at   
your earliest convenience. This   
can be completed at the pulmonary   
function lab in [facility]   
or at [facility] in [place].  
  
Obtain nonfasting labs and chest   
x-ray within the next week.  
  
Labs are to be completed at the   
urgent care in the front of this   
building or any [facility]   
lab facility. Urgent care lab is   
open 7 days per week from 8a-8p   
on weekdays and 9a-5p on   
weekends. Appointment is not   
necessary but can be scheduled   
via Cursogram if desired.  
  
Take all medications as   
prescribed.  
Continue to follow with   
specialist providers.  
Healthy diet and regular aerobic   
exercise recommended.  
  
Please call the office at   
[phone] Option 4 or send a   
message via Cursogram with any   
questions related to your visit   
today.  
Electronically signed by Juliane Murphy APRN.CNP at 2024   
11:15 AM EDT  
  
documented in this encounter            [facility]  
   
                                                    2024 History of   
Present illness Narrative               Formatting of this note might be   
different from the original.  
AMBULATORY TELEPHONE VISIT  
  
I have communicated my name and   
active [place] licensure. The   
patient's identity and physical   
location were verified at the   
time of this visit. Robert Palacios or their legal   
representative has been informed   
of the risks and benefits of --   
and alternatives to -- treatment   
through a remote evaluation and   
consents to proceed with the   
evaluation remotely.  
  
Persons Present: patient  
  
Chief Complaint/Reason:   
Requesting further discussion of   
2024 mammogram and breast   
ultrasound results.  
  
HPI: Patient presented to office   
in  with chief complaint of   
intermittent left breast pain x 1   
week. Previous mammogram in   
2023 without evidence   
of malignancy. Patient does have   
past medical history notable for   
core needle biopsy of right   
breast in  and incisional   
biopsy of right breast in .   
Two small, tender palpable   
abnormalities evident on exam   
near left nipple. Updated   
diagnostic mammogram with   
tomography and ultrasound   
completed on 2024. Breasts   
are noted to be historically   
heterogeneously dense. No   
significant masses,   
calcifications, or other findings   
were visualized in either breast   
with updated mammogram. Left   
breast ultrasound was completed   
related to patient's symptoms. A   
duct with a 4 x 3 mm oval area   
was identified in the left breast   
central to the nipple in the   
retroareolar region. Area is felt   
per radiology to be most likely   
ductal ectasia or a complicated   
cyst, both of which are likely   
benign. Follow-up left breast   
ultrasound in 6 months was   
recommended to demonstrate   
stability. Patient with concerns   
today related to her niece having   
similar mammogram findings which   
ended up being breast cancer   
(invasive DCIS).  
  
Robert reports she is still having   
intermittent breast pain that is   
radiating from the left outer   
breast into the nipple area.   
Symptoms have not worsened since   
onset. Patient states that it   
feels  like something broke   
loose.  Pain is worse with   
certain movements or if bumped by   
her grandson or when carrying a   
laundry basket. Patient would   
like to pursue further evaluation   
by breast specialist and biopsy   
if possible to confirm symptoms   
are not related to cancer. Also   
notes recent increased in size of   
her right breast and states it   
appears  larger than normal    
which is a change from baseline.  
  
Patient also reports generally   
feeling unwell since acute upper   
respiratory illness in February.   
Does have history of COPD for   
which she continues on ANORO   
ELLIPTA. Former smoker. Notes   
unintentional weight loss. Is   
unsure of how much but reports   
she has had to purchase new bras   
and clothing. She generally feels   
unwell and fatigued. Also notes   
intermittent shortness of breath   
requiring frequent rest periods.   
Patient was evaluated at [facility]   
[facility] during acute illness. Chest   
x-ray revealed consolidation in   
bilateral bases, left greater   
than right. Patient also with   
leukocytosis and white blood cell   
count of 13. Denies ongoing   
fever, chills, cough, or other   
symptoms of respiratory illness.   
Robert is agreeable to updating   
lab work, chest x-ray, and   
spirometry testing.  
  
Data Reviewed: Most recent   
imaging  
Significant findings were   
mammogram and ultrasound results   
as discussed in HPI. Previous   
mammograms also reviewed.  
  
Assessment/Plan:  
ASSESSMENT/PLAN:  
1. Breast pain, left - ICD9:   
611.71, ICD10: N64.4 (primary   
diagnosis)  
Patient to establish with breast   
specialist for further evaluation   
and treatment if indicated.   
Continue regular self breast   
assessments. Monitor symptoms.  
- CONSULT TO BREAST CENTER  
  
2. Mass of upper inner quadrant   
of left breast - ICD9: 611.72,   
ICD10: N63.22  
See plan #1.  
- CONSULT TO BREAST CENTER  
  
3. History of abnormal mammogram   
- ICD9: V15.89, ICD10: Z87.898  
See plan #1.  
- CONSULT TO BREAST CENTER  
  
4. SOB (shortness of breath) -   
ICD9: 786.05, ICD10: R06.02  
Patient to update lab work, chest   
x-ray, spirometry. Continue Anoro   
Ellipta as currently prescribed.   
Consider pulmonology consult if   
indicated.  
- XR CHEST 2V FRONTAL/LAT  
- SPIROMETRY - BASELINE AND POST   
DILATOR  
- COMPLETE BLOOD COUNT AND   
DIFFERENTIAL  
- COMPREHENSIVE METABOLIC PANEL  
- FERRITIN  
- FOLATE, SERUM  
- IRON AND TIBC  
  
5. Fatigue, unspecified type -   
ICD9: 780.79, ICD10: R53.83  
See plan #4.  
- COMPLETE BLOOD COUNT AND   
DIFFERENTIAL  
- COMPREHENSIVE METABOLIC PANEL  
- FERRITIN  
- FOLATE, SERUM  
- IRON AND TIBC  
  
6. COPD, mild (HCC) - ICD9: 496,   
ICD10: J44.9  
See plan #4.  
- XR CHEST 2V FRONTAL/LAT  
- SPIROMETRY - BASELINE AND POST   
DILATOR  
  
Total Time Spent: 30 minutes  
  
Robert Palacios will return   
in 1 week for annual wellness   
visit or sooner as needed.   
Patient instructed to call the   
office or send a message via   
Cursogram with any questions   
related to this visit.  
  
Please Note: This office note has   
been created using waygum, a speech recognition   
software program, and may contain   
errors including punctuation,   
grammar, spelling, gender, and   
inappropriate words or phrases   
that pertain to the system.  
  
DATE: 2024  
SIGNATURE: Juliane Murphy APRN.CNP  
Electronically signed by Juliane Murphy APRN.CNP at 2024   
4:31 PM EDT  
Formatting of this note might be   
different from the original.  
Robert has agreed and is   
participating in this virtual   
telephone visit today from her   
home here in [place]. This visit is   
to discuss the results of her   
recent mammogram  
  
Robert would like to discuss her   
results further because she is   
afraid of getting breast cancer   
and the find on her recent   
mammogram shows an oval area in   
her left breast and it is   
suggested that she repeat imaging   
again in 6 months. Robert is   
afraid to wait as he niece is   
just finishing chemotherapy for   
breast cancer and all of her   
aunts had it and one aunt ended   
up with metastatic breast cancer   
that took her life.  
  
Leanna Burton LPN  
2024 10:37 AM  
  
Electronically signed by Leanna Burton LPN at 2024 4:31   
PM EDT  
documented in this encounter            [facility]  
   
                                2024 Note                 [facility]  
nter  
   
                                2024 Note                 [facility]  
nter  
   
                                        2024 Note     IMPRESSION: INCOMPLE  
TE: NEEDS   
ADDITIONAL IMAGING EVALUATION  
There is no abnormality seen in   
either breast to correspond with   
the  
pain, however, clinical followup   
is recommended. There is no   
abnormality  
seen in the left breast to   
correspond with the palpable   
abnormality,  
however, ultrasound is   
recommended.  
  
  
  
Etelvina Li M.D.  
er/samia:2024 10:36:06  
  
Imaging Technologist(s): Kathryn Ballesteros, [facility]  
  
Mammogram BI-RADS: 0 Incomplete:   
needs additional imaging   
evaluation  
  
Multiple national specialty   
organizations have released   
breast cancer  
screening guidelines for women at   
average risk for developing   
breast  
cancer - guidelines that are   
based on both evidence and   
opinion, yet  
differ on when to start and how   
often to screen for breast   
cancer. With  
representation from Breast   
Imaging, Internal Medicine,   
Women's Health,  
Family Medicine, and   
Medical/Surgical Oncology, the   
[facility] has  
carefully reviewed the data and   
reached the following consensus:  
  
1) All women should engage in   
shared decision-making with their   
providers  
to decide when to start and how   
often to screen;  
2) All women should have the   
opportunity to start screening   
mammography  
at age 40;  
3) For women ages 45-55, we   
recommend annual screening   
mammograms;  
4) For women ages 55 and over, we   
support both the transition from   
an  
annual to a biennial interval if   
this aligns more with patient's   
values  
and preferences, or continuation   
with annual screening;  
5) All women should discuss with   
their providers when to stop   
screening  
mammograms.  
  
  
  
  
: Samia  
Transcribe Date/Time: 2024   
9:21A  
  
Dictated by : ETELVINA LI MD  
  
This examination was interpreted   
and the report reviewed and  
electronically signed by:  
ETELVINA LI MD on 2024   
10:36AM [facility]   
RADIOLOGY  
   
                                2024 Note                 [facility]  
nter  
   
                                                    2024 Telephone   
encounter Note                          Formatting of this note might be   
different from the original.  
I spoke with patient and told her   
that I faxed her Holter , Echo,   
and Carotid test reports to Dr Carlson to the fax#[phone].   
Robert thanked me for letting her   
know.  
  
Leanna Burton LPN  
2024 11:39 AM  
  
Electronically signed by Leanna Burton LPN at 2024 11:40   
AM EDT  
                                        [facility]  
   
                                                    2024 Miscellaneous   
Notes                                   Formatting of this note might be   
different from the original.  
I spoke with patient and told her   
that I faxed her Holter , Echo,   
and Carotid test reports to Dr aCrlson to the fax#[phone].   
Robert thanked me for letting her   
know.  
  
Leanna Burton LPN  
2024 11:39 AM  
  
Electronically signed by Leanna Burton LPN at 2024 11:40   
AM EDT  
documented in this encounter            [facility]  
   
                                                    2024 Telephone   
encounter Note                          Formatting of this note might be   
different from the original.  
Referral faxed to [facility]#[phone] fax#[phone].  
  
Leanna Burton LPN  
2024 12:27 PM  
  
Electronically signed by Leanna Burton LPN at 2024 12:28   
PM EDT  
                                        [facility]  
   
                                                    2024 Miscellaneous   
Notes                                   Formatting of this note might be   
different from the original.  
Referral faxed to [facility]#[phone] fax#[phone].  
  
Leanna Burton LPN  
2024 12:27 PM  
  
Electronically signed by Leanna Burton LPN at 2024 12:28   
PM EDT  
documented in this encounter            [facility]  
   
                                        2024 Instructions   
  
  
Juliane Murphy APRN.CNP -   
2024 2:58 PM EDTFormatting   
of this note might be different   
from the original.  
Return for wellness visit in July   
or sooner as needed.  
  
Please call [phone] to   
schedule mammogram at your   
earliest convenience. Diagnostic   
mammograms must be scheduled at   
[facility].  
  
Take all medications as   
prescribed.  
Continue to follow with   
specialist providers.  
Healthy diet and regular aerobic   
exercise recommended.  
  
Please call the office at   
[phone] Option 4 or send a   
message via Cursogram with any   
questions related to your visit   
today.  
  
Electronically signed by Juliane Murphy APRN.CNP at 2024   
2:59 PM EDT  
  
documented in this encounter            [facility]  
   
                                                    2024 History of   
Present illness Narrative               Formatting of this note is   
different from the original.  
Images from the original note   
were not included.  
This note was created using   
Listen Edition.  
Subjective  
Robert Palacios is a 44 year   
old female presenting today with   
concern for left breast pain.   
Breast pain initially started   
intermittently approximately 2   
weeks ago. For the last week pain   
has been constant. Pain is   
described as sharp at onset   
fading into ongoing soreness.   
Symptoms become more noticeable   
with activity. At initial onset,   
pain radiated from her lateral   
flank/breast area centrally to   
her nipple. Patient denies   
palpable lump or other   
abnormality.  
  
Most recent mammogram completed   
in 2023 with no   
evidence of malignancy. Past   
medical history notable for core   
needle biopsy of right breast in   
 and incisional breast biopsy   
right breast in . Patient is   
status post TERRENCE with   
salpingectomy in . Ovaries   
remain. Patient notes history of   
postmenopausal symptoms including   
hot flashes, tinnitus persisting   
up until last year. Has not   
experienced recurrence of   
symptoms.  
  
PAST MEDICAL HISTORY  
Diagnosis Date  
Anxiety 2014  
Anxiety and depression  
COPD, mild (HCC) 2014  
Depression 2014  
Fibroadenosis of breast  
Fibromyalgia  
GERD (gastroesophageal reflux   
disease) 2014  
HTN (hypertension) 2014  
Hyperlipidemia 2014  
Lump or mass in breast 3/9/2009  
Malignant neoplasm of cervix   
uteri, unspecified site  
Cervical cancer  
NO SHOW 3/2/2007  
With primary care and specialty   
offices.  
PMH - PAST MEDICAL HISTORY OF  
diabetes  
Raynaud disease  
Raynaud disease  
Restless legs  
Rheumatoid arthritis (HCC)  
RSD lower limb  
bilateral legs  
Smoker 2014  
Tachycardia 2014  
Possible SVT  
Type II or unspecified type   
diabetes mellitus without mention   
of complication, uncontrolled   
2005  
Diet controlled.  
  
ACTIVE PROBLEM LIST  
No Show  
Lump Or Mass in Breast  
Fibroadenosis of Breast  
Anxiety  
Malignant Neoplasm of Cervix   
(Hcc)  
Fibromyalgia  
Rheumatoid Arthritis of Multiple   
Sites With Negative Rheumatoid   
Factor (Hcc)  
Raynaud Disease  
Restless Legs  
Mass of Left Side of Neck  
Routine Gynecological Examination  
Dysphagia  
Smoker  
Mixed Hyperlipidemia  
Tachycardia  
Copd, Mild (Hcc)  
Essential Hypertension  
Constipation  
Fatigue  
Hoarse Voice Quality  
Gastroesophageal Reflux Disease   
Without Esophagitis  
Rsd Lower Limb  
Diabetes Mellitus Type 2,   
Diet-Controlled (Hcc)  
Depression  
Neoplasm of Uncertain Behavior of   
Neck  
Encounter for Gynecological   
Examination Without Abnormal   
Finding  
Adjustment Disorder With Mixed   
Anxiety and Depressed Mood  
Environmental and Seasonal   
Allergies  
Bilateral Low Back Pain With   
Bilateral Sciatica  
Sacroiliitis (Hcc)  
Menopause  
Degenerative Disc Disease, Lumbar  
Thyroid Nodule  
Snoring  
Pain  
Folliculitis  
Loss of Hair  
Chronic Insomnia  
Chronic Midline Low Back Pain   
With Bilateral Sciatica  
Vertigo  
Tinnitus of Both Ears  
Sob (Shortness of Breath)  
Palpitations  
Facial Asymmetry  
Chronic Ankle Pain, Bilateral  
Chronic Foot Pain, Left  
  
  
Current Outpatient Medications  
Medication Sig Dispense Refill  
meloxicam (MOBIC) 7.5 mg tablet   
take 1 tablet by mouth once daily   
30 tablet 5  
hydrOXYzine HCl (ATARAX) 25 mg   
tablet Take 1-2 tablets by mouth   
at bedtime as needed (sleep). 60   
tablet 5  
loratadine (CLARITIN) 10 mg   
tablet Take 1 tablet by mouth   
once daily. 30 tablet 5  
omeprazole (PRILOSEC) 40 mg   
capsule Take 1 capsule by mouth   
two times a day. 180 capsule 3  
ANORO ELLIPTA 62.5-25   
mcg/actuation inhaler INHALE 1   
PUFF BY MOUTH INTO THE LUNGS ONCE   
A DAY 1 Each 11  
Blood-Glucose Meter Test One time   
a day and as needed. Insulin Dep?   
Yes E11.9 DM 2 1 Each 0  
Blood Glucose Control, Normal   
soln Use as instructed 1 Each 0  
blood sugar diagnostic (BLOOD   
GLUCOSE TEST) test strip 1 Strip   
once daily. Test blood sugar once   
daily and as needed. 100 Strip 2  
atenolol (TENORMIN) 25 mg tablet   
take 1 tablet by mouth once daily   
30 tablet 11  
atorvastatin (LIPITOR) 20 mg   
tablet Take 1 tablet by mouth   
once daily. 90 tablet 3  
cyanocobalamin (VITAMIN B-12) 100   
mcg tab Take 100 mcg by mouth   
once daily.  
albuterol HFA (PROVENTIL HFA,   
VENTOLIN HFA) 90 mcg/actuation   
inhaler Inhale 2 Puffs as   
instructed every 4 hours as   
needed for wheezing/shortness of   
breath. 1 Each 1  
blood sugar diagnostic (FREESTYLE   
LITE STRIPS) test strip Test   
blood sugar(s) 1 times daily. Dx:   
E11.9. Insulin: No 50 Strip 11  
Lancets lancets Check glucose   
daily . Dx: 11.9. 100 Each 3  
  
No current facility-administered   
medications for this visit.  
  
Medications were reviewed and   
verified.  
  
Social History  
  
Tobacco Use  
Smoking status: Former  
Packs/day: 1.00  
Years: 20.00  
Additional pack years: 0.00  
Total pack years: 20.00  
Types: Cigarettes  
Start date: 1993  
Passive exposure: Past  
Smokeless tobacco: Never  
Tobacco comments:  
Currently down to 4 cigarettes in   
the past 3 weeks  
Vaping Use  
Vaping Use: current everyday user  
Substances: Nicotine  
Substance Use Topics  
Alcohol use: Not Currently  
Drug use: Not Currently  
Types: Marijuana  
  
  
FAMILY HISTORY  
Problem Relation Age of Onset  
Hypertension Mother  
Heart Mother  
Diabetes Mother  
COPD Mother  
Alcohol/Drug Father  
Allergies Brother  
Alcohol/Drug Sister  
No Known Problems Paternal   
Grandmother  
No Known Problems Paternal   
Grandfather  
No Known Problems Son  
No Known Problems Son  
No Known Problems Daughter  
Diabetes Brother  
Hypoglycemic  
Breast Cancer Maternal Aunt  
No Known Problems Maternal   
Grandmother  
No Known Problems Maternal   
Grandfather  
Breast Cancer Maternal Aunt  
  
Review of Systems  
Constitutional: Positive for   
appetite change (Decreased over   
past 2 weeks) and fatigue   
(Worsened from baseline over last   
few weeks). Negative for activity   
change and unexpected weight   
change.  
Eyes: Negative for visual   
disturbance.  
Respiratory: Negative for cough   
and shortness of breath.  
Cardiovascular: Negative for   
chest pain, palpitations and leg   
swelling.  
Genitourinary:  
Left breast pain x 2 weeks as   
discussed in HPI.  
Skin: Negative for rash.  
Neurological: Negative for   
dizziness, weakness,   
light-headedness and headaches.  
Hematological: Negative. Negative   
for adenopathy.  
Psychiatric/Behavioral: The   
patient is nervous/anxious.  
  
  
Objective  
/78   Pulse 74   Temp (Src)   
98.6 (Temporal)   Resp 14   Ht 5'   
7  (1.70m)   Wt 168 lb 8.6 oz   
(76.4kg)   SpO2 99%   BMI 26.39   
kg/(m^2).  
  
  
  
Physical Exam  
Vitals and nursing note reviewed.  
Constitutional:  
General: She is not in acute   
distress.  
Appearance: Normal appearance.   
She is well-developed. She is not   
ill-appearing, toxic-appearing or   
diaphoretic.  
HENT:  
Head: Normocephalic and   
atraumatic.  
Right Ear: External ear normal.  
Left Ear: External ear normal.  
Nose: Nose normal.  
Mouth/Throat:  
Lips: Pink.  
Eyes:  
General: Lids are normal. Vision   
grossly intact. Gaze aligned   
appropriately.  
Extraocular Movements:   
Extraocular movements intact.  
Conjunctiva/sclera: Conjunctivae   
normal.  
Pupils: Pupils are equal, round,   
and reactive to light.  
Neck:  
Thyroid: No thyromegaly or   
thyroid tenderness.  
Trachea: Trachea normal.  
Cardiovascular:  
Rate and Rhythm: Normal rate and   
regular rhythm.  
Pulses: Normal pulses. No   
decreased pulses.  
Radial pulses are 2+ on the right   
side and 2+ on the left side.  
Heart sounds: Normal heart   
sounds.  
Pulmonary:  
Effort: Pulmonary effort is   
normal. No tachypnea or   
respiratory distress.  
Breath sounds: Normal breath   
sounds. No decreased breath   
sounds, wheezing, rhonchi or   
rales.  
Chest:  
Breasts:  
Manpreet Score is 5.  
Breasts are symmetrical.  
Right: Normal. No swelling,   
bleeding, inverted nipple, mass,   
nipple discharge, skin change or   
tenderness.  
Left: Mass and tenderness   
present. No swelling, bleeding,   
inverted nipple, nipple discharge   
or skin change.  
  
Comments: Declined chaperone   
during breast exam. Fibrocystic   
breast tissue bilaterally- known   
history of this. Palpable nodules   
around left nipple as documented.   
Tender to palpation.   
Approximately 0.75 cm in   
diameter. Experiencing pain in   
lateral left breast that radiates   
to center.  
Abdominal:  
General: Bowel sounds are normal.   
There is no distension.  
Palpations: Abdomen is soft.  
Tenderness: There is no abdominal   
tenderness. There is no guarding.  
Musculoskeletal:  
General: Normal range of motion.  
Cervical back: Normal range of   
motion and neck supple.  
Right lower leg: No edema.  
Left lower leg: No edema.  
Lymphadenopathy:  
Cervical: No cervical adenopathy.  
Skin:  
General: Skin is warm and dry.  
Capillary Refill: Capillary   
refill takes less than 2 seconds.  
Findings: No rash or wound.  
Neurological:  
General: No focal deficit   
present.  
Mental Status: She is alert and   
oriented to person, place, and   
time.  
Cranial Nerves: Cranial nerves   
2-12 are intact.  
Sensory: Sensation is intact.  
Motor: Motor function is intact.   
No weakness.  
Coordination: Coordination is   
intact.  
Gait: Gait is intact. Gait   
normal.  
Psychiatric:  
Mood and Affect: Mood normal.  
Speech: Speech normal.  
Behavior: Behavior normal.   
Behavior is cooperative.  
  
  
  
Assessment & Plan  
ASSESSMENT/PLAN:  
1. Mass of upper inner quadrant   
of left breast - ICD9: 611.72,   
ICD10: N63.22 (primary diagnosis)  
Patient to schedule diagnostic   
mammogram with ultrasound if   
indicated at earliest   
convenience.  
- [facility] DIAGNOSTIC BILATERAL  
- US BREAST LTD LEFT  
- US BREAST LTD RIGHT  
  
2. Breast pain, left - ICD9:   
611.71, ICD10: N64.4  
See plan #1.  
- [facility] BILATERAL  
- US BREAST LTD LEFT  
- US BREAST LTD RIGHT  
  
3. History of abnormal mammogram   
- ICD9: V15.89, ICD10: Z87.898  
See plan #1.  
- [facility] BILATERAL  
- US BREAST LTD LEFT  
- US BREAST LTD RIGHT  
  
Robert Plaacios will return   
in 2 month(s) for annual wellness   
visit or sooner as needed.   
Patient instructed to call the   
office or send a message via   
[facility] with any questions   
related to this visit.  
  
Please Note: This office note has   
been created using waygum, a speech recognition   
software program, and may contain   
errors including punctuation,   
grammar, spelling, gender, and   
inappropriate words or phrases   
that pertain to the system.  
  
DATE: 2024  
SIGNATURE: Juliane Murphy APRN.CNP  
Electronically signed by Juliane Murphy APRN.CNP at 2024   
1:42 AM EDT  
Formatting of this note might be   
different from the original.  
Robert is here today due to left   
breast pain x 1 week  
There are no lumps or bumps  
Her last mammogram was in   
September last year  
  
Leanna Burton LPN  
2024 2:37 PM  
  
  
Electronically signed by Leanna Burton LPN at 2024 1:42   
AM EDT  
documented in this encounter            [facility]  
   
                                2024 Note                 [facility]  
nt  
   
                                2024 Note                 [facility]  
[facility]  
   
                                                    2024 Telephone   
encounter Note                          Formatting of this note might be   
different from the original.  
Thank you!  
  
GHANSHYAM ReyesCNP  
Electronically signed by Juliane Murphy APRN.CNP at 2024   
9:28 AM EDT  
                                        [facility]  
   
                                                    2024 Miscellaneous   
Notes                                   Formatting of this note might be   
different from the original.  
Thank you!  
  
GHANSHYAM ReyesCNP  
Electronically signed by Juliane Murphy APRN.CNP at 2024   
9:28 AM EDT  
Formatting of this note might be   
different from the original.  
Can you get patient scheduled for   
a follow up to address breast   
concerns please?  
  
[email or web address] [email or web address].[email or web address]  
Electronically signed by Juliane Murphy APRN.CNP at 2024   
8:08 AM EDT  
documented in this encounter            [facility]  
   
                                                    2024 Telephone   
encounter Note                          Formatting of this note might be   
different from the original.  
Can you get patient scheduled for   
a follow up to address breast   
concerns please?  
  
HELLEN [email or web address].[email or web address]  
Electronically signed by Juliane Murphy APRN.CNP at 2024   
8:08 AM EDT  
                                        [facility]  
   
                                                    2024 Telephone   
encounter Note                          Formatting of this note is   
different from the original.  
Pharmacy [employer] message   
requesting the following refill.  
  
Requested Prescriptions  
  
Pending Prescriptions Disp   
Refills  
meloxicam (MOBIC) 7.5 mg tablet   
[Pharmacy Med Name: MELOXICAM 7.5   
MG TABLET] 30 tablet 5  
Sig: take 1 tablet by mouth once   
daily  
  
Patient last appointment:   
2024  
Next appointment 2024  
  
Patient Phone numbers:   
[phone] (home)  
  
Request is for script(s) to be   
escript to [facility]   
pharmacy.  
  
Leanna Burton LPN  
  
  
Electronically signed by Leanna Burton LPN at 2024 11:41   
AM EDT  
                                        [facility]  
   
                                                    2024 Miscellaneous   
Notes                                   Formatting of this note is   
different from the original.  
Pharmacy [employer] message   
requesting the following refill.  
  
Requested Prescriptions  
  
Pending Prescriptions Disp   
Refills  
meloxicam (MOBIC) 7.5 mg tablet   
[Pharmacy Med Name: MELOXICAM 7.5   
MG TABLET] 30 tablet 5  
Sig: take 1 tablet by mouth once   
daily  
  
Patient last appointment:   
2024  
Next appointment 2024  
  
Patient Phone numbers:   
[phone] (home)  
  
Request is for script(s) to be   
escript to [facility]   
pharmacy.  
  
Leanna Burton LPN  
  
  
Electronically signed by Leanna Burton LPN at 2024 11:41   
AM EDT  
documented in this encounter            [facility]  
   
                                        2024 Instructions   
  
  
Juliane Murphy APRN.CNP -   
2024 1:59 PM EDTFormatting   
of this note might be different   
from the original.  
Return in 3 months for annual   
wellness.  
  
Obtain fasting labs within the   
next 2 to 4 weeks. Nothing but   
water or black coffee for 12   
hours prior to having labs drawn.   
No food.  
  
Labs are to be completed at the   
urgent care in the front of this   
building or at any [facility] lab facility. Urgent care   
lab is open 7 days per week from   
8a-8p on weekdays and 9a-5p on   
weekends.  
  
Consult placed to rheumatology.   
They will contact you within 2   
weeks for an appointment. Please   
notify my office if you do not   
secure an appointment within that   
timeframe.  
  
Continued smoking cessation is   
recommended.  
  
Start hydroxyzine once daily at   
bedtime as needed for anxiety.   
This medication may make you   
drowsy. If symptoms continue,   
consider counseling. Call the   
office for a referral.  
  
We will consider resuming B12   
injections if level is low.  
  
Consult placed to Cardiology Dr. Solis. They will contact you   
within 2-3 weeks for an   
appointment. Please notify my   
office if you do not secure an   
appointment within that   
timeframe.  
Electronically signed by Juliane Murphy APRN.CNP at 2024   
2:13 PM EDT  
  
documented in this encounter            [facility]  
   
                                                    2024 History of   
Present illness Narrative               Formatting of this note is   
different from the original.  
This note was created using   
Listen Edition.  
Subjective  
Robert Palacios is a 44 year   
old female presenting today for   
3-month follow-up of chronic   
health conditions.  
  
Patient complained of persistent   
vertigo with sensation of feeling   
 lightheaded and off balance    
continuously throughout the day.   
She has chronic tinnitus and   
difficulty swallowing at   
baseline. Robert also noted   
associated symptoms of anxiety,   
nausea, shortness of breath, and   
palpitations when dizziness   
occurs. Patient was initiated on   
meclizine and referred for   
vestibular therapy. Carotid   
duplex and echocardiogram were   
unremarkable. Patient later noted   
that symptoms began with start of   
using topical colloidal silver   
from an extended family member   
with improvement of symptoms   
after discontinuing the   
supplement. She reports continued   
improvement of vertigo at this   
time with as needed meclizine use   
and avoidance of colloidal   
silver.  
  
Patient reports symptoms of chest   
tightness increasing since   
successful smoking cessation and   
with recent flulike illness   
occurring in February. States her   
chest is  tight like someone is   
squeezing me then lets go and   
blood rushes back and return.    
Discomfort wakes her from sleep   
when she does finally fall   
asleep. Patient continues to   
experience chronic insomnia.   
Reports she feels  unnerved at   
night.  Feels symptoms may be   
related to anxiety. Has   
previously been treated with   
Ativan, Xanax, and buspirone in   
the past. Buspirone was   
ineffective at improving anxiety.   
Holter report completed in   
February.  
  
Patient also with complaint of   
chronic bilateral ankle pain,   
left greater than right over   
several years. Previous x-rays   
revealed no acute process with   
presence of small calcaneal spur   
bilaterally at plantar fascial   
insertion. Patient was referred   
to podiatry for ongoing   
management and established with   
Dr. Donovan. Reports podiatry   
requested patient begin a   
supplement to improve joint pain   
but patient reports it was too   
expensive. Dr. Donovan   
suggested Lyrica and replacement   
but patient is hesitant to   
initiate related to her son   
abusing Lyrica in the past.   
Chronic pain of multiple joints   
has continued to worsen. Patient   
has trialed medical marijuana   
without improvement.  
  
Also noted previous treatment   
with Enbrel injections, which she   
states helped to improve joint   
pain. Patient claimed PCP ordered   
Enbrel and was requesting to   
reinitiate. Enbrel discussed with   
collaborating physician Dr. Berlin Adams. Per his   
recommendation, discussed with   
patient that she will need to   
reestablish with rheumatology for   
management of chronic   
rheumatologic conditions   
including rheumatoid arthritis,   
DDD, and fibromyalgia. Patient is   
agreeable to referral at this   
time.  
  
Patient is a type II diabetic.   
Not currently medicated and   
controlling blood sugar with diet   
and exercise. She does not check   
her blood sugar regularly. Denies   
symptoms of hyper- or   
hypoglycemia. Reports diabetic   
eye exam completed at [facility] in 2024. We will   
attempt to obtain records.   
Agreeable to updated A1c and   
urine albumin creatinine ratio   
screening.  
  
She also reports history of   
vitamin B-12 deficiency requiring   
immunizations in the past.   
Patient was replacing oral   
vitamin B12 but ran out of her   
supplement. Notes ongoing chronic   
fatigue. She is requesting labs   
assessment at this time.  
  
I have discussed rationale and   
benefits of HIV screening with   
patient. Patient desires   
screening at this time. She is   
unsure if she has received   
hepatitis B vaccine series. She   
is agreeable to immunity status   
testing.  
  
PAST MEDICAL HISTORY  
Diagnosis Date  
Anxiety 2014  
Anxiety and depression  
COPD, mild (HCC) 2014  
Depression 2014  
Fibroadenosis of breast  
Fibromyalgia  
GERD (gastroesophageal reflux   
disease) 2014  
HTN (hypertension) 2014  
Hyperlipidemia 2014  
Lump or mass in breast 3/9/2009  
Malignant neoplasm of cervix   
uteri, unspecified site  
Cervical cancer  
NO SHOW 3/2/2007  
With primary care and specialty   
offices.  
PMH - PAST MEDICAL HISTORY OF  
diabetes  
Raynaud disease  
Raynaud disease  
Restless legs  
Rheumatoid arthritis (HCC)  
RSD lower limb  
bilateral legs  
Smoker 2014  
Tachycardia 2014  
Possible SVT  
Type II or unspecified type   
diabetes mellitus without mention   
of complication, uncontrolled   
2005  
Diet controlled.  
  
ACTIVE PROBLEM LIST  
No Show  
Lump Or Mass in Breast  
Fibroadenosis of Breast  
Anxiety  
Malignant Neoplasm of Cervix   
(Hcc)  
Fibromyalgia  
Rheumatoid Arthritis of Multiple   
Sites With Negative Rheumatoid   
Factor (Hcc)  
Raynaud Disease  
Restless Legs  
Mass of Left Side of Neck  
Routine Gynecological Examination  
Dysphagia  
Smoker  
Mixed Hyperlipidemia  
Tachycardia  
Copd, Mild (Hcc)  
Essential Hypertension  
Constipation  
Fatigue  
Hoarse Voice Quality  
Gastroesophageal Reflux Disease   
Without Esophagitis  
Rsd Lower Limb  
Diabetes Mellitus Type 2,   
Diet-Controlled (Hcc)  
Depression  
Neoplasm of Uncertain Behavior of   
Neck  
Encounter for Gynecological   
Examination Without Abnormal   
Finding  
Adjustment Disorder With Mixed   
Anxiety and Depressed Mood  
Environmental and Seasonal   
Allergies  
Bilateral Low Back Pain With   
Bilateral Sciatica  
Sacroiliitis (Hcc)  
Menopause  
Degenerative Disc Disease, Lumbar  
Thyroid Nodule  
Snoring  
Pain  
Folliculitis  
Loss of Hair  
Chronic Insomnia  
Chronic Midline Low Back Pain   
With Bilateral Sciatica  
Vertigo  
Tinnitus of Both Ears  
Sob (Shortness of Breath)  
Palpitations  
Facial Asymmetry  
Chronic Ankle Pain, Bilateral  
Chronic Foot Pain, Left  
  
  
Current Outpatient Medications  
Medication Sig Dispense Refill  
meloxicam (MOBIC) 7.5 mg tablet   
Take 1 tablet by mouth once   
daily. 30 tablet 1  
loratadine (CLARITIN) 10 mg   
tablet Take 1 tablet by mouth   
once daily. 30 tablet 5  
omeprazole (PRILOSEC) 40 mg   
capsule Take 1 capsule by mouth   
two times a day. 180 capsule 3  
ANORO ELLIPTA 62.5-25   
mcg/actuation inhaler INHALE 1   
PUFF BY MOUTH INTO THE LUNGS ONCE   
A DAY 1 Each 11  
Blood-Glucose Meter Test One time   
a day and as needed. Insulin Dep?   
Yes E11.9 DM 2 1 Each 0  
Blood Glucose Control, Normal   
soln Use as instructed 1 Each 0  
blood sugar diagnostic (BLOOD   
GLUCOSE TEST) test strip 1 Strip   
once daily. Test blood sugar once   
daily and as needed. 100 Strip 2  
atenolol (TENORMIN) 25 mg tablet   
take 1 tablet by mouth once daily   
30 tablet 11  
atorvastatin (LIPITOR) 20 mg   
tablet Take 1 tablet by mouth   
once daily. 90 tablet 3  
cyanocobalamin (VITAMIN B-12) 100   
mcg tab Take 100 mcg by mouth   
once daily.  
albuterol HFA (PROVENTIL HFA,   
VENTOLIN HFA) 90 mcg/actuation   
inhaler Inhale 2 Puffs as   
instructed every 4 hours as   
needed for wheezing/shortness of   
breath. 1 Each 1  
blood sugar diagnostic (FREESTYLE   
LITE STRIPS) test strip Test   
blood sugar(s) 1 times daily. Dx:   
E11.9. Insulin: No 50 Strip 11  
Lancets lancets Check glucose   
daily . Dx: 11.9. 100 Each 3  
  
No current facility-administered   
medications for this visit.  
  
Medications were reviewed and   
verified.  
  
Social History  
  
Tobacco Use  
Smoking status: Former  
Packs/day: 1.00  
Years: 20.00  
Additional pack years: 0.00  
Total pack years: 20.00  
Types: Cigarettes  
Start date: 1993  
Passive exposure: Past  
Smokeless tobacco: Never  
Tobacco comments:  
Currently down to 4 cigarettes in   
the past 3 weeks  
Vaping Use  
Vaping Use: current everyday user  
Substances: Nicotine  
Substance Use Topics  
Alcohol use: Not Currently  
Drug use: Not Currently  
Types: Marijuana  
  
  
FAMILY HISTORY  
Problem Relation Age of Onset  
Hypertension Mother  
Heart Mother  
Diabetes Mother  
COPD Mother  
Alcohol/Drug Father  
Allergies Brother  
Alcohol/Drug Sister  
No Known Problems Paternal   
Grandmother  
No Known Problems Paternal   
Grandfather  
No Known Problems Son  
No Known Problems Son  
No Known Problems Daughter  
Diabetes Brother  
Hypoglycemic  
Breast Cancer Maternal Aunt  
No Known Problems Maternal   
Grandmother  
No Known Problems Maternal   
Grandfather  
Breast Cancer Maternal Aunt  
  
Review of Systems  
Constitutional: Positive for   
fatigue (Chronic). Negative for   
activity change, appetite change   
and unexpected weight change.  
HENT: Positive for tinnitus.  
Eyes: Negative for visual   
disturbance.  
Respiratory: Positive for chest   
tightness. Negative for cough,   
shortness of breath and wheezing.  
Cardiovascular: Positive for   
palpitations. Negative for chest   
pain and leg swelling.  
Gastrointestinal: Negative for   
abdominal distention and   
abdominal pain.  
Endocrine: Negative for cold   
intolerance, heat intolerance,   
polydipsia, polyphagia and   
polyuria.  
Musculoskeletal: Positive for   
arthralgias (Bilateral ankles,   
left greater than right and   
multiple other joints), back pain   
(Chronic low back pain), gait   
problem (Unsteady at times   
related to ankle pain), joint   
swelling (Ankles, fingers at   
times) and myalgias (Chronic   
related to fibromyalgia).   
Negative for neck pain and neck   
stiffness.  
Neurological: Negative for   
dizziness (Resolved with   
meclizine), weakness and   
light-headedness (Resolved with   
meclizine).  
Psychiatric/Behavioral: Positive   
for sleep disturbance. Negative   
for dysphoric mood, self-injury   
and suicidal ideas. The patient   
is nervous/anxious.  
  
  
Objective  
/72   Pulse 84   Resp 20     
Wt 173 lb (78.5kg)  
  
  
  
Physical Exam  
Vitals and nursing note reviewed.  
Constitutional:  
General: She is not in acute   
distress.  
Appearance: Normal appearance.   
She is well-developed,   
well-groomed and overweight. She   
is not ill-appearing,   
toxic-appearing or diaphoretic.  
HENT:  
Head: Normocephalic and   
atraumatic.  
Right Ear: External ear normal.  
Left Ear: External ear normal.  
Nose: Nose normal.  
Mouth/Throat:  
Lips: Pink.  
Eyes:  
General: Lids are normal. Vision   
grossly intact. Gaze aligned   
appropriately.  
Extraocular Movements:   
Extraocular movements intact.  
Conjunctiva/sclera: Conjunctivae   
normal.  
Pupils: Pupils are equal, round,   
and reactive to light.  
Neck:  
Thyroid: No thyromegaly or   
thyroid tenderness.  
Trachea: Trachea normal.  
Cardiovascular:  
Rate and Rhythm: Normal rate and   
regular rhythm.  
Pulses: Normal pulses. No   
decreased pulses.  
Radial pulses are 2+ on the right   
side and 2+ on the left side.  
Heart sounds: Normal heart   
sounds.  
Pulmonary:  
Effort: Pulmonary effort is   
normal. No tachypnea or   
respiratory distress.  
Breath sounds: Normal breath   
sounds. No decreased breath   
sounds, wheezing, rhonchi or   
rales.  
Abdominal:  
General: Bowel sounds are normal.   
There is no distension.  
Palpations: Abdomen is soft.  
Tenderness: There is no abdominal   
tenderness. There is no guarding.  
Musculoskeletal:  
General: No swelling, tenderness,   
deformity or signs of injury.   
Normal range of motion.  
Cervical back: Normal range of   
motion and neck supple.  
Right lower leg: No edema.  
Left lower leg: No edema.  
Lymphadenopathy:  
Cervical: No cervical adenopathy.  
Skin:  
General: Skin is warm and dry.  
Capillary Refill: Capillary   
refill takes less than 2 seconds.  
Findings: No rash or wound.  
Neurological:  
General: No focal deficit   
present.  
Mental Status: She is alert and   
oriented to person, place, and   
time.  
Cranial Nerves: Cranial nerves   
2-12 are intact.  
Sensory: Sensation is intact.  
Motor: Motor function is intact.   
No weakness.  
Coordination: Coordination is   
intact.  
Gait: Gait is intact. Gait   
normal.  
Psychiatric:  
Mood and Affect: Mood is anxious.   
Mood is not depressed.  
Speech: Speech normal. She is   
communicative.  
Behavior: Behavior normal.   
Behavior is cooperative.  
Thought Content: Thought content   
does not include homicidal or   
suicidal ideation. Thought   
content does not include   
homicidal or suicidal plan.  
  
  
  
Assessment & Plan  
ASSESSMENT/PLAN:  
1. Vertigo - ICD9: 780.4, ICD10:   
R42 (primary diagnosis)  
Improved. Continue meclizine as   
needed.  
  
2. BENY (generalized anxiety   
disorder) - ICD9: 300.02, ICD10:   
F41.1  
Ongoing. Not improved with   
buspirone. Trial hydroxyzine.  
- HYDROXYZINE HCL 25 MG TABLET  
  
3. Palpitations - ICD9: 785.1,   
ICD10: R00.2  
Patient to establish with   
cardiology for further evaluation   
and treatment.  
- CONSULT TO CARDIOLOGY  
  
4. Sensation of chest tightness -   
ICD9: 786.59, ICD10: R07.89  
Atypical chest pain, symptoms are   
not consistent with cardiac   
ischemia due to nonexertional   
nature of symptom, pleuritic   
nature of pain, and history of   
anxiety not well-managed possible   
etiology include GERD, COPD   
related, Pleurisy, and Anxiety  
- Electrocardiogram: An ECG on   
2024 showed normal   
sinus rhythm at 66 BPM, MI   
interval 146 ms, normal QRS, QRS   
duration 82 ms, non-specific T   
wave changes,  ms  
- Echocardiogram 2023   
significant for normal LVEF of 69   
5%, normal LV and RV function,   
small pericardial effusion,   
concentric LV perjury (mild),   
mild to moderate tricuspid   
regurgitation, borderline   
elevated pulmonary pressure,   
negative bubble study for   
intracardiac shunt  
- 48-hour Holter monitor 2024   
demonstrated sinus rhythm with   
short MI interval, heart rate   
ranging from 53 to 125 bpm with   
average 77 bpm, very rare SVE   
singles, very rare VE singles, no   
heart block or A-fib. Patient   
activated event marker during   
periods of sinus rhythm.  
- Oxygen saturation 99%  
- Referral to Cardiology  
- CONSULT TO CARDIOLOGY  
  
5. Abnormal Holter exam - ICD9:   
794.31, ICD10: R94.31  
See plan #4.  
- CONSULT TO CARDIOLOGY  
  
6. Essential hypertension - ICD9:   
401.9, ICD10: I10  
- Controlled  
- Home blood pressure readings   
controlled  
- Continue current medications  
- Recommend home blood pressure   
monitoring, to bring results to   
next visit  
- Encouraged sodium restriction,   
DASH or Mediterranean diet  
- Recommend regular aerobic   
exercise  
- Continued smoking cessation   
encouraged.  
- Reviewed risks of hypertension   
and principles of treatment  
- Follow up in 3 months or sooner   
if needed for hypertension visit  
- COMPREHENSIVE METABOLIC PANEL  
- CONSULT TO CARDIOLOGY  
  
7. Mixed hyperlipidemia - ICD9:   
272.2, ICD10: E78.2  
- Control undetermined, due for   
labs  
- Continue current medications  
- Counseled on healthy diet and   
regular exercise  
- Discussed need for and benefit   
of weight loss. BMI 27.10   
kg/(m^2)  
- Follow up in 3 months, sooner   
should any other issues arise.  
- LIPID PANEL BASIC  
- CONSULT TO CARDIOLOGY  
  
8. Primary insomnia - ICD9:   
307.42, ICD10: F51.01  
Sleep hygiene discussed. Trial   
hydroxyzine for management of   
nighttime anxiety.  
- HYDROXYZINE HCL 25 MG TABLET  
  
9. Multiple joint pain - ICD9:   
719.49, ICD10: M25.50  
Patient to establish with   
rheumatology for evaluation and   
treatment.  
- CONSULT TO RHEUM/IMMUN DISEASE  
  
10. Vitamin B12 deficiency -   
ICD9: 266.2, ICD10: E53.8  
Not currently supplementing.   
Received immunizations in the   
past. Obtain labs:  
- VITAMIN B12  
  
11. Diabetes mellitus type 2,   
diet-controlled (HCC) - ICD9:   
250.00, ICD10: E11.9  
- Control undetermined, due for   
labs  
- Counseled on healthy diet and   
regular exercise  
- Discussed diabetic education   
issues of diabetes complications   
and monitoring required and   
hypoglycemic/hyperglycemic   
symptoms  
- Follow up in 3 months, sooner   
should any other issues arise.  
- ALBUMIN/CREATININE RATIO, URINE  
- HEMOGLOBIN A1C  
  
12. Screening for HIV (human   
immunodeficiency virus) - ICD9:   
V73.89, ICD10: Z11.4  
Desires screening. Obtain labs:  
- HIV 1/2 COMBO WITH REFLEX TO   
DIFFERENTIATION  
  
13. Immunity status testing -   
ICD9: V72.61, ICD10: Z01.84  
Unsure of vaccination status.   
Obtain labs:  
- HEPATITIS B SURFACE ANTIBODY  
  
Robert Palacios will return   
in 3 month(s) for annual wellness   
visit or sooner as needed.   
Discussed need for continued   
follow-up with all specialist   
providers, taking all medications   
as prescribed, increasing   
activity level as tolerated, and   
lowering sodium and processed   
food intake at today's visit.   
Patient instructed to call the   
office or send a message via   
Cursogram with any questions   
related to this visit.  
  
Please Note: This office note has   
been created using waygum, a speech recognition   
software program, and may contain   
errors including punctuation,   
grammar, spelling, gender, and   
inappropriate words or phrases   
that pertain to the system.  
  
DATE: 2024  
SIGNATURE: Juliane Murphy APRN.CNP  
Electronically signed by Juliane Murphy APRN.CNP at 2024   
6:11 PM EDT  
documented in this encounter            [facility]  
   
                                2024 Note                 [facility]  
nter  
   
                                                    2024 Miscellaneous   
Notes                                   Formatting of this note might be   
different from the original.  
Items addressed in this   
encounter:  
Telephone Encounter  
  
Podiatry referral to Michael Donovan DPM  
Pt notified by  of office   
contact info to schedule appt  
Ph # for appts [phone]  
  
Able to close encounter.  
  
Lisa Hoang MA  
2024 9:25 AM  
  
  
9:25 AM  
  
  
Electronically signed by   
Lisa Hoang MA at 2024   
9:28 AM EDT  
documented in this encounter            [facility]  
   
                                        2024 Instructions   
  
  
Juliane Murphy APRN.CNP -   
2024 2:50 PM EDTFormatting   
of this note might be different   
from the original.  
Return for next scheduled follow   
up in April or sooner as needed.  
  
Consult placed to podiatry   
(Michael Donovan). They will   
contact you within 2-3 weeks for   
an appointment. Please notify my   
office if you do not secure an   
appointment within that   
timeframe.  
  
Begin Meloxicam once daily for   
pain. Do not cut, crush, or chew   
this medication. Do not take   
other antiinflammatory   
medications (Aspirin, Ibuprofen,   
Aleve, Advil). You can alternate   
with Tylenol if needed. Ice,   
rest, elevation as needed. Take   
medication with food to prevent   
stomach upset.  
Electronically signed by Juliane Murphy APRN.CNP at 2024   
2:52 PM EDT  
  
documented in this encounter            [facility]  
   
                                2024 Note                 [facility]  
nter  
   
                                                    2024 History of   
Present illness Narrative               Formatting of this note might be   
different from the original.  
Robert is here today due to   
bilateral ankle pain, left worse   
than right  
This ankle pain has been going on   
for years but the past few weeks   
it has been really flaring up  
She does have a history of ankle   
spurs but she has never seen   
Orthopedics for this problem  
walking makes it worse and   
nothing makes it feel better  
Patient has been elevating her   
feet, keeping them as warm as   
possible, applying heating pad  
She does use medical marijuana   
but she cannot do that when her   
grandson is there (she has joint   
custody with the other   
grandparents every other week)  
  
Leanna Burton LPN  
2024 2:31 PM  
  
Electronically signed by Leanna Burton LPN at 2024 1:33   
PM EDT  
Formatting of this note is   
different from the original.  
This note was created using   
Listen Edition.  
Subjective  
Robert Palacios is a 44 year   
old female presenting today with   
chief complaint of ankle pain,   
left greater than right. Patient   
reports history of bone spurs.   
Previous 3V x-rays completed in   
2019 per rheumatology reveal no   
acute process but do confirm   
presence of tiny calcaneal spur   
bilaterally at the plantar   
fascial insertion.  
  
Robert reports onset of pain 3   
years ago. Symptoms have worsened   
over the last 6 months. Patient   
was not previously evaluated by   
orthopedics or podiatry related   
to current complaint. Robert   
describes the pain as aching,   
throbbing, consistent, and   
greater on the left than right.   
Left-sided foot pain also noted.   
Activity makes symptoms worse.   
Rest, elevation, heating pad,   
warming her feet,   
over-the-counter   
anti-inflammatories, and medical   
marijuana minimally improved   
pain. She is unwilling to use   
medical marijuana when she is in   
custody of her grandson and   
reports increased pain during   
these times.  
  
Patient reports she was   
previously prescribed Enbrel   
injections which helped to   
improve joint pain. She states   
previous PCP prescribed the   
medication. She did briefly see   
Dr. Mariana Mart and Dr. Tess Yang of rheumatology [facility] but reports medication was   
not prescribed by her   
rheumatologist. Robert notes   
history of rheumatoid arthritis,   
degenerative disc disease,   
fibromyalgia.  
  
PAST MEDICAL HISTORY  
Diagnosis Date  
Anxiety 2014  
Anxiety and depression  
COPD, mild (HCC) 2014  
Depression 2014  
Fibroadenosis of breast  
Fibromyalgia  
GERD (gastroesophageal reflux   
disease) 2014  
HTN (hypertension) 2014  
Hyperlipidemia 2014  
Lump or mass in breast 3/9/2009  
Malignant neoplasm of cervix   
uteri, unspecified site  
Cervical cancer  
NO SHOW 3/2/2007  
With primary care and specialty   
offices.  
PMH - PAST MEDICAL HISTORY OF  
diabetes  
Raynaud disease  
Raynaud disease  
Restless legs  
Rheumatoid arthritis (HCC)  
RSD lower limb  
bilateral legs  
Smoker 2014  
Tachycardia 2014  
Possible SVT  
Type II or unspecified type   
diabetes mellitus without mention   
of complication, uncontrolled   
2005  
Diet controlled.  
  
ACTIVE PROBLEM LIST  
No Show  
Lump Or Mass in Breast  
Fibroadenosis of Breast  
Anxiety  
Malignant Neoplasm of Cervix   
(Hcc)  
Fibromyalgia  
Rheumatoid Arthritis Involving   
Multiple Sites With Positive   
Rheumatoid Factor (Hcc)  
Raynaud Disease  
Restless Legs  
Mass of Left Side of Neck  
Routine Gynecological Examination  
Dysphagia  
Smoker  
Mixed Hyperlipidemia  
Tachycardia  
Copd, Mild (Hcc)  
Essential Hypertension  
Constipation  
Fatigue  
Hoarse Voice Quality  
Gastroesophageal Reflux Disease   
Without Esophagitis  
Rsd Lower Limb  
Diabetes Mellitus Type 2,   
Diet-Controlled (Hcc)  
Depression  
Neoplasm of Uncertain Behavior of   
Neck  
Encounter for Gynecological   
Examination Without Abnormal   
Finding  
Adjustment Disorder With Mixed   
Anxiety and Depressed Mood  
Environmental and Seasonal   
Allergies  
Bilateral Low Back Pain With   
Bilateral Sciatica  
Sacroiliitis (Hcc)  
Menopause  
Degenerative Disc Disease, Lumbar  
Thyroid Nodule  
Snoring  
Pain  
Folliculitis  
Loss of Hair  
Chronic Insomnia  
Chronic Midline Low Back Pain   
With Bilateral Sciatica  
Vertigo  
Tinnitus of Both Ears  
Sob (Shortness of Breath)  
Palpitations  
Facial Asymmetry  
  
  
Current Outpatient Medications  
Medication Sig Dispense Refill  
loratadine (CLARITIN) 10 mg   
tablet Take 1 tablet by mouth   
once daily. 30 tablet 5  
omeprazole (PRILOSEC) 40 mg   
capsule Take 1 capsule by mouth   
two times a day. 180 capsule 3  
ANORO ELLIPTA 62.5-25   
mcg/actuation inhaler INHALE 1   
PUFF BY MOUTH INTO THE LUNGS ONCE   
A DAY 1 Each 11  
Blood-Glucose Meter Test One time   
a day and as needed. Insulin Dep?   
Yes E11.9 DM 2 1 Each 0  
Blood Glucose Control, Normal   
soln Use as instructed 1 Each 0  
blood sugar diagnostic (BLOOD   
GLUCOSE TEST) test strip 1 Strip   
once daily. Test blood sugar once   
daily and as needed. 100 Strip 2  
atenolol (TENORMIN) 25 mg tablet   
take 1 tablet by mouth once daily   
30 tablet 11  
atorvastatin (LIPITOR) 20 mg   
tablet Take 1 tablet by mouth   
once daily. 90 tablet 3  
cyanocobalamin (VITAMIN B-12) 100   
mcg tab Take 100 mcg by mouth   
once daily.  
albuterol HFA (PROVENTIL HFA,   
VENTOLIN HFA) 90 mcg/actuation   
inhaler Inhale 2 Puffs as   
instructed every 4 hours as   
needed for wheezing/shortness of   
breath. 1 Each 1  
blood sugar diagnostic (FREESTYLE   
LITE STRIPS) test strip Test   
blood sugar(s) 1 times daily. Dx:   
E11.9. Insulin: No 50 Strip 11  
Lancets lancets Check glucose   
daily . Dx: 11.9. 100 Each 3  
  
No current facility-administered   
medications for this visit.  
  
Medications were reviewed and   
verified.  
  
Social History  
  
Tobacco Use  
Smoking status: Former  
Packs/day: 1.00  
Years: 20.00  
Additional pack years: 0.00  
Total pack years: 20.00  
Types: Cigarettes  
Start date: 1993  
Passive exposure: Past  
Smokeless tobacco: Never  
Tobacco comments:  
Currently down to 4 cigarettes in   
the past 3 weeks  
Vaping Use  
Vaping Use: current everyday user  
Substances: Nicotine  
Substance Use Topics  
Alcohol use: Not Currently  
Drug use: Not Currently  
Types: Marijuana  
  
  
FAMILY HISTORY  
Problem Relation Age of Onset  
Hypertension Mother  
Heart Mother  
Diabetes Mother  
COPD Mother  
Alcohol/Drug Father  
Allergies Brother  
Alcohol/Drug Sister  
No Known Problems Paternal   
Grandmother  
No Known Problems Paternal   
Grandfather  
No Known Problems Son  
No Known Problems Son  
No Known Problems Daughter  
Diabetes Brother  
Hypoglycemic  
Breast Cancer Maternal Aunt  
No Known Problems Maternal   
Grandmother  
No Known Problems Maternal   
Grandfather  
Breast Cancer Maternal Aunt  
  
Review of Systems  
Constitutional: Negative for   
activity change, appetite change,   
fatigue and unexpected weight   
change.   
Respiratory: Negative for cough,   
chest tightness, shortness of   
breath and wheezing.  
Cardiovascular: Negative for   
chest pain, palpitations and leg   
swelling (Bilateral ankle   
swelling).  
Musculoskeletal: Positive for   
arthralgias (Bilateral ankles,   
left greater than right), back   
pain (Chronic low back pain),   
gait problem (Unsteady at times   
related to ankle pain), joint   
swelling (Ankles, fingers at   
times) and myalgias (Chronic   
related to fibromyalgia).   
Negative for neck pain and neck   
stiffness.  
  
  
Objective  
/82   Pulse 69   Temp (Src)   
98.2 (Temporal)   Resp 16   Ht 5'   
7  (1.70m)   Wt 171 lb 3.2 oz   
(77.7kg)   SpO2 99%   BMI 26.81   
kg/(m^2).  
  
  
  
Physical Exam  
Vitals reviewed.  
Constitutional:  
Appearance: Normal appearance.   
She is well-developed,   
well-groomed and overweight. She   
is not ill-appearing,   
toxic-appearing or diaphoretic.  
HENT:  
Right Ear: External ear normal.  
Left Ear: External ear normal.  
Nose: Nose normal.  
Eyes:  
Extraocular Movements:   
Extraocular movements intact.  
Conjunctiva/sclera: Conjunctivae   
normal.  
Cardiovascular:  
Rate and Rhythm: Normal rate and   
regular rhythm.  
Pulses: Normal pulses.  
Heart sounds: Normal heart   
sounds.  
Pulmonary:  
Effort: Pulmonary effort is   
normal.  
Breath sounds: Normal breath   
sounds.  
Abdominal:  
General: Bowel sounds are normal.  
Palpations: Abdomen is soft.  
Musculoskeletal:  
General: No swelling, tenderness,   
deformity or signs of injury.   
Normal range of motion.  
Cervical back: Normal range of   
motion and neck supple.  
Right lower leg: No edema.  
Left lower leg: No edema.  
Skin:  
General: Skin is warm and dry.  
Neurological:  
General: No focal deficit   
present.  
Mental Status: She is alert and   
oriented to person, place, and   
time.  
Motor: No weakness.  
Gait: Gait normal.  
Psychiatric:  
Mood and Affect: Mood normal.  
Behavior: Behavior normal.  
  
  
  
Assessment & Plan  
ASSESSMENT/PLAN:  
1. Chronic ankle pain, bilateral   
- ICD9: 719.47, 338.29, ICD10:   
M25.571, G89.29, M25.572 (primary   
diagnosis)  
2. Chronic foot pain, left -   
ICD9: 729.5, 338.29, ICD10:   
M79.672, G89.29  
Patient to establish with   
podiatry for further evaluation   
and treatment. Begin treatment   
with meloxicam. Avoid treatment   
with other over-the-counter   
NSAIDs. May alternate Tylenol as   
needed, rest, ice, elevation as   
needed.  
- CONSULT TO PODIATRY  
- MELOXICAM 7.5 MG TABLET  
  
3. Rheumatoid arthritis of   
multiple sites with negative   
rheumatoid factor (HCC) - ICD9:   
714.0, ICD10: M06.09  
Patient reports history of RA of   
multiple sites. No visible   
nodules or arthritic changes   
noted to feet or ankles. Consider   
referral to reestablish with   
rheumatology if symptoms persist.   
Plan for discussion of   
appropriateness of Enbrel   
prescription in primary care with   
collaborating provider.  
  
Robert Palacios will return   
in April for previously scheduled   
follow-up or sooner as needed.   
Discussed need for continued   
follow-up with all specialist   
providers, taking all medications   
as prescribed, increasing   
activity level as tolerated, and   
lowering sodium and processed   
food intake at today's visit.   
Patient instructed to call the   
office or send a message via   
Cursogram with any questions   
related to this visit.  
  
DATE: 2024  
SIGNATURE: HELLEN Reyes.CNP  
Electronically signed by Juliane Murphy APRN.CNP at 2024   
1:33 PM EDT  
documented in this encounter            [facility]  
   
                                2024 Note                 [facility]  
nt  
   
                                                    2024 Miscellaneous   
Notes                                   Formatting of this note is   
different from the original.  
Last Office Visit:  
2024  
  
Next Scheduled Office Visit:  
2024  
  
Requested Prescriptions  
  
Pending Prescriptions Disp   
Refills  
omeprazole (PRILOSEC) 40 mg   
capsule 180 capsule 3  
Sig: Take 1 capsule by mouth two   
times a day.  
  
Loly Alfonso LPN  
2024 5:13 PM  
  
Electronically signed by Loly Alfonso LPN at 2024 5:13 PM   
EDT  
documented in this encounter            [facility]  
   
                                                    2024 Miscellaneous   
Notes                                   Formatting of this note is   
different from the original.  
Pharmacy verified in [facility]  
  
Patient has been identified by   
name and date of birth: Yes  
Patient aware RX will be sent to   
pharmacy. No need to notify   
patient.  
  
Parent/Guardian phones for   
refill(s):  
  
Requested Prescriptions  
  
Pending Prescriptions Disp   
Refills  
ANORO ELLIPTA 62.5-25   
mcg/actuation inhaler [Pharmacy   
Med Name: ANORO ELLIPTA 62.5-25   
MCG INH]  
Sig: INHALE 1 PUFF BY MOUTH INTO   
THE LUNGS ONCE A DAY  
  
  
Date of last office visit :   
2020  
  
Date of next office visit : Visit   
date not found  
  
Last 2 Encounter Wt Readings:  
Date: Wt:  
2024 76.7 kg (169 lb)  
2024 76.7 kg (169 lb)  
  
Thyroid:  
TSH  
Date Value  
2023 1.817 mIU/L  
10/18/2019 1.220 uU/mL  
  
Diabetes:  
Hemoglobin A1C (%)  
Date Value  
2023 5.4  
2020 5.3  
  
Cholesterol:  
Triglyceride (mg/dL)  
Date Value  
2023 159  
2021 168  
  
HDL Cholesterol (mg/dL)  
Date Value  
2023 37  
2021 35  
  
LDL Cholesterol (mg/dL)  
Date Value  
2023 92  
2021 143  
  
ALT (U/L)  
Date Value  
2023 30  
2020 15  
  
Non HDL Cholesterol (mg/dL)  
Date Value  
2023 124  
2021 177  
  
Blood Pressure:  
BUN (mg/dL)  
Date Value  
2023 8  
2020 15  
  
Creatinine (mg/dL)  
Date Value  
2023 0.91  
2020 0.80  
  
Sodium (mmol/L)  
Date Value  
2023 145  
2020 138  
  
Potassium (mmol/L)  
Date Value  
2023 4.1  
2020 4.3  
Last 1 Encounter BP Readings:  
Date: BP:  
2024 126/82  
Coumadin: No results found for:   
 INR   
Blood Counts:  
RBC (m/uL)  
Date Value  
2023 4.37  
2021 4.13  
  
WBC (k/uL)  
Date Value  
2023 7.36  
2021 8.77  
  
Hematocrit (%)  
Date Value  
2023 37.8  
2021 38.0  
  
Hemoglobin (g/dL)  
Date Value  
2023 12.5  
2021 12.7  
  
Platelet Count (k/uL)  
Date Value  
2023 344  
2021 290  
  
Liver Function:  
ALT (U/L)  
Date Value  
2023 30  
2020 15  
  
AST (U/L)  
Date Value  
2023 21  
2020 16  
  
Potassium:  
Potassium (mmol/L)  
Date Value  
2023 4.1  
2020 4.3  
  
B12:  
Vitamin B12 (pg/mL)  
Date Value  
2021 493  
  
Please advise.  
  
Daisy Nova Ma  
Electronically signed by Daisy Nova Ma at 2024 9:51 AM   
EST  
documented in this encounter            [facility]  
   
                                        2024 Instructions   
  
  
Juliane Murphy APRN.CNP -   
2024 11:53 AM ESTFormatting   
of this note might be different   
from the original.  
Return for next scheduled follow   
up.  
  
Stop Cefdinir and Prednisone.   
Start doxycycline twice daily x 7   
days.  
  
Over the counter decongestants as   
needed.  
  
Return for follow up if symptoms   
worsen or do not resolve in 3-5   
days.  
  
If chest pain returns, go to ER   
for evaluation.  
  
Electronically signed by Juliane Murphy APRN.CNP at 2024   
11:55 AM EST  
  
documented in this encounter            [facility]  
   
                                                    2024 History of   
Present illness Narrative               Formatting of this note is   
different from the original.  
This note was created using   
Listen Edition.  
Subjective  
Robert Palacios is a 44 year   
old female presenting today for   
ER follow-up. Patient was seen in   
[facility] ER on 2024   
with complaint of chest pain and   
palpitations. Symptoms worsened   
that evening but had began two   
days prior. Patient noted a dull   
aching pain in her central chest.   
Pain was nonradiating. Patient   
felt her heart was racing. She   
was diagnosed with an ear   
infection on Monday and   
prescribed a 10-day course of   
cefdinir and a Medrol Dosepak.  
  
Robert was placed in holding due   
to no beds being available.   
Patient underwent chest x-ray and   
lab work but elected to leave the   
ER prior to completion of   
evaluation. Labs revealed mild   
leukocytosis at 13.0. Chest x-ray   
revealed no consolidation or   
pleural effusion. Mild   
infiltrates noted in bilateral   
lung bases, left greater than   
right.  
  
Today patient is feeling better.   
Her voice is still slightly   
hoarse, and she endorses mild   
shortness of breath with exertion   
or when lying flat. She has a   
dry, nonproductive cough. Patient   
reports ongoing fever last   
evening. She denies ongoing chest   
pain. Her left ear still feels   
 fluttery.  She has not taken any   
further doses of cefdinir or the   
Medrol Dosepak. Robert had   
completed 4 days of both   
treatments prior to being seen in   
ER.  
  
PAST MEDICAL HISTORY  
Diagnosis Date  
Anxiety 2014  
Anxiety and depression  
COPD, mild (HCC) 2014  
Depression 2014  
Fibroadenosis of breast  
Fibromyalgia  
GERD (gastroesophageal reflux   
disease) 2014  
HTN (hypertension) 2014  
Hyperlipidemia 2014  
Lump or mass in breast 3/9/2009  
Malignant neoplasm of cervix   
uteri, unspecified site  
Cervical cancer  
NO SHOW 3/2/2007  
With primary care and specialty   
offices.  
PMH - PAST MEDICAL HISTORY OF  
diabetes  
Raynaud disease  
Raynaud disease  
Restless legs  
Rheumatoid arthritis (HCC)  
RSD lower limb  
bilateral legs  
Smoker 2014  
Tachycardia 2014  
Possible SVT  
Type II or unspecified type   
diabetes mellitus without mention   
of complication, uncontrolled   
2005  
Diet controlled.  
  
ACTIVE PROBLEM LIST  
No Show  
Lump Or Mass in Breast  
Fibroadenosis of Breast  
Anxiety  
Malignant Neoplasm of Cervix   
(Hcc)  
Fibromyalgia  
Rheumatoid Arthritis Involving   
Multiple Sites With Positive   
Rheumatoid Factor (Hcc)  
Raynaud Disease  
Restless Legs  
Mass of Left Side of Neck  
Routine Gynecological Examination  
Dysphagia  
Smoker  
Mixed Hyperlipidemia  
Tachycardia  
Copd, Mild (Hcc)  
Essential Hypertension  
Constipation  
Fatigue  
Hoarse Voice Quality  
Gastroesophageal Reflux Disease   
Without Esophagitis  
Rsd Lower Limb  
Diabetes Mellitus Type 2,   
Diet-Controlled (Hcc)  
Depression  
Neoplasm of Uncertain Behavior of   
Neck  
Encounter for Gynecological   
Examination Without Abnormal   
Finding  
Adjustment Disorder With Mixed   
Anxiety and Depressed Mood  
Environmental and Seasonal   
Allergies  
Bilateral Low Back Pain With   
Bilateral Sciatica  
Sacroiliitis (Hcc)  
Menopause  
Degenerative Disc Disease, Lumbar  
Thyroid Nodule  
Snoring  
Pain  
Folliculitis  
Loss of Hair  
Chronic Insomnia  
Chronic Midline Low Back Pain   
With Bilateral Sciatica  
Vertigo  
Tinnitus of Both Ears  
Sob (Shortness of Breath)  
Palpitations  
Facial Asymmetry  
  
  
Current Outpatient Medications  
Medication Sig Dispense Refill  
cefdinir (OMNICEF) 300 mg capsule   
Take 1 capsule by mouth two times   
a day for 10 days. 20 capsule 0  
Brompheniramine-Pseudoeph-DM   
(BROMFED DM) 2-30-10 mg/5 mL   
syrup Take 10 mL by mouth every 4   
hours as needed   
(COUGH/CONGESTION) for up to 5   
days. 300 mL 0  
methylPREDNISolone (MEDROL, ANA,)   
4 mg Dose-Pack Take as instructed   
per package. 21 tablet 0  
Blood-Glucose Meter Test One time   
a day and as needed. Insulin Dep?   
Yes E11.9 DM 2 1 Each 0  
Blood Glucose Control, Normal   
soln Use as instructed 1 Each 0  
blood sugar diagnostic (BLOOD   
GLUCOSE TEST) test strip 1 Strip   
once daily. Test blood sugar once   
daily and as needed. 100 Strip 2  
atenolol (TENORMIN) 25 mg tablet   
take 1 tablet by mouth once daily   
30 tablet 11  
atorvastatin (LIPITOR) 20 mg   
tablet Take 1 tablet by mouth   
once daily. 90 tablet 3  
cyanocobalamin (VITAMIN B-12) 100   
mcg tab Take 100 mcg by mouth   
once daily.  
omeprazole (PRILOSEC) 40 mg   
capsule Take 1 capsule by mouth   
two times a day. 60 capsule 2  
albuterol HFA (PROVENTIL HFA,   
VENTOLIN HFA) 90 mcg/actuation   
inhaler Inhale 2 Puffs as   
instructed every 4 hours as   
needed for wheezing/shortness of   
breath. 1 Each 1  
umeclidinium-vilanterol (ANORO   
ELLIPTA) 62.5-25 mcg/actuation   
inhaler Inhale 1 Inhalation as   
instructed once daily. 1 Each 5  
blood sugar diagnostic (FREESTYLE   
LITE STRIPS) test strip Test   
blood sugar(s) 1 times daily. Dx:   
E11.9. Insulin: No 50 Strip 11  
Lancets lancets Check glucose   
daily . Dx: 11.9. 100 Each 3  
  
No current facility-administered   
medications for this visit.  
  
Medications were reviewed and   
verified.  
  
Social History  
  
Tobacco Use  
Smoking status: Former  
Packs/day: 1.00  
Years: 20.00  
Additional pack years: 0.00  
Total pack years: 20.00  
Types: Cigarettes  
Start date: 1993  
Passive exposure: Past  
Smokeless tobacco: Never  
Tobacco comments:  
Currently down to 4 cigarettes in   
the past 3 weeks  
Vaping Use  
Vaping Use: current everyday user  
Substances: Nicotine  
Substance Use Topics  
Alcohol use: Not Currently  
Drug use: Not Currently  
Types: Marijuana  
  
  
FAMILY HISTORY  
Problem Relation Age of Onset  
Hypertension Mother  
Heart Mother  
Diabetes Mother  
COPD Mother  
Alcohol/Drug Father  
Allergies Brother  
Alcohol/Drug Sister  
No Known Problems Paternal   
Grandmother  
No Known Problems Paternal   
Grandfather  
No Known Problems Son  
No Known Problems Son  
No Known Problems Daughter  
Diabetes Brother  
Hypoglycemic  
Breast Cancer Maternal Aunt  
No Known Problems Maternal   
Grandmother  
No Known Problems Maternal   
Grandfather  
Breast Cancer Maternal Aunt  
  
Review of Systems  
Constitutional: Positive for   
fever. Negative for activity   
change, appetite change, chills,   
diaphoresis, fatigue and   
unexpected weight change.  
HENT: Positive for congestion   
(mild) and voice change. Negative   
for ear discharge, ear pain,   
hearing loss, postnasal drip,   
rhinorrhea, sinus pressure, sinus   
pain, sneezing and trouble   
swallowing.  
Eyes: Negative.  
Respiratory: Positive for cough   
and shortness of breath. Negative   
for chest tightness and wheezing.  
Cardiovascular: Negative for   
chest pain, palpitations and leg   
swelling.  
Musculoskeletal: Negative for   
myalgias.  
Skin: Negative for rash.  
Neurological: Negative for   
headaches.  
  
  
Objective  
/82   Pulse 76   Temp (Src)   
97.8 (Temporal)   Resp 16   Ht 5'   
7  (1.70m)   Wt 169 lb (76.7kg)     
SpO2 97%   BMI 26.46 kg/(m^2).  
  
  
  
Physical Exam  
Vitals and nursing note reviewed.  
Constitutional:  
General: She is not in acute   
distress.  
Appearance: Normal appearance.   
She is well-developed,   
well-groomed and overweight. She   
is not ill-appearing,   
toxic-appearing or diaphoretic.  
HENT:  
Head: Normocephalic and   
atraumatic.  
Right Ear: Hearing, ear canal and   
external ear normal. No drainage.   
No middle ear effusion. There is   
no impacted cerumen. No mastoid   
tenderness. Tympanic membrane is   
not erythematous, retracted or   
bulging.  
Left Ear: Hearing, ear canal and   
external ear normal. No drainage.   
There is no impacted cerumen. No   
mastoid tenderness. Tympanic   
membrane is erythematous and   
bulging. Tympanic membrane is not   
retracted.  
Nose: Mucosal edema and   
congestion (mild) present. No   
rhinorrhea.  
Right Turbinates: Not pale.  
Left Turbinates: Not pale.  
Right Sinus: No maxillary sinus   
tenderness or frontal sinus   
tenderness.  
Left Sinus: No maxillary sinus   
tenderness or frontal sinus   
tenderness.  
Mouth/Throat:  
Lips: Pink.  
Mouth: Mucous membranes are dry.  
Pharynx: Oropharynx is clear.   
Uvula midline. No pharyngeal   
swelling, oropharyngeal exudate,   
posterior oropharyngeal erythema   
or uvula swelling.  
Comments: Mild vocal hoarseness.  
Eyes:  
General: Lids are normal. Vision   
grossly intact.  
Right eye: No discharge.  
Left eye: No discharge.  
Extraocular Movements:   
Extraocular movements intact.  
Conjunctiva/sclera: Conjunctivae   
normal.  
Cardiovascular:  
Rate and Rhythm: Normal rate and   
regular rhythm.  
Pulses: Normal pulses.  
Heart sounds: Normal heart   
sounds.  
Pulmonary:  
Effort: Pulmonary effort is   
normal. No respiratory distress.  
Breath sounds: Wheezing (mild,   
scattered, clears w/ coughing)   
present. No decreased breath   
sounds, rhonchi or rales.  
Abdominal:  
General: Bowel sounds are normal.   
There is no distension.  
Palpations: Abdomen is soft.  
Musculoskeletal:  
General: Normal range of motion.  
Cervical back: Normal range of   
motion and neck supple.  
Lymphadenopathy:  
Cervical: No cervical adenopathy.  
Skin:  
General: Skin is warm and dry.  
Capillary Refill: Capillary   
refill takes less than 2 seconds.  
Neurological:  
General: No focal deficit   
present.  
Mental Status: She is alert and   
oriented to person, place, and   
time.  
Motor: No weakness.  
Gait: Gait normal.  
Psychiatric:  
Mood and Affect: Mood normal.  
Behavior: Behavior normal.   
Behavior is cooperative.  
  
  
  
Assessment & Plan  
ASSESSMENT/PLAN:  
1. Sinobronchitis - ICD9: 473.9,   
490, ICD10: J32.9, J40 (primary   
diagnosis)  
- Will begin treatment with   
Doxycycline  
- The patient should also be   
given OTC decongestants prn and   
OTC cough and cold meds as needed   
for the first 5-7 days of   
treatment.  
- Supportive care with plenty of   
fluids, rest, and analgesia prn.  
- Follow up in 3-5 days if   
symptoms persist or worsen.  
- DOXYCYCLINE HYCLATE 100 MG   
CAPSULE  
  
2. Other acute nonsuppurative   
otitis media of left ear,   
recurrence not specified - ICD9:   
381.00, ICD10: H65.192  
- Will begin treatment with   
Doxycycline  
- The patient should also be   
given OTC decongestants prn and   
OTC cough and cold meds as needed   
for the first 5-7 days of   
treatment.  
- Supportive care with plenty of   
fluids, rest, and analgesia prn.  
- Follow up in 3-5 days if   
symptoms persist or worsen.  
- DOXYCYCLINE HYCLATE 100 MG   
CAPSULE  
  
Robert Palacios will return   
in April for previously scheduled   
follow-up or sooner as needed.   
Discussed need for continued   
follow-up with all specialist   
providers, taking all medications   
as prescribed, increasing   
activity level as tolerated, and   
lowering sodium and processed   
food intake at today's visit.   
Patient instructed to call the   
office or send a message via   
[facility] with any questions   
related to this visit.  
  
DATE: 2024  
SIGNATURE: Juliane Murphy APRN.CNP  
Electronically signed by Juliane Murphy APRN.CNP at 2024   
10:52 PM EST  
Formatting of this note might be   
different from the original.  
Robert is here today for an ER f/u   
for shortness of breath and chest   
pain  
She was taking Prednisone, had   
taken 9 tablets so far at that   
point  
Chest xray shows lung infiltrate   
and lab results show elevated   
white blood count  
  
Today she is not having any of   
those symptoms like she was   
before  
She does admit to still having   
shortness of breath, dry cough,   
chills last evening but gone this   
morning  
Appetite is back  
She has not taken anymore of the   
Medrol dose pack and has all but   
9 tablets left and the Cefdinir   
13 tablets left  
  
Leanna Burton LPN  
2024 11:34 AM  
  
  
Electronically signed by Leanna Burton LPN at 2024 10:52   
PM EST  
documented in this encounter            [facility]  
   
                                2024 Note                 [facility]  
nter  
   
                                2024 Note                 [facility]  
nter  
   
                                                    2024 Miscellaneous   
Notes                                   Formatting of this note might be   
different from the original.  
Robert returned call to office.   
She said that she was in the ER   
until midnight last evening. She   
did not take the antibiotic   
Cefdinir 300 mg bid x 10 days   
because she was not sure what was   
causing her heart rate to go up   
Prednisone or Cefdinir. Robert did   
schedule an appointment here in   
the office for tomorrow morning   
at 11:40 am arriving at 11:15 am.  
  
Leanna Burton LPN  
2024 4:35 PM  
  
Electronically signed by Leanna Burton LPN at 2024 4:35   
PM EST  
Formatting of this note might be   
different from the original.  
Per HELLEN Mercado.CNP.  
  
Darrel RamKim Mendez called   
requesting her lab results from   
[place]. Stated she was seen in   
the ER related to possible   
allergic reaction to prednisone   
she was prescribed for an ear   
infection. Patient failed to   
mention she was having chest pain   
and palpitations. I would like   
for you to call patient and ask   
if she was prescribed an   
antibiotic for her ear infection.   
Her WBC count is mildly elevated   
and chest xray showed infiltrates   
in her lung. Where is she at in   
her steroid course? How long has   
she been taking? How long is   
left? Increased heart rate is a   
normal side effect of taking oral   
steroid medications. Please   
schedule a follow up appointment   
tomorrow or early next week so I   
can evaluate her.  
  
I attempted to call patient   
Robert. No answer. I left a voice   
mail message for patient to   
return call to office.  
  
Leanna Burton LPN  
2024 4:24 PM  
  
Electronically signed by Leanna Burton LPN at 2024 4:24   
PM EST  
documented in this encounter            [facility]  
   
                                        2024 Note       
  
ORIGINAL  
  
EXAMINATION:  
ONE XRAY VIEW OF THE CHEST  
  
2024 8:12 pm  
  
COMPARISON:  
None.  
  
HISTORY:  
ORDERING SYSTEM PROVIDED HISTORY:  
Reason for Exam: cp  
  
FINDINGS:  
There is a neurostimulator lead   
projected at midthoracic spine.  
  
There is hypoinflation with   
accentuation of the   
cardiomediastinal silhouette  
and increased bronchovascular   
markings.  
  
There is no consolidation or   
pleural effusion. There are mild   
infiltrates in  
bilateral lower lung zones, left   
greater than right.  
  
There is no pulmonary vascular   
congestion.  
  
There is no pneumothorax.  
  
Osseous structures demonstrate   
mild degenerative changes.  
  
IMPRESSION:  
  
1. There is no consolidation or   
pleural effusion. There are mild   
infiltrates  
in bilateral lower lung zones,   
left greater than right.  
  
  
Interpreted by: Cosme Johnson  
Preliminary Report By: Cosme Johnson  
Electronically signed By Cosme Johnson  
Dictated Date: 2024 8:15:39   
PM  
Prelim Date: 2024 8:20:00 PM  
Sign Date: 2024 8:20:00 PM  
Ordering Provider: [facility]  
   
                                2024 Note                 [facility]  
nter  
   
                                                    2024 History of   
Present illness Narrative               Summary: Holter Monitor report  
  
Formatting of this note might be   
different from the original.  
NSR with short MI interval.   
 bpm, ave 77 bpm.  
Rare PAC's  
Rare PVC's  
No heart block or AFIB.  
Patient complained of chest pain   
- correlated with NSR only.  
Electronically signed by   
Julio C Cooper MD at   
2024 9:43 AM EST  
documented in this encounter            [facility]  
   
                                2024 Note                 [facility]  
nter  
   
                                                    2024 History of   
Present illness Narrative               Formatting of this note is   
different from the original.  
  
  
This is a 44-year-old female with   
a past medical history of COPD,   
fibromyalgia, hypertension and   
Raynaud's presents with 4 to 5   
days of nasal congestion, sinus   
pressure, cough and left ear   
pain. She initially had fevers   
and a sore throat however this   
has improved. Today she seems to   
have lost her voice. She has   
tried taking over-the-counter   
medications without much relief.   
Denies any dizziness, ear   
drainage, ringing in the ears,   
drooling, stridor, difficulty   
breathing or swallowing,   
shortness breath, chest pain,   
vomiting or diarrhea, abdominal   
pain or rash.  
  
The history is provided by the   
patient.  
  
PAST MEDICAL HISTORY  
Diagnosis Date  
Anxiety 2014  
Anxiety and depression  
COPD, mild (HCC) 2014  
Depression 2014  
Fibroadenosis of breast  
Fibromyalgia  
GERD (gastroesophageal reflux   
disease) 2014  
HTN (hypertension) 2014  
Hyperlipidemia 2014  
Lump or mass in breast 3/9/2009  
Malignant neoplasm of cervix   
uteri, unspecified site  
Cervical cancer  
NO SHOW 3/2/2007  
With primary care and specialty   
offices.  
PMH - PAST MEDICAL HISTORY OF  
diabetes  
Raynaud disease  
Raynaud disease  
Restless legs  
Rheumatoid arthritis (HCC)  
RSD lower limb  
bilateral legs  
Smoker 2014  
Tachycardia 2014  
Possible SVT  
Type II or unspecified type   
diabetes mellitus without mention   
of complication, uncontrolled   
2005  
Diet controlled.  
  
Current Outpatient Medications  
Medication Sig Dispense Refill  
Blood-Glucose Meter Test One time   
a day and as needed. Insulin Dep?   
Yes E11.9 DM 2 1 Each 0  
Blood Glucose Control, Normal   
soln Use as instructed 1 Each 0  
blood sugar diagnostic (BLOOD   
GLUCOSE TEST) test strip 1 Strip   
once daily. Test blood sugar once   
daily and as needed. 100 Strip 2  
atenolol (TENORMIN) 25 mg tablet   
take 1 tablet by mouth once daily   
30 tablet 11  
atorvastatin (LIPITOR) 20 mg   
tablet Take 1 tablet by mouth   
once daily. 90 tablet 3  
cyanocobalamin (VITAMIN B-12) 100   
mcg tab Take 100 mcg by mouth   
once daily.  
omeprazole (PRILOSEC) 40 mg   
capsule Take 1 capsule by mouth   
two times a day. 60 capsule 2  
albuterol HFA (PROVENTIL HFA,   
VENTOLIN HFA) 90 mcg/actuation   
inhaler Inhale 2 Puffs as   
instructed every 4 hours as   
needed for wheezing/shortness of   
breath. 1 Each 1  
umeclidinium-vilanterol (ANORO   
ELLIPTA) 62.5-25 mcg/actuation   
inhaler Inhale 1 Inhalation as   
instructed once daily. 1 Each 5  
blood sugar diagnostic (FREESTYLE   
LITE STRIPS) test strip Test   
blood sugar(s) 1 times daily. Dx:   
E11.9. Insulin: No 50 Strip 11  
Lancets lancets Check glucose   
daily . Dx: 11.9. 100 Each 3  
cefdinir (OMNICEF) 300 mg capsule   
Take 1 capsule by mouth two times   
a day for 10 days. 20 capsule 0  
Brompheniramine-Pseudoeph-DM   
(BROMFED DM) 2-30-10 mg/5 mL   
syrup Take 10 mL by mouth every 4   
hours as needed   
(COUGH/CONGESTION) for up to 5   
days. 300 mL 0  
methylPREDNISolone (MEDROL, ANA,)   
4 mg Dose-Pack Take as instructed   
per package. 21 tablet 0  
  
No current facility-administered   
medications for this visit.  
  
Social History  
  
Tobacco Use  
Smoking status: Former  
Packs/day: 1.00  
Years: 20.00  
Additional pack years: 0.00  
Total pack years: 20.00  
Types: Cigarettes  
Start date: 1993  
Passive exposure: Past  
Smokeless tobacco: Never  
Tobacco comments:  
Currently down to 4 cigarettes in   
the past 3 weeks  
Vaping Use  
Vaping Use: current everyday user  
Substances: Nicotine  
Substance Use Topics  
Alcohol use: Not Currently  
Drug use: Not Currently  
Types: Marijuana  
  
  
Alcohol Use: Not Currently  
  
Tobacco Use: 1 packs/day, for 20   
years. Types: Cigarettes  
  
Review of Systems  
Constitutional: Positive for   
fever and malaise/fatigue.  
HENT: Positive for congestion,   
ear pain (left ear pain), sinus   
pain and sore throat. Negative   
for ear discharge.  
Eyes: Negative for discharge and   
redness.  
Respiratory: Positive for cough.   
Negative for hemoptysis, sputum   
production, shortness of breath,   
wheezing and stridor.  
Cardiovascular: Negative for   
chest pain.  
Gastrointestinal: Negative for   
abdominal pain, diarrhea, nausea   
and vomiting.  
Musculoskeletal: Positive for   
neck pain (left side of neck   
where glands swollen). Negative   
for myalgias.  
Skin: Negative for rash.  
Neurological: Negative for   
dizziness and headaches.  
  
  
/88   Pulse 88   Temp 99.3   
  Resp 18   Wt 169 lb (76.7kg)     
SpO2 98%  
  
  
  
Physical Exam  
Vitals and nursing note reviewed.  
Constitutional:  
General: She is not in acute   
distress.  
Appearance: She is not   
ill-appearing or toxic-appearing.  
HENT:  
Head: Normocephalic and   
atraumatic.  
Right Ear: Tympanic membrane, ear   
canal and external ear normal.   
There is no impacted cerumen.  
Left Ear: Ear canal and external   
ear normal. There is no impacted   
cerumen.  
Ears:  
Comments: Left TM intact however   
slightly retracted with   
suppurative fluid seen posterior   
to the TM and the TM is   
erythematous. No edema, erythema   
or pain with rotation of the   
mastoids bilaterally.  
Nose: Congestion present.  
Comments: Pain to percussion over   
b/l maxillary sinus cavities  
  
Mouth/Throat:  
Mouth: Mucous membranes are   
moist.  
Comments: Airway patent without   
stridor, trismus or muffled   
voice. Patient tolerating oral   
secretions. Voice is hoarse   
sounding. Tonsils surgically   
absent. There is mild posterior   
pharyngeal erythema with   
cobblestoning. No oropharyngeal   
edema or exudates. Uvula midline.  
Eyes:  
Conjunctiva/sclera: Conjunctivae   
normal.  
Cardiovascular:  
Rate and Rhythm: Normal rate and   
regular rhythm.  
Pulmonary:  
Effort: Pulmonary effort is   
normal. No respiratory distress.  
Breath sounds: No stridor. No   
wheezing, rhonchi or rales.  
Musculoskeletal:  
Cervical back: Neck supple. No   
rigidity.  
Comments: HEADLEY x 4 without   
difficulty  
Lymphadenopathy:  
Cervical: Cervical adenopathy   
(tender enlarged LN's in b/l   
lateral cervical chain, L>R)   
present.  
Skin:  
General: Skin is warm and dry.  
Findings: No rash.  
Neurological:  
Mental Status: She is alert.  
Comments: Awake and alert. Motor   
grossly intact. Steady gait.  
  
  
  
This is a 45 y/o female with 5   
days of URI like symptoms. At the   
time of my exam, patient was   
afebrile, well-appearing,   
well-hydrated, not hypoxic,   
non-toxic and in no acute   
distress and appropriate for   
outpatient treatment and   
management. Given constellation   
of symptoms and clinical exam, I   
am concerned she has developed a   
secondary bacterial   
rhinosinusitis as well as an   
acute otitis media on the left.   
No clinical concern for ruptured   
TM or mastoiditis at this time.   
She does have impressive cervical   
adenopathy as well more so on the   
left. I am going to start her on   
Medrol Dosepak for the   
inflammation. Discussed signs and   
symptoms, progression, and   
treatment. Patient education   
provided and discussed along with   
printed information regarding   
treatment and follow-up. Patient   
advised to follow-up with   
patient's PCP in 3-5 days if no   
improvement in symptoms.   
Discussed signs/symptoms and red   
flags to monitor for and should   
symptoms worsen, advised patient   
to go to nearest ER for   
evaluation. Patient agreeable to   
plan and verbalized   
understanding. Pt discharged home   
in stable condition.  
  
ASSESSMENT/PLAN:  
1. Rhinosinusitis - ICD9: 473.9,   
ICD10: J32.9 (primary diagnosis)  
- Will begin treatment with as   
per antibiotic as written, see   
orders  
- Supportive care with plenty of   
fluids, rest, and analgesia prn.  
- CEFDINIR 300 MG CAPSULE  
-   
BROMPHENIRAMINE-PSEUDOEPHEDRINE-D  
M 2 MG-30 MG-10 MG/5 ML ORAL   
SYRUP  
- METHYLPREDNISOLONE 4 MG TABLETS   
IN A DOSE PACK  
  
2. Acute suppurative otitis media   
of left ear without spontaneous   
rupture of tympanic membrane,   
recurrence not specified - ICD9:   
382.00, ICD10: H66.002  
- Will begin treatment with as   
per antibiotic as written, see   
orders  
- Supportive care with plenty of   
fluids, rest, and analgesia prn.  
- CEFDINIR 300 MG CAPSULE  
  
Jeffrey Alvarez PA-C  
  
  
Electronically signed by Jeffrey Alvarez PA-C at 2024   
11:10 AM EST  
documented in this encounter            [facility]  
   
                                        2024 Instructions   
  
  
Jeffrey Alvarez PA-C -   
2024 11:02 AM ESTFormatting   
of this note is different from   
the original.  
-Start cefdinir twice daily for   
10 days to cover for the middle   
ear infection on the left and   
concern for developing a sinus   
infection  
-Bromfed 10 mL every 6 hours for   
cough and congestion and drainage  
-Medrol Dosepak as instructed  
-Cool-mist humidifier at   
nighttime  
-ER with chest pain, difficulty   
breathing, shortness of breath,   
not tolerating fluids or weakness  
  
Patient education: Cough, runny   
nose, and the common cold (The   
Basics)  
  
What causes cough, runny nose,   
and other symptoms of the common   
cold? -- These symptoms are   
usually caused by a viral   
infection. Lots of viruses can   
take hold inside your nose,   
mouth, throat, or lungs, and   
cause cold symptoms.  
Most people get over a cold   
without lasting problems. Even   
so, having a cold can be   
uncomfortable. And if your child   
has a cold, it can be hard to   
know when the symptoms call for a   
trip to the doctor.  
What are the symptoms of the   
common cold? -- The symptoms   
include:  
?Sneezing  
?Coughing  
?Sniffling and runny nose  
?Sore throat  
?Chest congestion  
In children, the common cold can   
also cause a fever. But adults do   
not usually get a fever when they   
have a cold.  
How can I tell if I have a cold   
or the flu? -- The common cold   
and the flu both cause many of   
the same symptoms. But they also   
have some important differences.   
This table can help you tell the   
difference between a cold and the   
flu.  
Is it a cold or the flu?  
Cold Flu  
Symptoms  
Fever Rare Usual; high (100 F to   
102 F; occasionally higher,   
especially in young children);   
lasts 3 to 4 days  
Headache Rare Common  
General aches, pains Slight   
Usual; often severe  
Fatigue, weakness Sometimes   
Usual; can last up to 2 to 3   
weeks  
Extreme exhaustion Never Usual;   
at the beginning of the illness  
Stuffy nose Common Sometimes  
Sneezing Usual Sometimes  
Sore throat Common Sometimes  
Chest discomfort, cough Mild to   
moderate; hacking cough Common;   
can become severe  
When should I call the doctor or   
nurse? -- Most people who have a   
cold do not need to see the   
doctor or nurse. But you should   
call your doctor or nurse if you   
have:  
?A fever of more than 100.4 F (38   
C) that comes with shaking   
chills, loss of appetite, or   
trouble breathing  
?A fever and also have lung   
disease, such as emphysema or   
asthma  
?A cough that lasts longer than   
10 days  
?Chest pain when you cough,   
trouble breathing, or coughing up   
blood  
If you are older than 75, you   
should also call your doctor or   
nurse any time you get a   
long-lasting cough.  
Take your child to the emergency   
room if he or she:  
?Becomes confused or stops   
responding to you  
?Has trouble breathing or has to   
work hard to breathe  
Call your child's doctor or nurse   
if he or she:  
?Refuses to drink anything for a   
long time  
?Is younger than 4 months  
?Has a fever and is not acting   
like him- or herself  
?Has a cough that lasts for more   
than 2 weeks and is not getting   
any better  
?Has a stuffed or runny nose that   
gets worse or does not get better   
after 2 weeks  
?Has red eyes or yellow goop   
coming out of his or her eyes  
?Has ear pain, pulls at his or   
her ears, or shows other signs of   
having an ear infection  
What can I do to feel better? --   
If you are a teenager or an   
adult, you can try cough and cold   
medicines that you can get   
without a prescription. These   
medicines might help with your   
symptoms. But they won't cure   
your cold, or help you get well   
faster.  
If you decide to try   
nonprescription cold medicines,   
be sure to follow the directions   
on the label. Do not combine 2 or   
more medicines that have   
acetaminophen in them. If you   
take too much acetaminophen, the   
drug can damage your liver. Also,   
if you have a heart condition, or   
you take prescription medicines,   
ask your pharmacist if it is safe   
to take the cold medicine you   
have in mind.  
What should I know if my child   
has a cold? -- In children, the   
common cold is often more severe   
than it is in adults. It also   
lasts longer. Plus, children   
often get a fever during the   
first 3 days of a cold.  
Are cough and cold medicines safe   
for children? -- If your child is   
younger than 6, you should not   
give him or her any cold   
medicines. These medicines are   
not safe for young children. Even   
if your child is older than 6,   
cough and cold medicines are   
unlikely to help.  
Never give aspirin to any child   
younger than 18 years old. In   
children, aspirin can cause a   
life-threatening condition called   
Reye syndrome. When giving your   
child acetaminophen or other   
nonprescription medicines, never   
give more than the recommended   
dose.  
How long will I be sick? -- Colds   
usually last 3 to 7 days in   
adults and 10 days in children,   
but some people have symptoms for   
up to 2 weeks.  
Can the common cold lead to more   
serious problems? -- In some   
cases, yes. In some people having   
a cold can lead to:  
?Pneumonia or bronchitis   
(infections of the lungs)  
?Ear infections  
?Worsening of asthma symptoms  
?Sinus infections  
How can I keep from getting   
another cold? -- The most   
important thing you can do is to   
wash your hands often with soap   
and water. Alcohol hand rubs work   
well, too. The germs that cause   
the common cold can live on   
tables, door handles, and other   
surfaces for at least 2 hours.   
You never know when you might be   
touching germs. That's why it's   
so important to clean your hands   
often.  
Electronically signed by Jeffrey Alvarez PA-C at 2024   
11:02 AM EST  
  
documented in this encounter            [facility]  
   
                                        2024 Note     HNO ID: [id number]  
Author: WILL VELAZQUEZ PA-C  
Service: ?  
Author Type: Physician Assistant  
Type: Progress Notes  
Filed: 2024 11:28  
Note Text:  
Pt referred to in person visit   
due to concerns of AOM.  
No charge  
  
Will Velazquez PA-C               [facility]  
   
                                                    2024 History of   
Present illness Narrative               Formatting of this note might be   
different from the original.  
Pt referred to in person visit   
due to concerns of AOM.  
No charge  
  
Will Velazquez PA-C  
  
Electronically signed by   
Will Velazquez PA-C at   
2024 11:28 AM EST  
documented in this encounter            [facility]  
   
                                                    2024 Miscellaneous   
Notes                                   Formatting of this note might be   
different from the original.  
Signed. Thank you.  
Electronically signed by Juliane Murphy APRN.CNP at 2024   
9:36 AM EST  
Formatting of this note is   
different from the original.  
Yes. I have orders attached,   
these should send automatically   
and not print  
  
Pended Orders  
  
ID Status Description Pended By   
When Reason  
[id number] Pended Blood-Glucose   
Meter Loly Alfonso LPN 24   
0910  
[id number] Pended Blood Glucose   
Control, Normal soln Loly Alfonso LPN 24 0910  
[id number] Pended blood sugar   
diagnostic (BLOOD GLUCOSE TEST)   
test strip-DAILY Loly Alfonso LPN 24 0910  
  
  
  
Loly Alfonso LPN  
2024 9:10 AM  
  
Electronically signed by Loly Alfonso LPN at 2024 9:10 AM   
EST  
Formatting of this note might be   
different from the original.  
Are we able to send monitors   
electronically? Mine always print   
for signature. I am happy to send   
one over but I don't want to make   
patient come  order if not   
necessary.  
Electronically signed by Juliane Murphy APRN.CNP at 2024   
11:48 PM EST  
documented in this encounter            [facility]  
   
                                                    2024 Miscellaneous   
Notes                                   Formatting of this note is   
different from the original.  
Patient MyChart message   
requesting the following refill.  
  
Requested Prescriptions  
  
Pending Prescriptions Disp   
Refills  
atenolol (TENORMIN) 25 mg tablet   
[Pharmacy Med Name: ATENOLOL 25   
MG TABLET] 30 tablet 11  
Sig: take 1 tablet by mouth once   
daily  
  
Patient last appointment:   
2024  
Next appointment 2024  
  
Patient Phone numbers:   
[phone] (home)  
  
Request is for script(s) to be   
escript to [facility]   
pharmacy.  
  
Leanna Burton LPN  
  
  
Electronically signed by Leanna Burton LPN at 2024 4:51   
PM EST  
documented in this encounter            [facility]  
   
                                                    2023 Miscellaneous   
Notes                                   Formatting of this note might be   
different from the original.  
Tried calling patient and she   
must not have had good reception   
she ended up hanging up on me  
Electronically signed by Aysha Crain at 2023 9:45 AM   
EDT  
Formatting of this note is   
different from the original.  
Pharmacy verified in Epic  
  
Patient has been identified by   
name and date of birth: Yes  
Patient aware RX will be sent to   
pharmacy. No need to notify   
patient.  
  
Patient phones for refill(s):  
  
Requested Prescriptions  
  
Pending Prescriptions Disp   
Refills  
atorvastatin (LIPITOR) 20 mg   
tablet 30 tablet 0  
Sig: Take 1 tablet by mouth once   
daily.  
  
  
Date of last office visit :   
2023  
  
Date of next office visit : Visit   
date not found  
  
Last 2 Encounter Wt Readings:  
Date: Wt:  
10/04/2023 75.1 kg (165 lb 9.6   
oz)  
05/10/2023 78 kg (172 lb)  
  
Not applicable  
  
Please advise.  
  
Justina Steiner MA  
Electronically signed by   
Justina Steiner MA at   
10/31/2023 1:10 PM EDT  
documented in this encounter            [facility]  
   
                                                    10- Miscellaneous   
Notes                                   Formatting of this note is   
different from the original.  
Patient requesting refills as   
follow:  
  
Last prescribed : 23  
Last OV: 10/04/23  
Next OV: none  
  
Requested Prescriptions  
  
Pending Prescriptions Disp   
Refills  
atenolol (TENORMIN) 25 mg tablet   
30 tablet 3  
Sig: Take 1 tablet by mouth once   
daily.  
  
Please review and advise.  
Robert Blackburn Ma  
  
Electronically signed by Robert Blackburn Ma at 10/06/2023 4:05 PM   
EDT  
documented in this encounter            [facility]  
   
                                        10- Instructions   
  
  
Juliane Murphy APRN.CNP -   
10/04/2023 9:46 AM EDTFormatting   
of this note might be different   
from the original.  
Return for annual wellness in   
next 2-3 months. No labs needed   
today.  
  
Pain management will call within   
2 weeks to schedule and   
appointment. If you do not hear   
from them, please contact my   
office.  
  
Continue sleep hygiene routine.   
If sleep not improved, will   
consider trazodone in future.   
Keep room dim, cool, avoid   
excessive blankets. Avoid screen   
time 1-2 hours before bed. Do   
calming activities- read,   
meditate, music to calm mind and   
prepare for sleep.  
  
Continue present medications.  
Electronically signed by Juliane Murphy APRN.CNP at 10/04/2023   
9:46 AM EDT  
  
documented in this encounter            [facility]  
   
                                                    10- History of   
Present illness Narrative               Formatting of this note is   
different from the original.  
This note was created using   
Listen Edition.  
Subjective  
Robert Palacios is a 43 year   
old female presenting today as a   
new patient to [facility] care.   
She previously followed with Dr. Jenna Chavarria, and reports her   
last wellness visit was at least   
1 year ago. She resides with her   
ex- and their 16 and   
18-year-old sons and 3-year-old   
grandson. Robert has custody of   
her 3-year-old grandson due to   
history of drug abuse in her son   
(the child's father). Robert also   
has 2 adult sons and 1 adult   
daughter that reside outside the   
home.  
  
Robert is a current cigarette   
smoker, consuming 1 to 2   
cigarettes daily. She is also   
vaping daily. She has smoked   
since age 13 but has quit   
intermittently throughout the   
years. At her heaviest, she was   
smoking 1.5 packs/day. Robert   
reports she is slowly trying to   
quit but has not tried any   
over-the-counter smoking   
cessation treatments. She denies   
alcohol or drug use.  
  
Robert followed previously with   
pain management in [facility] related to restless leg   
syndrome, fibromyalgia, chronic   
low back pain with sciatica. She   
has not seen him in over a year   
due to home life obligations   
including getting custody of her   
young grandson. Robert suffers   
from chronic neck, back, and leg   
pain which has worsened over the   
past 4 months. She reports the   
pain shoots down her neck and   
spine and into her legs and feet.   
Robert has an implanted spinal   
cord stimulator, which she states   
is helpful but does not relieve   
her pain. She endorses spasming,   
burning pain from her hips to her   
knees bilaterally. She was   
previously prescribed Lyrica,   
which she states was effective,   
but stopped taking the medication   
due to being unable to go to   
[facility] for her appointments   
after getting custody of her   
grandson. Robert was also   
concerned about having Lyrica in   
her home due to her son's drug   
abuse issues. She is requesting   
to establish with a new pain   
management provider locally.   
Robert does not follow with any   
other specialists.  
  
Robert reports chronic insomnia.   
She has not slept at all over the   
past 2 nights and feels this is   
likely related to chronic pain   
that has gone unmanaged. No   
improvement with repositioning.   
She lies in bed for several hours   
before being able to fall asleep   
at night. Robert was previously   
prescribed trazodone, which she   
states was effective. She reports   
good sleep hygiene routine in   
place.  
  
Robert has a history of   
hypertension, tachycardia,   
hyperlipidemia. Blood pressure   
well controlled today 108/78,   
heart rate 74 bpm. States he   
continues on atenolol and   
atorvastatin for management. Last   
lipid panel 2 weeks ago revealed   
slightly elevated triglycerides,   
low HDL.  
  
History of mild COPD. Robert   
continues to smoke as previously   
discussed. She does have a   
history of snoring, but denies   
history of RIC and does not use a   
CPAP. She does continue on Anoro   
Ellipta for management of COPD.   
She is not currently prescribed a   
rescue inhaler but does endorse   
intermittent dyspnea with   
exertion or upper respiratory   
illness.  
  
Robert also has a history of GERD   
and continues on omeprazole.   
Reflux symptoms are well   
controlled. She also has a   
history of dysphagia with certain   
foods which she states is still   
occurring intermittently. History   
of previous thyroid nodules.   
Robert had thyroid ultrasound in   
2022, which revealed a   
borderline/mild thyromegaly. No   
discrete nodules were identified.   
Most recent TSH was 2.096 when   
drawn 2 weeks ago and has been   
normal every time it was   
evaluated over the past 8 years.  
  
Robert also has a history of type   
2 diabetes controlled by diet   
alone. Most recent A1c 2 weeks   
ago was 5.4%. She denies symptoms   
of increased urination, thirst,   
appetite. Robert reports she was   
previously prescribed metformin   
and Actos individually. Her   
sugars bottomed out and she felt   
poorly on the medications so they   
were discontinued. She does not   
regularly check her blood sugar   
at home.  
  
Robert has a history of cervical   
cancer and a lump in her breast.   
The lump was determined to be   
fibrous in nature and benign. She   
underwent previous hysterectomy   
related to cervical cancer. She   
still has her ovaries and tubes.   
Robert previously followed with a   
gynecologist and [place] but has   
not been seen recently. She is   
agreeable to reestablishing with   
her previous gynecologist for   
surveillance.  
  
History of depression, adjustment   
disorder, anxiety and continues   
on buspirone for management. She   
does have a history of noncardiac   
related chest pain that was   
determined to be stress-induced.   
Robert still experiences chest   
tightness and palpitations   
intermittently. Most recent   
episode of palpitations occurred   
last week and woke her from sleep   
twice in the same night. Symptoms   
resolve when she distracts   
herself. Robert was not evaluated   
in the ER when she experienced   
palpitations. She does report   
significant ongoing stress at   
home.  
  
Robert also reports experiencing a   
 burning smell that is similar to   
car exhaust.  This has been going   
on for the past few months and   
only happens when she is at home   
or in her car. She does not   
experience the odor when she is   
outside of the home. No one else   
in the household is able to smell   
the odor. She reports that her   
ex- check to the CO2 level   
in their home, which was normal.   
She does not have a history of   
COVID-19 or recent upper   
respiratory illness. All other   
sensations of taste and smell are   
normal.  
  
PAST MEDICAL HISTORY  
Diagnosis Date  
Anxiety 2014  
Anxiety and depression  
COPD, mild (HCC) 2014  
Depression 2014  
Fibroadenosis of breast  
Fibromyalgia  
GERD (gastroesophageal reflux   
disease) 2014  
HTN (hypertension) 2014  
Hyperlipidemia 2014  
Lump or mass in breast 3/9/2009  
Malignant neoplasm of cervix   
uteri, unspecified site  
Cervical cancer  
NO SHOW 3/2/2007  
With primary care and specialty   
offices.  
PMH - PAST MEDICAL HISTORY OF  
diabetes  
Raynaud disease  
Raynaud disease  
Restless legs  
Rheumatoid arthritis (HCC)  
RSD lower limb  
bilateral legs  
Smoker 2014  
Tachycardia 2014  
Possible SVT  
Type II or unspecified type   
diabetes mellitus without mention   
of complication, uncontrolled   
2005  
Diet controlled.  
  
ACTIVE PROBLEM LIST  
No Show  
Lump Or Mass in Breast  
Fibroadenosis of Breast  
Anxiety  
Malignant Neoplasm of Cervix   
Uteri, Unspecified Site  
Fibromyalgia  
Rheumatoid Arthritis Involving   
Multiple Sites With Positive   
Rheumatoid Factor (Hcc)  
Raynaud Disease  
Restless Legs  
Mass of Left Side of Neck  
Routine Gynecological Examination  
Dysphagia  
Smoker  
Mixed Hyperlipidemia  
Tachycardia  
Copd, Mild (Hcc)  
Essential Hypertension  
Constipation  
Fatigue  
Hoarse Voice Quality  
Gastroesophageal Reflux Disease   
Without Esophagitis  
Rsd Lower Limb  
Diabetes Mellitus Type 2,   
Diet-Controlled (Hcc)  
Depression  
Neoplasm of Uncertain Behavior of   
Neck  
Encounter for Gynecological   
Examination Without Abnormal   
Finding  
Adjustment Disorder With Mixed   
Anxiety and Depressed Mood  
Environmental and Seasonal   
Allergies  
Bilateral Low Back Pain With   
Bilateral Sciatica  
Sacroiliitis (Hcc)  
Menopause  
Degenerative Disc Disease, Lumbar  
Thyroid Nodule  
Snoring  
Pain  
Folliculitis  
Loss of Hair  
  
  
Current Outpatient Medications  
Medication Sig Dispense Refill  
busPIRone (BUSPAR) 10 mg tablet   
Take 1 tablet by mouth three   
times daily. (Patient taking   
differently: Take 10 mg by mouth   
three times a day as needed.) 90   
tablet 2  
magnesium oxide 400 mg magnesium   
tab Take 1 tablet by mouth once   
daily.  
albuterol HFA (PROVENTIL HFA,   
VENTOLIN HFA) 90 mcg/actuation   
inhaler Inhale 2 Puffs as   
instructed every 4 hours as   
needed for wheezing/shortness of   
breath. 1 Each 1  
atorvastatin (LIPITOR) 20 mg   
tablet take 1 tablet by mouth   
once daily 30 tablet 0  
omeprazole (PRILOSEC) 40 mg   
capsule Take 1 capsule by mouth   
twice daily. 60 capsule 2  
atenolol (TENORMIN) 25 mg tablet   
take 1 tablet by mouth once daily   
30 tablet 3  
loratadine (CLARITIN) 10 mg   
tablet Take 1 tablet by mouth   
once daily. 30 tablet 5  
umeclidinium-vilanterol (ANORO   
ELLIPTA) 62.5-25 mcg/actuation   
inhaler Inhale 1 Inhalation as   
instructed once daily. 1 Each 5  
blood sugar diagnostic (FREESTYLE   
LITE STRIPS) test strip Test   
blood sugar(s) 1 times daily. Dx:   
E11.9. Insulin: No 50 Strip 11  
Lancets lancets Check glucose   
daily . Dx: 11.9. 100 Each 3  
  
No current facility-administered   
medications for this visit.  
  
Medications were reviewed and   
verified.  
  
Social History  
  
Tobacco Use  
Smoking status: Every Day  
Packs/day: 1.00  
Years: 20.00  
Additional pack years: 0.00  
Total pack years: 20.00  
Types: Cigarettes  
Start date: 1993  
Passive exposure: Past  
Smokeless tobacco: Never  
Tobacco comments:  
Currently down to 1-2 cigarettes   
per day  
Vaping Use  
Vaping Use: current everyday user  
Substances: Nicotine  
Substance Use Topics  
Alcohol use: Not Currently  
Drug use: Not Currently  
Types: Marijuana  
  
  
FAMILY HISTORY  
Problem Relation Age of Onset  
Hypertension Mother  
Heart Mother  
Diabetes Mother  
COPD Mother  
Alcohol/Drug Father  
Allergies Brother  
Alcohol/Drug Sister  
No Known Problems Paternal   
Grandmother  
No Known Problems Paternal   
Grandfather  
No Known Problems Son  
No Known Problems Son  
No Known Problems Daughter  
Diabetes Brother  
Hypoglycemic  
Breast Cancer Maternal Aunt  
No Known Problems Maternal   
Grandmother  
No Known Problems Maternal   
Grandfather  
Breast Cancer Maternal Aunt  
  
Review of Systems  
Constitutional: Negative.  
HENT: Positive for trouble   
swallowing. Negative for   
congestion, facial swelling,   
nosebleeds, postnasal drip,   
rhinorrhea, sinus pressure, sinus   
pain, sneezing and voice change.  
Experiencing strange odor,   
intermittent dysphagia as   
described in HPI.  
Eyes: Negative.  
Respiratory: Positive for chest   
tightness and shortness of   
breath. Negative for cough,   
choking and wheezing.  
Cardiovascular: Positive for   
palpitations. Negative for chest   
pain and leg swelling.  
Gastrointestinal: Negative.  
Endocrine: Negative. Negative for   
cold intolerance, heat   
intolerance, polydipsia,   
polyphagia and polyuria.  
Genitourinary: Negative.  
Musculoskeletal: Positive for   
arthralgias, back pain and neck   
pain. Negative for gait problem,   
joint swelling, myalgias and neck   
stiffness.  
Skin: Negative.  
Neurological: Negative. Negative   
for weakness.  
Hematological: Negative.  
Psychiatric/Behavioral: Positive   
for sleep disturbance. Negative   
for dysphoric mood, self-injury   
and suicidal ideas. The patient   
is nervous/anxious.  
  
Objective  
/78   Pulse 74   Temp (Src)   
98.2 (Temporal)   Resp 16   Ht 5'   
7  (1.70m)   Wt 165 lb 9.6 oz   
(75.1kg)   SpO2 100%   BMI 25.93   
kg/(m^2).  
  
  
Physical Exam  
Vitals and nursing note reviewed.  
Constitutional:  
General: She is not in acute   
distress.  
Appearance: Normal appearance.   
She is well-developed and   
overweight. She is not   
ill-appearing.  
HENT:  
Head: Normocephalic and   
atraumatic.  
Right Ear: External ear normal.  
Left Ear: External ear normal.  
Nose: Nose normal. No nasal   
deformity, nasal tenderness,   
congestion or rhinorrhea.  
Right Turbinates: Not enlarged.  
Left Turbinates: Not enlarged.  
Right Sinus: No maxillary sinus   
tenderness or frontal sinus   
tenderness.  
Left Sinus: No maxillary sinus   
tenderness or frontal sinus   
tenderness.  
Mouth/Throat:  
Mouth: Mucous membranes are   
moist.  
Pharynx: Oropharynx is clear.  
Eyes:  
Extraocular Movements:   
Extraocular movements intact.  
Pupils: Pupils are equal, round,   
and reactive to light.  
Neck:  
Thyroid: Thyromegaly present. No   
thyroid mass or thyroid   
tenderness.  
Trachea: Trachea normal.  
Cardiovascular:  
Rate and Rhythm: Normal rate and   
regular rhythm.  
Pulses: Normal pulses.  
Dorsalis pedis pulses are 2+ on   
the right side and 2+ on the left   
side.  
Heart sounds: Normal heart   
sounds.  
Pulmonary:  
Effort: Pulmonary effort is   
normal. No respiratory distress.  
Breath sounds: Decreased breath   
sounds present. No wheezing,   
rhonchi or rales.  
Abdominal:  
General: Bowel sounds are normal.   
There is no distension.  
Palpations: Abdomen is soft.  
Tenderness: There is no abdominal   
tenderness.  
Musculoskeletal:  
General: Normal range of motion.  
Cervical back: Normal range of   
motion and neck supple.   
Tenderness and bony tenderness   
present. No edema or rigidity.   
Spinous process tenderness   
present.  
Thoracic back: Bony tenderness   
present. No edema.  
Lumbar back: No edema.  
Right lower leg: No edema.  
Left lower leg: No edema.  
Lymphadenopathy:  
Cervical: No cervical adenopathy.  
Skin:  
General: Skin is warm and dry.  
Capillary Refill: Capillary   
refill takes less than 2 seconds.  
Neurological:  
General: No focal deficit   
present.  
Mental Status: She is alert and   
oriented to person, place, and   
time.  
Motor: No weakness.  
Psychiatric:  
Attention and Perception:   
Attention normal.  
Mood and Affect: Mood normal.   
Mood is not anxious or depressed.   
Affect is not flat, angry,   
tearful or inappropriate.  
Speech: Speech normal.  
Behavior: Behavior normal.   
Behavior is cooperative.  
Thought Content: Thought content   
normal. Thought content does not   
include homicidal or suicidal   
ideation. Thought content does   
not include homicidal or suicidal   
plan.  
  
  
Assessment & Plan  
Encounter Diagnosis  
ICD-10-CM  
1. Encounter to establish care   
Z76.89  
  
2. Chronic midline low back pain   
with bilateral sciatica M54.41   
CONSULT TO PAIN MGT  
M54.42  
G89.29  
  
3. Fibromyalgia M79.7  
  
4. Diabetes mellitus type 2,   
diet-controlled (HCC) E11.9  
  
5. COPD, mild (HCC) J44.9  
  
6. Essential hypertension I10  
  
7. Malignant neoplasm of cervix,   
unspecified site (HCC) C53.9  
  
8. Mixed hyperlipidemia E78.2  
  
9. Chronic insomnia F51.04  
  
10. Tobacco abuse Z72.0  
  
  
(Z76.89) Encounter to establish   
care (primary encounter   
diagnosis)  
Comment: Complete.  
Plan: Patient to follow-up in 2-3   
months for annual wellness exam.   
Patient had recent full panel of   
blood work. No need to repeat   
labs at this time.  
  
(M54.41, M54.42, G89.29) Chronic   
midline low back pain with   
bilateral sciatica  
(M79.7) Fibromyalgia  
Comment: Chronic generalized   
pain. Previously followed with   
pain management in [place] but   
has not seen them in at least a   
year due to obtaining custody of   
her young grandson. Reports   
previous success with Lyrica.  
Plan: CONSULT TO PAIN MGT  
Consult as above to establish   
with new pain management locally.  
  
(E11.9) Diabetes mellitus type 2,   
diet-controlled (HCC)  
Comment: Diet controlled. Not   
following with endocrinology. No   
current medications. Not   
currently monitoring blood sugar.   
Most recent A1c 5.4% in   
September. A1c has not been   
greater than 5.6% in the past 8   
years.  
Plan: Continue present dietary   
modifications including avoidance   
of processed sugars, excessive   
carbohydrates. Continue regular   
A1c monitoring.  
  
(J44.9) COPD, mild (HCC)  
Comment: Stable. Patient denies   
worsening dyspnea. Continues on   
Anoro Ellipta.  
Plan: albuterol HFA (PROVENTIL   
HFA, VENTOLIN HFA) 90  
mcg/actuation inhaler  
Continue present inhaler.   
Albuterol rescue inhaler as   
needed. Smoking cessation   
recommended.  
  
(I10) Essential hypertension  
Comment: Stable. Continues on   
atenolol. Intermittent episodes   
of palpitations. Blood pressure   
108/78 in clinic today heart rate   
74 bpm.  
Plan: Continue present   
medication. If patient is awoken   
from sleep related to   
palpitations and chest tightness,   
encouraged patient to report to   
emergency room for evaluation   
during the time symptoms are   
occurring. Patient verbalized   
understanding and agreement with   
plan. Recommend low-sodium/DASH   
diet. Smoking cessation.  
  
(C53.9) Malignant neoplasm of   
cervix, unspecified site (HCC)  
Comment: History of cervical   
cancer. Patient is status post   
hysterectomy. Ovaries and tubes   
still present. Patient has not   
followed with GYN in some time.  
Plan: Encouraged patient to   
reestablish with her previous   
gynecologist for management.   
Patient is agreeable and will   
call to schedule an appointment.  
  
(E78.2) Mixed hyperlipidemia  
Comment: Cholesterol well   
controlled on current regimen.   
Triglycerides mildly elevated.  
Plan: Continue present   
medication. Continue low fat, low   
cholesterol diet with avoidance   
of red meat, fried/fatty foods,   
and alcohol intake. Recommend   
smoking cessation and regular   
aerobic exercise as tolerated.  
  
(F51.04) Chronic insomnia  
Comment: Ongoing. Reports   
previous success with trazodone.   
Sleep hygiene routine in place.  
Plan: Continue sleep hygiene   
routine. If sleep not improved,   
will consider pharmacologic   
interventions in future. Keep   
room dim, cool, and avoid   
excessive blankets. Avoid screen   
time 1-2 hours before bed.   
Participate in calming   
activities- read, meditate, music   
to calm mind and prepare for   
sleep.  
  
(Z72.0) Tobacco abuse  
Comment: Patient continues to   
consume 1 to 2 cigarettes/day.   
Has not tried over-the-counter   
smoking cessation treatments. Not   
ready to quit at this time but is   
contemplating cessation in the   
future.  
Plan: Smoking cessation   
encouraged. Patient to reach out   
if she is interested in   
pharmacologic smoking cessation   
assistance. Plan to revisit this   
at her wellness appointment.  
  
Robert Palacios will   
follow-up in 3 months for annual   
wellness exam or sooner as   
needed. Patient instructed to   
call the office or send a message   
via [facility] with any questions   
related to this visit.  
  
DATE: 2023  
SIGNATURE: Juliane Murphy APRN.CNP  
Electronically signed by Juliane Murphy APRN.CNP at 10/04/2023   
2:42 PM EDT  
documented in this encounter            [facility]  
   
                                                    2023 History of   
Present illness Narrative               Formatting of this note might be   
different from the original.  
Radiology Service Progress Note  
  
PATIENT NAME: Robert Palacios  
MRN: [id number]  
  
DATE OF SERVICE: 2023  
TIME: 1:17 PM  
PATIENT IDENTITY VERIFICATION   
COMPLETED USING TWO (2)   
IDENTIFIERS: Name and Date of   
Birth confirmed by patient   
verbally.  
FALL SCREENING: Has the patient   
had 2 falls in the last year or 1   
fall with injury or currently   
using an Ambulatory Assistive   
Device (Walker, Cane, Wheelchair,   
Crutches, etc.)? No  
PATIENT GENDER DATA: Female.   
Pregnancy status: Pregnant: No   
Breastfeeding status: NO.  
PATIENT RELEVANT IMPLANT DATA   
REVIEWED: Not Applicable  
  
RADIOLOGY DEPARTMENT: Mammography  
  
PERIPHERAL IV DATA: Not   
applicable  
  
SIGNED BY: RT Hair(R)  
2023 1:17 PM  
  
  
Electronically signed by   
Madison Ash RT(R) at   
2023 1:18 PM EDT  
documented in this encounter            [facility]  
   
                                                    2023 Miscellaneous   
Notes                                   Formatting of this note might be   
different from the original.  
Due for fasting labs, orders   
placed.  
HELLEN Hinton.ALBERTA  
  
Electronically signed by Marilu Machuca APRN.CNP at 2023   
1:34 PM EDT  
Formatting of this note is   
different from the original.  
Pharmacy verified in [facility]  
  
Patient has been identified by   
name and date of birth: Yes  
Patient aware RX will be sent to   
pharmacy. No need to notify   
patient.  
  
Pharmacy phones for refill(s):  
  
Requested Prescriptions  
  
Pending Prescriptions Disp   
Refills  
atorvastatin (LIPITOR) 20 mg   
tablet [Pharmacy Med Name:   
ATORVASTATIN 20 MG TABLET] 30   
tablet 0  
Sig: take 1 tablet by mouth once   
daily  
  
  
Date of last office visit :   
2023  
  
Date of next office visit :   
2023  
  
Last 2 Encounter Wt Readings:  
Date: Wt:  
05/10/2023 78 kg (172 lb)  
2023 78 kg (172 lb)  
  
Cholesterol:  
Triglyceride (mg/dL)  
Date Value  
2021 168  
  
HDL Cholesterol (mg/dL)  
Date Value  
2021 35  
  
LDL Cholesterol (mg/dL)  
Date Value  
2021 143  
  
ALT (U/L)  
Date Value  
2020 15  
  
Non HDL Cholesterol (mg/dL)  
Date Value  
2021 177  
  
Please advise.  
  
Cindy Walsh LPN  
  
Electronically signed by Cindy Walsh LPN at 2023 1:26 PM   
EDT  
documented in this encounter            [facility]  
   
                                                    2023 Miscellaneous   
Notes                                   Formatting of this note is   
different from the original.  
Pharmacy verified in [facility]  
  
Patient has been identified by   
name and date of birth: Yes  
Patient aware RX will be sent to   
pharmacy. No need to notify   
patient.  
  
Pharmacy phones for refill(s):  
  
Requested Prescriptions  
  
Pending Prescriptions Disp   
Refills  
atorvastatin (LIPITOR) 20 mg   
tablet 30 tablet 0  
Sig: Take 1 tablet by mouth once   
daily.  
  
  
Date of last office visit :   
2023  
  
Date of next office visit : Visit   
date not found  
  
Last 2 Encounter Wt Readings:  
Date: Wt:  
05/10/2023 78 kg (172 lb)  
2023 78 kg (172 lb)  
  
Cholesterol:  
Triglyceride (mg/dL)  
Date Value  
2021 168  
  
HDL Cholesterol (mg/dL)  
Date Value  
2021 35  
  
LDL Cholesterol (mg/dL)  
Date Value  
2021 143  
  
ALT (U/L)  
Date Value  
2020 15  
  
Non HDL Cholesterol (mg/dL)  
Date Value  
2021 177  
  
Please advise.  
  
Cindy Walsh LPN  
  
Electronically signed by Cindy Walsh LPN at 2023 2:32 PM   
EDT  
documented in this encounter            [facility]  
   
                                                    2023 Miscellaneous   
Notes                                   Formatting of this note is   
different from the original.  
Pharmacy verified in [facility]  
  
Patient has been identified by   
name and date of birth: Yes  
Patient aware RX will be sent to   
pharmacy. No need to notify   
patient.  
  
Pharmacy phones for refill(s):  
  
Requested Prescriptions  
  
Pending Prescriptions Disp   
Refills  
omeprazole (PRILOSEC) 40 mg   
capsule [Pharmacy Med Name:   
OMEPRAZOLE DR 40 MG CAPSULE] 60   
capsule 0  
Sig: take 1 capsule by mouth   
twice a day  
  
  
Date of last office visit :   
2023  
  
Date of next office visit :  
  
Last 2 Encounter Wt Readings:  
Date: Wt:  
05/10/2023 78 kg (172 lb)  
2023 78 kg (172 lb)  
  
Not applicable  
  
Please advise.  
  
Cindy Walsh LPN  
  
Electronically signed by Cindy Walsh LPN at 2023 8:59 AM   
EDT  
documented in this encounter            [facility]  
   
                                                    2023 Miscellaneous   
Notes                                   Formatting of this note is   
different from the original.  
The following approved medication   
requests have been transmitted   
electronically.  
Requested Prescriptions  
  
Signed Prescriptions Disp Refills  
atenolol (TENORMIN) 25 mg tablet   
30 tablet 3  
Sig: take 1 tablet by mouth once   
daily  
Authorizing Provider: JENNA CHAVARRIA  
atorvastatin (LIPITOR) 20 mg   
tablet 30 tablet 0  
Sig: take 1 tablet by mouth once   
daily  
Authorizing Provider: JENNA CHAVARRIA MD  
  
Electronically signed by Jenna Chavarria MD at 2023 10:35   
AM EDT  
Formatting of this note is   
different from the original.  
Pharmacy verified in [facility]  
  
Patient has been identified by   
name and date of birth: Yes  
Patient aware RX will be sent to   
pharmacy. No need to notify   
patient.  
  
Pharmacy phones for refill(s):  
  
Requested Prescriptions  
  
Pending Prescriptions Disp   
Refills  
atenolol (TENORMIN) 25 mg tablet   
[Pharmacy Med Name: ATENOLOL 25   
MG TABLET] 30 tablet 3  
Sig: take 1 tablet by mouth once   
daily  
atorvastatin (LIPITOR) 20 mg   
tablet [Pharmacy Med Name:   
ATORVASTATIN 20 MG TABLET] 30   
tablet 0  
Sig: take 1 tablet by mouth once   
daily  
  
  
Date of last office visit : Visit   
date not found  
  
Date of next office visit : Visit   
date not found  
  
Last 2 Encounter Wt Readings:  
Date: Wt:  
05/10/2023 78 kg (172 lb)  
2023 78 kg (172 lb)  
  
Cholesterol:  
Triglyceride (mg/dL)  
Date Value  
2021 168  
  
HDL Cholesterol (mg/dL)  
Date Value  
2021 35  
  
LDL Cholesterol (mg/dL)  
Date Value  
2021 143  
  
ALT (U/L)  
Date Value  
2020 15  
  
Non HDL Cholesterol (mg/dL)  
Date Value  
2021 177  
  
Blood Pressure:  
BUN (mg/dL)  
Date Value  
2020 15  
  
Creatinine (mg/dL)  
Date Value  
2020 0.80  
  
Sodium (mmol/L)  
Date Value  
2020 138  
  
Potassium (mmol/L)  
Date Value  
2020 4.3  
Last 1 Encounter BP Readings:  
Date: BP:  
05/10/2023 131/83  
  
Please advise.  
  
Susan Huff  
Electronically signed by Susan Huff at 2023 8:09 AM EDT  
documented in this encounter            [facility]  
   
                                                    2023 Miscellaneous   
Notes                                   Formatting of this note might be   
different from the original.  
Patient aware.  
Electronically signed by Cindy Walsh LPN at 2023 4:53 PM   
EDT  
Formatting of this note is   
different from the original.  
Seen in [facility] a week ago   
for sore throat.  
  
PCN allergy  
  
The following approved medication   
requests have been transmitted   
electronically.  
Requested Prescriptions  
  
Signed Prescriptions Disp Refills  
doxycycline (VIBRA-TABS) 100 mg   
tablet 20 tablet 0  
Sig: Take 1 tablet by mouth twice   
daily for 10 days.  
  
Hold the magnesium wihle on the   
doxycycline, it may intervere   
with absorption.  
  
Jenna Chavarria MD  
  
Electronically signed by Jenna Chavarria MD at 2023 4:45   
PM EDT  
Formatting of this note might be   
different from the original.  
Using  
Sudafed  
Saline spray  
Cant use vaporizers due to   
Grandson has CF  
Was checked for Strep at Urgent   
care  
On eye drop for pink eye that   
seems better  
They notified her if not better   
might need Antibiotic  
Pharmacy uses [facility]  
Please advise for orders.  
  
Reason for Disposition  
[1] Sinus congestion (pressure,   
fullness) AND [2] present > 10   
days  
  
Answer Assessment - Initial   
Assessment Questions  
1. LOCATION:  
Sinus pain/some lymph node   
swelling left neck area  
2. ONSET:  
May 1 st  
3. SEVERITY:  
mild to moderate  
4. RECURRENT SYMPTOM:  
-years ago had sinus infection   
when she was a child  
5. NASAL CONGESTION:  
Yes major congestion thick feels   
sinus are blocked  
6. NASAL DISCHARGE:  
Yes dark thick green  
7. FEVER:  
Denies  
8. OTHER SYMPTOMS:  
Headache  
9. PREGNANCY:  
Na  
  
Protocols used: Sinus Pain or   
Congestion-ADULT-AH  
  
Electronically signed by Erika Miller RN at 2023 1:00 PM   
EDT  
documented in this encounter            [facility]  
   
                                                    05- Miscellaneous   
Notes                                   Formatting of this note might be   
different from the original.  
Agree with express care   
evaluation.  
HELLEN Hinton.ALBERTA  
  
  
Electronically signed by Marilu Machuca APRN.CNP at 05/10/2023   
1:15 PM EDT  
Formatting of this note might be   
different from the original.  
Spoke to patient who is   
requesting antibiotics, flonase,   
and cough medications for URI   
symptoms that started on   
Saturday, 23 and is gradually   
worsening.  
  
Patient offered office visit in   
[place], but states she lives   
in [facility] and would prefer to   
go to [facility] for   
evaluation.  
  
Care Advice:  
In-person evaluation  
Supportive care  
Encourage fluids  
Call back with new or worsening   
symptoms.  
  
Reason for Disposition  
Fever present > 3 days (72 hours)  
  
Answer Assessment - Initial   
Assessment Questions  
1. ONSET:  When did the nasal   
discharge start?   
23 STARTED WITH runny nose  
Yellow, crusty eyes, pink sclera   
-- worse on left  
Greenish, thick discharge from   
nose  
Nasal congestion, nasal pressure  
Swollen glands of left below ear  
Ear pressure, ear congestion,   
popping  
PND  
Fatigue  
  
2. AMOUNT:  How much discharge is   
there?   
Large amounts of discharge, thick  
  
3. COUGH: Cough, worse at night  
Throat clearing  
  
4. RESPIRATORY DISTRESS: denies  
  
5. FEVER: intermittent fever for   
several days  
  
6. SEVERITY:  like a truck has   
parked on her head   
  
7. OTHER SYMPTOMS: above  
  
Protocols used: Common   
Cold-ADULT-AH  
  
[facility] in [place]  
Electronically signed by Alex Redman RN at 05/10/2023 11:15 AM   
EDT  
Formatting of this note is   
different from the original.  
Patient left message on nurse   
line that she had a virtual visit   
today and dissatisfied with   
recommendations. She was advised   
she most likely has a viral   
infection and to treat with   
supportive care (see E-visit   
notes from 5/10/23).  
Patient is requesting   
prescriptions for Flonase,   
Antibiotics, cough.  
  
Attempted to reach patient, left   
message to call back.  
  
Reason for Disposition  
Message left on identified voice   
mail  
  
Protocols used: No Contact or   
Duplicate Contact Call-ADULT-  
  
Electronically signed by Alex Redman RN at 05/10/2023 9:16 AM   
EDT  
documented in this encounter            [facility]  
   
                                        05- Instructions   
  
  
Nick Capps MD - 05/10/2023   
12:25 PM EDTFormatting of this   
note might be different from the   
original.  
Gargle, lozenges  
Saline nasal spray  
OTC med as needed  
Recheck if any change  
F/u PCP for evaluation  
explained details  
  
Don't rub your eye  
cold compress  
See eye doctor for further care  
Explained details  
Use eyedrops as instructed  
Electronically signed by Nick Capps MD at 05/10/2023 12:28 PM   
EDT  
  
documented in this encounter            [facility]  
   
                                                    05- History of   
Present illness Narrative               Formatting of this note is   
different from the original.  
Robert Palacios is a 43 year   
old FEMALE who presents with Sore   
Throat (Congestion, fever,   
bilateral eye irritation/X 4   
days)  
  
43 years old female been with   
sore throat cough congestion for   
the last 3-4 days  
She also have redness of her left   
eye with discharge and crusty for   
the last few days  
Vision is normal  
It hurts to swallow  
  
She also had fever  
No shortness of breath no   
vomiting or diarrhea  
  
PAST MEDICAL HISTORY  
Diagnosis Date  
Anxiety 2014  
Anxiety and depression  
COPD, mild (HCC) 2014  
Depression 2014  
Fibroadenosis of breast  
Fibromyalgia  
GERD (gastroesophageal reflux   
disease) 2014  
HTN (hypertension) 2014  
Hyperlipidemia 2014  
Lump or mass in breast 3/9/2009  
Malignant neoplasm of cervix   
uteri, unspecified site  
Cervical cancer  
NO SHOW 3/2/2007  
With primary care and specialty   
offices.  
PMH - PAST MEDICAL HISTORY OF  
diabetes  
Raynaud disease  
Raynaud disease  
Restless legs  
Rheumatoid arthritis (HCC)  
RSD lower limb  
bilateral legs  
Smoker 2014  
Tachycardia 2014  
Possible SVT  
Type II or unspecified type   
diabetes mellitus without mention   
of complication, uncontrolled   
2005  
Diet controlled.  
  
ACTIVE PROBLEM LIST  
No Show  
Lump Or Mass in Breast  
Fibroadenosis of Breast  
Anxiety  
Malignant Neoplasm of Cervix   
Uteri, Unspecified Site  
Fibromyalgia  
Rheumatoid Arthritis Involving   
Multiple Sites With Positive   
Rheumatoid Factor (Hcc)  
Raynaud Disease  
Restless Legs  
Mass of Left Side of Neck  
Routine Gynecological Examination  
Dysphagia  
Smoker  
Mixed Hyperlipidemia  
Tachycardia  
Copd, Mild (Hcc)  
Essential Hypertension  
Constipation  
Fatigue  
Hoarse Voice Quality  
Gastroesophageal Reflux Disease   
Without Esophagitis  
Rsd Lower Limb  
Diabetes Mellitus Type 2,   
Diet-Controlled (Hcc)  
Depression  
Neoplasm of Uncertain Behavior of   
Neck  
Encounter for Gynecological   
Examination Without Abnormal   
Finding  
Adjustment Disorder With Mixed   
Anxiety and Depressed Mood  
Environmental and Seasonal   
Allergies  
Bilateral Low Back Pain With   
Bilateral Sciatica  
Sacroiliitis (Hcc)  
Menopause  
Degenerative Disc Disease, Lumbar  
Thyroid Nodule  
Snoring  
Pain  
Folliculitis  
Loss of Hair  
  
  
Current Outpatient Medications  
Medication Sig Dispense Refill  
omeprazole (PRILOSEC) 40 mg   
capsule Take 1 capsule by mouth   
twice daily. 60 capsule 0  
loratadine (CLARITIN) 10 mg   
tablet Take 1 tablet by mouth   
once daily. 30 tablet 5  
atorvastatin (LIPITOR) 20 mg   
tablet Take 1 tablet by mouth   
once daily. 30 tablet 0  
triamcinolone (KENALOG) 0.1 %   
lotion Apply to affected area   
three times daily. 60 mL 2  
busPIRone (BUSPAR) 10 mg tablet   
Take 1 tablet by mouth three   
times daily. 90 tablet 2  
umeclidinium-vilanterol (ANORO   
ELLIPTA) 62.5-25 mcg/actuation   
inhaler Inhale 1 Inhalation as   
instructed once daily. 1 Each 5  
hydrOXYzine HCl (ATARAX) 25 mg   
tablet Take 1 tablet by mouth   
four times daily. 120 tablet 1  
clindamycin (CLEOCIN T) 1 %   
external solution Apply to   
affected area twice daily. 30 mL   
1  
atenolol (TENORMIN) 25 mg tablet   
take 1 tablet by mouth once daily   
30 tablet 3  
blood sugar diagnostic (FREESTYLE   
LITE STRIPS) test strip Test   
blood sugar(s) 1 times daily. Dx:   
E11.9. Insulin: No 50 Strip 11  
Lancets lancets Check glucose   
daily . Dx: 11.9. 100 Each 3  
magnesium oxide 400 mg magnesium   
tab Take 1 tablet by mouth twice   
daily.  
aspirin, enteric coated   
(ASPIR-LOW) 81 mg EC tablet Take   
1 tablet by mouth once daily.   
(Patient not taking: Reported on   
5/10/2023) 30 tablet 10  
Lactobacillus acidophilus   
(PROBIOTIC ORAL) Take by mouth   
once daily. (Patient not taking:   
Reported on 5/10/2023)  
MEDICATION, NON-DATABASE once   
daily. estravin (Patient not   
taking: Reported on 2023)  
LYRICA 150 mg capsule Take 1   
tablet by mouth twice daily.   
(Patient not taking: Reported on   
5/10/2023) 3  
  
Current Facility-Administered   
Medications  
Medication Dose Route Frequency   
Provider Last Rate Last Admin  
perflutren lipid microspheres 1.3   
mL in NaCl (PF) 0.9% 10 mL   
injection (DEFINITY) INTRAVENOUS   
AS DIRECTED PRN Jenna Chavarria MD  
sodium chloride 0.9 % (flush) 10   
mL (BD POSIFLUSH) 10 mL   
INTRAVENOUS AS DIRECTED PRN   
Jenna Chavarria MD  
  
Social History  
  
Tobacco Use  
Smoking status: Every Day  
Packs/day: 1.00  
Years: 20.00  
Pack years: 20.00  
Types: Cigarettes  
Start date: 1993  
Smokeless tobacco: Never  
Tobacco comments:  
No smoker in childhood home.   
Spouse of 6 years a smoker.  
Vaping Use  
Vaping Use: current everyday user  
Substances: Nicotine  
Substance Use Topics  
Alcohol use: Not Currently  
Drug use: No  
  
  
Alcohol Use: Not Currently  
  
Tobacco Use: 1 packs/day, for 20   
years. Types: Cigarettes  
  
FAMILY HISTORY  
Problem Relation Age of Onset  
Hypertension Mother  
Heart Mother  
Diabetes Mother  
COPD Mother  
Alcohol/Drug Father  
Allergies Brother  
Alcohol/Drug Sister  
No Known Problems Paternal   
Grandmother  
No Known Problems Paternal   
Grandfather  
No Known Problems Son  
No Known Problems Son  
No Known Problems Daughter  
Diabetes Brother  
Hypoglycemic  
Breast Cancer Maternal Aunt  
No Known Problems Maternal   
Grandmother  
No Known Problems Maternal   
Grandfather  
Breast Cancer Maternal Aunt  
  
Review of Systems  
Constitutional: Positive for   
fever. Negative for chills.  
HENT: Positive for congestion and   
sore throat.  
Eyes: Positive for discharge and   
redness.  
Respiratory: Positive for cough.   
Negative for sputum production.  
  
/83   Pulse 88   Temp 97.9   
  Resp 18   Wt 172 lb (78.0kg)     
SpO2 99%  
  
  
  
Physical Exam  
Vitals reviewed.  
Constitutional:  
General: She is not in acute   
distress.  
Appearance: Normal appearance.   
She is not ill-appearing or   
toxic-appearing.  
HENT:  
Nose: Congestion present.  
Mouth/Throat:  
Mouth: Mucous membranes are   
moist.  
Pharynx: Posterior oropharyngeal   
erythema (Mild injected pharynx)   
present.  
Cardiovascular:  
Rate and Rhythm: Normal rate and   
regular rhythm.  
Heart sounds: Normal heart   
sounds.  
Pulmonary:  
Effort: Pulmonary effort is   
normal.  
Breath sounds: Normal breath   
sounds.  
Musculoskeletal:  
Cervical back: Normal range of   
motion and neck supple.  
Neurological:  
Mental Status: She is alert.  
  
Explained to patient that her   
symptom is from viral pharyngitis   
and URI. No need of antibiotics   
for  
We will treat her conjunctivitis   
with antibiotic eyedrops  
  
Patient understand and agreed  
ASSESSMENT/PLAN:  
1. Sore throat - ICD9: 462,   
ICD10: J02.9 (primary diagnosis)  
  
- RAPID GROUP A STREP RFLX TO PCR  
- GROUP A STREPTOCOCCUS BY PCR  
  
2. Viral pharyngitis - ICD9: 462,   
ICD10: J02.9  
  
3. Acute bacterial conjunctivitis   
of left eye - ICD9: 372.03,   
ICD10: H10.32  
  
- TOBRAMYCIN 0.3 % EYE DROPS  
  
Gargle, lozenges  
OTC med as needed  
Saline nasal spray  
Recheck if any change  
F/u PCP for evaluation  
explained details  
  
Don't rub your eye  
cold compress  
See eye doctor for further care  
Explained details  
Use eyedrops as instructed  
  
Nick Capps MD  
  
  
Electronically signed by Nick Capps MD at 05/10/2023 12:30 PM   
EDT  
documented in this encounter            [facility]  
   
                                                    05- History of   
Present illness Narrative               Formatting of this note might be   
different from the original.  
This is an [facility] eVisit   
note for Robert Palacios  
  
eVisit/Questionnaire reviewed  
The chief complaint for the visit   
- Patient presents with:  
Sinus Problem  
  
Recommendations/Treatment plan -   
Most consistent with viral URI.  
  
See My Chart Message to patient.  
  
Recommendation for follow up -   
PRN  
  
Time spend was 5 minutes.  
  
HELLEN Real.[email or web address]  
  
Electronically signed by Bang Goldberg APRN.CNP at 05/10/2023 8:03   
AM EDT  
documented in this encounter            [facility]  
   
                                                    2023 Miscellaneous   
Notes                                   Formatting of this note is   
different from the original.  
Patient phones requesting refills   
as follows:  
  
LAST REFILL 3/30/23  
LAST OV 23  
  
Requested Prescriptions  
  
Pending Prescriptions Disp   
Refills  
omeprazole (PRILOSEC) 40 mg   
capsule 60 capsule 0  
Sig: Take 1 capsule by mouth   
twice daily.  
  
Please review and advise.  
Rachel Contrears LPN  
  
Electronically signed by Rachel Contreras LPN at 2023 1:43   
PM EDT  
documented in this encounter            [facility]  
   
                                                    2023 Miscellaneous   
Notes                                   Formatting of this note is   
different from the original.  
Pharmacy verified in [facility]  
  
Patient has been identified by   
name and date of birth: Yes  
Patient aware RX will be sent to   
pharmacy. No need to notify   
patient.  
  
Patient phones for refill(s):  
  
Requested Prescriptions  
  
Pending Prescriptions Disp   
Refills  
loratadine (CLARITIN) 10 mg   
tablet 30 tablet 5  
Sig: Take 1 tablet by mouth once   
daily.  
  
  
Date of last office visit :   
2017  
  
Date of next office visit : Visit   
date not found  
  
Last 2 Encounter Wt Readings:  
Date: Wt:  
2023 78 kg (172 lb)  
2022 81.2 kg (179 lb)  
  
Not applicable  
  
Please advise.  
  
Justina Steiner MA  
Electronically signed by Justina Steiner MA at 2023   
11:04 AM EDT  
documented in this encounter            [facility]  
   
                                                    2023 Miscellaneous   
Notes                                   Formatting of this note is   
different from the original.  
Pharmacy verified in [facility]  
  
Patient has been identified by   
name and date of birth: Yes  
Patient aware RX will be sent to   
pharmacy. No need to notify   
patient.  
  
Pharmacy phones for refill(s):  
  
Requested Prescriptions  
  
Pending Prescriptions Disp   
Refills  
omeprazole (PRILOSEC) 40 mg   
capsule 60 capsule 0  
Sig: Take 1 capsule by mouth   
twice daily.  
  
  
Date of last office visit :   
2023  
  
Date of next office visit : Visit   
date not found  
  
Last 2 Encounter Wt Readings:  
Date: Wt:  
2023 78 kg (172 lb)  
2022 81.2 kg (179 lb)  
  
Not applicable  
  
Please advise.  
  
Cindy Walsh LPN  
  
Electronically signed by Cindy Walsh LPN at 2023 9:11 AM   
EDT  
documented in this encounter            [facility]  
   
                                                    2023 History of   
Present illness Narrative               Formatting of this note is   
different from the original.  
Images from the original note   
were not included.  
  
  
INTERNAL MEDICINE PRIMARY CARE  
  
SERVICE DATE: 2023  
  
PATIENT NAME: Robert Palacios  
MRN: [id number]  
  
PRIMARY CARE PHYSICIAN: Jenna Chavarria MD  
  
Subjective  
CHIEF COMPLAINT: Patient presents   
with:  
chest pain and rash on leg: Rash   
on leg started 2 months ago,   
chest pain started 3 weeks ago  
  
HISTORY OF PRESENT ILLNESS: Ms. Salty Palacios is a 42 year old   
female who presents with multiple   
complaints  
  
Leg rash  
Endorses leg rash starting two   
months ago.  
  
Chest pain  
Reports intermittent chest pain,   
wheezing, and shortness of breath   
starting 3 weeks ago.  
States wheezing and chest pain   
worsens when she lies down at   
night.  
Patient used to see cardiologist   
in the past.  
She smoke one cigarette a day and   
vapes often.  
Reviewed stress test (2018).  
Denies gerd.  
  
Mood  
Patient endorses psychosocial   
stress.  
Discontinued Cymbalta and   
Hydroxyzine.  
States that medication does not   
improve symptoms of depression.  
Says that she feels better when   
she does not take the medication.  
Patient is not in therapy but   
says she has a close friend to   
confide in.  
  
Medication management  
Compliant with Lyrica and   
Prilosec.  
  
Health maintenance:  
Due for diabetic foot exam.  
Due for dilated retinal exam.  
Due for routine pap testing.  
Due for one time HIV screening.  
Due for urine albumin  
Due for routine labs.  
Reviewed last lipid panel  
  
  
All medical history reviewed and   
updated in electronic record.  
  
PAST MEDICAL HISTORY  
Diagnosis Date  
Anxiety 2014  
Anxiety and depression  
COPD, mild (HCC) 2014  
Depression 2014  
Fibroadenosis of breast  
Fibromyalgia  
GERD (gastroesophageal reflux   
disease) 2014  
HTN (hypertension) 2014  
Hyperlipidemia 2014  
Lump or mass in breast 3/9/2009  
Malignant neoplasm of cervix   
uteri, unspecified site  
Cervical cancer  
NO SHOW 3/2/2007  
With primary care and specialty   
offices.  
PMH - PAST MEDICAL HISTORY OF  
diabetes  
Raynaud disease  
Raynaud disease  
Restless legs  
Rheumatoid arthritis (HCC)  
RSD lower limb  
bilateral legs  
Smoker 2014  
Tachycardia 2014  
Possible SVT  
Type II or unspecified type   
diabetes mellitus without mention   
of complication, uncontrolled   
2005  
Diet controlled.  
  
PAST SURGICAL HISTORY  
Procedure Laterality Date  
BIOPSY BREAST OPEN INCISIONAL   
2009  
right breast  
BX BREAST NEEDLE CORE W/O IMAGING   
GUIDANCE SPX 2006  
right breast  
PAST SURGICAL HISTORY OF   
10/16/2018  
Spinal Cord Stimulator Implant  
PAST SURGICAL HISTORY OF   
2020  
spinal cord stimulator revision  
PLCMT LOCALZTN CLIP,PERC,DURING   
BREAST BX 2009  
TITANIUM CLIP PLACEMENT  
TONSILLECTOMY PRIMARY/SECONDARY   
<AGE 10  
Tonsillectomy  
TOTAL ABDOMINAL HYSTERECT W/WO   
RMVL TUBE OVARY 10/07/1993  
Dr. Delgado  
 BREAST NEEDLE CORE BIOPSY RT   
2009  
  
FAMILY HISTORY  
Problem Relation Age of Onset  
Hypertension Mother  
Heart Mother  
Diabetes Mother  
COPD Mother  
Alcohol/Drug Father  
Allergies Brother  
Alcohol/Drug Sister  
No Known Problems Paternal   
Grandmother  
No Known Problems Paternal   
Grandfather  
No Known Problems Son  
No Known Problems Son  
No Known Problems Daughter  
Diabetes Brother  
Hypoglycemic  
Breast Cancer Maternal Aunt  
No Known Problems Maternal   
Grandmother  
No Known Problems Maternal   
Grandfather  
Breast Cancer Maternal Aunt  
  
Social History  
  
Tobacco Use  
Smoking status: Every Day  
Packs/day: 1.00  
Years: 20.00  
Pack years: 20.00  
Types: Cigarettes  
Start date: 1993  
Smokeless tobacco: Never  
Tobacco comments:  
No smoker in childhood home.   
Spouse of 6 years a smoker.  
Vaping Use  
Vaping Use: current everyday user  
Substances: Nicotine  
Substance Use Topics  
Alcohol use: Not Currently  
Drug use: No  
  
MEDICATIONS:  
Current Outpatient Medications on   
File Prior to Visit  
Medication Sig  
umeclidinium-vilanterol (ANORO   
ELLIPTA) 62.5-25 mcg/actuation   
inhaler Inhale 1 Inhalation as   
instructed once daily.  
omeprazole (PRILOSEC) 40 mg   
capsule Take 1 capsule by mouth   
twice daily.  
hydrOXYzine HCl (ATARAX) 25 mg   
tablet Take 1 tablet by mouth   
four times daily.  
clindamycin (CLEOCIN T) 1 %   
external solution Apply to   
affected area twice daily.  
loratadine (CLARITIN) 10 mg   
tablet Take 1 tablet by mouth   
once daily.  
atorvastatin (LIPITOR) 20 mg   
tablet Take 1 tablet by mouth   
once daily.  
atenolol (TENORMIN) 25 mg tablet   
take 1 tablet by mouth once daily  
blood sugar diagnostic (FREESTYLE   
LITE STRIPS) test strip Test   
blood sugar(s) 1 times daily. Dx:   
E11.9. Insulin: No  
Lancets lancets Check glucose   
daily . Dx: 11.9.  
aspirin, enteric coated   
(ASPIR-LOW) 81 mg EC tablet Take   
1 tablet by mouth once daily.  
Lactobacillus acidophilus   
(PROBIOTIC ORAL) Take by mouth   
once daily.  
magnesium oxide 400 mg magnesium   
tab Take 1 tablet by mouth twice   
daily.  
LYRICA 150 mg capsule Take 1   
tablet by mouth twice daily.  
MEDICATION, NON-DATABASE once   
daily. estravin (Patient not   
taking: Reported on 2023)  
  
No current facility-administered   
medications on file prior to   
visit.  
  
ALLERGIES  
Allergen Reactions  
Codeine Vomiting  
Gabapentin Other: See Comments  
Headache/migraine  
Penicillins Unknown  
childhood reaction  
  
REVIEW OF SYSTEMS:  
GENERAL: No weight loss, malaise   
or fevers  
HEENT: Negative for frequent or   
significant headaches. No changes   
in hearing or vision, no nose   
bleeds or other nasal problems  
NECK: Negative for lumps, goiter,   
pain and significant neck   
swelling  
RESPIRATORY: +shortness of   
breath, Negative for cough,   
hemoptysis,  
CARDIOVASCULAR: +chest pain,   
Negative for leg swelling or   
palpitations  
PSYCH:+recent psychosocial   
stressors, Negative for sleep   
disturbance,  
MUSCULOSKELETAL: Negative for   
joint pain or swelling, back pain   
or muscle pain  
SKIN: +leg rash,  
  
  
Objective  
  
PHYSICAL EXAM:  
/71   Pulse 72   Temp 36.8   
C (98.2 F)   Ht 170.2 cm (5' 7 )   
  Wt 78 kg (172 lb)   LMP (LMP   
Unknown)   SpO2 100%   BMI 26.94   
kg/m  
Body mass index is 26.94 kg/m .  
GENERAL: Alert, no distress,   
cooperative.  
SKIN: Skin color, texture, turgor   
normal. No rashes or lesions.  
HEENT: Normocephalic, pupils   
normal, extraocular movements   
intact.  
OROPHARYNX: Lips, mucosa, and   
tongue are normal.  
NECK: Supple. Thyroid normal. No   
cervical and supraclavicular   
lymphadenopathy.  
No jugulovenous distention. No   
carotid bruits.  
BREAST and AXILLA: Breast exam   
completed with chaperone present   
(MA, ): Breast and axilla normal.  
LUNGS: Lungs clear to   
auscultation. No wheeze or   
crackles.  
CARDIAC: Normal S1 and S2; no   
rubs, murmurs, or gallops.  
ABDOMEN: Abdomen soft,   
non-tender. No masses or   
organomegaly.  
NEURO: Alert, oriented X 3,   
Non-focal. Gait normal.  
EXTREMITIES: Extremities normal.   
No edema.  
PULSES: 2+ radial, 2+ dorsalis   
pedis.  
Skin: legs with irritated patch   
about 10 cm in length, R shin   
moreso than left, excoriated   
papules.  
  
Diagnostic tests reviewed for   
today's visit:  
  
Component Latest Ref Rng & Units   
2020  
Protein, Total 6.3 - 8.0 g/dL 6.7  
Albumin 3.9 - 4.9 g/dL 4.5  
Calcium 8.5 - 10.2 mg/dL 9.2  
Bilirubin, Total 0.2 - 1.3 mg/dL   
0.3  
Alkaline Phosphatase 34 - 123 U/L   
57  
AST 13 - 35 U/L 16  
Glucose 74 - 99 mg/dL 87  
BUN 7 - 21 mg/dL 15  
Creatinine 0.58 - 0.96 mg/dL 0.80  
Sodium 136 - 144 mmol/L 138  
Potassium 3.7 - 5.1 mmol/L 4.3  
Chloride 97 - 105 mmol/L 103  
CO2 22 - 30 mmol/L 27  
Anion Gap 9 - 18 mmol/L 8 (L)  
ALT 7 - 38 U/L 15  
eGFR- >60  
eGFR-All Other Races . >60  
WBC 3.70 - 11.00 k/uL 8.77  
RBC 3.90 - 5.20 m/uL 4.13  
Hemoglobin 11.5 - 15.5 g/dL 12.7  
Hematocrit 36.0 - 46.0 % 38.0  
MCV 80.0 - 100.0 fL 92.0  
MCH 26.0 - 34.0 pG 30.8  
MCHC 30.5 - 36.0 g/dL 33.4  
RDW-CV 11.5 - 15.0 % 13.4  
Platelet Count 150 - 400 k/uL 290  
MPV 9.0 - 12.7 fL 10.1  
Absolute nRBC <0.01 k/uL <0.01  
Cholesterol, Total <200 mg/dL 212   
(H)  
Triglyceride <150 mg/dL 168 (H)  
HDL Cholesterol >39 mg/dL 35 (L)  
LDL Cholesterol <100 mg/dL 143   
(H)  
Non HDL Cholesterol <130 mg/dL   
177 (H)  
Fasting Time hrs Unknown  
VLDL Cholesterol <30 mg/dL 34 (H)  
TC:HDL Ratio <5.10 6.06 (H)  
LDL:HDL Ratio <2.54 4.09 (H)  
Hemoglobin A1C 4.3 - 5.6 % 5.6  
Estimated Average Glucose mg/dL   
114  
TSH 0.270 - 4.200 mIU/L 1.540  
  
  
Assessment/Plan  
  
ASSESSMENT/PLAN:  
1. Mid sternal chest pain - ICD9:   
786.51, ICD10: R07.89 (primary   
diagnosis)  
Chest pain of unclear etiology,   
unlikely to be cardiac, likely   
stress induced.  
Plan: labs and imaging  
- ECG COMPLETE  
- BASIC METABOLIC PNL  
  
2. Vapes nicotine containing   
substance - ICD9: 305.1, ICD10:   
Z72.0  
- Cessation encouraged.  
- Physiologic and physical   
aspects of tobacco addiction as   
well as strategies for quitting   
were discussed.  
- Counseling was given focusing   
on the harmful effects of this   
addiction especially given the   
patient's medical condition(s)   
which will be worsened because of   
the chemicals in tobacco.  
  
3. Adjustment disorder with mixed   
anxiety and depressed mood -   
ICD9: 309.28, ICD10: F43.23  
- Plan: take buspirone 10 mg TID.   
She declines offer of   
antidepressant, says she would   
take a  nerve pill ; discussed   
adverse effects of   
benzodiazapine,  
  
4. Vitamin B 12 deficiency -   
ICD9: 266.2, ICD10: E53.8  
Plan: labs  
- VITAMIN B12 BLOOD  
- CBC  
  
5. Screening for lipid disorders   
- ICD9: V77.91, ICD10: Z13.220  
Plan: labs  
- LIPID PANEL BASIC  
  
6. Eczema, unspecified type -   
ICD9: 692.9, ICD10: L30.9  
- Topical steriod tx with Kenalog   
lotion  
- discussed skin care of rash  
- follow up if symptoms persist   
or worsen.  
  
Follow up if symptoms worsen or   
fail to improve  
  
ATTESTATION:  
By signing my name below, ITisha, attest that this   
documentation has been prepared   
under the direction and in the   
presence of Jenna Chavarria MD.  
Electronically signed:Kal Duran, 2023   
1:01 PM  
  
  
  
Provider Attestation:  
IJenna MD, personally   
performed the services described   
in this documentation. All   
medical record entries made by   
the miriamibmelissa were at my direction   
and in my presence. I have   
reviewed the chart and discharge   
instructions (if applicable) and   
agree that the record reflects my   
personal performance and is   
accurate and complete.   
Electronically Signed: Jenna Chavarria MD 2023 3:00   
PM  
  
  
Electronically signed by Jenna Chavarria MD at 2023 3:01   
PM EST  
documented in this encounter            [facility]  
   
                                                    2023 Miscellaneous   
Notes                                   Formatting of this note might be   
different from the original.  
Medication sent in another   
request.  
Electronically signed by Cindy Walsh LPN at 2023 12:20   
PM EST  
Formatting of this note might be   
different from the original.  
Patient not seen by this   
department in almost 3 years.   
Prescribing provider retired.   
Will route to PCP. Will need new   
patient appointment with   
pulmonologist if wishing to   
establish care.  
Kalee Last LPN  
  
Electronically signed by Kalee Last LPN at 2023 9:49 AM   
EST  
documented in this encounter            [facility]  
   
                                                    2023 Miscellaneous   
Notes                                   Formatting of this note is   
different from the original.  
Pharmacy verified in [facility]  
  
Patient has been identified by   
name and date of birth: Yes  
Patient aware RX will be sent to   
pharmacy. No need to notify   
patient.  
  
Patient phones for refill(s):  
  
Requested Prescriptions  
  
Pending Prescriptions Disp   
Refills  
omeprazole (PRILOSEC) 40 mg   
capsule 60 capsule 0  
Sig: Take 1 capsule by mouth   
twice daily.  
  
  
Date of last office visit :   
2023  
  
Date of next office visit : Visit   
date not found  
  
Last 2 Encounter Wt Readings:  
Date: Wt:  
2022 81.2 kg (179 lb)  
2022 81.2 kg (179 lb)  
  
Not applicable  
  
Please advise.  
  
Katlin Freed  
  
Electronically signed by Katlin Freed at 2023 8:38 AM EST  
documented in this encounter            [facility]  
   
                                                    2023 History of   
Present illness Narrative               Formatting of this note is   
different from the original.  
This Team Access Model visit is a   
virtual encounter. It required   
patient-provider interaction for   
the medical decision making as   
documented below. The patient   
consented to proceed by Dear   
Robert Palacios,  
  
Thank you for your Cursogram   
message. To respond more   
thoroughly to your message, we   
would like to schedule a   
15-minute virtual visit. During   
this visit, we will address your   
concerns, and you will have the   
opportunity to discuss your   
treatment plan. For this visit,   
you will need a device with a   
built-in camera.  
  
To self-schedule, a virtual visit   
with your provider, go to your   
[email or web address] [Schedule   
An Appointment] section of   
Cursogram within the next 7 days.   
This message was sent on   
2023.  
  
If you prefer to come for an   
in-person visit, please contact   
the Appointment Center  at   
[phone].  
  
Sincerely,  
  
MD Ethan Marlow issues or questions?   
[facility] MyChart support   
is available at [phone], 8   
a.m. to 10 p.m. (EST) Monday   
through Friday and weekends 8   
a.m. to 4:30 p.m before   
initiating the encounter.  
  
DISTANCE [facility]ALTH VISIT  
  
Robert Palacios is a 42 year   
old female seen for insomnia,   
stress, and a rash.  
  
HPI:  
  
  
- The patient's grandson   
overdosed on fentanyl - her son   
and his girlfriend both are   
addicts.  
- The patient has temporary   
custody over her grandson.  
- He had access to the fentanyl   
because of his father and mother   
- they are both incarcerated.  
- The patient's son and his   
girlfriend called CPS multiple   
times because she was too   
demanding with negative drug   
tests.  
- Her grandson is doing okay now.  
- Endorses being very stressed   
which has caused insomnia.  
- Sleep: She wakes up multiple   
times throughout the night and   
cannot fall asleep.  
  
Rash  
- The patient explains that she   
has hives/dry spots all over her   
body - face, neck, ear, legs.  
- The rash is itchy causing her   
to itch/pick.  
- She has tried cleaning it with   
peroxide.  
- Explains that the spots on her   
face seem to be filled with   
liquid that pops and scabs.  
- Has tried putting a triple   
antibiotic cream but says she   
thinks it might be making it   
worse.  
  
HISTORY REVIEWED (electronic   
chart updated):  
  
- medical history  
- medications  
- allergies  
  
REVIEW OF SYSTEMS:  
General: Feels well, no weight   
changes, fever, chills.  
HEENT: No sinus congestion,   
earache, sore throat.  
Cardiac: No chest pain,   
palpitations  
Resp: No cough, wheeze, shortness   
of breath  
GI: No reflux symptoms, food   
intoleance, bowel changes.  
: No urinary frequency,   
dysuria.  
MS: No pain or joint complaints.  
SKIN: +rash on neck, face, ears,   
and legs  
PSYCH: +stress, +insomnia.  
  
PHYSICAL EXAMINATION:  
  
VIDEO EXAM: performed via video   
enabled technology  
  
GENERAL: alert and appropriate,   
in no distress, well-hydrated,   
well nourished, and happy,   
smiling, interactive  
  
No physical exam performed today   
(phone encounter).  
  
Data Reviewed:  
Yes  
  
ASSESSMENT/PLAN:  
This encounter occurred by   
Ethan over the course of 12   
minutes which included   
face-to-face discussion with the   
patient, medical decision making,   
and charting.  
  
(F43.22) Adjustment disorder with   
anxious mood (primary encounter   
diagnosis)  
Comment: stressful situatoin  
Plan: see orders.  
  
(L73.9) Folliculitis  
Comment: face and neck. Not quite   
like hives.  
Plan: try the topical acne med   
and RTO in person if not settling   
down  
  
  
Requested Prescriptions  
  
Signed Prescriptions Disp Refills  
hydrOXYzine HCl (ATARAX) 25 mg   
tablet 120 tablet 1  
Sig: Take 1 tablet by mouth four   
times daily.  
clindamycin (CLEOCIN T) 1 %   
external solution 30 mL 1  
Sig: Apply to affected area twice   
daily.  
  
RTO: Follow-up if possible in a   
few weeks to have rash evaluated.  
  
Scribe Attestation:  
By signing my name below, Melanie LAZARO, attest that this   
documentation has been prepared   
under the direction and in the   
presence of Cj Chavarria M.D..  
Electronically Signed: Kal Jj. 2023   
3:26 PM.  
  
This telehealth encounter is   
provided under a state of   
emergency due to COVID19 and is   
for care for condition where   
providing the care is supportive   
of minimizing potential exposure   
and/or transmission of COVID19.  
  
Provider Attestation:  
Jenna LAZARO MD, personally   
performed the services described   
in this documentation. All   
medical record entries made by   
the scribe were at my direction   
and in my presence. I have   
reviewed the chart and discharge   
instructions (if applicable) and   
agree that the record reflects my   
personal performance and is   
accurate and complete.   
Electronically Signed: Jenna Chavarria MD 2023 5:11   
PM  
  
  
Electronically signed by Jenna Chavarria MD at 2023 5:13   
PM EST  
documented in this encounter            [facility]  
   
                                                    2022 Miscellaneous   
Notes                                   Formatting of this note might be   
different from the original.  
Attempted to reach patient. Left   
detailed message on patient's   
voicemail.  
[employer] message was also sent to   
patient with provider's   
recommendations.  
  
Electronically signed by Alex Redman RN at 2022 9:15 AM   
EST  
Formatting of this note might be   
different from the original.  
Left a message to CB  
Electronically signed by Jessy Nicholson RN at 2022 7:45   
PM EST  
Formatting of this note might be   
different from the original.  
Called [phone] advised   
patient to check MyChart or call   
back for message from provider.  
Electronically signed by Vonnie Ruiz RN at 2022 2:54 PM   
EST  
Formatting of this note might be   
different from the original.  
Left VM advising patient to call   
back for appointment/be seen at   
walk-in  
Electronically signed by Susy Shields RN at 2022 2:36 PM   
EST  
Formatting of this note might be   
different from the original.  
Shingles possible. Virtual or   
office appt or express care today   
or tomorrow for treatment.   
Probably best to come in for   
express care to see you today.   
Early treatment important.  
  
Jenna Chavarria MD  
Electronically signed by Jenna Chavarria MD at 2022 2:05   
PM EST  
Formatting of this note might be   
different from the original.  
Please advise  
Electronically signed by Marilu Sky Ma at 2022 3:20 PM   
EST  
documented in this encounter            [facility]  
   
                                                    2022 Miscellaneous   
Notes                                   Formatting of this note is   
different from the original.  
The following approved medication   
requests have been transmitted   
electronically.  
Requested Prescriptions  
  
Signed Prescriptions Disp Refills  
atenolol (TENORMIN) 25 mg tablet   
30 tablet 3  
Sig: take 1 tablet by mouth once   
daily  
Authorizing Provider: JENNA CHAVARRIAt by 2023.  
  
Jenna Chavarria MD  
  
Electronically signed by Jenna Chavarria MD at 2022 2:23   
PM EST  
Formatting of this note is   
different from the original.  
Last appointment: 22  
Next appointment: n/a  
  
Pharmacy verified in [facility].  
  
Refill(s) requested:  
Requested Prescriptions  
  
Pending Prescriptions Disp   
Refills  
atenolol (TENORMIN) 25 mg tablet   
[Pharmacy Med Name: ATENOLOL 25   
MG TABLET] 30 tablet 0  
Sig: take 1 tablet by mouth once   
daily  
  
Order(s) pended.  
  
Please advise.  
Aliena Mima Ma, CMA  
  
Electronically signed by Gale Guillermo Ma at 2022 2:06 PM   
EST  
documented in this encounter            [facility]  
   
                                                    2022 Miscellaneous   
Notes                                   Formatting of this note is   
different from the original.  
The following approved medication   
requests have been transmitted   
electronically.  
Requested Prescriptions  
  
Signed Prescriptions Disp Refills  
omeprazole (PRILOSEC) 40 mg   
capsule 60 capsule 0  
Sig: Take 1 capsule by mouth   
twice daily.  
Authorizing Provider: JENNA CHAVARRIA  
loratadine (CLARITIN) 10 mg   
tablet 30 tablet 5  
Sig: Take 1 tablet by mouth once   
daily.  
Authorizing Provider: JENNA CHAVARRIA  
atorvastatin (LIPITOR) 20 mg   
tablet 30 tablet 0  
Sig: Take 1 tablet by mouth once   
daily.  
Authorizing Provider: JENNA CHAVARRIA MD  
  
Electronically signed by Jenna Chavarria MD at 2022 2:21   
PM EST  
documented in this encounter            [facility]  
   
                                                    11- Miscellaneous   
Notes                                   Formatting of this note might be   
different from the original.  
Called pt and informed need for   
fasting labwork. Pt indicated   
understanding.  
Electronically signed by Susan Huff at 11/15/2022 3:40 PM EST  
Formatting of this note might be   
different from the original.  
Patient is due for fasting labs,   
orders placed.  
HELLEN Hinton.ALBERTA  
  
Electronically signed by Marilu Machuca APRN.CNP at 11/15/2022   
11:21 AM EST  
Formatting of this note is   
different from the original.  
Pharmacy verified in [facility]  
  
Patient has been identified by   
name and date of birth: Yes  
Patient aware RX will be sent to   
pharmacy. No need to notify   
patient.  
  
Patient phones for refill(s):  
  
Requested Prescriptions  
  
Pending Prescriptions Disp   
Refills  
atorvastatin (LIPITOR) 20 mg   
tablet 30 tablet 0  
Sig: Take 1 tablet by mouth once   
daily.  
  
  
Date of last office visit : Visit   
date not found  
  
Date of next office visit : Visit   
date not found  
  
Last 2 Encounter Wt Readings:  
Date: Wt:  
2022 81.2 kg (179 lb)  
2022 81.2 kg (179 lb)  
  
Cholesterol:  
Triglyceride (mg/dL)  
Date Value  
2021 168  
  
HDL Cholesterol (mg/dL)  
Date Value  
2021 35  
  
LDL Cholesterol (mg/dL)  
Date Value  
2021 143  
  
ALT (U/L)  
Date Value  
2020 15  
  
Non HDL Cholesterol (mg/dL)  
Date Value  
2021 177  
  
Please advise.  
  
Susan Huff  
Electronically signed by Susan Huff at 11/15/2022 10:30 AM EST  
documented in this encounter            [facility]  
   
                                                    2022 Miscellaneous   
Notes                                     
  
  
Formatting of this note is   
different from the original.  
  
Signed Prescriptions Disp Refills  
Lancets lancets 100 Each 3  
Sig: Check glucose daily . Dx:   
11.9.  
HAN: No  
Authorizing Provider: JENNA CHAVARRIA  
  
Rx for monitor sent on another   
encounter.  
  
Jenna Chavarria MD  
  
  
Electronically signed by Jenna Chavarria MD at 2022 10:35   
AM EDT  
  
  
Formatting of this note might be   
different from the original.  
Patient Comment: Also need new   
glucose monitor  
  
  
  
Electronically signed by Ioana Lemus LPN at 2022 9:36   
AM EDTdocumented in this   
encounter                               [facility]  
   
                                                    2022 Miscellaneous   
Notes                                     
  
  
Formatting of this note is   
different from the original.  
The following approved medication   
requests have been transmitted   
electronically.  
  
Signed Prescriptions Disp Refills  
blood sugar diagnostic (FREESTYLE   
LITE STRIPS) test strip 50 Strip   
11  
Sig: Test blood sugar(s) 1 times   
daily. Dx: E11.9. Insulin: No  
HAN: No  
Authorizing Provider: JENNA CHAVARRIA  
Blood-Glucose Meter (FREESTYLE   
INSULINX) 1 Each 0  
Sig: Daily glucose check . Dx:   
Type 2 DM - Controlled E11.9, no   
insulin  
Authorizing Provider: JENNA CHAVARRIA MD  
  
  
  
Electronically signed by Jenna Chavarria MD at 2022 10:34   
AM EDT  
  
  
Formatting of this note is   
different from the original.  
Last appointment: 22  
Next appointment: na  
  
Pharmacy verified in [facility].  
  
Refill(s) requested:  
  
Pending Prescriptions Disp   
Refills  
FREESTYLE LITE STRIPS 50 Strip 1  
Sig: Test blood sugar(s) 1 times   
daily. Dx: E11.9. Insulin: No  
HAN: No  
  
Order(s) pended.  
  
Please advise.  
Jennifer Meyer MA, [facility]  
  
  
  
Electronically signed by Jennifer Meyer MA at 2022 9:11   
AM EDTdocumented in this   
encounter                               [facility]  
   
                                                    2022 History of   
Present illness Narrative                 
  
  
Formatting of this note is   
different from the original.  
ENDOCRINOLOGY [facility]   
VISIT  
  
REFERRING PHYSICIAN: Jenna Chavarria  
[address] [place] [address] [place] [address]  
  
My final recommendations will be   
communicated back to the   
requesting physician by way of   
shared Medical record or a letter   
via [place] mail  
  
Subjective:  
  
Robert Palacios is a 42 year   
old female here to establish care   
for Weight gain.  
  
General symptoms:  
Fatigue: yes, feels exhausted  
Reports that it is hard for her   
function  
She snores at night  
She wakes up tired  
Weight change: weight gain of   
30-35 lbs  
States that the significant   
weight gain over the last 1 year.  
Meal planning: she only eats   
dinner- vegetables, chicken and   
rice  
She drinks coffee in the morning,   
skips breakfast and lunch  
Exercise: walks on treadmill   
daily, 45 mins  
Appetite change: yes  
Menstrual irregularities: s/p   
hysterectomy in   
Ovaries are in place  
She has 3 biological children  
No trouble conceiving  
Change in bowel habits: diarrhea   
and constipation  
Temperature intolerance: heat and   
cold intolerance  
Dry skin: yes, cracks easy  
  
Mentions mood changes.  
  
She also reports hair loss.  
  
She reports intermittent neck   
swelling, states that this is   
associated with bloating.  
She was found to have thyroid   
nodules  
  
Most recent thyroid US didn't   
reveal thyroid nodules  
  
No family history of thyroid   
disease  
Ideal weight - 145 lbs  
  
She also has type 2 diabetes  
Currently, this is diet   
controlled  
Previously, she was on Actos  
She tried Metformin but developed   
diarrhea  
  
REVIEW OF SYSTEMS:  
  
Answers for HPI/ROS submitted by   
the patient on 2022  
Fatigue: Yes  
Night Sweats: Yes  
Recent Unintentional Weight   
Change: Yes  
Skin Color Changes: Yes  
Post-Nasal Drip: No  
Thyroid Pain (lower neck): Yes  
Trouble Swallowing: Yes  
Vision Disturbance: Yes  
Chest Pain: Yes  
Leg Swelling: Yes  
Blood Clots?: No  
Leg Pain while walking?: Yes  
Difficulty Breathing?: No  
Heartburn: No  
Nausea: Yes  
Vomiting?: No  
Diarrhea: Yes  
Constipation: Yes  
Abdominal Pain: Yes  
Bone Pain?: Yes  
Muscle Aches: Yes  
Muscle Weakness: Yes  
Joint Pain or Stiffness: Yes  
Headaches: Yes  
Dizziness: No  
Numbness?: Yes  
Urgency to Urinate?: No  
Increased Urination?: No  
Slow or Small Urine Stream?: Yes  
Are your menstrual cycles   
regular?: No  
Are your menstrual cycles   
irregular?: No  
Have your menstrual cycles   
stopped?: Yes  
Flushing?: Yes  
Hot Flashes?: Yes  
Increased Thirst: Yes  
Change in Body Hair?: Yes  
Cold Intolerance: Yes  
Heat Intolerance?: Yes  
  
ALLERGIES:  
ALLERGIES  
Allergen Reactions  
Codeine Vomiting  
Gabapentin Other: See Comments  
Headache/migraine  
Penicillins Unknown  
childhood reaction  
  
MEDICATIONS:  
Current Outpatient Medications on   
File Prior to Visit  
Medication Sig  
umeclidinium-vilanterol (ANORO   
ELLIPTA) 62.5-25 mcg/actuation   
inhaler Inhale 1 Inhalation as   
instructed once daily.  
atorvastatin (LIPITOR) 20 mg   
tablet Take 1 tablet by mouth   
once daily.  
atenolol (TENORMIN) 25 mg tablet   
Take 1 tablet by mouth once   
daily.  
omeprazole (PRILOSEC) 40 mg   
capsule Take 1 capsule by mouth   
twice daily.  
aspirin, enteric coated   
(ASPIR-LOW) 81 mg EC tablet Take   
1 tablet by mouth once daily.  
Lactobacillus acidophilus   
(PROBIOTIC ORAL) Take by mouth   
once daily.  
MEDICATION, NON-DATABASE once   
daily. estravin  
loratadine (CLARITIN) 10 mg   
tablet Take 1 tablet by mouth   
once daily.  
magnesium oxide 400 mg magnesium   
tab Take 1 tablet by mouth twice   
daily.  
blood sugar diagnostic (FREESTYLE   
LITE STRIPS) test strip Test   
blood sugar(s) 1 times daily. Dx:   
E11.9. Insulin: No  
LYRICA 150 mg capsule Take 1   
tablet by mouth twice daily.  
Lancets (FREESTYLE LANCETS)   
lancets Use as instructed  
  
No current facility-administered   
medications on file prior to   
visit.  
  
PAST MEDICAL HISTORY:  
PAST MEDICAL HISTORY  
Diagnosis Date  
Anxiety 2014  
Anxiety and depression  
COPD, mild (HCC) 2014  
Depression 2014  
Fibroadenosis of breast  
Fibromyalgia  
GERD (gastroesophageal reflux   
disease) 2014  
HTN (hypertension) 2014  
Hyperlipidemia 2014  
Lump or mass in breast 3/9/2009  
Malignant neoplasm of cervix   
uteri, unspecified site  
Cervical cancer  
NO SHOW 3/2/2007  
With primary care and specialty   
offices.  
PMH - PAST MEDICAL HISTORY OF  
diabetes  
Raynaud disease  
Raynaud disease  
Restless legs  
Rheumatoid arthritis (HCC)  
RSD lower limb  
bilateral legs  
Smoker 2014  
Tachycardia 2014  
Possible SVT  
Type II or unspecified type   
diabetes mellitus without mention   
of complication, uncontrolled   
2005  
Diet controlled.  
  
PAST SURGICAL HISTORY:  
PAST SURGICAL HISTORY  
Procedure Laterality Date  
BIOPSY BREAST OPEN INCISIONAL   
2009  
right breast  
BX BREAST NEEDLE CORE W/O IMAGING   
GUIDANCE SPX 2006  
right breast  
PAST SURGICAL HISTORY OF   
10/16/2018  
Spinal Cord Stimulator Implant  
PAST SURGICAL HISTORY OF   
2020  
spinal cord stimulator revision  
PLCMT LOCALZTN CLIP,PERC,DURING   
BREAST BX 2009  
TITANIUM CLIP PLACEMENT  
TONSILLECTOMY PRIMARY/SECONDARY   
<AGE 10  
Tonsillectomy  
TOTAL ABDOMINAL HYSTERECT W/WO   
RMVL TUBE OVARY 10/07/1993  
Dr. Delgado  
US BREAST NEEDLE CORE BIOPSY RT   
2009  
  
FAMILY HISTORY:  
FAMILY HISTORY  
Problem Relation Age of Onset  
Hypertension Mother  
Heart Mother  
Diabetes Mother  
COPD Mother  
Alcohol/Drug Father  
Allergies Brother  
Alcohol/Drug Sister  
No Known Problems Paternal   
Grandmother  
No Known Problems Paternal   
Grandfather  
No Known Problems Son  
No Known Problems Son  
No Known Problems Daughter  
Diabetes Brother  
Hypoglycemic  
Breast Cancer Maternal Aunt  
No Known Problems Maternal   
Grandmother  
No Known Problems Maternal   
Grandfather  
Breast Cancer Maternal Aunt  
  
SOCIAL HISTORY:  
Social History  
  
Tobacco Use  
Smoking status: Current Every Day   
Smoker  
Packs/day: 1.00  
Years: 20.00  
Pack years: 20.00  
Types: Cigarettes  
Start date: 1993  
Smokeless tobacco: Never Used  
Tobacco comment: No smoker in   
childhood home. Spouse of 6 years   
a smoker.  
Vaping Use  
Vaping Use: current everyday user  
Substances: Nicotine  
Substance Use Topics  
Alcohol use: Not Currently  
Drug use: No  
  
PHYSICAL EXAM:  
  
General: Alert and oriented x3,   
no acute distress  
  
LAB:  
  
Ref. Range 2022 11:13   
2022 11:14  
Hemoglobin A1C Latest Ref Range:   
4.3 - 5.6 % 5.6  
Estimated Average Glucose Latest   
Units: mg/dL 114  
TSH Latest Ref Range: 0.270 -   
4.200 mIU/L 1.540  
  
Thyroid US- May 2022  
Right Lobe: 5.4 x 1.5 x 2.0 cm;   
grossly homogeneous echogenicity,  
expected vascular flow.  
  
Left Lobe: 5.3 x 2.0 x 2.0 cm;   
grossly homogeneous echogenicity,  
expected vascular flow.  
  
Isthmus: 0.2 cm  
  
Nodules: No discrete nodules   
identified. The previously   
mentioned left  
thyroid nodule is not visualized   
on the current study.  
  
ASSESSMENT/PLAN:  
  
1) Weight gain  
2) type 2 diabetes  
3) Snoring  
4) Menopause  
  
Recent weight gain  
This is likely due to Menopause  
Reviewed and discussed her recent   
bio-chemical evaluation  
TSH is normal, which rules out   
hypothyroidism  
No thyroid nodule on recent   
thyroid US  
No steroid use  
Check A1c and fasting insulin  
She didn't tolerate Metformin in   
the past  
Given rapid weight gain, will   
screen her for Cushing's syndrome  
She has symptoms suggestive of   
RIC  
Suggested her to talk to PCP and   
consider undergoing a sleep study  
If the above bio-chemical   
evaluation is normal, then will   
refer her to Endocrine dietitian   
for dietary weight loss programs  
  
I will send patient a message in   
my chart once the lab results   
become available  
Follow up to be determined based   
on the results  
  
Samantha Olivier MD  
22  
  
  
Electronically signed by Samantha Olivier MD at 2022 1:34 PM   
EDTdocumented in this encounter         [facility]  
   
                                                    2022 Miscellaneous   
Notes                                     
  
  
Formatting of this note might be   
different from the original.  
Called pt left voicemail   
informing her that order has been   
placed for fasting labs. [employer]   
message sent.  
  
  
Electronically signed by Susan Huff at 2022 9:27 AM EDT  
  
  
Formatting of this note is   
different from the original.  
Overdue for lab.  
  
Refill on 22  
COMP METABOLIC PANEL  
LIPID PANEL BASIC  
  
1 fill only. Lab before   
additional fills.  
  
Signed Prescriptions Disp Refills  
atorvastatin (LIPITOR) 20 mg   
tablet 30 tablet 0  
Sig: Take 1 tablet by mouth once   
daily.  
HAN: No  
Authorizing Provider: JENNA CHAVARRIA  
atenolol (TENORMIN) 25 mg tablet   
30 tablet 0  
Sig: Take 1 tablet by mouth once   
daily.  
HAN: No  
Authorizing Provider: JENNA CHAVARRIA MD  
  
  
Electronically signed by Jenna Chavarria MD at 06/10/2022 9:30   
AM EDT  
  
  
Formatting of this note is   
different from the original.  
Pharmacy verified in [facility]  
  
Patient has been identified by   
name and date of birth: Yes  
Patient aware RX will be sent to   
pharmacy. No need to notify   
patient.  
  
Patient phones for refill(s):  
  
Pending Prescriptions Disp   
Refills  
ATORVASTATIN 20 MG TABLET 30   
tablet 11  
Sig: Take 1 tablet by mouth once   
daily.  
HAN: No  
ATENOLOL 25 MG TABLET 90 tablet 1  
Sig: Take 1 tablet by mouth once   
daily.  
HAN: No  
  
  
  
Date of last office visit :   
2022  
  
Date of next office visit : Visit   
date not found  
  
Last 2 Encounter Wt Readings:  
Date: Wt:  
2022 81.2 kg (179 lb)  
2022 81.2 kg (179 lb)  
  
Cholesterol:  
Triglyceride (mg/dL)  
Date Value  
2021 168  
  
HDL Cholesterol (mg/dL)  
Date Value  
2021 35  
  
LDL Cholesterol (mg/dL)  
Date Value  
2021 143  
  
ALT (U/L)  
Date Value  
2020 15  
  
Non HDL Cholesterol (mg/dL)  
Date Value  
2021 177  
  
Blood Pressure:  
BUN (mg/dL)  
Date Value  
2020 15  
  
Creatinine (mg/dL)  
Date Value  
2020 0.80  
  
Sodium (mmol/L)  
Date Value  
2020 138  
  
Potassium (mmol/L)  
Date Value  
2020 4.3  
Last 1 Encounter BP Readings:  
Date: BP:  
2022 108/64  
  
Please advise.  
  
Cindy Walsh LPN  
  
  
  
Electronically signed by Cindy Walsh LPN at 06/10/2022 8:51 AM   
EDTdocumented in this encounter         [facility]  
   
                                                    06- Miscellaneous   
Notes                                     
  
  
Formatting of this note is   
different from the original.  
The following approved medication   
requests have been transmitted   
electronically.  
  
Signed Prescriptions Disp Refills  
aspirin, enteric coated   
(ASPIR-LOW) 81 mg EC tablet 30   
tablet 10  
Sig: Take 1 tablet by mouth once   
daily.  
HAN: No  
Authorizing Provider: JENNA CHAVARRIA MD  
  
  
  
Electronically signed by Jenna Chavarria MD at 06/10/2022 2:06   
PM EDT  
  
  
Formatting of this note is   
different from the original.  
Last appointment: 22  
Next appointment: n/a  
  
Pharmacy verified in [place].  
  
Refill(s) requested:  
  
Pending Prescriptions Disp   
Refills  
ASPIRIN 81 MG TABLET,DELAYED   
RELEASE 30 tablet 0  
Sig: Take 1 tablet by mouth once   
daily.  
HAN: No  
  
Order(s) pended.  
  
Please advise.  
Gale Guillermo Ma, CMA  
  
  
  
Electronically signed by Gale Guillermo Ma at 06/10/2022 10:40 AM   
EDTdocumented in this encounter         [facility]  
   
                                                    06- Miscellaneous   
Notes                                     
  
  
Formatting of this note is   
different from the original.  
Last appointment: 22  
Next appointment: n/a  
  
Pharmacy verified in [facility].  
  
Refill(s) requested:  
  
Pending Prescriptions Disp   
Refills  
ATORVASTATIN 20 MG TABLET 30   
tablet 11  
Sig: take 1 tablet by mouth once   
daily  
HAN: Yes  
  
Order(s) pended.  
  
Please advise.  
Lisa Mejia LPN  
  
  
Electronically signed by Lisa Mejia at 06/10/2022 9:28 AM   
EDTdocumented in this encounter         [facility]  
   
                                                    2022 Miscellaneous   
Notes                                     
  
  
Formatting of this note might be   
different from the original.  
1st attempt to reach for   
scheduling, left voicemail with   
our call back numbers.  
  
If patient returns call, please   
schedule her endocrinology   
referral.  
  
Cinthya Esparza  
  
  
Electronically signed by Cinthya Esparza at 2022 2:12 PM EDT  
  
  
Formatting of this note is   
different from the original.  
Do you want to see an   
endocrinologist for an opinion. I   
don't have much to add, and   
weight loss medications are not   
suitable. Consult placed.  
  
.  
 Get Medical Advice on 22  
CONSULT TO ENDOCRINOLOGY  
  
  
  
  
Electronically signed by Jenna Chavarria MD at 2022 12:34   
PM EDTdocumented in this   
encounter                               [facility]  
   
                                                    2022 Miscellaneous   
Notes                                     
  
  
Formatting of this note might be   
different from the original.  
1st attempt, left voicemail for   
patient to call back and schedule   
or to schedule through .  
  
  
Electronically signed by Radha Solis at 2022 10:27 AM EDT  
  
  
Formatting of this note might be   
different from the original.  
Your last BMI is 28. This is   
below the usual threshold for   
weight loss medication, and   
[employer] in my experience   
doesn't cover weight loss meds in   
nearly any case.  
  
Lyrica may be aggravating your   
weight gain.  
  
Wellbutrin sometimes is used for   
depression with weight loss as a   
side effect.  
  
You can schedule an office visit   
to discuss, but a true weight   
loss medication is not likely an   
option.  
  
Jenna Chavarria MD  
  
  
  
  
Electronically signed by Jenna Chavarria MD at 2022 9:13   
AM EDTdocumented in this   
encounter                               [facility]  
   
                                                    2022 Miscellaneous   
Notes                                     
  
  
Formatting of this note might be   
different from the original.  
Received records request from   
[place] [facility].   
Requested information faxed.  
  
  
Electronically signed by Ssuan Huff at 2022 1:37 PM   
EDTdocumented in this encounter         [facility]  
   
                                                    2022 History of   
Present illness Narrative                 
  
  
Formatting of this note is   
different from the original.  
HISTORY AND PHYSICAL  
  
Robert Palacios  
1980  
  
REFERRING PHYSICIAN: Jenna Chavarria MD  
  
CHIEF COMPLAINT: Consult   
(thyromegaly, )  
  
HPI: The patient is a 42 year old   
female with a complaint of   
thyromegaly and dysphagia-like   
symptoms. Patient also has a   
plethora of other problems not   
losing weight losing her hair. I   
am seeing her because she had an   
ultrasound of her thyroid which   
showed some mild thyromegaly but   
no signs of polyps. Patient had a   
TSH was normal. She has not had   
any other studies with thyroid   
testing. She states that she   
sometimes gets things stuck in   
the back of her throat..  
  
The patient is being seen by me   
today at the request of Dr. Jenna Chavarria MD for my opinion   
and advice regarding Thyromegaly  
Oral phase dysphagia (primary   
encounter diagnosis).  
  
PAST MEDICAL HISTORY  
Diagnosis Date  
Anxiety 2014  
Anxiety and depression  
COPD, mild (HCC) 2014  
Depression 2014  
Fibroadenosis of breast  
Fibromyalgia  
GERD (gastroesophageal reflux   
disease) 2014  
HTN (hypertension) 2014  
Hyperlipidemia 2014  
Lump or mass in breast 3/9/2009  
Malignant neoplasm of cervix   
uteri, unspecified site  
Cervical cancer  
NO SHOW 3/2/2007  
With primary care and specialty   
offices.  
PMH - PAST MEDICAL HISTORY OF  
diabetes  
Raynaud disease  
Raynaud disease  
Restless legs  
Rheumatoid arthritis (HCC)  
RSD lower limb  
bilateral legs  
Smoker 2014  
Tachycardia 2014  
Possible SVT  
Type II or unspecified type   
diabetes mellitus without mention   
of complication, uncontrolled   
2005  
Diet controlled.  
  
  
PAST SURGICAL HISTORY  
Procedure Laterality Date  
BIOPSY BREAST OPEN INCISIONAL   
2009  
right breast  
BX BREAST NEEDLE CORE W/O IMAGING   
GUIDANCE SPX 2006  
right breast  
PAST SURGICAL HISTORY OF   
10/16/2018  
Spinal Cord Stimulator Implant  
PAST SURGICAL HISTORY OF   
2020  
spinal cord stimulator revision  
PLCMT LOCALZTN CLIP,PERC,DURING   
BREAST BX 2009  
TITANIUM CLIP PLACEMENT  
TONSILLECTOMY PRIMARY/SECONDARY   
<AGE 10  
Tonsillectomy  
TOTAL ABDOMINAL HYSTERECT W/WO   
RMVL TUBE OVARY 10/07/1993  
Dr. Delgado  
US BREAST NEEDLE CORE BIOPSY RT   
2009  
  
  
Current Outpatient Medications  
Medication Sig  
Lactobacillus acidophilus   
(PROBIOTIC ORAL) Take by mouth   
once daily.  
MEDICATION, NON-DATABASE once   
daily. estravin  
omeprazole (PRILOSEC) 40 mg   
capsule take 1 capsule by mouth   
twice a day  
loratadine (CLARITIN) 10 mg   
tablet Take 1 tablet by mouth   
once daily.  
aspirin, enteric coated   
(ASPIR-LOW) 81 mg EC tablet Take   
1 tablet by mouth once daily.  
atenolol (TENORMIN) 25 mg tablet   
take 1 tablet by mouth once daily  
umeclidinium-vilanterol (ANORO   
ELLIPTA) 62.5-25 mcg/actuation   
inhaler Inhale 1 Inhalation as   
instructed once daily.  
atorvastatin (LIPITOR) 20 mg   
tablet Take 1 tablet by mouth   
once daily.  
magnesium oxide 400 mg magnesium   
tab Take 1 tablet by mouth twice   
daily.  
blood sugar diagnostic (FREESTYLE   
LITE STRIPS) test strip Test   
blood sugar(s) 1 times daily. Dx:   
E11.9. Insulin: No  
LYRICA 150 mg capsule Take 1   
tablet by mouth twice daily.  
Lancets (FREESTYLE LANCETS)   
lancets Use as instructed  
  
No current facility-administered   
medications for this visit.  
  
ALLERGIES: Codeine, Gabapentin,   
and Penicillins  
  
PERSONAL HISTORY:  
Social History  
  
Tobacco Use  
Smoking status: Current Every Day   
Smoker  
Packs/day: 1.00  
Years: 20.00  
Pack years: 20.00  
Types: Cigarettes  
Start date: 1993  
Smokeless tobacco: Never Used  
Tobacco comment: No smoker in   
childhood home. Spouse of 6 years   
a smoker.  
Vaping Use  
Vaping Use: current everyday user  
Substances: Nicotine  
Substance Use Topics  
Alcohol use: Not Currently  
Drug use: No  
  
  
FAMILY HISTORY:  
FAMILY HISTORY  
Problem Relation Age of Onset  
Hypertension Mother  
Heart Mother  
Diabetes Mother  
COPD Mother  
Alcohol/Drug Father  
Allergies Brother  
Alcohol/Drug Sister  
No Known Problems Paternal   
Grandmother  
No Known Problems Paternal   
Grandfather  
No Known Problems Son  
No Known Problems Son  
No Known Problems Daughter  
Diabetes Brother  
Hypoglycemic  
Breast Cancer Maternal Aunt  
No Known Problems Maternal   
Grandmother  
No Known Problems Maternal   
Grandfather  
Breast Cancer Maternal Aunt  
  
REVIEW OF SYMPTOMS:  
The review of systems data was   
entered by the nurse and reviewed   
by me  
  
Nursing Notes:  
Ruth Chaudhari LPN 2022 8:25 AM   
Signed  
REVIEW OF SYSTEMS:  
General: The patient notes   
fatigue, denies weight loss,   
notes weight gain, notes feeling   
hot, and notes feelings of cold.  
Eyes: The patient denies   
glaucoma, denies eye   
injury/surgery, wears glasses or   
contacts.  
Ear/Nose/Throat: The patient   
notes allergies, denies hayfever,   
denies ear infections, and denies   
bloody noses.  
Cardiovascular: The patient   
denies chest pain, notes heart   
disease, denies high blood   
pressure,denies cardiac stent,   
denies prior heart attack, notes   
irregular heart beat, notes high   
cholesterol, denies poor   
circulation, denies heart   
failure, other cardiac issues,   
notes claudication, notes cold   
feet, denies peripheral arterial   
stent.  
Respiratory: The patient denies   
tuberculosis, denies pneumonia,   
denies frequent cough, denies   
pulmonary embolism, notes   
shortness of breath, and denies   
coughing up blood, notes other   
lung problems.  
Gastrointestinal: The patient   
notes difficulty swallowing,   
notes acid reflux, denies ulcers,   
denies vomiting, denies   
jaundice/hepatitis, denies   
gallbladder problems, denies   
black or tarry stools, denies   
hemorrhoids, denies bleeding from   
rectum, denies diverticulitis,   
notes constipation, denies   
diarrhea, denies loss of stool   
control, and denies hernias.  
Kidney/Bladder: The patient   
denies kidney stones, denies   
urine infections, and denies   
bloody urine.  
Skin: The patient denies a   
history of skin cancer, denies   
bleeding/changing moles, and   
notes a history of skin rash.  
Neurologic: The patient denies a   
history of epilepsy/convulsions,   
notes headaches, denies   
head/spinal injuries, and denies   
stroke/TIA.  
Psychiatric: The patient denies   
psychiatric medications, notes   
depression, and denies voices,   
denies substance abuse.  
Endocrine: The patient denies   
thyroid disorders, denies   
diabetes, and notes hormonal   
problems.  
Hematologic: The patient notes a   
history of bruising, denies   
bleeding, and denies anemia,   
denies blood clots.  
Infections: The patient denies a   
history of measles and mumps,   
denies rheumatic fever, and   
denies sexually transmitted   
diseases.  
Musculoskeletal: The patient   
notes back pain/injury, notes   
back problems, denies sciatica,   
notes knee/foot trouble, notes   
arthritis, or denies gout.  
  
When was patient's last Mammogram   
screening? 2022  
  
Last Colonoscopy: none  
  
Ruth Chaudhari LPN  
  
  
PHYSICAL EXAMINATION:  
  
General: The patient is 42 year   
old female, well nourished, well   
hydrated in no acute distress.   
The patient is oriented to time,   
place, and person.  
  
VITALS: Blood pressure 108/64,   
pulse 77, temperature 37.1 C   
(98.8 F), height 170.2 cm (5'   
7 ), weight 81.2 kg (179 lb),   
SpO2 96 %.  
  
HEENT: Normal cephalic,   
ataumatic, pupils are equally   
round, sclera are anicteric,   
mucous membranes are moist,   
oropharynx is clear. Neck has no   
masses, asymmetry or   
lymphadenopathy. Thyroid is   
unremarkable.  
  
Respiratory: Clear to   
auscultation and percussion.   
Normal respiratory excursion and   
pattern.  
  
Cardiac: Examination is regular   
rate and rhythm.  
  
Abdominal exam: Soft, nontender,   
with no palpable masses. No   
hepatosplenomegaly. No palpable   
hernias.  
  
Rectal exam:  
  
Extremities: no clubbing,   
cyanosis or edema. No adenopathy.  
  
Other:  
  
LABORATORY VALUES: As Noted  
  
RADIOLOGIC STUDIES: As Noted  
Assessment  
IMPRESSION: Thyromegaly  
Oral phase dysphagia (primary   
encounter diagnosis)  
  
PLAN: Patient has a consultation   
with Dr. Mukul Ruiz an   
endocrinologist in town in the   
next week or so. I have also   
encouraged her to go see the ear   
nose and throat doctors so that   
they can evaluate the back of her   
throat to make sure that there is   
nothing back there. There is   
nothing that needs to be done   
from a surgical standpoint with   
her thyroid gland.  
  
Diagnoses: (R13.11) Oral phase   
dysphagia (primary encounter   
diagnosis)  
(E01.0) Thyromegaly  
  
My findings have been   
communicated to Dr. Jenna Chavarria MD via shared medical   
record. This note will be   
forwarded to Dr. Jenna Chavarria MD.  
  
Return to Clinic: The patient is   
instructed to follow-up with me   
as needed.  
  
_____________________________  
Daniel P Peabody III, MD  
  
  
Electronically signed by Daniel P Peabody, MD at 2022 9:24 AM   
EDTdocumented in this encounter         [facility]  
   
                                        2022 Nurse Note   
  
  
Formatting of this note might be   
different from the original.  
REVIEW OF SYSTEMS:  
General: The patient notes   
fatigue, denies weight loss,   
notes weight gain, notes feeling   
hot, and notes feelings of cold.  
Eyes: The patient denies   
glaucoma, denies eye   
injury/surgery, wears glasses or   
contacts.  
Ear/Nose/Throat: The patient   
notes allergies, denies hayfever,   
denies ear infections, and denies   
bloody noses.  
Cardiovascular: The patient   
denies chest pain, notes heart   
disease, denies high blood   
pressure,denies cardiac stent,   
denies prior heart attack, notes   
irregular heart beat, notes high   
cholesterol, denies poor   
circulation, denies heart   
failure, other cardiac issues,   
notes claudication, notes cold   
feet, denies peripheral arterial   
stent.  
Respiratory: The patient denies   
tuberculosis, denies pneumonia,   
denies frequent cough, denies   
pulmonary embolism, notes   
shortness of breath, and denies   
coughing up blood, notes other   
lung problems.  
Gastrointestinal: The patient   
notes difficulty swallowing,   
notes acid reflux, denies ulcers,   
denies vomiting, denies   
jaundice/hepatitis, denies   
gallbladder problems, denies   
black or tarry stools, denies   
hemorrhoids, denies bleeding from   
rectum, denies diverticulitis,   
notes constipation, denies   
diarrhea, denies loss of stool   
control, and denies hernias.  
Kidney/Bladder: The patient   
denies kidney stones, denies   
urine infections, and denies   
bloody urine.  
Skin: The patient denies a   
history of skin cancer, denies   
bleeding/changing moles, and   
notes a history of skin rash.  
Neurologic: The patient denies a   
history of epilepsy/convulsions,   
notes headaches, denies   
head/spinal injuries, and denies   
stroke/TIA.  
Psychiatric: The patient denies   
psychiatric medications, notes   
depression, and denies voices,   
denies substance abuse.  
Endocrine: The patient denies   
thyroid disorders, denies   
diabetes, and notes hormonal   
problems.  
Hematologic: The patient notes a   
history of bruising, denies   
bleeding, and denies anemia,   
denies blood clots.  
Infections: The patient denies a   
history of measles and mumps,   
denies rheumatic fever, and   
denies sexually transmitted   
diseases.  
Musculoskeletal: The patient   
notes back pain/injury, notes   
back problems, denies sciatica,   
notes knee/foot trouble, notes   
arthritis, or denies gout.  
  
When was patient's last Mammogram   
screening? 2022  
  
Last Colonoscopy: none  
  
Ruth Chaudhari LPN  
  
  
Electronically signed by Ruth Chaudhari LPN at 2022 8:25 AM   
EDTdocumented in this encounter         [facility]  
   
                                                    2022 Miscellaneous   
Notes                                     
  
  
Formatting of this note might be   
different from the original.  
LOV and TSH labs with order faxed   
to [facility].  
  
  
Electronically signed by Susan Huff at 2022 10:49 AM   
EDTdocumented in this encounter         [facility]  
   
                                                    2022 Miscellaneous   
Notes                                     
  
  
Formatting of this note might be   
different from the original.  
Endo consult placed.  
  
Jenna Chavarria MD  
  
  
Electronically signed by Jenna Chavarria MD at 2022 11:57   
AM EDT  
  
  
Formatting of this note might be   
different from the original.  
Called patient to obtain reason   
for endo referral.  
She wants 2nd opinion regarding   
thyroid.  
C/o weight gain and hair loss-   
wants endo opinion.  
Still keeping OV with [facility] re   
thyromegaly.  
Please advise if referral can be   
placed and faxed to number below.  
  
Ruth Oates RN  
  
  
  
Electronically signed by Ruth Oates RN at 2022 10:56   
AM EDT  
  
  
Formatting of this note might be   
different from the original.  
Robert Palacios is calling   
Jenna Chavarria MD today to   
request Referral Request   
(endocrinologist Dr. Ruiz fax   
number [phone])please send   
referral and all recent lab   
results to provider so that she   
will see patient. She is an   
outside provider. And patient   
would like this done as soon as   
possible. Patient has been   
identified by name and birthdate.  
Duration of symptoms: N/A  
Person calling: self  
Call patient at: at home  
[phone] (home) [phone]   
(cell)  
  
Was an appointment scheduled: No  
  
Closing statement:  
Results or non-symptom based   
questions: Thank you for calling   
[facility], your call will   
be returned within the next   
business day.  
  
Anne Sexton Pss  
  
  
  
Electronically signed by Anne Sexton Pss at 2022 10:11 AM   
EDTdocumented in this encounter         [facility]  
   
                                                    2022 Miscellaneous   
Notes                                     
  
  
Formatting of this note might be   
different from the original.  
May 13, 2022  
  
PID: [id number] oRbert Palacios  
[address] [place] [address]  
  
Dear Ms. Salty Palacios,  
  
We are pleased to inform you that   
the results of your recent breast   
imaging exam on 2022 are   
normal.  
  
Your mammogram demonstrates that   
you have dense breast tissue,   
which could hide abnormalities.   
Dense breast tissue, in and of   
itself, is a relatively common   
condition. Therefore, this   
information is not provided to   
cause undue concern; rather, it   
is to raise your awareness and   
promote discussion with your   
health care provider regarding   
the presence of dense breast   
tissue in addition to other risk   
factors.  
  
Early detection of cancer is very   
important. We also understand   
recommendations regarding breast   
cancer screening are   
controversial. Please discuss   
with your primary care provider   
which strategy is best for you   
and whether a mammogram is right   
for you.  
  
Your imaging studies and report   
will be kept on file at [facility] as part of your permanent   
medical record and are available   
for your continuing care.  
  
Thank you for allowing us to help   
in meeting your health care   
needs.  
  
Sincerely,  
  
Dr. Cristobal  
Interpreting Radiologist  
[facility]  
  
(Normal over 40)  
  
  
Electronically signed by   
Mammography Coordinator at   
2022 10:31 AM EDTdocumented   
in this encounter                       [facility]  
   
                                        2022 Procedure note   
  
  
Formatting of this note might be   
different from the original.  
Radiology Service Progress Note  
  
PATIENT NAME: Robert Palacios  
MRN: [id number]  
  
DATE OF SERVICE: May 13, 2022  
TIME: 10:02 AM  
PATIENT IDENTITY VERIFICATION   
COMPLETED USING TWO (2)   
IDENTIFIERS: Name and Date of   
Birth confirmed by patient   
verbally.  
FALL SCREENING: Has the patient   
had 2 falls in the last year or 1   
fall with injury or currently   
using an Ambulatory Assistive   
Device (Walker, Cane, Wheelchair,   
Crutches, etc.)? No  
PATIENT GENDER DATA: Female.   
Pregnancy status: Pregnant: No   
Breastfeeding status: NO.  
PATIENT RELEVANT IMPLANT DATA   
REVIEWED: Not Applicable  
  
RADIOLOGY DEPARTMENT: Mammography  
  
PERIPHERAL IV DATA: Not   
applicable  
  
SIGNED BY: Jaspal Dukes  
May 13, 2022 10:02 AM  
  
  
  
  
Electronically signed by Jaspal Dukes at 2022   
10:03 AM EDTdocumented in this   
encounter                               [facility]  
   
                                                    2022 Miscellaneous   
Notes                                     
  
  
Formatting of this note is   
different from the original.  
Pharmacy verified in Epic  
  
Patient has been identified by   
name and date of birth: Yes  
Patient aware RX will be sent to   
pharmacy. No need to notify   
patient.  
  
Pharmacy phones for refill(s):  
  
Pending Prescriptions Disp   
Refills  
OMEPRAZOLE 40 MG CAPSULE,DELAYED   
RELEASE 60 capsule 1  
Sig: take 1 capsule by mouth   
twice a day  
HAN: Yes  
  
  
  
Date of last office visit :   
2022  
  
Date of next office visit : Visit   
date not found  
  
Last 2 Encounter Wt Readings:  
Date: Wt:  
2022 81.2 kg (179 lb)  
2021 75.8 kg (167 lb)  
  
Not applicable  
  
Please advise.  
  
Katlin Freed  
  
  
  
Electronically signed by Katlin Freed at 2022 11:10 AM   
EDTdocumented in this encounter         [facility]  
   
                                                    2022 Miscellaneous   
Notes                                     
  
  
Formatting of this note might be   
different from the original.  
Menopausal range on the hormones,   
estroven would be reasonable to   
try  
  
Jenna Chavarria MD  
  
  
Electronically signed by Jenna Chavarria MD at 2022 1:07   
PM EDTdocumented in this   
encounter                               [facility]  
   
                                                    2022 History of   
Present illness Narrative                 
  
  
Formatting of this note is   
different from the original.  
CHIEF COMPLAINT  
Patient presents with:  
Menopause Consult: Started a few   
months ago  
  
HISTORY OF PRESENT ILLNESS  
Robert Palacios is a 42 year   
old female who presents here   
today for evaluation of   
menopausal symptoms. I last saw   
this patient on 21.  
  
Menopausal symptoms  
Patient has been getting hot   
flashes, gaining weight, and has   
an irritable mood.  
Patient had 4 pregnancies and 3   
deliveries  
She had a hysterectomy in   
Patient has been exercising daily   
and eating healthy and has not   
been losing weight.  
She is currently taking Lyrica   
for pain  
  
Health Maintenance  
Due for diabetic foot exam.  
Due for dilated retinal exam.  
Due for routine pap testing.  
Due for one time HIV screening.  
Due for urine albumin  
Due for mammogram.  
Due for routine labs.  
  
Labs reviewed.  
Past medical history,   
appointments, medications,   
allergies reviewed.  
  
REVIEW OF SYSTEMS  
Pertinent positives/ negatives:  
General: Feels well, no fever, no   
chills, +weight gain.  
HEENT: No sinus congestion,   
earache, sore throat.  
Cardiac: No chest pain,   
palpitations  
Resp: No cough, wheeze, shortness   
of breath  
GI: No reflux symptoms, food   
intolerance, bowel changes.  
: No urinary frequency,   
dysuria.  
MS: No pain or joint complaints.  
GYN: +menopausal symptoms  
  
PAST MEDICAL HISTORY  
PAST MEDICAL HISTORY  
Diagnosis Date  
Anxiety 2014  
Anxiety and depression  
COPD, mild (HCC) 2014  
Depression 2014  
Fibroadenosis of breast  
Fibromyalgia  
GERD (gastroesophageal reflux   
disease) 2014  
HTN (hypertension) 2014  
Hyperlipidemia 2014  
Lump or mass in breast 3/9/2009  
Malignant neoplasm of cervix   
uteri, unspecified site  
Cervical cancer  
NO SHOW 3/2/2007  
With primary care and specialty   
offices.  
PMH - PAST MEDICAL HISTORY OF  
diabetes  
Raynaud disease  
Raynaud disease  
Restless legs  
Rheumatoid arthritis (HCC)  
RSD lower limb  
bilateral legs  
Smoker 2014  
Tachycardia 2014  
Possible SVT  
Type II or unspecified type   
diabetes mellitus without mention   
of complication, uncontrolled   
2005  
Diet controlled.  
  
PHYSICAL EXAMINATION  
/74   Pulse 81   Temp 36.5   
C (97.7 F)   Ht 167.6 cm (5' 6 )   
  Wt 81.2 kg (179 lb)   LMP (LMP   
Unknown)   BMI 28.89 kg/m  
General: Alert, well developed,   
well nourished, no distress,   
pleasant and cooperative. Obese.  
Heart: Regular rate and rhythm.   
Normal S1 and S2. No murmurs,   
rubs, or gallops.  
Lungs: Clear to auscultation   
bilaterally. No respiratory   
distress. No wheezes, rales, or   
rhonchi.  
Abdomen: Soft, non-tender, no   
distention.  
Extremities: Feet/ankles without   
edema, posterior tibial pulses   
full and symmetrical.  
  
Data Reviewed  
21  
Lipid-  
Cholesterol, Total  
<200 mg/dL 212 High  
Comment: <200 mg/dL, Desirable  
200-239 mg/dL, Borderline high  
>239 mg/dL, High  
Triglyceride  
<150 mg/dL 168 High  
Comment: <150 mg/dL, Normal  
150-199 mg/dL, Borderline high  
200-499 mg/dL, High  
>499 mg/dL, Very high  
HDL Cholesterol  
>39 mg/dL 35 Low  
Comment: 40-59 mg/dL, Acceptable  
>59 mg/dL, High: Negative risk   
factor for coronary heart disease  
<40 mg/dL, Low: Positive risk   
factor for coronary heart disease  
LDL Cholesterol  
<100 mg/dL 143 High  
Comment: <100 mg/dL, Optimal  
100-129 mg/dL, Near optimal/above   
optimal  
130-159 mg/dL, Borderline high  
160-189 mg/dL, High  
>189 mg/dL, Very high  
Secondary prevention optimal LDL   
Cholesterol levels are   
recommended to be < 70  
mg/dL  
Non HDL Cholesterol  
<130 mg/dL 177 High  
Comment: <130 mg/dL, Optimal  
130-159 mg/dL, Near optimal/above   
optimal  
160-189 mg/dL, Borderline high  
190-219 mg/dL, High  
>219 mg/dL, Very high  
Secondary prevention optimal non   
HDL Cholesterol levels are   
recommended to be  
< 100 mg/dL  
Fasting Time  
hrs Unknown  
VLDL Cholesterol  
<30 mg/dL 34 High  
TC:HDL Ratio  
<5.10 6.06 High  
LDL:HDL Ratio  
<2.54 4.09 High  
  
Assessment/Plan  
  
(N95.1) Hot flashes due to   
menopause (primary encounter   
diagnosis)  
Comment: Patient gaining weight,   
feeling irritable and having hot   
flashes. Had hysterectomy  
Plan: FSH BLD, LUTEINIZING   
HORMONE, TSH BLD  
  
(E04.1) Thyroid nodule  
Comment: well controlled on   
current regimen. Patient having   
difficulty losing weight.  
Plan: US THYROID/PARATHYROID  
  
(I10) Essential hypertension  
Comment: well controlled in   
office  
Plan: COMP METABOLIC PANEL  
  
(E11.9) Diabetes mellitus type 2,   
diet-controlled (HCC)  
Comment: patient having difficult   
time losing weight  
Plan: COMP METABOLIC PANEL, HGB   
A1C  
  
(E78.2) Mixed hyperlipidemia  
Comment: due for labs  
Plan: LIPID PANEL BASIC  
  
No medications selected for   
refill.  
  
RTO: PRN  
  
Scribe Attestation:  
By signing my name below, IHannah, attest that this   
documentation has been prepared   
under the direction and in the   
presence of Cj Chavarria M.D.  
Electronically Signed: Kal Daniel. 2022   
8:38 AM  
  
Provider Attestation:  
IJenna MD, personally   
performed the services described   
in this documentation. All   
medical record entries made by   
the scribe were at my direction   
and in my presence. I have   
reviewed the chart and discharge   
instructions (if applicable) and   
agree that the record reflects my   
personal performance and is   
accurate and complete.   
Electronically Signed: Jenna Chavarria MD. 2022 2:31 PM  
  
  
  
  
Electronically signed by Jenna Chavarria MD at 2022 2:31   
PM EDTdocumented in this   
encounter                               [facility]  
   
                                                    2022 Miscellaneous   
Notes                                     
  
  
Formatting of this note might be   
different from the original.  
Patient informed of provider's   
message and verbalized   
understanding. She will go to   
express care if symptoms persist   
or worsen.  
  
  
Electronically signed by Alex Redman RN at 2022 4:02 PM   
EDT  
  
  
Formatting of this note might be   
different from the original.  
Her symptoms are consistent with   
a viral upper respiratory   
infection. Strep is highly   
unlikely when there is nasal   
congestion/drainage/cough present   
with the sore throat. If she   
would like to be evaluated in   
person to possibly be tested for   
strep/covid/flu, she may be seen   
in express care any day. I would   
recommend continued supportive   
care as most viral URI's last   
about 7-10 days, sometimes up to   
2 weeks.  
HELLEN Hinton.ALBERTA  
  
  
  
Electronically signed by Marilu Machuca APRN.CNP at 2022   
3:23 PM EDT  
  
  
Formatting of this note is   
different from the original.  
Robert Lima  
to Jenna Chavarria MD  
SS  
  
10:18 AM  
I have a sore throat x3 days now   
very noticeable swollen glands ,   
can I get an antibiotic in   
capsule form please it hurts to   
swallow? No other symptoms, warm   
compresses, cough drops, and hot   
tea so far please call   
[phone] for any questions  
[facility] on [address]   
[place]  
  
Spoke to patient who reports   
moderate sore throat x 5 days   
with swollen glands, white spots   
on tonsils, and other mild   
cold/viral symptoms which are   
improving slightly. Patient   
thinks she has strep throat and   
is requesting antibiotic.  
  
Patient advised to make an   
appointment in primary care for   
evaluation or go to express care   
if appointment is not available.  
Patient declined to be scheduled   
at this time. She has a   
previously scheduled appointment   
with PCP on .  
  
Care advice:  
Pain medications  
Drink plenty liquids  
Soft diet  
Call back if you become worse.  
  
Patient's preferred pharmacy is   
[facility] on [address] in   
[place].  
  
Reason for Disposition  
[1] Sore throat with cough/cold   
symptoms AND [2] present > 5 days  
  
Answer Assessment - Initial   
Assessment Questions  
1. ONSET: 3/30/22, gradually   
improving  
  
2. SEVERITY: Glands swollen on   
neck  
Pain rated at moderate and   
described as sore  
Decreased appetite, increased   
soreness with swallowing  
Fatigue  
  
3. STREP EXPOSURE:  
unknown  
  
4. VIRAL SYMPTOMS: Runny nose,   
nasal congestion, headache,   
fatigue  
  
5. FEVER: denies  
  
6. PUS ON THE TONSILS: White   
spots on tonsils  
  
7. OTHER SYMPTOMS: Denies   
difficulty breathing, rash  
  
Appt. Scheduled with PCP on   
Friday  
Thinks she has strep throat and   
is requesting antibiotic  
  
Advised to make appointment or   
use express care if one is not   
available , declined at this   
time.  
  
Protocols used: SORE   
THROAT-ADULT-AH  
  
  
  
  
Electronically signed by Alex Redman RN at 2022 2:51 PM   
EDTdocumented in this encounter         [facility]  
   
                                                    2022 Miscellaneous   
Notes                                     
  
  
Formatting of this note might be   
different from the original.  
See nurse triage encounter dated   
22  
  
  
Electronically signed by Alex Redman RN at 2022 2:52 PM   
EDTdocumented in this encounter         [facility]  
   
                                        Evaluation + Plan note   
  
  
No data available for this   
section                                 [facility]  
Work Phone:   
[phone]  
  
  
  
                                                    Evaluation note   
  
  
  
                                                    Diagnosis  
   
                                                      
  
  
Hot flashes due to menopause- Primary  
   
                                                      
  
  
Thyroid nodule  
  
  
Nontoxic uninodular goiter  
   
                                                      
  
  
Essential hypertension  
  
  
Unspecified essential hypertension  
   
                                                      
  
  
Diabetes mellitus type 2, diet-controlled (HCC)  
  
  
Type II or unspecified type diabetes mellitus without mention of complication, 
not   
stated as uncontrolled  
   
                                                      
  
  
Mixed hyperlipidemia  
  
documented in this encounter  
[facility]   
  
                                                    Diagnosis  
   
                                                      
  
  
Encounter for screening mammogram for breast cancer  
  
documented in this encounter  
[facility]   
  
                                                    Diagnosis  
   
                                                      
  
  
Thyroid nodule  
  
  
Nontoxic uninodular goiter  
  
documented in this encounter  
[facility]   
  
                                                    Diagnosis  
   
                                                      
  
  
Thyromegaly- Primary  
  
  
Goiter, unspecified  
   
                                                      
  
  
Hair loss  
  
  
Alopecia, unspecified  
  
documented in this encounter  
[facility]   
  
                                                    Diagnosis  
   
                                                      
  
  
Oral phase dysphagia- Primary  
  
  
Dysphagia, oral phase  
   
                                                      
  
  
Thyromegaly  
  
  
Goiter, unspecified  
  
documented in this encounter  
[facility]   
  
                                                    Diagnosis  
   
                                                      
  
  
Abnormal weight gain- Primary  
  
documented in this encounter  
[facility]   
  
                                                    Diagnosis  
   
                                                      
  
  
Essential hypertension- Primary  
  
  
Unspecified essential hypertension  
   
                                                      
  
  
Mixed hyperlipidemia  
  
documented in this encounter  
[facility]   
  
                                                    Diagnosis  
   
                                                      
  
  
Weight gain- Primary  
  
  
Abnormal weight gain  
   
                                                      
  
  
Diabetes mellitus type 2, diet-controlled (HCC)  
  
  
Type II or unspecified type diabetes mellitus without mention of complication, 
not   
stated as uncontrolled  
   
                                                      
  
  
Snoring  
  
  
Other dyspnea and respiratory abnormality  
   
                                                      
  
  
Menopause  
  
  
Symptomatic menopausal or female climacteric states  
  
documented in this encounter  
[facility]   
  
                                                    Diagnosis  
   
                                                      
  
  
Type 2 diabetes mellitus without complication, without long-term current use of   
insulin (HCC)  
  
documented in this encounter  
[facility] note*   
  
                                                    Diagnosis  
   
                                                      
  
  
Diabetes mellitus type 2, diet-controlled (HCC)- Primary  
  
  
Type II or unspecified type diabetes mellitus without mention of complication, 
not   
stated as uncontrolled  
  
documented in this encounter  
[facility] note*   
  
                                                    Diagnosis  
   
                                                      
  
  
Mixed hyperlipidemia  
  
documented in this encounter  
[facility] note*   
  
                                                    Diagnosis  
   
                                                      
  
  
Mixed hyperlipidemia  
  
documented in this encounter  
[facility] note*   
  
                                                    Diagnosis  
   
                                                      
  
  
Mixed hyperlipidemia  
  
documented in this encounter  
[facility] note*   
  
                                                    Diagnosis  
   
                                                      
  
  
Essential hypertension  
  
  
Unspecified essential hypertension  
  
documented in this encounter  
[facility] note*   
  
                                                    Diagnosis  
   
                                                      
  
  
Environmental and seasonal allergies  
   
                                                      
  
  
Mixed hyperlipidemia  
  
documented in this encounter  
[facility] note*   
  
                                                    Diagnosis  
   
                                                      
  
  
Adjustment disorder with anxious mood- Primary  
  
  
Adjustment disorder with anxiety  
   
                                                      
  
  
Folliculitis  
  
  
Other specified disease of hair and hair follicles  
  
documented in this encounter  
[facility] note*   
  
                                                    Diagnosis  
   
                                                      
  
  
Mid sternal chest pain- Primary  
  
  
Precordial pain  
   
                                                      
  
  
Vapes nicotine containing substance  
   
                                                      
  
  
Adjustment disorder with mixed anxiety and depressed mood  
   
                                                      
  
  
Vitamin B 12 deficiency  
  
  
Other B-complex deficiencies  
   
                                                      
  
  
Screening for lipid disorders  
   
                                                      
  
  
Eczema, unspecified type  
  
documented in this encounter  
[facility] note*   
  
                                                    Diagnosis  
   
                                                      
  
  
Environmental and seasonal allergies  
  
documented in this encounter  
[facility] note*   
  
                                                    Diagnosis  
   
                                                      
  
  
Viral upper respiratory tract infection with cough- Primary  
  
  
Acute upper respiratory infections of unspecified site  
  
documented in this encounter  
[facility] note*   
  
                                                    Diagnosis  
   
                                                      
  
  
Sore throat- Primary  
  
  
Acute pharyngitis  
   
                                                      
  
  
Viral pharyngitis  
  
  
Acute pharyngitis  
   
                                                      
  
  
Acute bacterial conjunctivitis of left eye  
  
documented in this encounter  
[facility] note*   
  
                                                    Diagnosis  
   
                                                      
  
  
Essential hypertension  
  
  
Unspecified essential hypertension  
   
                                                      
  
  
Mixed hyperlipidemia  
  
documented in this encounter  
[facility] note*   
  
                                                    Diagnosis  
   
                                                      
  
  
Encounter for screening mammogram for breast cancer  
  
documented in this encounter  
[facility] note*   
  
                                                    Diagnosis  
   
                                                      
  
  
Mixed hyperlipidemia  
  
documented in this encounter  
[facility] note*   
  
                                                    Diagnosis  
   
                                                      
  
  
Essential hypertension- Primary  
  
  
Unspecified essential hypertension  
   
                                                      
  
  
Mixed hyperlipidemia  
   
                                                      
  
  
Type 2 diabetes mellitus without complication, without long-term current use of   
insulin (HCC)  
  
documented in this encounter  
[facility] note*   
  
                                                    Diagnosis  
   
                                                      
  
  
Encounter to establish care- Primary  
  
  
Other reasons for seeking consultation  
   
                                                      
  
  
Chronic midline low back pain with bilateral sciatica  
   
                                                      
  
  
Fibromyalgia  
  
  
Mylagia and myositis, unspecified  
   
                                                      
  
  
Diabetes mellitus type 2, diet-controlled (HCC)  
  
  
Type II or unspecified type diabetes mellitus without mention of complication, 
not   
stated as uncontrolled  
   
                                                      
  
  
COPD, mild (HCC)  
  
  
Chronic airway obstruction, not elsewhere classified  
   
                                                      
  
  
Essential hypertension  
  
  
Unspecified essential hypertension  
   
                                                      
  
  
Malignant neoplasm of cervix, unspecified site (HCC)  
   
                                                      
  
  
Mixed hyperlipidemia  
   
                                                      
  
  
Chronic insomnia  
  
  
Insomnia, unspecified  
   
                                                      
  
  
Tobacco abuse  
  
  
Tobacco use disorder  
  
documented in this encounter  
[facility] note*   
  
                                                    Diagnosis  
   
                                                      
  
  
Essential hypertension  
  
  
Unspecified essential hypertension  
  
documented in this encounter  
[facility]   
  
                                                    Diagnosis  
   
                                                      
  
  
Mixed hyperlipidemia  
  
documented in this encounter  
[facility] note*   
  
                                                    Diagnosis  
   
                                                      
  
  
Mixed hyperlipidemia  
  
documented in this encounter  
[facility] note*   
  
                                                    Diagnosis  
   
                                                      
  
  
Essential hypertension  
  
  
Unspecified essential hypertension  
  
documented in this encounter  
[facility] note*   
  
                                                    Diagnosis  
   
                                                      
  
  
Diabetes mellitus type 2, diet-controlled (HCC)- Primary  
  
  
Type II or unspecified type diabetes mellitus without mention of complication, 
not   
stated as uncontrolled  
  
documented in this encounter  
[facility] note*   
  
                                                    Diagnosis  
   
                                                      
  
  
Treatment not available- Primary  
  
  
Procedure not carried out for other reasons  
  
documented in this encounter  
[facility] note*   
  
                                                    Diagnosis  
   
                                                      
  
  
Rhinosinusitis- Primary  
  
  
Unspecified sinusitis (chronic)  
   
                                                      
  
  
Acute suppurative otitis media of left ear without spontaneous rupture of 
tympanic   
membrane, recurrence not specified  
  
documented in this encounter  
[facility] note*   
  
                                                    Diagnosis  
   
                                                      
  
  
Palpitation- Primary  
  
  
Palpitations  
  
documented in this encounter  
[facility] note*   
  
                                                    Diagnosis  
   
                                                      
  
  
Sinobronchitis- Primary  
  
  
Unspecified sinusitis (chronic)  
   
                                                      
  
  
Other acute nonsuppurative otitis media of left ear, recurrence not specified  
  
documented in this encounter  
[facility] note*   
  
                                                    Diagnosis  
   
                                                      
  
  
Environmental and seasonal allergies  
  
documented in this encounter  
[facility] note*   
  
                                                    Diagnosis  
   
                                                      
  
  
Chronic ankle pain, bilateral- Primary  
   
                                                      
  
  
Chronic foot pain, left  
   
                                                      
  
  
Rheumatoid arthritis of multiple sites with negative rheumatoid factor (HCC)  
  
documented in this encounter  
[facility] note*   
  
                                                    Diagnosis  
   
                                                      
  
  
Chronic ankle pain, bilateral  
   
                                                      
  
  
Chronic foot pain, left  
  
documented in this encounter  
[facility] note*   
  
                                                    Diagnosis  
   
                                                      
  
  
Breast pain, left  
  
  
Mastodynia  
   
                                                      
  
  
History of abnormal mammogram  
  
  
Other specified personal history presenting hazards to health  
   
                                                      
  
  
Mass of upper inner quadrant of left breast  
  
documented in this encounter  
[facility] note*   
  
                                                    Diagnosis  
   
                                                      
  
  
Breast pain, left  
  
  
Mastodynia  
   
                                                      
  
  
History of abnormal mammogram  
  
  
Other specified personal history presenting hazards to health  
   
                                                      
  
  
Mass of upper inner quadrant of left breast  
  
documented in this encounter  
[facility]   
  
                                                    Diagnosis  
   
                                                      
  
  
Breast pain, left- Primary  
  
  
Mastodynia  
   
                                                      
  
  
Mass of upper inner quadrant of left breast  
   
                                                      
  
  
History of abnormal mammogram  
  
  
Other specified personal history presenting hazards to health  
   
                                                      
  
  
SOB (shortness of breath)  
  
  
Shortness of breath  
   
                                                      
  
  
Fatigue, unspecified type  
   
                                                      
  
  
COPD, mild (HCC)  
  
  
Chronic airway obstruction, not elsewhere classified  
  
documented in this encounter  
[facility] note*   
  
                                                    Diagnosis  
   
                                                      
  
  
Abnormal findings on diagnostic imaging of breast- Primary  
  
  
Other (abnormal) findings on radiological examination of breast  
   
                                                      
  
  
Breast pain, left  
  
  
Mastodynia  
   
                                                      
  
  
History of abnormal mammogram  
  
  
Other specified personal history presenting hazards to health  
   
                                                      
  
  
Mass of upper inner quadrant of left breast  
  
documented in this encounter  
[facility]   
  
                                                    Diagnosis  
   
                                                      
  
  
Vertigo- Primary  
  
  
Dizziness and giddiness  
   
                                                      
  
  
BENY (generalized anxiety disorder)  
  
  
Generalized anxiety disorder  
   
                                                      
  
  
Palpitations  
   
                                                      
  
  
Sensation of chest tightness  
   
                                                      
  
  
Abnormal Holter exam  
  
  
Nonspecific abnormal electrocardiogram (ECG) (EKG)  
   
                                                      
  
  
Essential hypertension  
  
  
Unspecified essential hypertension  
   
                                                      
  
  
Mixed hyperlipidemia  
   
                                                      
  
  
Primary insomnia  
  
  
Persistent disorder of initiating or maintaining sleep  
   
                                                      
  
  
Multiple joint pain  
  
  
Pain in joint, multiple sites  
   
                                                      
  
  
Vitamin B12 deficiency  
  
  
Other B-complex deficiencies  
   
                                                      
  
  
Diabetes mellitus type 2, diet-controlled (HCC)  
  
  
Type II or unspecified type diabetes mellitus without mention of complication, 
not   
stated as uncontrolled  
   
                                                      
  
  
Screening for HIV (human immunodeficiency virus)  
  
  
Special screening examination for other specified viral diseases  
   
                                                      
  
  
Immunity status testing  
  
  
Antibody response examination  
  
documented in this encounter  
[facility]   
  
                                                    Diagnosis  
   
                                                      
  
  
Recurrent infections- Primary  
  
  
Unspecified infectious and parasitic diseases  
   
                                                      
  
  
Inflammatory arthritis  
  
  
Unspecified inflammatory polyarthropathy  
   
                                                      
  
  
Multiple joint pain  
  
  
Pain in joint, multiple sites  
   
                                                      
  
  
Seasonal allergic rhinitis due to pollen  
  
documented in this encounter  
[facility]   
  
                                                    Diagnosis  
   
                                                      
  
  
SOB (shortness of breath)  
  
  
Shortness of breath  
   
                                                      
  
  
COPD, mild (HCC)  
  
  
Chronic airway obstruction, not elsewhere classified  
  
documented in this encounter  
[facility]   
  
                                                    Diagnosis  
   
                                                      
  
  
Malaise and fatigue- Primary  
  
  
Other malaise and fatigue  
   
                                                      
  
  
Weight loss, unintentional  
  
  
Loss of weight  
  
documented in this encounter  
[facility]   
  
                                                    Diagnosis  
   
                                                      
  
  
Vertigo  
  
  
Dizziness and giddiness  
   
                                                      
  
  
SOB (shortness of breath)  
  
  
Shortness of breath  
   
                                                      
  
  
Palpitations  
   
                                                      
  
  
Facial asymmetry  
  
  
Congenital musculoskeletal deformities of skull, face, and jaw  
  
documented in this encounter  
[facility]   
  
                                                    Diagnosis  
   
                                                      
  
  
Encounter for screening mammogram for breast cancer  
  
documented in this encounter  
[facility]   
  
                                                    Diagnosis  
   
                                                      
  
  
Chronic obstructive pulmonary disease, unspecified COPD type (HCC)- Primary  
   
                                                      
  
  
COPD, mild (HCC)  
  
  
Chronic airway obstruction, not elsewhere classified  
   
                                                      
  
  
Smoker  
  
  
Tobacco use disorder  
  
documented in this encounter  
[facility]   
  
                                                    Diagnosis  
   
                                                      
  
  
Thyromegaly  
  
  
Goiter, unspecified  
   
                                                      
  
  
Fatigue, unspecified type  
  
documented in this encounter  
[facility]   
  
                                                    Diagnosis  
   
                                                      
  
  
Mass of upper inner quadrant of left breast- Primary  
   
                                                      
  
  
Breast pain, left  
  
  
Mastodynia  
   
                                                      
  
  
History of abnormal mammogram  
  
  
Other specified personal history presenting hazards to health  
   
                                                      
  
  
Breast pain, left  
  
  
Mastodynia  
   
                                                      
  
  
History of abnormal mammogram  
  
  
Other specified personal history presenting hazards to health  
   
                                                      
  
  
Mass of upper inner quadrant of left breast  
  
documented in this encounter  
[facility] note*   
  
                                                    Diagnosis  
   
                                                      
  
  
Wellness examination- Primary  
   
                                                      
  
  
Environmental and seasonal allergies  
   
                                                      
  
  
Acute otitis media, left  
  
  
Unspecified otitis media  
   
                                                      
  
  
Thyromegaly  
  
  
Goiter, unspecified  
   
                                                      
  
  
Fatigue, unspecified type  
   
                                                      
  
  
Multiple joint pain  
  
  
Pain in joint, multiple sites  
   
                                                      
  
  
SOB (shortness of breath)  
  
  
Shortness of breath  
   
                                                      
  
  
Breast pain, left  
  
  
Mastodynia  
   
                                                      
  
  
Thyromegaly  
  
  
Goiter, unspecified  
   
                                                      
  
  
Fatigue, unspecified type  
  
documented in this encounter  
[facility] note*   
  
                                                    Diagnosis  
   
                                                      
  
  
Inflammatory arthritis  
  
  
Unspecified inflammatory polyarthropathy  
  
documented in this encounter  
[facility] note*   
  
                                                    Diagnosis  
   
                                                      
  
  
Chronic obstructive pulmonary disease, unspecified COPD type (HCC)  
  
documented in this encounter  
[facility] note*   
  
                                                    Diagnosis  
   
                                                      
  
  
Chronic obstructive pulmonary disease, unspecified COPD type (HCC)  
  
documented in this encounter  
[facility] note*   
  
                                                    Diagnosis  
   
                                                      
  
  
Rheumatoid arthritis of multiple sites with negative rheumatoid factor (HCC)- 
Primary  
   
                                                      
  
  
Chronic pain syndrome  
   
                                                      
  
  
COPD, mild (HCC)  
  
  
Chronic airway obstruction, not elsewhere classified  
   
                                                      
  
  
Breast pain, left  
  
  
Mastodynia  
   
                                                      
  
  
Abnormal findings on diagnostic imaging of breast  
  
  
Other (abnormal) findings on radiological examination of breast  
   
                                                      
  
  
Generalized anxiety disorder with panic attacks  
   
                                                      
  
  
Encounter for immunization  
  
  
Need for other specified prophylactic vaccination against single bacterial 
disease  
   
                                                      
  
  
Screening for cervical cancer  
  
  
Screening for malignant neoplasm of the cervix  
  
documented in this encounter  
[facility] note*   
  
                                                    Diagnosis  
   
                                                      
  
  
Generalized anxiety disorder with panic attacks- Primary  
   
                                                      
  
  
Multiple joint pain  
  
  
Pain in joint, multiple sites  
   
                                                      
  
  
SOB (shortness of breath)  
  
  
Shortness of breath  
   
                                                      
  
  
Acute diarrhea  
  
  
Diarrhea  
  
documented in this encounter  
[facility] note*   
  
                                                    Diagnosis  
   
                                                      
  
  
Seronegative rheumatoid arthritis (HCC)- Primary  
  
  
Rheumatoid arthritis  
  
documented in this encounter  
[facility] note*   
  
                                                    Diagnosis  
   
                                                      
  
  
Peripheral vertigo of both ears- Primary  
   
                                                      
  
  
Encounter for follow-up examination  
   
                                                      
  
  
Eustachian tube dysfunction, bilateral  
  
documented in this encounter  
[facility] note*   
  
                                                    Diagnosis  
   
                                                      
  
  
Chest pain, unspecified type- Primary  
  
documented in this encounter  
[facility] note*   
  
                                                    Diagnosis  
   
                                                      
  
  
Influenza A- Primary  
  
  
Influenza with other respiratory manifestations  
   
                                                      
  
  
Chest discomfort  
  
  
Other chest pain  
   
                                                      
  
  
Gastroesophageal reflux disease without esophagitis  
  
  
Esophageal reflux  
   
                                                      
  
  
Dysphagia, unspecified type  
  
documented in this encounter  
[facility] Discharge instructions  
  
No data available for this section  
  
[facility]  
Work Phone: [phone] note  
  
No data available for this section  
  
[facility]  
Work Phone: [phone] for referral (narrative)* Diagnostic Procedure 
  Only (Routine) - Authorized  
  
                          Specialty    Diagnoses / Procedures Referred By Contac  
t Referred To Contact  
   
                                        [facility]            
  
  
Diagnoses  
  
  
Thyroid nodule  
  
  
  
Procedures  
  
  
US THYROID/PARATHYROID  
  
  
US SOFT TISSUE HEAD & NECK   
REAL TIME IMGE Jenna Gibson MD  
  
  
[address] [place] [address]  
  
  
Phone: [phone]  
  
  
Fax: [phone]                         
  
  
[facility]  
  
  
  
                          Referral ID  Status       Reason       Start   
Date                                    Expiration   
Date                                    Visits   
Requested                               Visits   
Authorized  
   
                                [id number]        Authorized        
  
  
Auto-Generat  
ed Referral     2022        1               1  
  
  
  
  
Electronically signed by Jenna Chavarria MD at 2022 10:56 AM T  
  
  
[facility] for referral (narrative)* Diagnostic Procedure Only 
  (Routine) - Closed  
  
                          Specialty    Diagnoses / Procedures Referred By Contac  
t Referred To Contact  
   
                                        [facility]            
  
  
Diagnoses  
  
  
Encounter for screening   
mammogram for breast cancer  
  
  
  
Procedures  
  
  
ALYSON SCREENING  
  
  
SCREENING MAMMOGRAPHY BI   
2-VIEW BREAST INC CAD                     
  
  
Jenna Chavarria MD  
  
  
[address] [place] [address]  
  
  
Phone: [phone]  
  
  
Fax: [phone]                         
  
  
[facility]  
  
  
[address] [place]   
[address]  
  
  
  
                    Referral ID Status    Reason    Start Date Expiration Date V  
isits   
Requested                               Visits   
Authorized  
   
                                [id number]        Closed            
  
  
Auto-Generate  
d Referral      2021       1               1  
  
  
  
  
Electronically signed by Jenna Chavarria MD at 2022 9:30 AM [facility] for referral (narrative)* Diagnostic Procedure Only 
  (Routine) - Closed  
  
                          Specialty    Diagnoses / Procedures Referred By Contac  
t Referred To Contact  
   
                                        [facility]            
  
  
Diagnoses  
  
  
Thyroid nodule  
  
  
  
Procedures  
  
  
US THYROID/PARATHYROID  
  
  
US SOFT TISSUE HEAD & NECK   
REAL TIME IMGE Jenna Gibson MD  
  
  
[address] [place] [address]  
  
  
Phone: [phone]  
  
  
Fax: [phone]                         
  
  
[facility]  
  
  
  
                    Referral ID Status    Reason    Start Date Expiration Date V  
isits   
Requested                               Visits   
Authorized  
   
                                [id number]        Closed            
  
  
Auto-Generate  
d Referral      2022        1               1  
  
  
  
  
Electronically signed by Jenna Chavarria MD at 2022 9:06 AM [facility] for referral (narrative)* Outpatient Procedure (Routine) 
  - Additional Clinical Info Needed  
  
                          Specialty    Diagnoses / Procedures Referred By Contac  
t Referred To Contact  
   
                                                    [facility]   
[facility]                                 
  
  
Diagnoses  
  
  
Mid sternal chest pain  
  
  
Vapes nicotine   
containing substance  
  
  
  
Procedures  
  
  
STRESS ECHO TREADMILL  
  
  
ECHO TTHRC R-T 2D W/WO   
M-MODE COMPLETE REST&ST                   
  
  
Jenna Chavarria MD  
  
  
[address] [place] [address]  
  
  
Phone: [phone]  
  
  
Fax: [phone]                         
  
  
[address] [address]  
  
  
  
                          Referral ID  Status       Reason       Start   
Date                                    Expiration   
Date                                    Visits   
Requested                               Visits   
Authorized  
   
                                        [id number]            Additional   
Clinical Info   
Needed                                    
  
  
Auto-Generat  
ed Referral     2023       1               1  
  
  
  
  
Electronically signed by Jenna Chavarria MD at 2023 1:26 PM EST  
  
  
* Outpatient Procedure (Routine) - Pending Review  
  
                          Specialty    Diagnoses / Procedures Referred By Contac  
brooklynn Referred To Contact  
   
                                                    [facility] VASCULAR   
[facility]                                 
  
  
Diagnoses  
  
  
Mid sternal chest pain  
  
  
  
Procedures  
  
  
ECG COMPLETE  
  
  
ECG ROUTINE ECG W/LEAST   
12 LDS W/I&R                              
  
  
Jenna Chavarria MD  
  
  
[address] [place] [address]  
  
  
Phone: [phone]  
  
  
Fax: [phone]                         
  
  
[address] [address]  
  
  
  
                          Referral ID  Status       Reason       Start   
Date                                    Expiration   
Date                                    Visits   
Requested                               Visits   
Authorized  
   
                                        [id number]            Pending   
Review                                    
  
  
Auto-Generat  
ed Referral     2023       1               1  
  
  
  
  
Electronically signed by Jenna Chavarria MD at 2023 1:12 PM EST  
  
  
[facility] for referral (narrative)* Diagnostic Procedure Only 
  (Routine) - Pending Review  
  
                          Specialty    Diagnoses / Procedures Referred By Contac  
t Referred To Contact  
   
                                        [facility]            
  
  
Diagnoses  
  
  
Encounter for screening   
mammogram for breast cancer  
  
  
  
Procedures  
  
  
ALYSON SCREENING  
  
  
SCREENING MAMMOGRAPHY BI   
2-VIEW BREAST INC CAD                     
  
  
Jenna Chavarria MD  
  
  
[address] [place] [address]  
  
  
Phone: [phone]  
  
  
Fax: [phone]                         
  
  
[facility]  
  
  
[address] [place] [address]   
[address]  
  
  
  
                          Referral ID  Status       Reason       Start   
Date                                    Expiration   
Date                                    Visits   
Requested                               Visits   
Authorized  
   
                                        [id number]            Pending   
Review                                    
  
  
Auto-Generat  
ed Referral     2023       1               1  
  
  
  
  
Electronically signed by Jenna Chavarria MD at 2023 8:27 AM EDT  
  
  
[facility] for referral (narrative)* Diagnostic Procedure Only 
  (Routine) - Closed  
  
                          Specialty    Diagnoses / Procedures Referred By Contac  
t Referred To Contact  
   
                                        [facility]            
  
  
Diagnoses  
  
  
Breast pain, left  
  
  
History of abnormal   
mammogram  
  
  
Mass of upper inner   
quadrant of left breast  
  
  
  
Procedures  
  
  
US BREAST LTD LEFT  
  
  
US BREAST UNI REAL TIME   
WITH IMAGE LIMITED                        
  
  
Juliane Murphy APRN.CNP  
  
  
[address] [place] [address]  
  
  
Phone: [phone]  
  
  
Fax: [phone]                         
  
  
[facility]  
  
  
[address] [place] [address]  
  
  
  
                    Referral ID Status    Reason    Start Date Expiration Date V  
isits   
Requested                               Visits   
Authorized  
   
                                [id number]        Closed            
  
  
Auto-Generate  
d Referral      2024       1               1  
  
  
  
  
Electronically signed by Juliane MORRISSEYCNP at 2024 8:38 AM EDT  
  
  
[facility] for referral (narrative)* Outpatient Procedure (Routine) 
  - New Request  
  
                          Specialty    Diagnoses / Procedures Referred By Contac  
t Referred To Contact  
   
                                        [facility]   
  
  
Diagnoses  
  
  
SOB (shortness of   
breath)  
  
  
COPD, mild (HCC)  
  
  
  
Procedures  
  
  
SPIROMETRY - BASELINE   
AND POST DILATOR  
  
  
BRNCDILAT RSPSE SPMTRY   
PRE&POST-BRNCDILAT ADMN                   
  
  
Juliane Murphy APRN.CNP  
  
  
[address] [place] [address]  
  
  
Phone: [phone]  
  
  
Fax: [phone]                         
  
  
Res[facility]   
[place]  
  
  
[address] [place] [address]  
  
  
  
                          Referral ID  Status       Reason       Start   
Date                                    Expiration   
Date                                    Visits   
Requested                               Visits   
Authorized  
   
                                [id number]        New Request       
  
  
Auto-Generat  
ed Referral     2024       1               1  
  
  
  
  
Electronically signed by Juliane MORRISSEYCNP at 2024 11:16 AM EDT  
  
  
* Consult, Test, Treat (Routine) - Authorized  
  
                          Specialty    Diagnoses / Procedures Referred By Contac  
t Referred To Contact  
   
                                        Breast Diseases       
  
  
Diagnoses  
  
  
Breast pain, left  
  
  
History of abnormal   
mammogram  
  
  
Mass of upper inner   
quadrant of left breast  
  
  
  
Procedures  
  
  
CONSULT TO BREAST CENTER  
  
  
OFFICE/OUTPATIENT NEW HIGH   
MDM 60 MINUTES                            
  
  
Moses Madsen MD  
  
  
[address] [place]  
  
  
[address] [address]  
  
  
Phone: [phone]                       
  
  
  
  
  
                          Referral ID  Status       Reason       Start   
Date                                    Expiration   
Date                                    Visits   
Requested                               Visits   
Authorized  
   
                                [id number]        Authorized        
  
  
PCP Requested   
Referral        2024       1               1  
  
  
  
  
Electronically signed by Juliane Murphy APRN.CNP at 2024 11:16 AM EDT  
  
  
[facility] for referral (narrative)* Diagnostic Procedure Only 
  (Routine) - Authorized  
  
                          Specialty    Diagnoses / Procedures Referred By Contac  
t Referred To Contact  
   
                                        [facility]            
  
  
Diagnoses  
  
  
Abnormal findings on   
diagnostic imaging of   
breast  
  
  
  
Procedures  
  
  
US BREAST LTD LEFT  
  
  
US BREAST UNI REAL TIME   
WITH IMAGE LIMITED                        
  
  
Pascual Austin MD  
  
  
[address] [facility] [facility] [place] [address]  
  
  
Phone: [phone]  
  
  
Fax: [phone]                         
  
  
[facility]  
  
  
[address] [place] [address]  
  
  
  
                          Referral ID  Status       Reason       Start   
Date                                    Expiration   
Date                                    Visits   
Requested                               Visits   
Authorized  
   
                                [id number]        Authorized        
  
  
Auto-Generat  
ed Referral     2025       1               1  
  
  
  
  
Electronically signed by Pascual Austin MD at 2024 11:57 AM EDT  
  
  
* Diagnostic Procedure Only (Routine) - New Request  
  
                          Specialty    Diagnoses / Procedures Referred By Contac  
t Referred To Contact  
   
                                        [facility]            
  
  
Diagnoses  
  
  
Abnormal findings on   
diagnostic imaging of   
breast  
  
  
  
Procedures  
  
  
US BREAST LTD LEFT  
  
  
US BREAST UNI REAL TIME   
WITH IMAGE LIMITED                        
  
  
Pascual Austin MD  
  
  
[address]  
  
  
[place] [address]  
  
  
Phone: [phone]  
  
  
Fax: [phone]                         
  
  
[facility]  
  
  
[address] [place] [address]  
  
  
  
                          Referral ID  Status       Reason       Start   
Date                                    Expiration   
Date                                    Visits   
Requested                               Visits   
Authorized  
   
                                [id number]        New Request       
  
  
Auto-Generat  
ed Referral     2025       1               1  
  
  
  
  
Electronically signed by Pascual Austin MD at 2024 11:57 AM EDT  
  
  
[facility] for referral (narrative)* Diagnostic Procedure Only 
  (Routine) - Authorized  
  
                          Specialty    Diagnoses / Procedures Referred By Contac  
t Referred To Contact  
   
                                        [facility]            
  
  
Diagnoses  
  
  
Inflammatory arthritis  
  
  
  
Procedures  
  
  
US HAND/WRIST SYNOVIAL   
SCREEN LEFT  
  
  
US COMPL JOINT R-T W/IMAGE   
DOCUMENTATION                             
  
  
Reginald Pedroza MD  
  
  
[address] [place] [address]  
  
  
Phone: [phone]  
  
  
Fax: [phone]                         
  
  
[facility]  
  
  
[place] [address]  
  
  
  
                          Referral ID  Status       Reason       Start   
Date                                    Expiration   
Date                                    Visits   
Requested                               Visits   
Authorized  
   
                                [id number]        Authorized        
  
  
Auto-Generat  
ed Referral     2024        10/4/2025       1               1  
  
  
  
  
Electronically signed by Reginald Pedroza MD at 2024 9:15 AM EDT  
  
  
* Diagnostic Procedure Only (Routine) - Authorized  
  
                          Specialty    Diagnoses / Procedures Referred By Contac  
t Referred To Contact  
   
                                        [facility]            
  
  
Diagnoses  
  
  
Inflammatory arthritis  
  
  
  
Procedures  
  
  
US HAND/WRIST SYNOVIAL   
SCREEN RIGHT  
  
  
US COMPL JOINT R-T W/IMAGE   
DOCUMENTATION                             
  
  
Reginald Pedroza MD  
  
  
[address] [place] [address]  
  
  
Phone: [phone]  
  
  
Fax: [phone]                         
  
  
[facility]  
  
  
[place] [address]  
  
  
  
                          Referral ID  Status       Reason       Start   
Date                                    Expiration   
Date                                    Visits   
Requested                               Visits   
Authorized  
   
                                [id number]        Authorized        
  
  
Auto-Generat  
ed Referral     2024        10/4/2025       1               1  
  
  
  
  
Electronically signed by Reginald Pedroza MD at 2024 9:15 AM EDT  
  
  
[facility] for referral (narrative)* Outpatient Procedure (Urgent) -
   Denied  
  
                          Specialty    Diagnoses / Procedures Referred By Contac  
t Referred To Contact  
   
                                                    [facility]   
INSTITUTE                                 
  
  
Diagnoses  
  
  
Vertigo  
  
  
SOB (shortness of   
breath)  
  
  
Palpitations  
  
  
Facial asymmetry  
  
  
  
Procedures  
  
  
ECHO  
  
  
ECHO TTHRC R-T 2D   
W/WOM-MODE COMPL   
SPEC&COLR D                               
  
  
Juliane Murphy   
APRN.CNP  
  
  
[address] [place] [address]  
  
  
Phone: [phone]  
  
  
Fax: [phone]                         
  
  
[facility]  
  
  
[address] [place] [address]  
  
  
  
                    Referral ID Status    Reason    Start Date Expiration Date V  
isits   
Requested                               Visits   
Authorized  
   
                                [id number]        Denied            
  
  
Auto-Generat  
ed Referral  
  
  
Patient   
Cleared -   
Admin/Chairm  
an/Director   
advise to   
proceed or   
did not   
respond         2023       1               0  
  
  
  
  
Electronically signed by Juliane MACEDO.CNP at 2023 9:45 AM EST  
  
  
[facility] for referral (narrative)* Diagnostic Procedure Only 
  (Routine) - Closed  
  
                          Specialty    Diagnoses / Procedures Referred By Contac  
t Referred To Contact  
   
                                        [facility]            
  
  
Diagnoses  
  
  
Encounter for screening   
mammogram for breast cancer  
  
  
  
Procedures  
  
  
ALYSON SCREENING  
  
  
SCREENING MAMMOGRAPHY BI   
2-VIEW BREAST INC CAD                     
  
  
Jenna Chavarria MD  
  
  
[address] [place] [address] [place] [address]  
  
  
Phone: [phone]  
  
  
Fax: [phone]                         
  
  
[facility]  
  
  
[address] [place]   
[address]  
  
  
  
                    Referral ID Status    Reason    Start Date Expiration Date V  
isits   
Requested                               Visits   
Authorized  
   
                                [id number]        Closed            
  
  
Auto-Generate  
d Referral      2023       1               1  
  
  
  
  
Electronically signed by Jenna Chavarria MD at 2023 12:47 PM EDT  
  
  
[facility] for referral (narrative)* Diagnostic Procedure Only 
  (Routine) - Closed  
  
                          Specialty    Diagnoses / Procedures Referred By Contac  
t Referred To Contact  
   
                                        [facility]            
  
  
Diagnoses  
  
  
Thyromegaly  
  
  
Fatigue, unspecified type  
  
  
  
Procedures  
  
  
US THYROID/PARATHYROID  
  
  
US SOFT TISSUE HEAD & NECK   
REAL TIME IMGE DOCM                       
  
  
Juliane Murphy APRN.CNP  
  
  
[address] [place] [address]  
  
  
Phone: [phone]  
  
  
Fax: [phone]                         
  
  
[facility]  
  
  
[place] [address]  
  
  
  
                    Referral ID Status    Reason    Start Date Expiration Date V  
isits   
Requested                               Visits   
Authorized  
   
                                [id number]        Closed            
  
  
Auto-Generate  
d Referral      2024       1               1  
  
  
  
  
Electronically signed by Juliane MORRISSEYCNP at 2024 12:02 PM EDT  
  
  
[facility] for referral (narrative)* Diagnostic Procedure Only 
  (Routine) - Closed  
  
                          Specialty    Diagnoses / Procedures Referred By Contac  
t Referred To Contact  
   
                                        [facility]            
  
  
Diagnoses  
  
  
Breast pain, left  
  
  
History of abnormal   
mammogram  
  
  
Mass of upper inner   
quadrant of left breast  
  
  
  
Procedures  
  
  
US BREAST LTD RIGHT  
  
  
US BREAST UNI REAL TIME   
WITH IMAGE LIMITED                        
  
  
Juliane Murphy APRN.CNP  
  
  
[address] [place] [address]  
  
  
Phone: [phone]  
  
  
Fax: [phone]                         
  
  
[facility]  
  
  
[address] [place] [address]  
  
  
  
                    Referral ID Status    Reason    Start Date Expiration Date V  
isits   
Requested                               Visits   
Authorized  
   
                                [id number]        Closed            
  
  
Auto-Generate  
d Referral      2024       1               1  
  
  
  
  
Electronically signed by Juliane MORRISSEYCNP at 2024 2:57 PM EDT  
  
  
* Diagnostic Procedure Only (Routine) - Closed  
  
                          Specialty    Diagnoses / Procedures Referred By Contac  
t Referred To Contact  
   
                                        [facility]            
  
  
Diagnoses  
  
  
Breast pain, left  
  
  
History of abnormal   
mammogram  
  
  
Mass of upper inner   
quadrant of left breast  
  
  
  
Procedures  
  
  
US BREAST LTD LEFT  
  
  
US BREAST UNI REAL TIME   
WITH IMAGE LIMITED                        
  
  
Juliane Murphy APRN.CNP  
  
  
[address] [place] [address]  
  
  
Phone: [phone]  
  
  
Fax: [phone]                         
  
  
[facility]  
  
  
[address]  
  
  
[place] [address]  
  
  
  
                    Referral ID Status    Reason    Start Date Expiration Date V  
isits   
Requested                               Visits   
Authorized  
   
                                [id number]        Closed            
  
  
Auto-Generate  
d Referral      2024       1               1  
  
  
  
  
Electronically signed by Juliane Murphy APRN.CNP at 2024 2:57 PM EDT  
  
  
[facility] for referral (narrative)* Diagnostic Procedure Only 
  (Routine) - Closed  
  
                          Specialty    Diagnoses / Procedures Referred By Contac  
t Referred To Contact  
   
                                        [facility]            
  
  
Diagnoses  
  
  
Thyromegaly  
  
  
Fatigue, unspecified type  
  
  
  
Procedures  
  
  
US THYROID/PARATHYROID  
  
  
US SOFT TISSUE HEAD & NECK   
REAL TIME IMGE DOCM                       
  
  
Juliane Murphy,   
APRN.CNP  
  
  
[address] [place] [address]  
  
  
Phone: [phone]  
  
  
Fax: [phone]                         
  
  
[facility]  
  
  
[place] [address]  
  
  
  
                    Referral ID Status    Reason    Start Date Expiration Date V  
isits   
Requested                               Visits   
Authorized  
   
                                [id number]        Closed            
  
  
Auto-Generate  
d Referral      2024       1               1  
  
  
  
  
Electronically signed by Juliane Murphy APRN.CNP at 2024 2:22 PM EDT  
  
  
[facility] for referral (narrative)* Diagnostic Procedure Only 
  (Routine) - Closed  
  
                          Specialty    Diagnoses / Procedures Referred By Contac  
t Referred To Contact  
   
                                        [facility]            
  
  
Diagnoses  
  
  
Inflammatory arthritis  
  
  
  
Procedures  
  
  
US HAND/WRIST SYNOVIAL   
SCREEN LEFT  
  
  
US COMPL JOINT R-T W/IMAGE   
DOCUMENTATION                             
  
  
Reginald Pedroza MD  
  
  
[address]  
  
  
[place] [address]  
  
  
Phone: [phone]  
  
  
Fax: [phone]                         
  
  
[facility]  
  
  
[place] [address]  
  
  
  
                    Referral ID Status    Reason    Start Date Expiration Date V  
isits   
Requested                               Visits   
Authorized  
   
                                [id number]        Closed            
  
  
Auto-Generate  
d Referral      2024        10/4/2025       1               1  
  
  
  
  
Electronically signed by Reginald Pedroza MD at 10/21/2024 2:34 PM EDT  
  
  
* Diagnostic Procedure Only (Routine) - Closed  
  
                          Specialty    Diagnoses / Procedures Referred By Contac  
t Referred To Contact  
   
                                        [facility]            
  
  
Diagnoses  
  
  
Inflammatory arthritis  
  
  
  
Procedures  
  
  
US HAND/WRIST SYNOVIAL   
SCREEN RIGHT  
  
  
US COMPL JOINT R-T W/IMAGE   
DOCUMENTATION                             
  
  
Reginald Pedroza MD  
  
  
[address]  
  
  
[place] [address]  
  
  
Phone: [phone]  
  
  
Fax: [phone]                         
  
  
[facility]  
  
  
[place] [address]  
  
  
  
                    Referral ID Status    Reason    Start Date Expiration Date V  
isits   
Requested                               Visits   
Authorized  
   
                                [id number]        Closed            
  
  
Auto-Generate  
d Referral      2024        10/4/2025       1               1  
  
  
  
  
Electronically signed by Reginald Pedroza MD at 10/21/2024 2:34 PM EDT  
  
  
[facility] for visit Narrative* Diagnostic Procedure Only (Routine) 
  - Closed  
  
                          Specialty    Diagnoses / Procedures Referred By Contac  
t Referred To Contact  
   
                                        [facility]            
  
  
Diagnoses  
  
  
Thyroid nodule  
  
  
  
Procedures  
  
  
US THYROID/PARATHYROID  
  
  
US SOFT TISSUE HEAD & NECK   
REAL TIME IMGE Jenna Gibson MD  
  
  
[address] [place] [address]  
  
  
Phone: [phone]  
  
  
Fax: [phone]                         
  
  
[facility]  
  
  
  
                    Referral ID Status    Reason    Start Date Expiration Date V  
isits   
Requested                               Visits   
Authorized  
   
                                [id number]        Closed            
  
  
Auto-Generate  
d Referral      2022        1               1  
  
  
  
[facility] for visit Narrative* Diagnostic Procedure Only (Routine) 
  - Closed  
  
                          Specialty    Diagnoses / Procedures Referred By Contac  
t Referred To Contact  
   
                                        [facility]            
  
  
Diagnoses  
  
  
Breast pain, left  
  
  
History of abnormal   
mammogram  
  
  
Mass of upper inner   
quadrant of left breast  
  
  
  
Procedures  
  
  
US BREAST LTD LEFT  
  
  
US BREAST UNI REAL TIME   
WITH IMAGE LIMITED                        
  
  
Juliane Murphy   
APRELI.CNP  
  
  
[address] [place] [address]  
  
  
Phone: [phone]  
  
  
Fax: [phone]                         
  
  
[facility]  
  
  
[address] [place] [address]  
  
  
  
                    Referral ID Status    Reason    Start Date Expiration Date V  
isits   
Requested                               Visits   
Authorized  
   
                                [id number]        Closed            
  
  
Auto-Generate  
d Referral      2024       1               1  
  
  
  
[facility] for visit Narrative* Diagnostic Procedure Only (Routine) 
  - Closed  
  
                          Specialty    Diagnoses / Procedures Referred By Contac  
 Referred To Contact  
   
                                        [facility]            
  
  
Diagnoses  
  
  
Breast pain, left  
  
  
History of abnormal   
mammogram  
  
  
Mass of upper inner   
quadrant of left breast  
  
  
  
Procedures  
  
  
ALYSON DIAGNOSTIC BILATERAL  
  
  
DIAGNOSTIC MAMMOGRAPHY   
COMPUTER-AIDED DETCJ BI                   
  
  
Juliane Murphy   
APRELI.CNP  
  
  
[address] [place] [address]  
  
  
Phone: [phone]  
  
  
Fax: [phone]                         
  
  
[facility]  
  
  
[address] [place]   
[address]  
  
  
  
                    Referral ID Status    Reason    Start Date Expiration Date V  
isits   
Requested                               Visits   
Authorized  
   
                                [id number]        Closed            
  
  
Auto-Generate  
d Referral      2024       2025       1               1  
  
  
  
[facility] for visit Narrative* Outpatient Procedure (Urgent) - 
  Denied  
  
                          Specialty    Diagnoses / Procedures Referred By Contac  
t Referred To Contact  
   
                                                    [facility]                                 
  
  
Diagnoses  
  
  
Vertigo  
  
  
SOB (shortness of   
breath)  
  
  
Palpitations  
  
  
Facial asymmetry  
  
  
  
Procedures  
  
  
ECHO  
  
  
ECHO TTHRC R-T 2D   
W/WOM-MODE COMPL   
SPEC&COLR D                               
  
  
Juliane Murphy,   
APRN.CNP  
  
  
[address] [place] [address]  
  
  
Phone: [phone]  
  
  
Fax: [phone]                         
  
  
[facility]   
[place]  
  
  
[address] [place] [address]  
  
  
  
                    Referral ID Status    Reason    Start Date Expiration Date V  
isits   
Requested                               Visits   
Authorized  
   
                                [id number]        Denied            
  
  
Auto-Generat  
ed Referral  
  
  
Patient   
Cleared -   
Admin/Chairm  
an/Director   
advise to   
proceed or   
did not   
respond         2023       1               0  
  
  
  
[facility] for visit Narrative* Diagnostic Procedure Only (Routine) 
  - Closed  
  
                          Specialty    Diagnoses / Procedures Referred By Contac  
t Referred To Contact  
   
                                        [facility]            
  
  
Diagnoses  
  
  
Encounter for screening   
mammogram for breast cancer  
  
  
  
Procedures  
  
  
ALYSON SCREENING  
  
  
SCREENING MAMMOGRAPHY BI   
2-VIEW BREAST INC CAD                     
  
  
Jenna Chavarria MD  
  
  
[address] [place] [address]  
  
  
Phone: [phone]  
  
  
Fax: [phone]                         
  
  
[facility]  
  
  
[address] [place]   
[address]  
  
  
  
                    Referral ID Status    Reason    Start Date Expiration Date V  
isits   
Requested                               Visits   
Authorized  
   
                                [id number]        Closed            
  
  
Auto-Generate  
d Referral      2023       1               1  
  
  
  
[facility] for visit Narrative* Diagnostic Procedure Only (Routine) 
  - Closed  
  
                          Specialty    Diagnoses / Procedures Referred By Contac  
 Referred To Contact  
   
                                        [facility]            
  
  
Diagnoses  
  
  
Thyromegaly  
  
  
Fatigue, unspecified type  
  
  
  
Procedures  
  
  
US THYROID/PARATHYROID  
  
  
US SOFT TISSUE HEAD & NECK   
REAL TIME IMGE DOCM                       
  
  
Juliane Murphy,   
APRELI.CNP  
  
  
[address] [place] [address]  
  
  
Phone: [phone]  
  
  
Fax: [phone]                         
  
  
[facility]  
  
  
[place] [address]  
  
  
  
                    Referral ID Status    Reason    Start Date Expiration Date V  
isits   
Requested                               Visits   
Authorized  
   
                                [id number]        Closed            
  
  
Auto-Generate  
d Referral      2024       1               1  
  
  
  
[facility] for visit Narrative* Diagnostic Procedure Only (Routine) 
  - Closed  
  
                          Specialty    Diagnoses / Procedures Referred By Contac  
t Referred To Contact  
   
                                        [facility]            
  
  
Diagnoses  
  
  
Inflammatory arthritis  
  
  
  
Procedures  
  
  
US HAND/WRIST SYNOVIAL   
SCREEN LEFT  
  
  
US COMPL JOINT R-T W/IMAGE   
DOCUMENTATION                             
  
  
Reginald Pedroza MD  
  
  
[address]  
  
  
[place] [address]  
  
  
Phone: [phone]  
  
  
Fax: [phone]                         
  
  
[facility]  
  
  
[place] [address]  
  
  
  
                    Referral ID Status    Reason    Start Date Expiration Date V  
isits   
Requested                               Visits   
Authorized  
   
                                [id number]        Closed            
  
  
Auto-Generate  
d Referral      2024        10/4/2025       1               1  
  
  
  
[facility]  
  
Summary Purpose  
  
  
                                                      
  
  
  
Family History  
No Family History Records FoundNo Family History Records FoundNo Family History 
Records FoundNo Family History Records Found  
  
No data available for this section  
  
No Family History Records FoundNo Family History Records FoundNo Family History 
Records FoundNo Family History Records FoundNo Family History Records Found  
  
Advance Directives  
No Advanced Directives Records FoundDocuments on File  
  
                          Type         Date Recorded Patient Representative Expl  
anation  
   
                          Advance Directives and Living Will                      
         
   
                          Power of                              
  
                                Documents on File  
  
                          Type         Date Recorded Patient Representative Expl  
anation  
   
                          ACP-Advance Directive                             
   
                          ACP-Power of                              
  
                                Documents on File  
  
                          Type         Date Recorded Patient Representative Expl  
anation  
   
                          ACP-Advance Directive                             
   
                          ACP-Power of                              
  
                                Documents on File  
  
                          Type         Date Recorded Patient Representative Expl  
anation  
   
                          Advance Directive(s) 10/1/2018 11:05 AM                
  
                                Documents on File  
  
                          Type         Date Recorded Patient Representative Expl  
anation  
   
                          Advance Directive(s) 10/1/2018 11:05 AM                
  
  
  
Discharge Instructions  
* Instructions*   
  
Kari Davis MD - 2020  
  
  
  
Keep wounds clean and dry  
Keep dressing for 1 week  
Sponge Bath for 1 week  
Activities as tolerated  
  
  
  
  
documented in this encounter* Instructions*   
  
Kari Davis MD - 2020  
  
  
  
Keep wounds clean and dry  
Keep dressing for 1 week  
Sponge Bath for 1 week  
Activities as tolerated  
  
  
  
  
* Attachments  
  
The following attachments cannot be sent through [facility] Everywhere.  
  
* Coronavirus Disease (COVID-19): General Info (English)  
  
documented in this encounter  
  
History of Present Illness  
* Flory Rocha RN - 2020 2:40 PM EST  
  
Disch instr have been reviewed with patient, her  and daughters. All 
verbalize understanding. Assessment unchanged. Taking snack & coffee very well. 
Pain is well tolerated d/t her chronic pain status. Smiling, in good spirits. 
Anxious to go home, as they reside approx 1 1/2 hours away. Condition stable.  
  
  
  
Electronically signed by Flory Rocha RN at 2020 2:46 PM EST  
  
  
* Cindy Granados RN - 2020 2:06 PM EST  
  
OT 85. Pain rated a 5. Instructed will give pain po meds for ride home. Agrees 
and voices understanding  
  
  
  
Electronically signed by Cindy SAVAGE RN at 2020 2:07 PM EST  
  
  
* Cindy Granados RN - 2020 1:49 PM EST  
  
Pt arouses. Rates pain a 9. Dressing clean dry and intact left buttock area  
  
  
  
Electronically signed by Cindy SAVAGE RN at 2020 1:50 PM EST  
  
  
* Marli Lee RN - 2020 2:19 PM EST  
  
PAT phone call complete. Pt to bring remote control for spinal cord stimulator 
on dos.  
  
  
  
Electronically signed by Marli Lee RN at 2020 2:19 PM EST  
  
  
documented in this encounter* Flory Rocha RN - 2020 4:53 PM EDT  
  
Discharge instructions were reviewed with patient by RUDY Rae R.N. Patient 
verbalized understanding. Awake, A & O. Anxious to go home, as they live 1 1/2 
hours away. Condition is stable. Will proceed with discharge.  
  
  
  
Electronically signed by Flory Rocha RN at 2020 4:54 PM EDT  
  
  
* Cindy Granados RN - 2020 4:11 PM EDT  
  
Pt req pain med. See MAR. VSS and back dressings clean dry and intact  
  
  
  
Electronically signed by Cindy SAVAGE RN at 2020 4:12 PM EDT  
  
  
* Cindy Granados RN - 2020 4:01 PM EDT  
  
[facility] SCS programmed  
  
  
  
Electronically signed by Cindy SAVAGE RN at 2020 4:02 PM EDT  
  
  
* Soren, Katrin, RN - 2020 3:23 PM EDT  
  
  
[facility]:  
[facility]  
[id number]  
40 y.o.  
1980  
BOVIE PAD SITE CLEAR AND INTACT PRE AND POST OP. TAKEN TO PACU,  
ATTACHED TO MONITOR AND REPORT GIVEN TO RN.  
VSS DRSG DRY AND INTACT  
  
Electronically signed by Katrin Doty RN on 2020  
  
  
  
Electronically signed by Katrin Doty RN at 2020 3:51 PM EDT  
  
  
documented in this encounter  
  
Reason for Referral  
  
  
                          Status       Reason       Specialty    Diagnoses /   
Procedures                              Referred By   
Contact                                 Referred To   
Contact  
   
                          Pending Review              Radiology      
  
  
Diagnoses  
  
  
Pain  
  
  
  
Procedures  
  
  
Fluoro For Surgical   
Procedures                                
  
  
Kari Davis MD  
  
  
[address] [place] [address]  
  
  
Phone:   
[phone]  
  
  
Fax: [phone]                         
  
  
  
  
  
                          Specialty    Diagnoses / Procedures Referred By Contac  
t Referred To Contact  
   
                                        Endocrinology         
  
  
Diagnoses  
  
  
Thyromegaly  
  
  
Hair loss  
  
  
  
Procedures  
  
  
CONSULT TO ENDOCRINOLOGY  
  
  
OFFICE/OUTPATIENT [facility] 60-74 MINUTES                         
  
  
Jenna Chavarria MD  
  
  
[address] [place] [address] [place] [address]  
  
  
Phone: [phone]  
  
  
Fax: [phone]                         
  
  
  
  
  
                          Referral ID  Status       Reason       Start   
Date                                    Expiration   
Date                                    Visits   
Requested                               Visits   
Authorized  
   
                                [id number]        Authorized        
  
  
PCP Requested   
Referral        2022       1               1  
  
  
  
                          Specialty    Diagnoses / Procedures Referred By Contac  
t Referred To Contact  
   
                                        Endocrinology         
  
  
Diagnoses  
  
  
Abnormal weight gain  
  
  
  
Procedures  
  
  
CONSULT TO ENDOCRINOLOGY  
  
  
OFFICE/OUTPATIENT [facility] 60-74 MINUTES                         
  
  
Jenna Chavarria MD  
  
  
[address] [place] [address] [place] [address]  
  
  
Phone: [phone]  
  
  
Fax: [phone]                         
  
  
  
  
  
                          Referral ID  Status       Reason       Start   
Date                                    Expiration   
Date                                    Visits   
Requested                               Visits   
Authorized  
   
                                [id number]        Authorized        
  
  
PCP Requested   
Referral        2022        1               1  
  
  
  
                          Specialty    Diagnoses / Procedures Referred By Contac  
t Referred To Contact  
   
                                                              
  
  
Diagnoses  
  
  
Type 2 diabetes mellitus   
without complication, without   
long-term current use of   
insulin (HCC)  
                                          
  
  
Jenna Chavarria MD  
  
  
[address] [place] [address]  
  
  
Phone: [phone]  
  
  
Fax: [phone]                         
  
  
  
  
  
                Referral ID Status  Reason  Start Date Expiration Date Visits Re  
quested Visits   
Authorized  
   
                [id number] Closed                          1       1  
  
  
  
                          Specialty    Diagnoses / Procedures Referred By Contac  
t Referred To Contact  
   
                                        Pain Management       
  
  
Diagnoses  
  
  
Chronic midline low back   
pain with bilateral   
sciatica  
  
  
Fibromyalgia  
  
  
  
Procedures  
  
  
CONSULT TO PAIN MGT  
  
  
OFFICE/OUTPATIENT [facility] 60-74 MINUTES                         
  
  
Juliane Murphy,   
APRN.CNP  
  
  
[address] [place] [address]  
  
  
Phone: [phone]  
  
  
Fax: [phone]                         
  
  
  
  
  
                          Referral ID  Status       Reason       Start   
Date                                    Expiration   
Date                                    Visits   
Requested                               Visits   
Authorized  
   
                                [id number]        Authorized        
  
  
PCP Requested   
Referral        10/4/2023       10/3/2024       1               1  
  
  
  
                          Specialty    Diagnoses / Procedures Referred By Contac  
t Referred To Contact  
   
                                        Podiatry              
  
  
Diagnoses  
  
  
Chronic ankle pain, bilateral  
  
  
Chronic foot pain, left  
  
  
  
Procedures  
  
  
CONSULT TO PODIATRY  
  
  
OFFICE/OUTPATIENT [facility] 60 MINUTES                            
  
  
Juliane Murphy APRN.CNP  
  
  
[address] [place] [address]  
  
  
Phone: [phone]  
  
  
Fax: [phone]                         
  
  
  
  
  
                          Referral ID  Status       Reason       Start   
Date                                    Expiration   
Date                                    Visits   
Requested                               Visits   
Authorized  
   
                                [id number]        Authorized        
  
  
PCP Requested   
Referral        3/19/2024       3/19/2025       1               1  
  
  
  
                          Specialty    Diagnoses / Procedures Referred By Contac  
t Referred To Contact  
   
                                                    Cardiology /   
CARDIOLOGY                                
  
  
Diagnoses  
  
  
Essential hypertension  
  
  
Sensation of chest   
tightness  
  
  
Abnormal Holter exam  
  
  
Palpitations  
  
  
Mixed hyperlipidemia  
  
  
  
Procedures  
  
  
CONSULT TO CARDIOLOGY                     
  
  
Juliane Murphy APRN.CNP  
  
  
[address] [place] [address]  
  
  
Phone: [phone]  
  
  
Fax: [phone]                         
  
  
Rohan Solis MD  
  
  
[address] [place] [address]  
  
  
  
                          Referral ID  Status       Reason       Start   
Date                                    Expiration   
Date                                    Visits   
Requested                               Visits   
Authorized  
   
                                        [id number]            Ref Not   
Required                                  
  
  
PCP Requested   
Referral        2024       1               1  
  
  
  
                          Specialty    Diagnoses / Procedures Referred By Contac  
t Referred To Contact  
   
                                        Rheumatology          
  
  
Diagnoses  
  
  
Multiple joint pain  
  
  
  
Procedures  
  
  
CONSULT TO RHEUM/IMMUN   
DISEASE  
  
  
CONSULT TO RHEUM/IMMUN   
DISEASE  
  
  
OFFICE/OUTPATIENT NEW [facility] 60 MINUTES                            
  
  
Juliane Murphy APRN.CNP  
  
  
[address] [place] [address]  
  
  
Phone: [phone]  
  
  
Fax: [phone]                         
  
  
  
  
  
                          Referral ID  Status       Reason       Start   
Date                                    Expiration   
Date                                    Visits   
Requested                               Visits   
Authorized  
   
                                [id number]        Authorized        
  
  
PCP Requested   
Referral        2024       1               1  
  
  
  
                          Specialty    Diagnoses / Procedures Referred By Contac  
t Referred To Contact  
   
                                                              
  
  
Diagnoses  
  
  
Malaise and fatigue  
  
  
Weight loss, unintentional  
  
  
  
Procedures  
  
  
CONSULT TO   
HEMATOLOGY/ONCOLOGY  
  
  
CONSULT TO   
HEMATOLOGY/ONCOLOGY  
  
  
OFFICE/OUTPATIENT NEW [facility] 60 MINUTES                            
  
  
Juliane Murphy APRN.CNP  
  
  
[address] [place] [address]  
  
  
Phone: [phone]  
  
  
Fax: [phone]                         
  
  
Karel Robertson, DO  
  
  
[address] [place] [address]  
  
  
Phone: [phone]  
  
  
Fax: [phone]  
  
  
  
                          Referral ID  Status       Reason       Start   
Date                                    Expiration   
Date                                    Visits   
Requested                               Visits   
Authorized  
   
                                [id number]        Authorized        
  
  
PCP Requested   
Referral        9/10/2024       9/10/2025       1               1  
  
  
  
                          Specialty    Diagnoses / Procedures Referred By Contac  
t Referred To Contact  
   
                                                              
  
  
Diagnoses  
  
  
Chronic obstructive pulmonary   
disease, unspecified COPD   
type (HCC)  
                                          
  
  
Judie Delgado MD  
  
  
[address] [place] [address]  
  
  
[address]  
  
  
[place] [address]  
  
  
Phone: [phone]  
  
  
Fax: [phone]                         
  
  
  
  
  
                    Referral ID Status    Reason    Start Date Expiration Date V  
isits   
Requested                               Visits   
Authorized  
   
                                        [id number]            Pending   
Review                                              1            1  
  
  
  
                          Specialty    Diagnoses / Procedures Referred By Contac  
t Referred To Contact  
   
                                        [facility]   
  
  
Diagnoses  
  
  
Chronic obstructive   
pulmonary disease,   
unspecified COPD type   
(HCC)  
  
  
  
Procedures  
  
  
LUNG DIFFUSION CAPACITY   
(DLCO)  
  
  
DIFFUSING CAPACITY                        
  
  
Judie Delgado MD  
  
  
 [facility]  
  
  
[address]  
  
  
[place] [address]  
  
  
Phone: [phone]  
  
  
Fax: [phone]                         
  
  
Respiratory   
[place] [id number]  
  
  
  
                          Referral ID  Status       Reason       Start   
Date                                    Expiration   
Date                                    Visits   
Requested                               Visits   
Authorized  
   
                                [id number]        Authorized        
  
  
Auto-Generat  
ed Referral     2024       10/18/2025      1               1  
  
  
  
                          Specialty    Diagnoses / Procedures Referred By Contac  
t Referred To Contact  
   
                                        [facility]   
  
  
Diagnoses  
  
  
Chronic obstructive   
pulmonary disease,   
unspecified COPD type   
(HCC)  
  
  
  
Procedures  
  
  
LUNG VOLUMES                              
  
  
Judie Delgado MD  
  
  
 [facility]  
  
  
[address]  
  
  
[place] [address]  
  
  
Phone: [phone]  
  
  
Fax: [phone]                         
  
  
Respiratory   
[address] [address]  
  
  
  
                          Referral ID  Status       Reason       Start   
Date                                    Expiration   
Date                                    Visits   
Requested                               Visits   
Authorized  
   
                                        [id number]            Pending   
Review                                    
  
  
Auto-Generat  
ed Referral     2024       10/18/2025      1               1  
  
  
  
                          Specialty    Diagnoses / Procedures Referred By Contac  
t Referred To Contact  
   
                                        [facility]   
  
  
Diagnoses  
  
  
Chronic obstructive   
pulmonary disease,   
unspecified COPD type   
(HCC)  
  
  
  
Procedures  
  
  
SPIROMETRY - BASELINE   
AND POST DILATOR  
  
  
BRNCDILAT RSPSE SPMTRY   
PRE&POST-BRNCDILAT ADMN                   
  
  
Judie Delgado MD  
  
  
[address] [facility]  
  
  
[address]  
  
  
[place] [address]  
  
  
Phone: [phone]  
  
  
Fax: [phone]                         
  
  
Respiratory   
[place]  
  
  
[address] [place] [address]  
  
  
  
                          Referral ID  Status       Reason       Start   
Date                                    Expiration   
Date                                    Visits   
Requested                               Visits   
Authorized  
   
                                [id number]        Authorized        
  
  
Auto-Generat  
ed Referral     2024       10/18/2025      1               1  
  
  
  
                          Specialty    Diagnoses / Procedures Referred By Contac  
t Referred To Contact  
   
                                                    [facility]   
[facility]                               
  
  
Diagnoses  
  
  
Peripheral vertigo,   
unspecified laterality  
  
  
  
Procedures  
  
  
CONSULT TO PHYSICAL   
THERAPY  
  
  
PHYSICAL THERAPY   
[facility]PLE[facility]   
[address]                                   
  
  
Juliane Murphy APRN.CNP  
  
  
[address] [place] [address]  
  
  
Phone: [phone]  
  
  
Fax: [phone]                         
  
  
[facility]   
Therapy [address] [address]  
  
  
  
                          Referral ID  Status       Reason       Start   
Date                                    Expiration   
Date                                    Visits   
Requested                               Visits   
Authorized  
   
                                        [id number]            Pending   
Review                                    
  
  
Auto-Generat  
ed Referral                             2025          1                   1  
  
  
  
                          Specialty    Diagnoses / Procedures Referred By Contac  
t Referred To Contact  
   
                                        Gastroenterology      
  
  
Diagnoses  
  
  
Gastroesophageal reflux   
disease, unspecified   
whether esophagitis   
present  
  
  
Dysphagia, unspecified   
type  
  
  
  
Procedures  
  
  
CONSULT TO   
GASTROENTEROLOGY  
  
  
OFFICE/OUTPATIENT [facility]   
[facility]M 60 MINUTES                            
  
  
TemsiJuliane lisa   
APRELI.CNP  
  
  
[address] [place] [address]  
  
  
Phone: [phone]  
  
  
Fax: [phone]                         
  
  
  
  
  
                          Referral ID  Status       Reason       Start   
Date                                    Expiration   
Date                                    Visits   
Requested                               Visits   
Authorized  
   
                                [id number]        Authorized        
  
  
PCP Requested   
Referral        2025       1               1  
  
  
  
Assessments  
  
  
                                                    Diagnosis  
   
                                                      
  
  
Pain  
  
  
Generalized pain  
  
  
  
Additional Source Comments  
  
  
  
                                                    INFORMATION SOURCE (unrecogn  
ized section and content)  
   
                                          
  
  
  
                                        DATE CREATED        AUTHOR  
   
                                10/26/2018                      [facility]  
  
  
  
                                DATE CREATED    AUTHOR          AUTHOR'S ORGANIZ  
ATION  
   
                                2019                      [facility]  
  
  
  
                                DATE CREATED    AUTHOR          AUTHOR'S ORGANIZ  
ATION  
   
                                2020                      [facility]  
  
  
  
                                DATE CREATED    AUTHOR          AUTHOR'S ORGANIZ  
ATION  
   
                                2020                      [facility]  
  
  
  
                                DATE CREATED    AUTHOR          AUTHOR'S ORGANIZ  
ATION  
   
                                2024                      [facility]  
oundation ([place])  
  
  
  
                                DATE CREATED    AUTHOR          AUTHOR'S ORGANIZ  
ATION  
   
                                10/23/2024                      [facility]  
  
  
  
                                DATE CREATED    AUTHOR          AUTHOR'S ORGANIZ  
ATION  
   
                                2024                      [facility]  
  
  
  
                                DATE CREATED    AUTHOR          AUTHOR'S ORGANIZ  
ATION  
   
                                2025                      [facility]  
ELI  
  
  
  
                                DATE CREATED    AUTHOR          AUTHOR'S ORGANIZ  
ATION  
   
                                2025                      [facility]  
nter  
  
  
  
  
  
                                                    Reason for Visit (unrecogniz  
ed section and content)  
   
                                          
  
  
  
                    Status    Reason    Specialty Diagnoses / Procedures Referre  
d By Contact Referred To   
Contact  
   
                                                                   
  
  
Diagnoses  
  
  
Lower back pain  
  
  
LOW BACK PAIN  
  
  
  
Procedures  
  
  
MI IMPLANT   
NEUROSTIM/  
  
  
REVISION OF SPINAL   
CORD STIMULATOR,   
MAURICIORO, PAT AT [facility]                                     
  
  
Kari Davis MD  
  
  
[address]  
  
  
[place] [address]  
  
  
Phone: [phone]  
  
  
Fax: [phone]                         
  
  
[facility]  
  
  
Phone: [phone]  
  
  
  
                    Status    Reason    Specialty Diagnoses / Procedures Referre  
d By Contact Referred To   
Contact  
   
                                                                   
  
  
Diagnoses  
  
  
Low back pain  
  
  
COMPLEX REGIONAL PAIN   
SYNDROME  
  
  
  
Procedures  
  
  
MI REVISION SPINAL   
NEUROSTIM ELECTRODE   
PERC ARRAY, INCL   
FLUORO  
  
  
REVISION OF SCS LEADS   
(MAX) PAT ON ADMIT   
DR. DAVIS TO COVER H&P                    
  
  
Kari Davis MD  
  
  
[address]  
  
  
[place] [address]  
  
  
Phone: [phone]  
  
  
Fax: [phone]                         
  
  
[facility]  
  
  
Phone: [phone]  
  
  
  
                                        Reason              Comments  
   
                                        Nurse Triage Call   sore throat  
  
  
  
                                        Reason              Comments  
   
                                        Menopause Consult   Started a few months  
 ago  
  
  
  
                                        Reason              Comments  
   
                                        Refill Request        
  
  
  
                                        Reason              Comments  
   
                                        Radiology Mammogram   
  
  
  
                          Specialty    Diagnoses / Procedures Referred By Contac  
t Referred To Contact  
   
                                        [facility]            
  
  
Diagnoses  
  
  
Encounter for screening   
mammogram for breast cancer  
  
  
  
Procedures  
  
  
ALYSON SCREENING  
  
  
SCREENING MAMMOGRAPHY BI   
2-VIEW BREAST INC Jenna Pena MD  
  
  
[address] [facility] [address] [place] [address]  
  
  
Phone: [phone]  
  
  
Fax: [phone]                         
  
  
[facility]  
  
  
[address] [place]   
[address]  
  
  
  
                    Referral ID Status    Reason    Start Date Expiration Date V  
isits   
Requested                               Visits   
Authorized  
   
                                [id number]        Closed            
  
  
Auto-Generate  
d Referral      2021       1               1  
  
  
  
                                Reason          Onset Date      Comments  
   
                                Referral Request 2022      endocrinologist  
 Dr. Ruiz fax number [phone]  
  
  
  
                                        Reason              Comments  
   
                                        request for LOV and TSH labs from [facility]  
  
  
  
                                        Reason              Comments  
   
                                        Consult             thyromegaly,  
  
  
  
                          Specialty    Diagnoses / Procedures Referred By Contac  
t Referred To Contact  
   
                                        General Surgery       
  
  
Diagnoses  
  
  
Thyromegaly  
  
  
  
Procedures  
  
  
CONSULT TO GENERAL   
SURGERY  
  
  
OFFICE/OUTPATIENT [facility] MDM 60-74 MINUTES                    
  
  
Jenna Chavarria MD  
  
  
[address] [place] [address]  
  
  
Phone: [phone]  
  
  
Fax: [phone]                         
  
  
Peabody, Daniel P, MD  
  
  
[address] [place] [address]  
  
  
Phone: [phone]  
  
  
Fax: [phone]  
  
  
  
                    Referral ID Status    Reason    Start Date Expiration Date V  
isits   
Requested                               Visits   
Authorized  
   
                                [id number]        Closed            
  
  
PCP Requested   
Referral        2022       1               1  
  
  
  
                                        Reason              Comments  
   
                                        Release Of Medical Records request from   
[facility]  
  
  
  
                                Reason          Onset Date      Comments  
   
                                Refill Request  2022        
  
  
  
                                        Reason              Comments  
   
                                        Thyroid Problem       
   
                                        Obesity               
  
  
  
                                Reason          Onset Date      Comments  
   
                                Refill Request  2022        
  
  
  
                                Reason          Onset Date      Comments  
   
                                Refill Request  2022        
  
  
  
                                Reason          Onset Date      Comments  
   
                                Refill Request  2022        
  
  
  
                                Reason          Onset Date      Comments  
   
                                Refill Request  2022        
  
  
  
                                Reason          Onset Date      Comments  
   
                                Refill Request  2022        
  
  
  
                                        Reason              Comments  
   
                                        Rash                  
   
                                        Anxiety               
  
  
  
                                Reason          Onset Date      Comments  
   
                                Refill Request  2023        
  
  
  
                                Reason          Onset Date      Comments  
   
                                Refill Request  2023        
   
                                Refill Request  2023        
  
  
  
                                        Reason              Comments  
   
                                        chest pain and rash on leg Rash on leg s  
tarted 2 months ago, chest pain started   
3   
weeks ago  
  
  
  
                                Reason          Onset Date      Comments  
   
                                Refill Request  2023        
  
  
  
                                Reason          Onset Date      Comments  
   
                                Refill Request  2023        
  
  
  
                                Reason          Onset Date      Comments  
   
                                Refill Request  2023        
  
  
  
                                        Reason              Comments  
   
                                        Sinus Problem         
  
  
  
                                        Reason              Comments  
   
                                        Sore Throat         Congestion, fever, b  
ilateral eye irritationX 4 days  
  
  
  
                                        Reason              Comments  
   
                                        Nurse Triage Call   URI  
   
                                        URI                   
  
  
  
                                        Reason              Comments  
   
                                        Nurse Triage Call     
  
  
  
                                Reason          Onset Date      Comments  
   
                                Refill Request  2023        
  
  
  
                                        Reason              Comments  
   
                                        Medication Request  [facility]  
n 20 mg  
  
  
  
                                        Reason              Comments  
   
                                        Establish Care      New patient  
  
  
  
                                Reason          Onset Date      Comments  
   
                                Refill Request  10/06/2023        
  
  
  
                                Reason          Onset Date      Comments  
   
                                Refill Request  10/31/2023        
  
  
  
                                Reason          Onset Date      Comments  
   
                                Refill Request  2024        
  
  
  
                                        Reason              Comments  
   
                                        No Show               
  
  
  
                                        Reason              Comments  
   
                                        Mass                Swollen glands x 2 d  
ays  
  
  
  
                                        Reason              Comments  
   
                                        Results               
  
  
  
                                        Reason              Comments  
   
                                        Follow Up             
  
  
  
                                Reason          Onset Date      Comments  
   
                                Refill Request  2024        
  
  
  
                                        Reason              Comments  
   
                                        Podiatry referral to Michael Donovan DPM   
  
  
  
                                        Reason              Comments  
   
                                        Acute Visit           
  
  
  
                                        Reason              Comments  
   
                                        Referral Information cardiology  
  
  
  
                                        Reason              Comments  
   
                                        Follow Up             
  
  
  
                                        Reason              Comments  
   
                                        Breast Problem        
  
  
  
                          Specialty    Diagnoses / Procedures Referred By Contac  
t Referred To Contact  
   
                                        Breast Diseases       
  
  
Diagnoses  
  
  
Breast pain, left  
  
  
History of abnormal   
mammogram  
  
  
Mass of upper inner   
quadrant of left breast  
  
  
  
Procedures  
  
  
CONSULT TO BREAST CENTER  
  
  
OFFICE/OUTPATIENT [facility]   
[facility] 60 MINUTES                            
  
  
Moses Madsen MD  
  
  
[address] [place]  
  
  
[address] [address]  
  
  
Phone: [phone]                       
  
  
  
  
  
                    Referral ID Status    Reason    Start Date Expiration Date V  
isits   
Requested                               Visits   
Authorized  
   
                                [id number]        Closed            
  
  
PCP Requested   
Referral        2024       1               1  
  
  
  
                                        Reason              Comments  
   
                                        Follow Up           Results  
  
  
  
                                        Reason              Comments  
   
                                        Joint Pain          Juliane Murphy NP r  
ef for joint pain  
  
  
  
                          Specialty    Diagnoses / Procedures Referred By Contac  
t Referred To Contact  
   
                                        Rheumatology          
  
  
Diagnoses  
  
  
Multiple joint pain  
  
  
  
Procedures  
  
  
CONSULT TO RHEUM/IMMUN   
DISEASE  
  
  
CONSULT TO RHEUM/IMMUN   
DISEASE  
  
  
OFFICE/OUTPATIENT NEW [facility] 60 MINUTES                            
  
  
Juliane Murphy,   
APRN.CNP  
  
  
[address] [place] [address]  
  
  
Phone: [phone]  
  
  
Fax: [phone]                         
  
  
  
  
  
                    Referral ID Status    Reason    Start Date Expiration Date V  
isits   
Requested                               Visits   
Authorized  
   
                                [id number]        Closed            
  
  
PCP Requested   
Referral        2024       1               1  
  
  
  
                                        Reason              Comments  
   
                                        Appointment           
  
  
  
                                        Reason              Comments  
   
                                        Referral Request    Hematology / Oncolog  
y  
  
  
  
                                        Reason              Comments  
   
                                        COPD                  
   
                                        Shortness of Breath   
  
  
  
                                        Reason              Comments  
   
                                        Established Patient   
  
  
  
                                        Reason              Comments  
   
                                        Yearly Exam           
  
  
  
                                        Reason              Comments  
   
                                        New Patient           
  
  
  
                                Reason          Onset Date      Comments  
   
                                Primary Care Coordinator Ed Follow Up 2024  
        
  
  
  
                                        Reason              Comments  
   
                                        Spirometry            
  
  
  
                          Specialty    Diagnoses / Procedures Referred By Contac  
t Referred To Contact  
   
                                        [facility]   
  
  
Diagnoses  
  
  
Chronic obstructive   
pulmonary disease,   
unspecified COPD type   
(HCC)  
  
  
  
Procedures  
  
  
SPIROMETRY - BASELINE   
AND POST DILATOR  
  
  
BRNCDILAT RSPSE SPMTRY   
PRE&POST-BRNCDILAT ADMN                   
  
  
Judie Delgado MD  
  
  
 [facility]  
  
  
[address]  
  
  
[place] [address]  
  
  
Phone: [phone]  
  
  
Fax: [phone]                         
  
  
Respiratory   
[facility]  
  
  
[address] [place] [id number]  
  
  
  
                    Referral ID Status    Reason    Start Date Expiration Date V  
isits   
Requested                               Visits   
Authorized  
   
                                [id number]        Closed            
  
  
Auto-Generate  
d Referral      2024       10/18/2025      1               1  
  
  
  
                          Specialty    Diagnoses / Procedures Referred By Contac  
t Referred To Contact  
   
                                        [facility]   
  
  
Diagnoses  
  
  
Chronic obstructive   
pulmonary disease,   
unspecified COPD type   
(HCC)  
  
  
  
Procedures  
  
  
LUNG DIFFUSION CAPACITY   
(DLCO)  
  
  
DIFFUSING CAPACITY                        
  
  
Judie Delgado MD  
  
  
 [facility]  
  
  
[address]  
  
  
[place] [address]  
  
  
Phone: [phone]  
  
  
Fax: [phone]                         
  
  
Respiratory   
[facility]  
  
  
[address] [place] [address]  
  
  
  
                    Referral ID Status    Reason    Start Date Expiration Date V  
isits   
Requested                               Visits   
Authorized  
   
                                [id number]        Closed            
  
  
Auto-Generate  
d Referral      2024       10/18/2025      1               1  
  
  
  
                          Specialty    Diagnoses / Procedures Referred By Contac  
t Referred To Contact  
   
                                        [facility]   
  
  
Diagnoses  
  
  
Chronic obstructive   
pulmonary disease,   
unspecified COPD type   
(HCC)  
  
  
  
Procedures  
  
  
LUNG VOLUMES                              
  
  
Judie Delgado MD  
  
  
 [facility]  
  
  
[address]  
  
  
[place] [address]  
  
  
Phone: [phone]  
  
  
Fax: [phone]                         
  
  
Respiratory   
[facility]  
  
  
[address] [place] [address]  
  
  
  
                    Referral ID Status    Reason    Start Date Expiration Date V  
isits   
Requested                               Visits   
Authorized  
   
                                [id number]        Closed            
  
  
Auto-Generate  
d Referral      10/29/2024      2024      1               1  
  
  
  
                                        Reason              Comments  
   
                                        Medication Problem    
  
  
  
                                        Reason              Comments  
   
                                        Established Patient   
  
  
  
                                        Reason              Comments  
   
                                        Anxiety               
  
  
  
                                        Reason              Comments  
   
                                        Transition Of Care    
   
                                        Hospital F/U          
  
  
  
                                Reason          Onset Date      Comments  
   
                                Primary Care Coordinator Ed Follow Up 2024  
        
  
  
  
                                        Reason              Comments  
   
                                        Cough               Cough, sinus drainag  
e, chest pain/congestion since November, got worse   
last   
night  
  
  
  
                                Reason          Onset Date      Comments  
   
                                Primary Care Coordinator Ed Follow Up 2025  
        
  
  
  
                                        Reason              Comments  
   
                                        Patient Update        
  
  
  
                                        Reason              Comments  
   
                                        Follow Up             
  
  
  
                                        Reason              Comments  
   
                                        Gastro referral faxed to [facility]   
  
  
  
                                        Reason              Comments  
   
                                        referral info given via [employer]   
  
  
  
                                        Reason              Comments  
   
                                        Referral Information Gastroenterology  
  
  
  
  
  
                                                    Source Comments (unrecognize  
d section and content)  
   
                                                    In the event this informatio  
n is protected by the Federal Confidentiality of   
Alcohol   
and Drug Abuse Patient Records regulations: This information has been disclosed 
to   
you from records protected by Federal confidentiality rules (42 CFR Part 2). The
   
Federal rules prohibit you from making any further disclosure of this 
information   
unless further disclosure is expressly permitted by the written consent of the 
person   
to whom it pertains or as otherwise permitted by 42 CFR Part 2. A general   
authorization for the release of medical or other information is NOT sufficient 
for   
this purpose. The Federal rules restrict any use of the information to 
criminally   
investigate or prosecute any alcohol or drug abuse patient.[facility] 
the   
event this information is protected by the Federal Confidentiality of Alcohol 
and   
Drug Abuse Patient Records regulations: This information has been disclosed to 
you   
from records protected by Federal confidentiality rules (42 CFR Part 2). The 
Federal   
rules prohibit you from making any further disclosure of this information unless
   
further disclosure is expressly permitted by the written consent of the person 
to   
whom it pertains or as otherwise permitted by 42 CFR Part 2. A general 
authorization   
for the release of medical or other information is NOT sufficient for this 
purpose.   
The Federal rules restrict any use of the information to criminally investigate 
or   
prosecute any alcohol or drug abuse patient.[facility]In the event this   
information is protected by the Federal Confidentiality of Alcohol and Drug 
Abuse   
Patient Records regulations: This information has been disclosed to you from 
records   
protected by Federal confidentiality rules (42 CFR Part 2). The Federal rules   
prohibit you from making any further disclosure of this information unless 
further   
disclosure is expressly permitted by the written consent of the person to whom 
it   
pertains or as otherwise permitted by 42 CFR Part 2. A general authorization for
 the   
release of medical or other information is NOT sufficient for this purpose. The   
Federal rules restrict any use of the information to criminally investigate or   
prosecute any alcohol or drug abuse patient.[facility] the event this   
information is protected by the Federal Confidentiality of Alcohol and Drug 
Abuse   
Patient Records regulations: This information has been disclosed to you from 
records   
protected by Federal confidentiality rules (42 CFR Part 2). The Federal rules   
prohibit you from making any further disclosure of this information unless 
further   
disclosure is expressly permitted by the written consent of the person to whom 
it   
pertains or as otherwise permitted by 42 CFR Part 2. A general authorization for
 the   
release of medical or other information is NOT sufficient for this purpose. The   
Federal rules restrict any use of the information to criminally investigate or   
prosecute any alcohol or drug abuse patient.[facility]In the event this   
information is protected by the Federal Confidentiality of Alcohol and Drug 
Abuse   
Patient Records regulations: This information has been disclosed to you from 
records   
protected by Federal confidentiality rules (42 CFR Part 2). The Federal rules   
prohibit you from making any further disclosure of this information unless 
further   
disclosure is expressly permitted by the written consent of the person to whom 
it   
pertains or as otherwise permitted by 42 CFR Part 2. A general authorization for
 the   
release of medical or other information is NOT sufficient for this purpose. The   
Federal rules restrict any use of the information to criminally investigate or   
prosecute any alcohol or drug abuse patient.[facility]In the event this   
information is protected by the Federal Confidentiality of Alcohol and Drug 
Abuse   
Patient Records regulations: This information has been disclosed to you from 
records   
protected by Federal confidentiality rules (42 CFR Part 2). The Federal rules   
prohibit you from making any further disclosure of this information unless 
further   
disclosure is expressly permitted by the written consent of the person to whom 
it   
pertains or as otherwise permitted by 42 CFR Part 2. A general authorization for
 the   
release of medical or other information is NOT sufficient for this purpose. The   
Federal rules restrict any use of the information to criminally investigate or   
prosecute any alcohol or drug abuse patient.[facility]In the event this   
information is protected by the Federal Confidentiality of Alcohol and Drug 
Abuse   
Patient Records regulations: This information has been disclosed to you from 
records   
protected by Federal confidentiality rules (42 CFR Part 2). The Federal rules   
prohibit you from making any further disclosure of this information unless 
further   
disclosure is expressly permitted by the written consent of the person to whom 
it   
pertains or as otherwise permitted by 42 CFR Part 2. A general authorization for
 the   
release of medical or other information is NOT sufficient for this purpose. The   
Federal rules restrict any use of the information to criminally investigate or   
prosecute any alcohol or drug abuse patient.[facility]In the event this   
information is protected by the Federal Confidentiality of Alcohol and Drug 
Abuse   
Patient Records regulations: This information has been disclosed to you from 
records   
protected by Federal confidentiality rules (42 CFR Part 2). The Federal rules   
prohibit you from making any further disclosure of this information unless 
further   
disclosure is expressly permitted by the written consent of the person to whom 
it   
pertains or as otherwise permitted by 42 CFR Part 2. A general authorization for
 the   
release of medical or other information is NOT sufficient for this purpose. The   
Federal rules restrict any use of the information to criminally investigate or   
prosecute any alcohol or drug abuse patient.[facility]In the event this   
information is protected by the Federal Confidentiality of Alcohol and Drug 
Abuse   
Patient Records regulations: This information has been disclosed to you from 
records   
protected by Federal confidentiality rules (42 CFR Part 2). The Federal rules   
prohibit you from making any further disclosure of this information unless 
further   
disclosure is expressly permitted by the written consent of the person to whom 
it   
pertains or as otherwise permitted by 42 CFR Part 2. A general authorization for
 the   
release of medical or other information is NOT sufficient for this purpose. The   
Federal rules restrict any use of the information to criminally investigate or   
prosecute any alcohol or drug abuse patient.[facility]In the event this   
information is protected by the Federal Confidentiality of Alcohol and Drug 
Abuse   
Patient Records regulations: This information has been disclosed to you from 
records   
protected by Federal confidentiality rules (42 CFR Part 2). The Federal rules   
prohibit you from making any further disclosure of this information unless 
further   
disclosure is expressly permitted by the written consent of the person to whom 
it   
pertains or as otherwise permitted by 42 CFR Part 2. A general authorization for
 the   
release of medical or other information is NOT sufficient for this purpose. The   
Federal rules restrict any use of the information to criminally investigate or   
prosecute any alcohol or drug abuse patient.[facility] EastIn the event this   
information is protected by the Federal Confidentiality of Alcohol and Drug 
Abuse   
Patient Records regulations: This information has been disclosed to you from 
records   
protected by Federal confidentiality rules (42 CFR Part 2). The Federal rules   
prohibit you from making any further disclosure of this information unless 
further   
disclosure is expressly permitted by the written consent of the person to whom 
it   
pertains or as otherwise permitted by 42 CFR Part 2. A general authorization for
 the   
release of medical or other information is NOT sufficient for this purpose. The   
Federal rules restrict any use of the information to criminally investigate or   
prosecute any alcohol or drug abuse patient.[facility] EastIn the event this   
information is protected by the Federal Confidentiality of Alcohol and Drug 
Abuse   
Patient Records regulations: This information has been disclosed to you from 
records   
protected by Federal confidentiality rules (42 CFR Part 2). The Federal rules   
prohibit you from making any further disclosure of this information unless 
further   
disclosure is expressly permitted by the written consent of the person to whom 
it   
pertains or as otherwise permitted by 42 CFR Part 2. A general authorization for
 the   
release of medical or other information is NOT sufficient for this purpose. The   
Federal rules restrict any use of the information to criminally investigate or   
prosecute any alcohol or drug abuse patient.[facility] EastIn the event this   
information is protected by the Federal Confidentiality of Alcohol and Drug 
Abuse   
Patient Records regulations: This information has been disclosed to you from 
records   
protected by Federal confidentiality rules (42 CFR Part 2). The Federal rules   
prohibit you from making any further disclosure of this information unless 
further   
disclosure is expressly permitted by the written consent of the person to whom 
it   
pertains or as otherwise permitted by 42 CFR Part 2. A general authorization for
 the   
release of medical or other information is NOT sufficient for this purpose. The   
Federal rules restrict any use of the information to criminally investigate or   
prosecute any alcohol or drug abuse patient.[facility] EastIn the event this   
information is protected by the Federal Confidentiality of Alcohol and Drug 
Abuse   
Patient Records regulations: This information has been disclosed to you from 
records   
protected by Federal confidentiality rules (42 CFR Part 2). The Federal rules   
prohibit you from making any further disclosure of this information unless 
further   
disclosure is expressly permitted by the written consent of the person to whom 
it   
pertains or as otherwise permitted by 42 CFR Part 2. A general authorization for
 the   
release of medical or other information is NOT sufficient for this purpose. The   
Federal rules restrict any use of the information to criminally investigate or   
prosecute any alcohol or drug abuse patient.[facility] EastIn the event this   
information is protected by the Federal Confidentiality of Alcohol and Drug 
Abuse   
Patient Records regulations: This information has been disclosed to you from 
records   
protected by Federal confidentiality rules (42 CFR Part 2). The Federal rules   
prohibit you from making any further disclosure of this information unless 
further   
disclosure is expressly permitted by the written consent of the person to whom 
it   
pertains or as otherwise permitted by 42 CFR Part 2. A general authorization for
 the   
release of medical or other information is NOT sufficient for this purpose. The   
Federal rules restrict any use of the information to criminally investigate or   
prosecute any alcohol or drug abuse patient.[facility] EastIn the event this   
information is protected by the Federal Confidentiality of Alcohol and Drug 
Abuse   
Patient Records regulations: This information has been disclosed to you from 
records   
protected by Federal confidentiality rules (42 CFR Part 2). The Federal rules   
prohibit you from making any further disclosure of this information unless 
further   
disclosure is expressly permitted by the written consent of the person to whom 
it   
pertains or as otherwise permitted by 42 CFR Part 2. A general authorization for
 the   
release of medical or other information is NOT sufficient for this purpose. The   
Federal rules restrict any use of the information to criminally investigate or   
prosecute any alcohol or drug abuse patient.[facility] EastIn the event this   
information is protected by the Federal Confidentiality of Alcohol and Drug 
Abuse   
Patient Records regulations: This information has been disclosed to you from 
records   
protected by Federal confidentiality rules (42 CFR Part 2). The Federal rules   
prohibit you from making any further disclosure of this information unless 
further   
disclosure is expressly permitted by the written consent of the person to whom 
it   
pertains or as otherwise permitted by 42 CFR Part 2. A general authorization for
 the   
release of medical or other information is NOT sufficient for this purpose. The   
Federal rules restrict any use of the information to criminally investigate or   
prosecute any alcohol or drug abuse patient.[facility] EastIn the event this   
information is protected by the Federal Confidentiality of Alcohol and Drug 
Abuse   
Patient Records regulations: This information has been disclosed to you from 
records   
protected by Federal confidentiality rules (42 CFR Part 2). The Federal rules   
prohibit you from making any further disclosure of this information unless 
further   
disclosure is expressly permitted by the written consent of the person to whom 
it   
pertains or as otherwise permitted by 42 CFR Part 2. A general authorization for
 the   
release of medical or other information is NOT sufficient for this purpose. The   
Federal rules restrict any use of the information to criminally investigate or   
prosecute any alcohol or drug abuse patient.[facility] EastIn the event this   
information is protected by the Federal Confidentiality of Alcohol and Drug 
Abuse   
Patient Records regulations: This information has been disclosed to you from 
records   
protected by Federal confidentiality rules (42 CFR Part 2). The Federal rules   
prohibit you from making any further disclosure of this information unless 
further   
disclosure is expressly permitted by the written consent of the person to whom 
it   
pertains or as otherwise permitted by 42 CFR Part 2. A general authorization for
 the   
release of medical or other information is NOT sufficient for this purpose. The   
Federal rules restrict any use of the information to criminally investigate or   
prosecute any alcohol or drug abuse patient.[facility] EastIn the event this   
information is protected by the Federal Confidentiality of Alcohol and Drug 
Abuse   
Patient Records regulations: This information has been disclosed to you from 
records   
protected by Federal confidentiality rules (42 CFR Part 2). The Federal rules   
prohibit you from making any further disclosure of this information unless 
further   
disclosure is expressly permitted by the written consent of the person to whom 
it   
pertains or as otherwise permitted by 42 CFR Part 2. A general authorization for
 the   
release of medical or other information is NOT sufficient for this purpose. The   
Federal rules restrict any use of the information to criminally investigate or   
prosecute any alcohol or drug abuse patient.[facility] EastIn the event this   
information is protected by the Federal Confidentiality of Alcohol and Drug 
Abuse   
Patient Records regulations: This information has been disclosed to you from 
records   
protected by Federal confidentiality rules (42 CFR Part 2). The Federal rules   
prohibit you from making any further disclosure of this information unless 
further   
disclosure is expressly permitted by the written consent of the person to whom 
it   
pertains or as otherwise permitted by 42 CFR Part 2. A general authorization for
 the   
release of medical or other information is NOT sufficient for this purpose. The   
Federal rules restrict any use of the information to criminally investigate or   
prosecute any alcohol or drug abuse patient.[facility] EastIn the event this   
information is protected by the Federal Confidentiality of Alcohol and Drug 
Abuse   
Patient Records regulations: This information has been disclosed to you from 
records   
protected by Federal confidentiality rules (42 CFR Part 2). The Federal rules   
prohibit you from making any further disclosure of this information unless 
further   
disclosure is expressly permitted by the written consent of the person to whom 
it   
pertains or as otherwise permitted by 42 CFR Part 2. A general authorization for
 the   
release of medical or other information is NOT sufficient for this purpose. The   
Federal rules restrict any use of the information to criminally investigate or   
prosecute any alcohol or drug abuse patient.[facility] the event this   
information is protected by the Federal Confidentiality of Alcohol and Drug 
Abuse   
Patient Records regulations: This information has been disclosed to you from 
records   
protected by Federal confidentiality rules (42 CFR Part 2). The Federal rules   
prohibit you from making any further disclosure of this information unless 
further   
disclosure is expressly permitted by the written consent of the person to whom 
it   
pertains or as otherwise permitted by 42 CFR Part 2. A general authorization for
 the   
release of medical or other information is NOT sufficient for this purpose. The   
Federal rules restrict any use of the information to criminally investigate or   
prosecute any alcohol or drug abuse patient.[facility] the event this   
information is protected by the Federal Confidentiality of Alcohol and Drug 
Abuse   
Patient Records regulations: This information has been disclosed to you from 
records   
protected by Federal confidentiality rules (42 CFR Part 2). The Federal rules   
prohibit you from making any further disclosure of this information unless 
further   
disclosure is expressly permitted by the written consent of the person to whom 
it   
pertains or as otherwise permitted by 42 CFR Part 2. A general authorization for
 the   
release of medical or other information is NOT sufficient for this purpose. The   
Federal rules restrict any use of the information to criminally investigate or   
prosecute any alcohol or drug abuse patient.[facility]In the event this   
information is protected by the Federal Confidentiality of Alcohol and Drug 
Abuse   
Patient Records regulations: This information has been disclosed to you from 
records   
protected by Federal confidentiality rules (42 CFR Part 2). The Federal rules   
prohibit you from making any further disclosure of this information unless 
further   
disclosure is expressly permitted by the written consent of the person to whom 
it   
pertains or as otherwise permitted by 42 CFR Part 2. A general authorization for
 the   
release of medical or other information is NOT sufficient for this purpose. The   
Federal rules restrict any use of the information to criminally investigate or   
prosecute any alcohol or drug abuse patient.[facility] the event this   
information is protected by the Federal Confidentiality of Alcohol and Drug 
Abuse   
Patient Records regulations: This information has been disclosed to you from 
records   
protected by Federal confidentiality rules (42 CFR Part 2). The Federal rules   
prohibit you from making any further disclosure of this information unless 
further   
disclosure is expressly permitted by the written consent of the person to whom 
it   
pertains or as otherwise permitted by 42 CFR Part 2. A general authorization for
 the   
release of medical or other information is NOT sufficient for this purpose. The   
Federal rules restrict any use of the information to criminally investigate or   
prosecute any alcohol or drug abuse patient.[facility]In the event this   
information is protected by the Federal Confidentiality of Alcohol and Drug 
Abuse   
Patient Records regulations: This information has been disclosed to you from 
records   
protected by Federal confidentiality rules (42 CFR Part 2). The Federal rules   
prohibit you from making any further disclosure of this information unless 
further   
disclosure is expressly permitted by the written consent of the person to whom 
it   
pertains or as otherwise permitted by 42 CFR Part 2. A general authorization for
 the   
release of medical or other information is NOT sufficient for this purpose. The   
Federal rules restrict any use of the information to criminally investigate or   
prosecute any alcohol or drug abuse patient.[facility] the event this   
information is protected by the Federal Confidentiality of Alcohol and Drug 
Abuse   
Patient Records regulations: This information has been disclosed to you from 
records   
protected by Federal confidentiality rules (42 CFR Part 2). The Federal rules   
prohibit you from making any further disclosure of this information unless 
further   
disclosure is expressly permitted by the written consent of the person to whom 
it   
pertains or as otherwise permitted by 42 CFR Part 2. A general authorization for
 the   
release of medical or other information is NOT sufficient for this purpose. The   
Federal rules restrict any use of the information to criminally investigate or   
prosecute any alcohol or drug abuse patient.[facility]In the event this   
information is protected by the Federal Confidentiality of Alcohol and Drug 
Abuse   
Patient Records regulations: This information has been disclosed to you from 
records   
protected by Federal confidentiality rules (42 CFR Part 2). The Federal rules   
prohibit you from making any further disclosure of this information unless 
further   
disclosure is expressly permitted by the written consent of the person to whom 
it   
pertains or as otherwise permitted by 42 CFR Part 2. A general authorization for
 the   
release of medical or other information is NOT sufficient for this purpose. The   
Federal rules restrict any use of the information to criminally investigate or   
prosecute any alcohol or drug abuse patient.[facility]In the event this   
information is protected by the Federal Confidentiality of Alcohol and Drug 
Abuse   
Patient Records regulations: This information has been disclosed to you from 
records   
protected by Federal confidentiality rules (42 CFR Part 2). The Federal rules   
prohibit you from making any further disclosure of this information unless 
further   
disclosure is expressly permitted by the written consent of the person to whom 
it   
pertains or as otherwise permitted by 42 CFR Part 2. A general authorization for
 the   
release of medical or other information is NOT sufficient for this purpose. The   
Federal rules restrict any use of the information to criminally investigate or   
prosecute any alcohol or drug abuse patient.[facility]In the event this   
information is protected by the Federal Confidentiality of Alcohol and Drug 
Abuse   
Patient Records regulations: This information has been disclosed to you from 
records   
protected by Federal confidentiality rules (42 CFR Part 2). The Federal rules   
prohibit you from making any further disclosure of this information unless 
further   
disclosure is expressly permitted by the written consent of the person to whom 
it   
pertains or as otherwise permitted by 42 CFR Part 2. A general authorization for
 the   
release of medical or other information is NOT sufficient for this purpose. The   
Federal rules restrict any use of the information to criminally investigate or   
prosecute any alcohol or drug abuse patient.[facility]In the event this   
information is protected by the Federal Confidentiality of Alcohol and Drug 
Abuse   
Patient Records regulations: This information has been disclosed to you from 
records   
protected by Federal confidentiality rules (42 CFR Part 2). The Federal rules   
prohibit you from making any further disclosure of this information unless 
further   
disclosure is expressly permitted by the written consent of the person to whom 
it   
pertains or as otherwise permitted by 42 CFR Part 2. A general authorization for
 the   
release of medical or other information is NOT sufficient for this purpose. The   
Federal rules restrict any use of the information to criminally investigate or   
prosecute any alcohol or drug abuse patient.[facility]In the event this   
information is protected by the Federal Confidentiality of Alcohol and Drug 
Abuse   
Patient Records regulations: This information has been disclosed to you from 
records   
protected by Federal confidentiality rules (42 CFR Part 2). The Federal rules   
prohibit you from making any further disclosure of this information unless 
further   
disclosure is expressly permitted by the written consent of the person to whom 
it   
pertains or as otherwise permitted by 42 CFR Part 2. A general authorization for
 the   
release of medical or other information is NOT sufficient for this purpose. The   
Federal rules restrict any use of the information to criminally investigate or   
prosecute any alcohol or drug abuse patient.[facility]In the event this   
information is protected by the Federal Confidentiality of Alcohol and Drug 
Abuse   
Patient Records regulations: This information has been disclosed to you from 
records   
protected by Federal confidentiality rules (42 CFR Part 2). The Federal rules   
prohibit you from making any further disclosure of this information unless 
further   
disclosure is expressly permitted by the written consent of the person to whom 
it   
pertains or as otherwise permitted by 42 CFR Part 2. A general authorization for
 the   
release of medical or other information is NOT sufficient for this purpose. The   
Federal rules restrict any use of the information to criminally investigate or   
prosecute any alcohol or drug abuse patient.[facility]In the event this   
information is protected by the Federal Confidentiality of Alcohol and Drug 
Abuse   
Patient Records regulations: This information has been disclosed to you from 
records   
protected by Federal confidentiality rules (42 CFR Part 2). The Federal rules   
prohibit you from making any further disclosure of this information unless 
further   
disclosure is expressly permitted by the written consent of the person to whom 
it   
pertains or as otherwise permitted by 42 CFR Part 2. A general authorization for
 the   
release of medical or other information is NOT sufficient for this purpose. The   
Federal rules restrict any use of the information to criminally investigate or   
prosecute any alcohol or drug abuse patient.[facility]In the event this   
information is protected by the Federal Confidentiality of Alcohol and Drug 
Abuse   
Patient Records regulations: This information has been disclosed to you from 
records   
protected by Federal confidentiality rules (42 CFR Part 2). The Federal rules   
prohibit you from making any further disclosure of this information unless 
further   
disclosure is expressly permitted by the written consent of the person to whom 
it   
pertains or as otherwise permitted by 42 CFR Part 2. A general authorization for
 the   
release of medical or other information is NOT sufficient for this purpose. The   
Federal rules restrict any use of the information to criminally investigate or   
prosecute any alcohol or drug abuse patient.[facility]In the event this   
information is protected by the Federal Confidentiality of Alcohol and Drug 
Abuse   
Patient Records regulations: This information has been disclosed to you from 
records   
protected by Federal confidentiality rules (42 CFR Part 2). The Federal rules   
prohibit you from making any further disclosure of this information unless 
further   
disclosure is expressly permitted by the written consent of the person to whom 
it   
pertains or as otherwise permitted by 42 CFR Part 2. A general authorization for
 the   
release of medical or other information is NOT sufficient for this purpose. The   
Federal rules restrict any use of the information to criminally investigate or   
prosecute any alcohol or drug abuse patient.[facility]In the event this   
information is protected by the Federal Confidentiality of Alcohol and Drug 
Abuse   
Patient Records regulations: This information has been disclosed to you from 
records   
protected by Federal confidentiality rules (42 CFR Part 2). The Federal rules   
prohibit you from making any further disclosure of this information unless 
further   
disclosure is expressly permitted by the written consent of the person to whom 
it   
pertains or as otherwise permitted by 42 CFR Part 2. A general authorization for
 the   
release of medical or other information is NOT sufficient for this purpose. The   
Federal rules restrict any use of the information to criminally investigate or   
prosecute any alcohol or drug abuse patient.[facility]In the event this   
information is protected by the Federal Confidentiality of Alcohol and Drug 
Abuse   
Patient Records regulations: This information has been disclosed to you from 
records   
protected by Federal confidentiality rules (42 CFR Part 2). The Federal rules   
prohibit you from making any further disclosure of this information unless 
further   
disclosure is expressly permitted by the written consent of the person to whom 
it   
pertains or as otherwise permitted by 42 CFR Part 2. A general authorization for
 the   
release of medical or other information is NOT sufficient for this purpose. The   
Federal rules restrict any use of the information to criminally investigate or   
prosecute any alcohol or drug abuse patient.[facility]In the event this   
information is protected by the Federal Confidentiality of Alcohol and Drug 
Abuse   
Patient Records regulations: This information has been disclosed to you from 
records   
protected by Federal confidentiality rules (42 CFR Part 2). The Federal rules   
prohibit you from making any further disclosure of this information unless 
further   
disclosure is expressly permitted by the written consent of the person to whom 
it   
pertains or as otherwise permitted by 42 CFR Part 2. A general authorization for
 the   
release of medical or other information is NOT sufficient for this purpose. The   
Federal rules restrict any use of the information to criminally investigate or   
prosecute any alcohol or drug abuse patient.[facility]In the event this   
information is protected by the Federal Confidentiality of Alcohol and Drug 
Abuse   
Patient Records regulations: This information has been disclosed to you from 
records   
protected by Federal confidentiality rules (42 CFR Part 2). The Federal rules   
prohibit you from making any further disclosure of this information unless 
further   
disclosure is expressly permitted by the written consent of the person to whom 
it   
pertains or as otherwise permitted by 42 CFR Part 2. A general authorization for
 the   
release of medical or other information is NOT sufficient for this purpose. The   
Federal rules restrict any use of the information to criminally investigate or   
prosecute any alcohol or drug abuse patient.[facility]In the event this   
information is protected by the Federal Confidentiality of Alcohol and Drug 
Abuse   
Patient Records regulations: This information has been disclosed to you from 
records   
protected by Federal confidentiality rules (42 CFR Part 2). The Federal rules   
prohibit you from making any further disclosure of this information unless 
further   
disclosure is expressly permitted by the written consent of the person to whom 
it   
pertains or as otherwise permitted by 42 CFR Part 2. A general authorization for
 the   
release of medical or other information is NOT sufficient for this purpose. The   
Federal rules restrict any use of the information to criminally investigate or   
prosecute any alcohol or drug abuse patient.[facility]In the event this   
information is protected by the Federal Confidentiality of Alcohol and Drug 
Abuse   
Patient Records regulations: This information has been disclosed to you from 
records   
protected by Federal confidentiality rules (42 CFR Part 2). The Federal rules   
prohibit you from making any further disclosure of this information unless 
further   
disclosure is expressly permitted by the written consent of the person to whom 
it   
pertains or as otherwise permitted by 42 CFR Part 2. A general authorization for
 the   
release of medical or other information is NOT sufficient for this purpose. The   
Federal rules restrict any use of the information to criminally investigate or   
prosecute any alcohol or drug abuse patient.[facility]In the event this   
information is protected by the Federal Confidentiality of Alcohol and Drug 
Abuse   
Patient Records regulations: This information has been disclosed to you from 
records   
protected by Federal confidentiality rules (42 CFR Part 2). The Federal rules   
prohibit you from making any further disclosure of this information unless 
further   
disclosure is expressly permitted by the written consent of the person to whom 
it   
pertains or as otherwise permitted by 42 CFR Part 2. A general authorization for
 the   
release of medical or other information is NOT sufficient for this purpose. The   
Federal rules restrict any use of the information to criminally investigate or   
prosecute any alcohol or drug abuse patient.[facility]In the event this   
information is protected by the Federal Confidentiality of Alcohol and Drug 
Abuse   
Patient Records regulations: This information has been disclosed to you from 
records   
protected by Federal confidentiality rules (42 CFR Part 2). The Federal rules   
prohibit you from making any further disclosure of this information unless 
further   
disclosure is expressly permitted by the written consent of the person to whom 
it   
pertains or as otherwise permitted by 42 CFR Part 2. A general authorization for
 the   
release of medical or other information is NOT sufficient for this purpose. The   
Federal rules restrict any use of the information to criminally investigate or   
prosecute any alcohol or drug abuse patient.[facility]In the event this   
information is protected by the Federal Confidentiality of Alcohol and Drug 
Abuse   
Patient Records regulations: This information has been disclosed to you from 
records   
protected by Federal confidentiality rules (42 CFR Part 2). The Federal rules   
prohibit you from making any further disclosure of this information unless 
further   
disclosure is expressly permitted by the written consent of the person to whom 
it   
pertains or as otherwise permitted by 42 CFR Part 2. A general authorization for
 the   
release of medical or other information is NOT sufficient for this purpose. The   
Federal rules restrict any use of the information to criminally investigate or   
prosecute any alcohol or drug abuse patient.[facility]In the event this   
information is protected by the Federal Confidentiality of Alcohol and Drug 
Abuse   
Patient Records regulations: This information has been disclosed to you from 
records   
protected by Federal confidentiality rules (42 CFR Part 2). The Federal rules   
prohibit you from making any further disclosure of this information unless 
further   
disclosure is expressly permitted by the written consent of the person to whom 
it   
pertains or as otherwise permitted by 42 CFR Part 2. A general authorization for
 the   
release of medical or other information is NOT sufficient for this purpose. The   
Federal rules restrict any use of the information to criminally investigate or   
prosecute any alcohol or drug abuse patient.[facility]In the event this   
information is protected by the Federal Confidentiality of Alcohol and Drug 
Abuse   
Patient Records regulations: This information has been disclosed to you from 
records   
protected by Federal confidentiality rules (42 CFR Part 2). The Federal rules   
prohibit you from making any further disclosure of this information unless 
further   
disclosure is expressly permitted by the written consent of the person to whom 
it   
pertains or as otherwise permitted by 42 CFR Part 2. A general authorization for
 the   
release of medical or other information is NOT sufficient for this purpose. The   
Federal rules restrict any use of the information to criminally investigate or   
prosecute any alcohol or drug abuse patient.[facility]In the event this   
information is protected by the Federal Confidentiality of Alcohol and Drug 
Abuse   
Patient Records regulations: This information has been disclosed to you from 
records   
protected by Federal confidentiality rules (42 CFR Part 2). The Federal rules   
prohibit you from making any further disclosure of this information unless 
further   
disclosure is expressly permitted by the written consent of the person to whom 
it   
pertains or as otherwise permitted by 42 CFR Part 2. A general authorization for
 the   
release of medical or other information is NOT sufficient for this purpose. The   
Federal rules restrict any use of the information to criminally investigate or   
prosecute any alcohol or drug abuse patient.[facility]In the event this   
information is protected by the Federal Confidentiality of Alcohol and Drug 
Abuse   
Patient Records regulations: This information has been disclosed to you from 
records   
protected by Federal confidentiality rules (42 CFR Part 2). The Federal rules   
prohibit you from making any further disclosure of this information unless 
further   
disclosure is expressly permitted by the written consent of the person to whom 
it   
pertains or as otherwise permitted by 42 CFR Part 2. A general authorization for
 the   
release of medical or other information is NOT sufficient for this purpose. The   
Federal rules restrict any use of the information to criminally investigate or   
prosecute any alcohol or drug abuse patient.[facility]In the event this   
information is protected by the Federal Confidentiality of Alcohol and Drug 
Abuse   
Patient Records regulations: This information has been disclosed to you from 
records   
protected by Federal confidentiality rules (42 CFR Part 2). The Federal rules   
prohibit you from making any further disclosure of this information unless 
further   
disclosure is expressly permitted by the written consent of the person to whom 
it   
pertains or as otherwise permitted by 42 CFR Part 2. A general authorization for
 the   
release of medical or other information is NOT sufficient for this purpose. The   
Federal rules restrict any use of the information to criminally investigate or   
prosecute any alcohol or drug abuse patient.[facility]In the event this   
information is protected by the Federal Confidentiality of Alcohol and Drug 
Abuse   
Patient Records regulations: This information has been disclosed to you from 
records   
protected by Federal confidentiality rules (42 CFR Part 2). The Federal rules   
prohibit you from making any further disclosure of this information unless 
further   
disclosure is expressly permitted by the written consent of the person to whom 
it   
pertains or as otherwise permitted by 42 CFR Part 2. A general authorization for
 the   
release of medical or other information is NOT sufficient for this purpose. The   
Federal rules restrict any use of the information to criminally investigate or   
prosecute any alcohol or drug abuse patient.[facility]In the event this   
information is protected by the Federal Confidentiality of Alcohol and Drug 
Abuse   
Patient Records regulations: This information has been disclosed to you from 
records   
protected by Federal confidentiality rules (42 CFR Part 2). The Federal rules   
prohibit you from making any further disclosure of this information unless 
further   
disclosure is expressly permitted by the written consent of the person to whom 
it   
pertains or as otherwise permitted by 42 CFR Part 2. A general authorization for
 the   
release of medical or other information is NOT sufficient for this purpose. The   
Federal rules restrict any use of the information to criminally investigate or   
prosecute any alcohol or drug abuse patient.[facility] the event this   
information is protected by the Federal Confidentiality of Alcohol and Drug 
Abuse   
Patient Records regulations: This information has been disclosed to you from 
records   
protected by Federal confidentiality rules (42 CFR Part 2). The Federal rules   
prohibit you from making any further disclosure of this information unless 
further   
disclosure is expressly permitted by the written consent of the person to whom 
it   
pertains or as otherwise permitted by 42 CFR Part 2. A general authorization for
 the   
release of medical or other information is NOT sufficient for this purpose. The   
Federal rules restrict any use of the information to criminally investigate or   
prosecute any alcohol or drug abuse patient.[facility] the event this   
information is protected by the Federal Confidentiality of Alcohol and Drug 
Abuse   
Patient Records regulations: This information has been disclosed to you from 
records   
protected by Federal confidentiality rules (42 CFR Part 2). The Federal rules   
prohibit you from making any further disclosure of this information unless 
further   
disclosure is expressly permitted by the written consent of the person to whom 
it   
pertains or as otherwise permitted by 42 CFR Part 2. A general authorization for
 the   
release of medical or other information is NOT sufficient for this purpose. The   
Federal rules restrict any use of the information to criminally investigate or   
prosecute any alcohol or drug abuse patient.[facility] the event this   
information is protected by the Federal Confidentiality of Alcohol and Drug 
Abuse   
Patient Records regulations: This information has been disclosed to you from 
records   
protected by Federal confidentiality rules (42 CFR Part 2). The Federal rules   
prohibit you from making any further disclosure of this information unless 
further   
disclosure is expressly permitted by the written consent of the person to whom 
it   
pertains or as otherwise permitted by 42 CFR Part 2. A general authorization for
 the   
release of medical or other information is NOT sufficient for this purpose. The   
Federal rules restrict any use of the information to criminally investigate or   
prosecute any alcohol or drug abuse patient.[facility] the event this   
information is protected by the Federal Confidentiality of Alcohol and Drug 
Abuse   
Patient Records regulations: This information has been disclosed to you from 
records   
protected by Federal confidentiality rules (42 CFR Part 2). The Federal rules   
prohibit you from making any further disclosure of this information unless 
further   
disclosure is expressly permitted by the written consent of the person to whom 
it   
pertains or as otherwise permitted by 42 CFR Part 2. A general authorization for
 the   
release of medical or other information is NOT sufficient for this purpose. The   
Federal rules restrict any use of the information to criminally investigate or   
prosecute any alcohol or drug abuse patient.[facility] the event this   
information is protected by the Federal Confidentiality of Alcohol and Drug 
Abuse   
Patient Records regulations: This information has been disclosed to you from 
records   
protected by Federal confidentiality rules (42 CFR Part 2). The Federal rules   
prohibit you from making any further disclosure of this information unless 
further   
disclosure is expressly permitted by the written consent of the person to whom 
it   
pertains or as otherwise permitted by 42 CFR Part 2. A general authorization for
 the   
release of medical or other information is NOT sufficient for this purpose. The   
Federal rules restrict any use of the information to criminally investigate or   
prosecute any alcohol or drug abuse patient.[facility] the event this   
information is protected by the Federal Confidentiality of Alcohol and Drug 
Abuse   
Patient Records regulations: This information has been disclosed to you from 
records   
protected by Federal confidentiality rules (42 CFR Part 2). The Federal rules   
prohibit you from making any further disclosure of this information unless 
further   
disclosure is expressly permitted by the written consent of the person to whom 
it   
pertains or as otherwise permitted by 42 CFR Part 2. A general authorization for
 the   
release of medical or other information is NOT sufficient for this purpose. The   
Federal rules restrict any use of the information to criminally investigate or   
prosecute any alcohol or drug abuse patient.[facility]In the event this   
information is protected by the Federal Confidentiality of Alcohol and Drug 
Abuse   
Patient Records regulations: This information has been disclosed to you from 
records   
protected by Federal confidentiality rules (42 CFR Part 2). The Federal rules   
prohibit you from making any further disclosure of this information unless 
further   
disclosure is expressly permitted by the written consent of the person to whom 
it   
pertains or as otherwise permitted by 42 CFR Part 2. A general authorization for
 the   
release of medical or other information is NOT sufficient for this purpose. The   
Federal rules restrict any use of the information to criminally investigate or   
prosecute any alcohol or drug abuse patient.[facility]In the event this   
information is protected by the Federal Confidentiality of Alcohol and Drug 
Abuse   
Patient Records regulations: This information has been disclosed to you from 
records   
protected by Federal confidentiality rules (42 CFR Part 2). The Federal rules   
prohibit you from making any further disclosure of this information unless 
further   
disclosure is expressly permitted by the written consent of the person to whom 
it   
pertains or as otherwise permitted by 42 CFR Part 2. A general authorization for
 the   
release of medical or other information is NOT sufficient for this purpose. The   
Federal rules restrict any use of the information to criminally investigate or   
prosecute any alcohol or drug abuse patient.[facility]In the event this   
information is protected by the Federal Confidentiality of Alcohol and Drug 
Abuse   
Patient Records regulations: This information has been disclosed to you from 
records   
protected by Federal confidentiality rules (42 CFR Part 2). The Federal rules   
prohibit you from making any further disclosure of this information unless 
further   
disclosure is expressly permitted by the written consent of the person to whom 
it   
pertains or as otherwise permitted by 42 CFR Part 2. A general authorization for
 the   
release of medical or other information is NOT sufficient for this purpose. The   
Federal rules restrict any use of the information to criminally investigate or   
prosecute any alcohol or drug abuse patient.[facility]In the event this   
information is protected by the Federal Confidentiality of Alcohol and Drug 
Abuse   
Patient Records regulations: This information has been disclosed to you from 
records   
protected by Federal confidentiality rules (42 CFR Part 2). The Federal rules   
prohibit you from making any further disclosure of this information unless 
further   
disclosure is expressly permitted by the written consent of the person to whom 
it   
pertains or as otherwise permitted by 42 CFR Part 2. A general authorization for
 the   
release of medical or other information is NOT sufficient for this purpose. The   
Federal rules restrict any use of the information to criminally investigate or   
prosecute any alcohol or drug abuse patient.[facility]In the event this   
information is protected by the Federal Confidentiality of Alcohol and Drug 
Abuse   
Patient Records regulations: This information has been disclosed to you from 
records   
protected by Federal confidentiality rules (42 CFR Part 2). The Federal rules   
prohibit you from making any further disclosure of this information unless 
further   
disclosure is expressly permitted by the written consent of the person to whom 
it   
pertains or as otherwise permitted by 42 CFR Part 2. A general authorization for
 the   
release of medical or other information is NOT sufficient for this purpose. The   
Federal rules restrict any use of the information to criminally investigate or   
prosecute any alcohol or drug abuse patient.[facility]In the event this   
information is protected by the Federal Confidentiality of Alcohol and Drug 
Abuse   
Patient Records regulations: This information has been disclosed to you from 
records   
protected by Federal confidentiality rules (42 CFR Part 2). The Federal rules   
prohibit you from making any further disclosure of this information unless 
further   
disclosure is expressly permitted by the written consent of the person to whom 
it   
pertains or as otherwise permitted by 42 CFR Part 2. A general authorization for
 the   
release of medical or other information is NOT sufficient for this purpose. The   
Federal rules restrict any use of the information to criminally investigate or   
prosecute any alcohol or drug abuse patient.[facility]In the event this   
information is protected by the Federal Confidentiality of Alcohol and Drug 
Abuse   
Patient Records regulations: This information has been disclosed to you from 
records   
protected by Federal confidentiality rules (42 CFR Part 2). The Federal rules   
prohibit you from making any further disclosure of this information unless 
further   
disclosure is expressly permitted by the written consent of the person to whom 
it   
pertains or as otherwise permitted by 42 CFR Part 2. A general authorization for
 the   
release of medical or other information is NOT sufficient for this purpose. The   
Federal rules restrict any use of the information to criminally investigate or   
prosecute any alcohol or drug abuse patient.[facility]In the event this   
information is protected by the Federal Confidentiality of Alcohol and Drug 
Abuse   
Patient Records regulations: This information has been disclosed to you from 
records   
protected by Federal confidentiality rules (42 CFR Part 2). The Federal rules   
prohibit you from making any further disclosure of this information unless 
further   
disclosure is expressly permitted by the written consent of the person to whom 
it   
pertains or as otherwise permitted by 42 CFR Part 2. A general authorization for
 the   
release of medical or other information is NOT sufficient for this purpose. The   
Federal rules restrict any use of the information to criminally investigate or   
prosecute any alcohol or drug abuse patient.[facility]In the event this   
information is protected by the Federal Confidentiality of Alcohol and Drug 
Abuse   
Patient Records regulations: This information has been disclosed to you from 
records   
protected by Federal confidentiality rules (42 CFR Part 2). The Federal rules   
prohibit you from making any further disclosure of this information unless 
further   
disclosure is expressly permitted by the written consent of the person to whom 
it   
pertains or as otherwise permitted by 42 CFR Part 2. A general authorization for
 the   
release of medical or other information is NOT sufficient for this purpose. The   
Federal rules restrict any use of the information to criminally investigate or   
prosecute any alcohol or drug abuse patient.[facility]In the event this   
information is protected by the Federal Confidentiality of Alcohol and Drug 
Abuse   
Patient Records regulations: This information has been disclosed to you from 
records   
protected by Federal confidentiality rules (42 CFR Part 2). The Federal rules   
prohibit you from making any further disclosure of this information unless 
further   
disclosure is expressly permitted by the written consent of the person to whom 
it   
pertains or as otherwise permitted by 42 CFR Part 2. A general authorization for
 the   
release of medical or other information is NOT sufficient for this purpose. The   
Federal rules restrict any use of the information to criminally investigate or   
prosecute any alcohol or drug abuse patient.[facility]In the event this   
information is protected by the Federal Confidentiality of Alcohol and Drug 
Abuse   
Patient Records regulations: This information has been disclosed to you from 
records   
protected by Federal confidentiality rules (42 CFR Part 2). The Federal rules   
prohibit you from making any further disclosure of this information unless 
further   
disclosure is expressly permitted by the written consent of the person to whom 
it   
pertains or as otherwise permitted by 42 CFR Part 2. A general authorization for
 the   
release of medical or other information is NOT sufficient for this purpose. The   
Federal rules restrict any use of the information to criminally investigate or   
prosecute any alcohol or drug abuse patient.[facility]In the event this   
information is protected by the Federal Confidentiality of Alcohol and Drug 
Abuse   
Patient Records regulations: This information has been disclosed to you from 
records   
protected by Federal confidentiality rules (42 CFR Part 2). The Federal rules   
prohibit you from making any further disclosure of this information unless 
further   
disclosure is expressly permitted by the written consent of the person to whom 
it   
pertains or as otherwise permitted by 42 CFR Part 2. A general authorization for
 the   
release of medical or other information is NOT sufficient for this purpose. The   
Federal rules restrict any use of the information to criminally investigate or   
prosecute any alcohol or drug abuse patient.[facility]In the event this   
information is protected by the Federal Confidentiality of Alcohol and Drug 
Abuse   
Patient Records regulations: This information has been disclosed to you from 
records   
protected by Federal confidentiality rules (42 CFR Part 2). The Federal rules   
prohibit you from making any further disclosure of this information unless 
further   
disclosure is expressly permitted by the written consent of the person to whom 
it   
pertains or as otherwise permitted by 42 CFR Part 2. A general authorization for
 the   
release of medical or other information is NOT sufficient for this purpose. The   
Federal rules restrict any use of the information to criminally investigate or   
prosecute any alcohol or drug abuse patient.[facility]In the event this   
information is protected by the Federal Confidentiality of Alcohol and Drug 
Abuse   
Patient Records regulations: This information has been disclosed to you from 
records   
protected by Federal confidentiality rules (42 CFR Part 2). The Federal rules   
prohibit you from making any further disclosure of this information unless 
further   
disclosure is expressly permitted by the written consent of the person to whom 
it   
pertains or as otherwise permitted by 42 CFR Part 2. A general authorization for
 the   
release of medical or other information is NOT sufficient for this purpose. The   
Federal rules restrict any use of the information to criminally investigate or   
prosecute any alcohol or drug abuse patient.[facility]In the event this   
information is protected by the Federal Confidentiality of Alcohol and Drug 
Abuse   
Patient Records regulations: This information has been disclosed to you from 
records   
protected by Federal confidentiality rules (42 CFR Part 2). The Federal rules   
prohibit you from making any further disclosure of this information unless 
further   
disclosure is expressly permitted by the written consent of the person to whom 
it   
pertains or as otherwise permitted by 42 CFR Part 2. A general authorization for
 the   
release of medical or other information is NOT sufficient for this purpose. The   
Federal rules restrict any use of the information to criminally investigate or   
prosecute any alcohol or drug abuse patient.[facility]In the event this   
information is protected by the Federal Confidentiality of Alcohol and Drug 
Abuse   
Patient Records regulations: This information has been disclosed to you from 
records   
protected by Federal confidentiality rules (42 CFR Part 2). The Federal rules   
prohibit you from making any further disclosure of this information unless 
further   
disclosure is expressly permitted by the written consent of the person to whom 
it   
pertains or as otherwise permitted by 42 CFR Part 2. A general authorization for
 the   
release of medical or other information is NOT sufficient for this purpose. The   
Federal rules restrict any use of the information to criminally investigate or   
prosecute any alcohol or drug abuse patient.[facility]In the event this   
information is protected by the Federal Confidentiality of Alcohol and Drug 
Abuse   
Patient Records regulations: This information has been disclosed to you from 
records   
protected by Federal confidentiality rules (42 CFR Part 2). The Federal rules   
prohibit you from making any further disclosure of this information unless 
further   
disclosure is expressly permitted by the written consent of the person to whom 
it   
pertains or as otherwise permitted by 42 CFR Part 2. A general authorization for
 the   
release of medical or other information is NOT sufficient for this purpose. The   
Federal rules restrict any use of the information to criminally investigate or   
prosecute any alcohol or drug abuse patient.[facility]In the event this   
information is protected by the Federal Confidentiality of Alcohol and Drug 
Abuse   
Patient Records regulations: This information has been disclosed to you from 
records   
protected by Federal confidentiality rules (42 CFR Part 2). The Federal rules   
prohibit you from making any further disclosure of this information unless 
further   
disclosure is expressly permitted by the written consent of the person to whom 
it   
pertains or as otherwise permitted by 42 CFR Part 2. A general authorization for
 the   
release of medical or other information is NOT sufficient for this purpose. The   
Federal rules restrict any use of the information to criminally investigate or   
prosecute any alcohol or drug abuse patient.[facility]In the event this   
information is protected by the Federal Confidentiality of Alcohol and Drug 
Abuse   
Patient Records regulations: This information has been disclosed to you from 
records   
protected by Federal confidentiality rules (42 CFR Part 2). The Federal rules   
prohibit you from making any further disclosure of this information unless 
further   
disclosure is expressly permitted by the written consent of the person to whom 
it   
pertains or as otherwise permitted by 42 CFR Part 2. A general authorization for
 the   
release of medical or other information is NOT sufficient for this purpose. The   
Federal rules restrict any use of the information to criminally investigate or   
prosecute any alcohol or drug abuse patient.[facility]In the event this   
information is protected by the Federal Confidentiality of Alcohol and Drug 
Abuse   
Patient Records regulations: This information has been disclosed to you from 
records   
protected by Federal confidentiality rules (42 CFR Part 2). The Federal rules   
prohibit you from making any further disclosure of this information unless 
further   
disclosure is expressly permitted by the written consent of the person to whom 
it   
pertains or as otherwise permitted by 42 CFR Part 2. A general authorization for
 the   
release of medical or other information is NOT sufficient for this purpose. The   
Federal rules restrict any use of the information to criminally investigate or   
prosecute any alcohol or drug abuse patient.[facility]In the event this   
information is protected by the Federal Confidentiality of Alcohol and Drug 
Abuse   
Patient Records regulations: This information has been disclosed to you from 
records   
protected by Federal confidentiality rules (42 CFR Part 2). The Federal rules   
prohibit you from making any further disclosure of this information unless 
further   
disclosure is expressly permitted by the written consent of the person to whom 
it   
pertains or as otherwise permitted by 42 CFR Part 2. A general authorization for
 the   
release of medical or other information is NOT sufficient for this purpose. The   
Federal rules restrict any use of the information to criminally investigate or   
prosecute any alcohol or drug abuse patient.[facility]In the event this   
information is protected by the Federal Confidentiality of Alcohol and Drug 
Abuse   
Patient Records regulations: This information has been disclosed to you from 
records   
protected by Federal confidentiality rules (42 CFR Part 2). The Federal rules   
prohibit you from making any further disclosure of this information unless 
further   
disclosure is expressly permitted by the written consent of the person to whom 
it   
pertains or as otherwise permitted by 42 CFR Part 2. A general authorization for
 the   
release of medical or other information is NOT sufficient for this purpose. The   
Federal rules restrict any use of the information to criminally investigate or   
prosecute any alcohol or drug abuse patient.[facility]In the event this   
information is protected by the Federal Confidentiality of Alcohol and Drug 
Abuse   
Patient Records regulations: This information has been disclosed to you from 
records   
protected by Federal confidentiality rules (42 CFR Part 2). The Federal rules   
prohibit you from making any further disclosure of this information unless 
further   
disclosure is expressly permitted by the written consent of the person to whom 
it   
pertains or as otherwise permitted by 42 CFR Part 2. A general authorization for
 the   
release of medical or other information is NOT sufficient for this purpose. The   
Federal rules restrict any use of the information to criminally investigate or   
prosecute any alcohol or drug abuse patient.[facility]In the event this   
information is protected by the Federal Confidentiality of Alcohol and Drug 
Abuse   
Patient Records regulations: This information has been disclosed to you from 
records   
protected by Federal confidentiality rules (42 CFR Part 2). The Federal rules   
prohibit you from making any further disclosure of this information unless 
further   
disclosure is expressly permitted by the written consent of the person to whom 
it   
pertains or as otherwise permitted by 42 CFR Part 2. A general authorization for
 the   
release of medical or other information is NOT sufficient for this purpose. The   
Federal rules restrict any use of the information to criminally investigate or   
prosecute any alcohol or drug abuse patient.[facility] [place] the event this   
information is protected by the Federal Confidentiality of Alcohol and Drug 
Abuse   
Patient Records regulations: This information has been disclosed to you from 
records   
protected by Federal confidentiality rules (42 CFR Part 2). The Federal rules   
prohibit you from making any further disclosure of this information unless 
further   
disclosure is expressly permitted by the written consent of the person to whom 
it   
pertains or as otherwise permitted by 42 CFR Part 2. A general authorization for
 the   
release of medical or other information is NOT sufficient for this purpose. The   
Federal rules restrict any use of the information to criminally investigate or   
prosecute any alcohol or drug abuse patient.[facility] [place] [facility] the event this   
information is protected by the Federal Confidentiality of Alcohol and Drug 
Abuse   
Patient Records regulations: This information has been disclosed to you from 
records   
protected by Federal confidentiality rules (42 CFR Part 2). The Federal rules   
prohibit you from making any further disclosure of this information unless 
further   
disclosure is expressly permitted by the written consent of the person to whom 
it   
pertains or as otherwise permitted by 42 CFR Part 2. A general authorization for
 the   
release of medical or other information is NOT sufficient for this purpose. The   
Federal rules restrict any use of the information to criminally investigate or   
prosecute any alcohol or drug abuse patient.[facility] [place] [facility] the event this   
information is protected by the Federal Confidentiality of Alcohol and Drug 
Abuse   
Patient Records regulations: This information has been disclosed to you from 
records   
protected by Federal confidentiality rules (42 CFR Part 2). The Federal rules   
prohibit you from making any further disclosure of this information unless 
further   
disclosure is expressly permitted by the written consent of the person to whom 
it   
pertains or as otherwise permitted by 42 CFR Part 2. A general authorization for
 the   
release of medical or other information is NOT sufficient for this purpose. The   
Federal rules restrict any use of the information to criminally investigate or   
prosecute any alcohol or drug abuse patient.[facility] [place] [facility] the event this   
information is protected by the Federal Confidentiality of Alcohol and Drug 
Abuse   
Patient Records regulations: This information has been disclosed to you from 
records   
protected by Federal confidentiality rules (42 CFR Part 2). The Federal rules   
prohibit you from making any further disclosure of this information unless 
further   
disclosure is expressly permitted by the written consent of the person to whom 
it   
pertains or as otherwise permitted by 42 CFR Part 2. A general authorization for
 the   
release of medical or other information is NOT sufficient for this purpose. The   
Federal rules restrict any use of the information to criminally investigate or   
prosecute any alcohol or drug abuse patient.[facility] [place] [facility]In the event this   
information is protected by the Federal Confidentiality of Alcohol and Drug 
Abuse   
Patient Records regulations: This information has been disclosed to you from 
records   
protected by Federal confidentiality rules (42 CFR Part 2). The Federal rules   
prohibit you from making any further disclosure of this information unless 
further   
disclosure is expressly permitted by the written consent of the person to whom 
it   
pertains or as otherwise permitted by 42 CFR Part 2. A general authorization for
 the   
release of medical or other information is NOT sufficient for this purpose. The   
Federal rules restrict any use of the information to criminally investigate or   
prosecute any alcohol or drug abuse patient.[facility] [place] [facility]In the event this   
information is protected by the Federal Confidentiality of Alcohol and Drug 
Abuse   
Patient Records regulations: This information has been disclosed to you from 
records   
protected by Federal confidentiality rules (42 CFR Part 2). The Federal rules   
prohibit you from making any further disclosure of this information unless 
further   
disclosure is expressly permitted by the written consent of the person to whom 
it   
pertains or as otherwise permitted by 42 CFR Part 2. A general authorization for
 the   
release of medical or other information is NOT sufficient for this purpose. The   
Federal rules restrict any use of the information to criminally investigate or   
prosecute any alcohol or drug abuse patient.[facility] [place] [facility]In the event this   
information is protected by the Federal Confidentiality of Alcohol and Drug 
Abuse   
Patient Records regulations: This information has been disclosed to you from 
records   
protected by Federal confidentiality rules (42 CFR Part 2). The Federal rules   
prohibit you from making any further disclosure of this information unless 
further   
disclosure is expressly permitted by the written consent of the person to whom 
it   
pertains or as otherwise permitted by 42 CFR Part 2. A general authorization for
 the   
release of medical or other information is NOT sufficient for this purpose. The   
Federal rules restrict any use of the information to criminally investigate or   
prosecute any alcohol or drug abuse patient.[facility] [place] [facility]In the event this   
information is protected by the Federal Confidentiality of Alcohol and Drug 
Abuse   
Patient Records regulations: This information has been disclosed to you from 
records   
protected by Federal confidentiality rules (42 CFR Part 2). The Federal rules   
prohibit you from making any further disclosure of this information unless 
further   
disclosure is expressly permitted by the written consent of the person to whom 
it   
pertains or as otherwise permitted by 42 CFR Part 2. A general authorization for
 the   
release of medical or other information is NOT sufficient for this purpose. The   
Federal rules restrict any use of the information to criminally investigate or   
prosecute any alcohol or drug abuse patient.[facility] [place] [facility]In the event this   
information is protected by the Federal Confidentiality of Alcohol and Drug 
Abuse   
Patient Records regulations: This information has been disclosed to you from 
records   
protected by Federal confidentiality rules (42 CFR Part 2). The Federal rules   
prohibit you from making any further disclosure of this information unless 
further   
disclosure is expressly permitted by the written consent of the person to whom 
it   
pertains or as otherwise permitted by 42 CFR Part 2. A general authorization for
 the   
release of medical or other information is NOT sufficient for this purpose. The   
Federal rules restrict any use of the information to criminally investigate or   
prosecute any alcohol or drug abuse patient.[facility] [place] [facility]In the event this   
information is protected by the Federal Confidentiality of Alcohol and Drug 
Abuse   
Patient Records regulations: This information has been disclosed to you from 
records   
protected by Federal confidentiality rules (42 CFR Part 2). The Federal rules   
prohibit you from making any further disclosure of this information unless 
further   
disclosure is expressly permitted by the written consent of the person to whom 
it   
pertains or as otherwise permitted by 42 CFR Part 2. A general authorization for
 the   
release of medical or other information is NOT sufficient for this purpose. The   
Federal rules restrict any use of the information to criminally investigate or   
prosecute any alcohol or drug abuse patient.[facility] [place] [facility]In the event this   
information is protected by the Federal Confidentiality of Alcohol and Drug 
Abuse   
Patient Records regulations: This information has been disclosed to you from 
records   
protected by Federal confidentiality rules (42 CFR Part 2). The Federal rules   
prohibit you from making any further disclosure of this information unless 
further   
disclosure is expressly permitted by the written consent of the person to whom 
it   
pertains or as otherwise permitted by 42 CFR Part 2. A general authorization for
 the   
release of medical or other information is NOT sufficient for this purpose. The   
Federal rules restrict any use of the information to criminally investigate or   
prosecute any alcohol or drug abuse patient.[facility] [place] [facility]In the event this   
information is protected by the Federal Confidentiality of Alcohol and Drug 
Abuse   
Patient Records regulations: This information has been disclosed to you from 
records   
protected by Federal confidentiality rules (42 CFR Part 2). The Federal rules   
prohibit you from making any further disclosure of this information unless 
further   
disclosure is expressly permitted by the written consent of the person to whom 
it   
pertains or as otherwise permitted by 42 CFR Part 2. A general authorization for
 the   
release of medical or other information is NOT sufficient for this purpose. The   
Federal rules restrict any use of the information to criminally investigate or   
prosecute any alcohol or drug abuse patient.[facility] [place] [facility]In the event this   
information is protected by the Federal Confidentiality of Alcohol and Drug 
Abuse   
Patient Records regulations: This information has been disclosed to you from 
records   
protected by Federal confidentiality rules (42 CFR Part 2). The Federal rules   
prohibit you from making any further disclosure of this information unless 
further   
disclosure is expressly permitted by the written consent of the person to whom 
it   
pertains or as otherwise permitted by 42 CFR Part 2. A general authorization for
 the   
release of medical or other information is NOT sufficient for this purpose. The   
Federal rules restrict any use of the information to criminally investigate or   
prosecute any alcohol or drug abuse patient.[facility] [place] [facility]In the event this   
information is protected by the Federal Confidentiality of Alcohol and Drug 
Abuse   
Patient Records regulations: This information has been disclosed to you from 
records   
protected by Federal confidentiality rules (42 CFR Part 2). The Federal rules   
prohibit you from making any further disclosure of this information unless 
further   
disclosure is expressly permitted by the written consent of the person to whom 
it   
pertains or as otherwise permitted by 42 CFR Part 2. A general authorization for
 the   
release of medical or other information is NOT sufficient for this purpose. The   
Federal rules restrict any use of the information to criminally investigate or   
prosecute any alcohol or drug abuse patient.[facility] [place] [facility]In the event this   
information is protected by the Federal Confidentiality of Alcohol and Drug 
Abuse   
Patient Records regulations: This information has been disclosed to you from 
records   
protected by Federal confidentiality rules (42 CFR Part 2). The Federal rules   
prohibit you from making any further disclosure of this information unless 
further   
disclosure is expressly permitted by the written consent of the person to whom 
it   
pertains or as otherwise permitted by 42 CFR Part 2. A general authorization for
 the   
release of medical or other information is NOT sufficient for this purpose. The   
Federal rules restrict any use of the information to criminally investigate or   
prosecute any alcohol or drug abuse patient.[facility] [place] [facility]In the event this   
information is protected by the Federal Confidentiality of Alcohol and Drug 
Abuse   
Patient Records regulations: This information has been disclosed to you from 
records   
protected by Federal confidentiality rules (42 CFR Part 2). The Federal rules   
prohibit you from making any further disclosure of this information unless 
further   
disclosure is expressly permitted by the written consent of the person to whom 
it   
pertains or as otherwise permitted by 42 CFR Part 2. A general authorization for
 the   
release of medical or other information is NOT sufficient for this purpose. The   
Federal rules restrict any use of the information to criminally investigate or   
prosecute any alcohol or drug abuse patient.[facility] [place] [facility]In the event this   
information is protected by the Federal Confidentiality of Alcohol and Drug 
Abuse   
Patient Records regulations: This information has been disclosed to you from 
records   
protected by Federal confidentiality rules (42 CFR Part 2). The Federal rules   
prohibit you from making any further disclosure of this information unless 
further   
disclosure is expressly permitted by the written consent of the person to whom 
it   
pertains or as otherwise permitted by 42 CFR Part 2. A general authorization for
 the   
release of medical or other information is NOT sufficient for this purpose. The   
Federal rules restrict any use of the information to criminally investigate or   
prosecute any alcohol or drug abuse patient.[facility] [place] [facility]In the event this   
information is protected by the Federal Confidentiality of Alcohol and Drug 
Abuse   
Patient Records regulations: This information has been disclosed to you from 
records   
protected by Federal confidentiality rules (42 CFR Part 2). The Federal rules   
prohibit you from making any further disclosure of this information unless 
further   
disclosure is expressly permitted by the written consent of the person to whom 
it   
pertains or as otherwise permitted by 42 CFR Part 2. A general authorization for
 the   
release of medical or other information is NOT sufficient for this purpose. The   
Federal rules restrict any use of the information to criminally investigate or   
prosecute any alcohol or drug abuse patient.[facility] [place] [facility]In the event this   
information is protected by the Federal Confidentiality of Alcohol and Drug 
Abuse   
Patient Records regulations: This information has been disclosed to you from 
records   
protected by Federal confidentiality rules (42 CFR Part 2). The Federal rules   
prohibit you from making any further disclosure of this information unless 
further   
disclosure is expressly permitted by the written consent of the person to whom 
it   
pertains or as otherwise permitted by 42 CFR Part 2. A general authorization for
 the   
release of medical or other information is NOT sufficient for this purpose. The   
Federal rules restrict any use of the information to criminally investigate or   
prosecute any alcohol or drug abuse patient.[facility] [place] [facility]In the event this   
information is protected by the Federal Confidentiality of Alcohol and Drug 
Abuse   
Patient Records regulations: This information has been disclosed to you from 
records   
protected by Federal confidentiality rules (42 CFR Part 2). The Federal rules   
prohibit you from making any further disclosure of this information unless 
further   
disclosure is expressly permitted by the written consent of the person to whom 
it   
pertains or as otherwise permitted by 42 CFR Part 2. A general authorization for
 the   
release of medical or other information is NOT sufficient for this purpose. The   
Federal rules restrict any use of the information to criminally investigate or   
prosecute any alcohol or drug abuse patient.[facility] [place] [facility]In the event this   
information is protected by the Federal Confidentiality of Alcohol and Drug 
Abuse   
Patient Records regulations: This information has been disclosed to you from 
records   
protected by Federal confidentiality rules (42 CFR Part 2). The Federal rules   
prohibit you from making any further disclosure of this information unless 
further   
disclosure is expressly permitted by the written consent of the person to whom 
it   
pertains or as otherwise permitted by 42 CFR Part 2. A general authorization for
 the   
release of medical or other information is NOT sufficient for this purpose. The   
Federal rules restrict any use of the information to criminally investigate or   
prosecute any alcohol or drug abuse patient.[facility] [place] [facility]In the event this   
information is protected by the Federal Confidentiality of Alcohol and Drug 
Abuse   
Patient Records regulations: This information has been disclosed to you from 
records   
protected by Federal confidentiality rules (42 CFR Part 2). The Federal rules   
prohibit you from making any further disclosure of this information unless 
further   
disclosure is expressly permitted by the written consent of the person to whom 
it   
pertains or as otherwise permitted by 42 CFR Part 2. A general authorization for
 the   
release of medical or other information is NOT sufficient for this purpose. The   
Federal rules restrict any use of the information to criminally investigate or   
prosecute any alcohol or drug abuse patient.[facility] [place] [facility]In the event this   
information is protected by the Federal Confidentiality of Alcohol and Drug 
Abuse   
Patient Records regulations: This information has been disclosed to you from 
records   
protected by Federal confidentiality rules (42 CFR Part 2). The Federal rules   
prohibit you from making any further disclosure of this information unless 
further   
disclosure is expressly permitted by the written consent of the person to whom 
it   
pertains or as otherwise permitted by 42 CFR Part 2. A general authorization for
 the   
release of medical or other information is NOT sufficient for this purpose. The   
Federal rules restrict any use of the information to criminally investigate or   
prosecute any alcohol or drug abuse patient.[facility] [place] [facility]In the event this   
information is protected by the Federal Confidentiality of Alcohol and Drug 
Abuse   
Patient Records regulations: This information has been disclosed to you from 
records   
protected by Federal confidentiality rules (42 CFR Part 2). The Federal rules   
prohibit you from making any further disclosure of this information unless 
further   
disclosure is expressly permitted by the written consent of the person to whom 
it   
pertains or as otherwise permitted by 42 CFR Part 2. A general authorization for
 the   
release of medical or other information is NOT sufficient for this purpose. The   
Federal rules restrict any use of the information to criminally investigate or   
prosecute any alcohol or drug abuse patient.[facility] [place] [facility]In the event this   
information is protected by the Federal Confidentiality of Alcohol and Drug 
Abuse   
Patient Records regulations: This information has been disclosed to you from 
records   
protected by Federal confidentiality rules (42 CFR Part 2). The Federal rules   
prohibit you from making any further disclosure of this information unless 
further   
disclosure is expressly permitted by the written consent of the person to whom 
it   
pertains or as otherwise permitted by 42 CFR Part 2. A general authorization for
 the   
release of medical or other information is NOT sufficient for this purpose. The   
Federal rules restrict any use of the information to criminally investigate or   
prosecute any alcohol or drug abuse patient.[facility]  
  
  
  
  
                                                    Care Teams (unrecognized sec  
tion and content)  
   
                                          
  
  
  
                      Team Member Relationship Specialty  Start Date End Date  
   
                                                      
  
  
Jenna Chavarria MD  
  
  
[address] [place] [address] [address] [place], [place] [address] [phone] (Work)  
  
  
[phone] (Fax) PCP - General   Family Practice 18           
  
  
  
                      Team Member Relationship Specialty  Start Date End Date  
   
                                                      
  
  
Jenna Chavarria MD  
  
  
[address] [place] [address] [address] [place], [place] [address] [phone] (Work)  
  
  
[phone] (Fax) PCP - General   Family Practice 18           
  
  
  
                      Team Member Relationship Specialty  Start Date End Date  
   
                                                      
  
  
Jenna Chavarria MD  
  
  
[address] [facility] [place] [address]  
  
  
[phone] (Work)  
  
  
[phone] (Fax) PCP - General   Family Practice 18           
  
  
  
                      Team Member Relationship Specialty  Start Date End Date  
   
                                                      
  
  
Jenna Chavarria MD  
  
  
[address] [place] [address]  
  
  
[phone] (Work)  
  
  
[phone] (Fax) PCP - General   Family Practice 18           
  
  
  
                      Team Member Relationship Specialty  Start Date End Date  
   
                                                      
  
  
Jenna Chavarria MD  
  
  
[address] [place] [address]  
  
  
[phone] (Work)  
  
  
[phone] (Fax) PCP - General   Family Practice 18           
  
  
  
                      Team Member Relationship Specialty  Start Date End Date  
   
                                                      
  
  
Jenna Chavarria MD  
  
  
[address] [place] [address]  
  
  
[phone] (Work)  
  
  
[phone] (Fax) PCP - General   Family Practice 18           
  
  
  
                      Team Member Relationship Specialty  Start Date End Date  
   
                                                      
  
  
Jenna Chavarria MD  
  
  
[address] [place] [address]  
  
  
[phone] (Work)  
  
  
[phone] (Fax) PCP - General   Family Practice 18           
  
  
  
                      Team Member Relationship Specialty  Start Date End Date  
   
                                                      
  
  
Jenna Chavarria MD  
  
  
[address] [place] [address]  
  
  
[phone] (Work)  
  
  
[phone] (Fax) PCP - General   Family Practice 18           
  
  
  
                      Team Member Relationship Specialty  Start Date End Date  
   
                                                      
  
  
Jenna Chavarria MD  
  
  
[address] [place] [address]  
  
  
[phone] (Work)  
  
  
[phone] (Fax) PCP - General   Family Practice 18           
  
  
  
                      Team Member Relationship Specialty  Start Date End Date  
   
                                                      
  
  
Jenna Chavarria MD  
  
  
[address] [place] [address]  
  
  
[phone] (Work)  
  
  
[phone] (Fax) PCP - General   Family Practice 18           
  
  
  
                      Team Member Relationship Specialty  Start Date End Date  
   
                                                      
  
  
Jenna Chavarria MD  
  
  
[address] [place] [address]  
  
  
[phone] (Work)  
  
  
[phone] (Fax) PCP - General   Family Practice 18           
  
  
  
                      Team Member Relationship Specialty  Start Date End Date  
   
                                                      
  
  
Jenna Chavarria MD  
  
  
[address] [place] [address]  
  
  
[phone] (Work)  
  
  
[phone] (Fax) PCP - General   Family Practice 18           
  
  
  
                      Team Member Relationship Specialty  Start Date End Date  
   
                                                      
  
  
Jenna Chavarria MD  
  
  
[address] [place] [address]  
  
  
[phone] (Work)  
  
  
[phone] (Fax) PCP - General   Family Practice 18           
  
  
  
                      Team Member Relationship Specialty  Start Date End Date  
   
                                                      
  
  
Jenna Chavarria MD  
  
  
[address] [place] [address]  
  
  
[phone] (Work)  
  
  
[phone] (Fax) PCP - General   Family Practice 18           
  
  
  
                      Team Member Relationship Specialty  Start Date End Date  
   
                                                      
  
  
Jenna Chavarria MD  
  
  
[address] [place] [address]  
  
  
[phone] (Work)  
  
  
[phone] (Fax) PCP - General   Family Practice 18           
  
  
  
                      Team Member Relationship Specialty  Start Date End Date  
   
                                                      
  
  
Jenna Chavarria MD  
  
  
[address] [place] [address]  
  
  
[phone] (Work)  
  
  
[phone] (Fax) PCP - General   Family Medicine 18           
  
  
  
                      Team Member Relationship Specialty  Start Date End Date  
   
                                                      
  
  
Jenna Chavarria MD  
  
  
[address] [place] [address]  
  
  
[phone] (Work)  
  
  
[phone] (Fax) PCP - General   Family Medicine 18           
  
  
  
                      Team Member Relationship Specialty  Start Date End Date  
   
                                                      
  
  
Jenna Chavarria MD  
  
  
[address] [place] [address]  
  
  
[phone] (Work)  
  
  
[phone] (Fax) PCP - General   Family Medicine 18           
  
  
  
                      Team Member Relationship Specialty  Start Date End Date  
   
                                                      
  
  
Jenna Chavarria MD  
  
  
[address] [place] [address]  
  
  
[phone] (Work)  
  
  
[phone] (Fax) PCP - General   Family Medicine 18           
  
  
  
                      Team Member Relationship Specialty  Start Date End Date  
   
                                                      
  
  
Jenna Chavarria MD  
  
  
[address] [place] [address]  
  
  
[phone] (Work)  
  
  
[phone] (Fax) PCP - General   Family Medicine 18           
  
  
  
                      Team Member Relationship Specialty  Start Date End Date  
   
                                                      
  
  
Jenna Chavarria MD  
  
  
[address] [place] [address]  
  
  
[phone] (Work)  
  
  
[phone] (Fax) PCP - General   Family Medicine 18           
  
  
  
                      Team Member Relationship Specialty  Start Date End Date  
   
                                                      
  
  
Jenna Chavarria MD  
  
  
[address] [place] [address]  
  
  
[phone] (Work)  
  
  
[phone] (Fax) PCP - General   Family Medicine 18           
  
  
  
                      Team Member Relationship Specialty  Start Date End Date  
   
                                                      
  
  
Jenna Chavarria MD  
  
  
[address] [place] [address]  
  
  
[phone] (Work)  
  
  
[phone] (Fax) PCP - General   Family Medicine 18           
  
  
  
                      Team Member Relationship Specialty  Start Date End Date  
   
                                                      
  
  
Jenna Chavarria MD  
  
  
[address] [place] [address]  
  
  
[phone] (Work)  
  
  
[phone] (Fax) PCP - General   Family Medicine 18           
  
  
  
                      Team Member Relationship Specialty  Start Date End Date  
   
                                                      
  
  
Jenna Chavarria MD  
  
  
[address] [place] [address]  
  
  
[phone] (Work)  
  
  
[phone] (Fax) PCP - General   Family Medicine 18           
  
  
  
                      Team Member Relationship Specialty  Start Date End Date  
   
                                                      
  
  
Jenna Chavarria MD  
  
  
[address] [place] [address]  
  
  
[phone] (Work)  
  
  
[phone] (Fax) PCP - General   Family Medicine 18           
  
  
  
                      Team Member Relationship Specialty  Start Date End Date  
   
                                                      
  
  
Jenna Chavarria MD  
  
  
[address] [place] [address] [place] [address]  
  
  
[phone] (Work)  
  
  
[phone] (Fax) PCP - General   Family Medicine 18           
  
  
  
                      Team Member Relationship Specialty  Start Date End Date  
   
                                                      
  
  
Jenna Chavarria MD  
  
  
[address] [place] [address]  
  
  
[phone] (Work)  
  
  
[phone] (Fax) PCP - General   Family Medicine 18           
  
  
  
                      Team Member Relationship Specialty  Start Date End Date  
   
                                                      
  
  
Jenna Chavarria MD  
  
  
[address] [facility] [address] [address] [place], [place] [address] [phone] (Work)  
  
  
[phone] (Fax) PCP - General   Family Medicine 18           
  
  
  
                      Team Member Relationship Specialty  Start Date End Date  
   
                                                      
  
  
Jenna Chavarria MD  
  
  
[address] [place] [address]  
  
  
[phone] (Work)  
  
  
[phone] (Fax) PCP - General   Family Medicine 18           
  
  
  
                      Team Member Relationship Specialty  Start Date End Date  
   
                                                      
  
  
Jenna Chavarria MD  
  
  
NPI: [id number]  
  
  
[address] [place] [address]  
  
  
[phone] (Work)  
  
  
[phone] (Fax) PCP - General   Family Medicine 18           
  
  
  
                      Team Member Relationship Specialty  Start Date End Date  
   
                                                      
  
  
TemradhacJuliane, APRN.CNP  
  
  
NPI: [id number]  
  
  
[address] [place] [address]  
  
  
[phone] (Work)  
  
  
[phone] (Fax) PCP - General   Primary Care    23           
  
  
  
                      Team Member Relationship Specialty  Start Date End Date  
   
                                                      
  
  
TemJuliane davis, APRN.CNP  
  
  
NPI: [id number]  
  
  
[address] [place] [address]  
  
  
[phone] (Work)  
  
  
[phone] (Fax) PCP - General   Primary Care    23           
  
  
  
                      Team Member Relationship Specialty  Start Date End Date  
   
                                                      
  
  
TemradhacJuliane, APRN.CNP  
  
  
NPI: [id number]  
  
  
[address] [place] [address]  
  
  
[phone] (Work)  
  
  
[phone] (Fax) PCP - General   Primary Care    23           
  
  
  
                      Team Member Relationship Specialty  Start Date End Date  
   
                                                      
  
  
TemsicJuliane, APRN.CNP  
  
  
NPI: [id number]  
  
  
[address] [place] [address]  
  
  
[phone] (Work)  
  
  
[phone] (Fax) PCP - General   Primary Care    23           
  
  
  
                      Team Member Relationship Specialty  Start Date End Date  
   
                                                      
  
  
TemsicJuliane K, APRN.CNP  
  
  
NPI: [id number]  
  
  
[address]  
  
  
[place] [address]  
  
  
[phone] (Work)  
  
  
[phone] (Fax) PCP - General   Primary Care    23           
  
  
  
                      Team Member Relationship Specialty  Start Date End Date  
   
                                                      
  
  
Temsic, Juliane K, APRN.CNP  
  
  
NPI: [id number]  
  
  
[address] [facility] [place] [address]  
  
  
[phone] (Work)  
  
  
[phone] (Fax) PCP - General   Primary Care    23           
  
  
  
                      Team Member Relationship Specialty  Start Date End Date  
   
                                                      
  
  
Temsic, Juliane K, APRN.CNP  
  
  
NPI: [id number]  
  
  
[address] [facility] [place] [address]  
  
  
[phone] (Work)  
  
  
[phone] (Fax) PCP - General   Primary Care    23           
  
  
  
                      Team Member Relationship Specialty  Start Date End Date  
   
                                                      
  
  
Temsic, Juliane K, APRN.CNP  
  
  
NPI: [id number]  
  
  
[address] [facility] [place] [address]  
  
  
[phone] (Work)  
  
  
[phone] (Fax) PCP - General   Primary Care    23           
  
  
  
                      Team Member Relationship Specialty  Start Date End Date  
   
                                                      
  
  
Temsic, Juliane K, APRN.CNP  
  
  
NPI: [id number]  
  
  
[address] [facility] [place] [address]  
  
  
[phone] (Work)  
  
  
[phone] (Fax) PCP - General   Primary Care    23           
  
  
  
                      Team Member Relationship Specialty  Start Date End Date  
   
                                                      
  
  
Temsic, Juliane K, APRN.CNP  
  
  
NPI: [id number]  
  
  
[address] [facility] [place] [address]  
  
  
[phone] (Work)  
  
  
[phone] (Fax) PCP - General   Primary Care    23           
  
  
  
                      Team Member Relationship Specialty  Start Date End Date  
   
                                                      
  
  
Temsic, Juliane K, APRN.CNP  
  
  
NPI: [id number]  
  
  
[address] [facility] [place] [address]  
  
  
[phone] (Work)  
  
  
[phone] (Fax) PCP - General   Primary Care    23           
  
  
  
                      Team Member Relationship Specialty  Start Date End Date  
   
                                                      
  
  
Temsic, Juliane K, APRN.CNP  
  
  
NPI: [id number]  
  
  
[address] [facility] [place] [address]  
  
  
[phone] (Work)  
  
  
[phone] (Fax) PCP - General   Primary Care    23           
  
  
  
                      Team Member Relationship Specialty  Start Date End Date  
   
                                                      
  
  
Temsic, Juliane K, APRN.CNP  
  
  
NPI: [id number]  
  
  
[address] [facility] [place] [address]  
  
  
[phone] (Work)  
  
  
[phone] (Fax) PCP - General   Primary Care    23           
  
  
  
                      Team Member Relationship Specialty  Start Date End Date  
   
                                                      
  
  
Temsic, Juliane K, APRN.CNP  
  
  
NPI: [id number]  
  
  
[address] [facility] [place] [address]  
  
  
[phone] (Work)  
  
  
[phone] (Fax) PCP - General   Primary Care    23           
  
  
  
                      Team Member Relationship Specialty  Start Date End Date  
   
                                                      
  
  
Temsic, Juliane K, APRN.CNP  
  
  
NPI: [id number]  
  
  
[address] [facility] [place] [address]  
  
  
[phone] (Work)  
  
  
[phone] (Fax) PCP - General   Primary Care    23           
  
  
  
                      Team Member Relationship Specialty  Start Date End Date  
   
                                                      
  
  
Temsic, Juliane K, APRN.CNP  
  
  
NPI: [id number]  
  
  
[address] [facility] [place] [address]  
  
  
[phone] (Work)  
  
  
[phone] (Fax) PCP - General   Primary Care    23           
  
  
  
                      Team Member Relationship Specialty  Start Date End Date  
   
                                                      
  
  
Temsic, Juliane K, APRN.CNP  
  
  
NPI: [id number]  
  
  
[address] [facility] [place] [address]  
  
  
[phone] (Work)  
  
  
[phone] (Fax) PCP - General   Primary Care    23           
  
  
  
                      Team Member Relationship Specialty  Start Date End Date  
   
                                                      
  
  
Temsic, Juliane K, APRN.CNP  
  
  
NPI: [id number]  
  
  
[address] [facility] [place] [address]  
  
  
[phone] (Work)  
  
  
[phone] (Fax) PCP - General   Primary Care    23           
  
  
  
                      Team Member Relationship Specialty  Start Date End Date  
   
                                                      
  
  
Temsic, Juliane K, APRN.CNP  
  
  
NPI: [id number]  
  
  
[address]  
  
  
[place], [place] [address] [phone] (Work)  
  
  
[phone] (Fax) PCP - General   Primary Care    23           
  
  
  
                      Team Member Relationship Specialty  Start Date End Date  
   
                                                      
  
  
Juliane Murphy, APRN.CNP  
  
  
NPI: [id number]  
  
  
[address]  
  
  
[place] [address]  
  
  
[phone] (Work)  
  
  
[phone] (Fax) PCP - General   Primary Care    23           
  
  
  
                      Team Member Relationship Specialty  Start Date End Date  
   
                                                      
  
  
[facility]  
  
  
NPI: [id number]  
  
  
[address]  
  
  
[place] [address]  
  
  
[phone] (Work)  
  
  
[phone] (Fax) PCP - General   Primary Care    23           
   
                                                      
  
  
Rich Oakley MD  
  
  
NPI: [id number]  
  
  
[address] [facility] [place] [address] [phone] (Work)  
  
  
[phone] (Fax)                 Optometry       24           
  
  
  
                      Team Member Relationship Specialty  Start Date End Date  
   
                                                      
  
  
[facility]  
  
  
NPI: [id number]  
  
  
[address] [place], [place] [address] [phone] (Work)  
  
  
[phone] (Fax) PCP - General   Primary Care    23           
   
                                                      
  
  
Rich Oakley MD  
  
  
NPI: [id number]  
  
  
[address] [place] [address] [phone] (Work)  
  
  
[phone] (Fax)                 Optometry       24           
  
  
  
                      Team Member Relationship Specialty  Start Date End Date  
   
                                                      
  
  
[facility].C[facility]  
  
  
NPI: [id number]  
  
  
[address] [place] [address]  
  
  
[phone] (Work)  
  
  
[phone] (Fax) PCP - General   Primary Care    23           
   
                                                      
  
  
Rich Oakley MD  
  
  
NPI: [id number]  
  
  
[address] [facility] [address] [facility] [place] [address] [phone] (Work)  
  
  
[phone] (Fax)                 Optometry       24           
  
  
  
                      Team Member Relationship Specialty  Start Date End Date  
   
                                                      
  
  
Juliane Murphy APRN.CNP  
  
  
NPI: [id number]  
  
  
[address] [place] [address]  
  
  
[phone] (Work)  
  
  
[phone] (Fax) PCP - General   Primary Care    23           
   
                                                      
  
  
Rich Oakley MD  
  
  
NPI: [id number]  
  
  
[address] [place] [address] [phone] (Work)  
  
  
[phone] (Fax)                 Optometry       24           
  
  
  
                      Team Member Relationship Specialty  Start Date End Date  
   
                                                      
  
  
Juliane Murphy APRN.CNP  
  
  
NPI: [id number]  
  
  
[address] [place] [address]  
  
  
[phone] (Work)  
  
  
[phone] (Fax) PCP - General   Primary Care    23           
   
                                                      
  
  
Rich Oakley MD  
  
  
NPI: [id number]  
  
  
[address] [place] [address] [phone] (Work)  
  
  
[phone] (Fax)                 Optometry       24           
  
  
  
                      Team Member Relationship Specialty  Start Date End Date  
   
                                                      
  
  
Juliane Murphy APRELI.CNP  
  
  
NPI: [id number]  
  
  
[address] [place], [place] [address] [phone] (Work)  
  
  
[phone] (Fax) PCP - General   Primary Care    23           
   
                                                      
  
  
iRch Oakley MD  
  
  
NPI: [id number]  
  
  
[address] [place] [address] [phone] (Work)  
  
  
[phone] (Fax)                 Optometry       24           
  
  
  
                      Team Member Relationship Specialty  Start Date End Date  
   
                                                      
  
  
Juliane Murphy APRELI.CNP  
  
  
NPI: [id number]  
  
  
[address] [place] [address]  
  
  
[phone] (Work)  
  
  
[phone] (Fax) PCP - General   Primary Care    23           
  
  
  
                      Team Member Relationship Specialty  Start Date End Date  
   
                                                      
  
  
Jenna Chavarria MD  
  
  
NPI: [id number]  
  
  
[address] [facility] [address] [place], [place] [address] [phone] (Work)  
  
  
[phone] (Fax) PCP - General   Family Medicine 18  
  
  
  
                      Team Member Relationship Specialty  Start Date End Date  
   
                                                      
  
  
Juliane Murphy APRELI.CNP  
  
  
NPI: [id number]  
  
  
[address] [place], [place] [address] [phone] (Work)  
  
  
[phone] (Fax) PCP - General   Primary Care    23           
   
                                                      
  
  
Rich Oakley MD  
  
  
NPI: [id number]  
  
  
[address] [facility] [place] [address] [phone] (Work)  
  
  
[phone] (Fax)                 Optometry       24           
  
  
  
                      Team Member Relationship Specialty  Start Date End Date  
   
                                                      
  
  
Juliane Murphy APRN.CNP  
  
  
NPI: [id number]  
  
  
[address] [place], [place] [address] [phone] (Work)  
  
  
[phone] (Fax) PCP - General   Primary Care    23           
   
                                                      
  
  
Rich Oakley MD  
  
  
NPI: [id number]  
  
  
[address] [facility] [place] [address] [phone] (Work)  
  
  
[phone] (Fax)                 Optometry       24           
  
  
  
                      Team Member Relationship Specialty  Start Date End Date  
   
                                                      
  
  
Juliane Murphy APRN.CNP  
  
  
NPI: [id number]  
  
  
[address] [place], [place] [address] [phone] (Work)  
  
  
[phone] (Fax) PCP - General   Primary Care    23           
   
                                                      
  
  
Rich Oakley MD  
  
  
NPI: [id number]  
  
  
[address] [facility] [place] [address] [phone] (Work)  
  
  
[phone] (Fax)                 Optometry       24           
  
  
  
                      Team Member Relationship Specialty  Start Date End Date  
   
                                                      
  
  
Juliane Murphy APRN.CNP  
  
  
NPI: [id number]  
  
  
[address] [place] [address]  
  
  
[phone] (Work)  
  
  
[phone] (Fax) PCP - General   Primary Care    23           
   
                                                      
  
  
Rich Oakley MD  
  
  
NPI: [id number]  
  
  
[address] [place] [address] [phone] (Work)  
  
  
[phone] (Fax)                 Optometry       24           
  
  
  
                      Team Member Relationship Specialty  Start Date End Date  
   
                                                      
  
  
Juliane Murphy APRELI.CNP  
  
  
NPI: [id number]  
  
  
[address] [place] [address]  
  
  
[phone] (Work)  
  
  
[phone] (Fax) PCP - General   Primary Care    23           
   
                                                      
  
  
Rich Oakley MD  
  
  
NPI: [id number]  
  
  
[address] [place] [address] [phone] (Work)  
  
  
[phone] (Fax)                 Optometry       24           
  
  
  
                      Team Member Relationship Specialty  Start Date End Date  
   
                                                      
  
  
Juliane Murphy APRN.CNP  
  
  
NPI: [id number]  
  
  
[address] [place], [place] [address] [phone] (Work)  
  
  
[phone] (Fax) PCP - General   Primary Care    23           
   
                                                      
  
  
Rich Oakley MD  
  
  
NPI: [id number]  
  
  
[address] [place] [address] [phone] (Work)  
  
  
[phone] (Fax)                 Optometry       24           
  
  
  
                      Team Member Relationship Specialty  Start Date End Date  
   
                                                      
  
  
Juliane Murphy, APRN.CNP  
  
  
NPI: [id number]  
  
  
[address] [place], [place] [address] [phone] (Work)  
  
  
[phone] (Fax) PCP - General   Primary Care    23           
   
                                                      
  
  
Rich Oakley MD  
  
  
NPI: [id number]  
  
  
[address] [place] [address] [phone] (Work)  
  
  
[phone] (Fax)                 Optometry       24           
  
  
  
                      Team Member Relationship Specialty  Start Date End Date  
   
                                                      
  
  
Juliane Murphy, APRN.CNP  
  
  
NPI: [id number]  
  
  
[address] [place], [place] [address] [phone] (Work)  
  
  
[phone] (Fax) PCP - General   Primary Care    23           
   
                                                      
  
  
Rich Oakley MD  
  
  
NPI: [id number]  
  
  
[address] [place] [address] [phone] (Work)  
  
  
[phone] (Fax)                 Optometry       24           
   
                                                      
  
  
Reginald Pedroza MD  
  
  
NPI: [id number]  
  
  
[address]  
  
  
[place], [place] [address] [phone] (Work)  
  
  
[phone] (Fax)                 Allergy         24            
   
                                                      
  
  
Kari Davis  
  
  
NPI: [id number]  
  
  
[address]  
  
  
[address]  
  
  
[place] [address] [phone] (Work)                 Pain Management 10/29/23          
   
                                                      
  
  
Judie Delgado MD  
  
  
NPI: [id number]  
  
  
[address] [address]                 Pulmonary Disease 24           
   
                                                      
  
  
Pascual Austin MD  
  
  
NPI: [id number]  
  
  
[address] [place] [address] [phone] (Work)  
  
  
[phone] (Fax)                 General Surgery 24           
  
  
  
                      Team Member Relationship Specialty  Start Date End Date  
   
                                                      
  
  
Juliane Murphy, APRN.CNP  
  
  
NPI: [id number]  
  
  
[address] [place] [address]  
  
  
[phone] (Work)  
  
  
[phone] (Fax) PCP - General   Primary Care    23           
   
                                                      
  
  
Rich Oakley MD  
  
  
NPI: [id number]  
  
  
[address] [place] [address] [phone] (Work)  
  
  
[phone] (Fax)                 Optometry       24           
   
                                                      
  
  
Reginald Pedroza MD  
  
  
NPI: [id number]  
  
  
[address]  
  
  
[place], [place] [id number] [phone] (Work)  
  
  
[phone] (Fax)                 Allergy         24            
   
                                                      
  
  
Kari Davis  
  
  
NPI: [id number]  
  
  
[address]  
  
  
[address]  
  
  
[place] [address] [phone] (Work)                 Pain Management 10/29/23          
   
                                                      
  
  
Judie Delgado MD  
  
  
NPI: [id number]  
  
  
[address] [place] [facility] [place] [address]                 Pulmonary Disease 24           
   
                                                      
  
  
Pascual Austin MD  
  
  
NPI: [id number]  
  
  
[address] [place] [address] [phone] (Work)  
  
  
[phone] (Fax)                 General Surgery 24           
  
  
  
                      Team Member Relationship Specialty  Start Date End Date  
   
                                                      
  
  
Juliane Murphy APRN.CNP  
  
  
NPI: [id number]  
  
  
[address]  
  
  
[place], [place] [address] [phone] (Work)  
  
  
[phone] (Fax) PCP - General   Primary Care    23           
   
                                                      
  
  
Rich Oakley MD  
  
  
NPI: [id number]  
  
  
[address] [place] [address] [phone] (Work)  
  
  
[phone] (Fax)                 Optometry       24           
   
                                                      
  
  
Reginald Pedroza MD  
  
  
NPI: [id number]  
  
  
[address]  
  
  
[place], [place] [address] [phone] (Work)  
  
  
[phone] (Fax)                 Allergy         24            
   
                                                      
  
  
Kari Davis  
  
  
NPI: [id number]  
  
  
[address]  
  
  
[address]  
  
  
[place] [address] [phone] (Work)                 Pain Management 10/29/23          
   
                                                      
  
  
Judie Delgado MD  
  
  
NPI: [id number]  
  
  
[address] [place] [facility] [place] [address]                 Pulmonary Disease 24           
   
                                                      
  
  
Pascual Austin MD  
  
  
NPI: [id number]  
  
  
[address] [place] [address] [phone] (Work)  
  
  
[phone] (Fax)                 General Surgery 24           
  
  
  
                      Team Member Relationship Specialty  Start Date End Date  
   
                                                      
  
  
Juliane Murphy APRN.CNP  
  
  
NPI: [id number]  
  
  
[address]  
  
  
[place], [place] [address] [phone] (Work)  
  
  
[phone] (Fax) PCP - General   Primary Care    23           
   
                                                      
  
  
Rich Oakley MD  
  
  
NPI: [id number]  
  
  
[address] [place] [address] [phone] (Work)  
  
  
[phone] (Fax)                 Optometry       24           
   
                                                      
  
  
Reginald Pedroza MD  
  
  
NPI: [id number]  
  
  
[address]  
  
  
[place], [place] [address] [phone] (Work)  
  
  
[phone] (Fax)                 Allergy         24            
   
                                                      
  
  
Kari Davis  
  
  
NPI: [id number]  
  
  
[address]  
  
  
# [place] [address] [phone] (Work)                 Pain Management 10/29/23          
   
                                                      
  
  
Judie Delgado MD  
  
  
NPI: [id number]  
  
  
[address] [place] [address]                 Pulmonary Disease 24           
   
                                                      
  
  
Pascual Austin MD  
  
  
NPI: [id number]  
  
  
[address] [place] [address] [phone] (Work)  
  
  
[phone] (Fax)                 General Surgery 24           
  
  
  
                      Team Member Relationship Specialty  Start Date End Date  
   
                                                      
  
  
Juliane Murphy, HELLEN.CNP  
  
  
NPI: [id number]  
  
  
[address] [place], [place] [address] [phone] (Work)  
  
  
[phone] (Fax) PCP - General   Primary Care    23           
   
                                                      
  
  
Rich Oakley MD  
  
  
NPI: [id number]  
  
  
[address] [place] [address] [phone] (Work)  
  
  
[phone] (Fax)                 Optometry       24           
   
                                                      
  
  
Reginald Pedroza MD  
  
  
NPI: [id number]  
  
  
[address]  
  
  
[place], [place] [address] [phone] (Work)  
  
  
[phone] (Fax)                 Allergy         24            
   
                                                      
  
  
Kari Davis  
  
  
NPI: [id number]  
  
  
[address]  
  
  
# [place] [address] [phone] (Work)                 Pain Management 10/29/23          
   
                                                      
  
  
Judie Delgado MD  
  
  
NPI: [id number]  
  
  
[address] [place] [facility] [place] [address]                 Pulmonary Disease 24           
   
                                                      
  
  
Pascual Austin MD  
  
  
NPI: [id number]  
  
  
[address] [place] [address] [phone] (Work)  
  
  
[phone] (Fax)                 General Surgery 24           
  
  
  
                      Team Member Relationship Specialty  Start Date End Date  
   
                                                      
  
  
Juliane Murphy APRELI.CNP  
  
  
NPI: [id number]  
  
  
[address] [place], [place] [address] [phone] (Work)  
  
  
[phone] (Fax) PCP - General   Primary Care    23           
   
                                                      
  
  
Rich Oakley MD  
  
  
NPI: [id number]  
  
  
[address] [place] [address] [phone] (Work)  
  
  
[phone] (Fax)                 Optometry       24           
   
                                                      
  
  
Reginald Pedroza MD  
  
  
NPI: [id number]  
  
  
[address]  
  
  
[place] [address]  
  
  
[phone] (Work)  
  
  
[phone] (Fax)                 Allergy         24            
   
                                                      
  
  
Kari Davis  
  
  
NPI: [id number]  
  
  
[address]  
  
  
[address]  
  
  
[place] [address]  
  
  
[phone] (Work)                 Pain Management 10/29/23          
   
                                                      
  
  
Judie Delgado MD  
  
  
NPI: [id number]  
  
  
[address] [place] [facility] [place] [address]                 Pulmonary Disease 24           
   
                                                      
  
  
Pascual Austin MD  
  
  
NPI: [id number]  
  
  
[address] [place] [address] [phone] (Work)  
  
  
[phone] (Fax)                 General Surgery 24           
  
  
  
                      Team Member Relationship Specialty  Start Date End Date  
   
                                                      
  
  
Juliane Murphy APRN.CNP  
  
  
NPI: [id number]  
  
  
[address] [place], [place] [address] [phone] (Work)  
  
  
[phone] (Fax) PCP - General   Primary Care    23           
   
                                                      
  
  
Rich Oakley MD  
  
  
NPI: [id number]  
  
  
[address] [facility] [place] [address] [phone] (Work)  
  
  
[phone] (Fax)                 Optometry       24           
   
                                                      
  
  
Reginald Pedroza MD  
  
  
NPI: [id number]  
  
  
[address]  
  
  
[place] [address]  
  
  
[phone] (Work)  
  
  
[phone] (Fax)                 Allergy         24            
   
                                                      
  
  
Kari Davis  
  
  
NPI: [id number]  
  
  
[address]  
  
  
[address]  
  
  
[place] [address] [phone] (Work)                 Pain Management 10/29/23          
   
                                                      
  
  
Judie Delgado MD  
  
  
NPI: [id number]  
  
  
[address] [place] [address]                 Pulmonary Disease 24           
   
                                                      
  
  
Pascual Austin MD  
  
  
NPI: [id number]  
  
  
[address] [place] [address] [phone] (Work)  
  
  
[phone] (Fax)                 General Surgery 24           
  
  
  
                      Team Member Relationship Specialty  Start Date End Date  
   
                                                      
  
  
Juliane Murphy, APRELI.CNP  
  
  
NPI: [id number]  
  
  
[address] [place], [place] [address] [phone] (Work)  
  
  
[phone] (Fax) PCP - General   Primary Care    23           
   
                                                      
  
  
Rich Oakley MD  
  
  
NPI: [id number]  
  
  
[address] [place] [address] [phone] (Work)  
  
  
[phone] (Fax)                 Optometry       24           
   
                                                      
  
  
Reginald Pedroza MD  
  
  
NPI: [id number]  
  
  
[address]  
  
  
[place], [place] [address] [phone] (Work)  
  
  
[phone] (Fax)                 Allergy         24            
   
                                                      
  
  
Kari Davis  
  
  
NPI: [id number]  
  
  
[address]  
  
  
[address]  
  
  
[place] [address] [phone] (Work)                 Pain Management 10/29/23          
   
                                                      
  
  
Judie Delgado MD  
  
  
NPI: [id number]  
  
  
[address] [place] [address]                 Pulmonary Disease 24           
   
                                                      
  
  
Pascual Austin MD  
  
  
NPI: [id number]  
  
  
[address]  
  
  
[place] [address] [phone] (Work)  
  
  
[phone] (Fax)                 General Surgery 24           
  
  
  
                      Team Member Relationship Specialty  Start Date End Date  
   
                                                      
  
  
Juliane Murphy APRN.CNP  
  
  
NPI: [id number]  
  
  
[address] [place] [address]  
  
  
[phone] (Work)  
  
  
[phone] (Fax) PCP - General   Primary Care    23           
   
                                                      
  
  
Rich Oakley MD  
  
  
NPI: [id number]  
  
  
[address] [place] [address] [phone] (Work)  
  
  
[phone] (Fax)                 Optometry       24           
   
                                                      
  
  
Reginald Pedroza MD  
  
  
NPI: [id number]  
  
  
[address]  
  
  
[place] [address]  
  
  
[phone] (Work)  
  
  
[phone] (Fax)                 Allergy         24            
   
                                                      
  
  
Malak, Osama Anis  
  
  
NPI: [id number]  
  
  
[address]  
  
  
[address]  
  
  
[place] [address] [phone] (Work)                 Pain Management 10/29/23          
   
                                                      
  
  
Judie Delgado MD  
  
  
NPI: [id number]  
  
  
[address] [place] [address]                 Pulmonary Disease 24           
   
                                                      
  
  
Pascual Austin MD  
  
  
NPI: [id number]  
  
  
[address] [place] [address] [phone] (Work)  
  
  
[phone] (Fax)                 General Surgery 24           
   
                                                      
  
  
Latosha Anaya MD  
  
  
NPI: [id number]  
  
  
 [address]  
  
  
[address] [address]  
  
  
[phone] (Work)  
  
  
[phone] (Fax)                 Cardiology      24           
  
  
  
                      Team Member Relationship Specialty  Start Date End Date  
   
                                                      
  
  
Juliane Murphy APRN.CNP  
  
  
NPI: [id number]  
  
  
[address] [place] [address]  
  
  
[phone] (Work)  
  
  
[phone] (Fax) PCP - General   Primary Care    23           
   
                                                      
  
  
Rich Oakley MD  
  
  
NPI: [id number]  
  
  
[address] [place] [address] [phone] (Work)  
  
  
[phone] (Fax)                 Optometry       24           
   
                                                      
  
  
Reginald Pedroza MD  
  
  
NPI: [id number]  
  
  
[address]  
  
  
[place] [address]  
  
  
[phone] (Work)  
  
  
[phone] (Fax)                 Allergy         24            
   
                                                      
  
  
MalKari romero Anis  
  
  
NPI: [id number]  
  
  
[address]  
  
  
[address]  
  
  
[place] [address] [phone] (Work)                 Pain Management 10/29/23          
   
                                                      
  
  
Judie Delgado MD  
  
  
NPI: [id number]  
  
  
[address] [place] [address]                 Pulmonary Disease 24           
   
                                                      
  
  
Pascual Austin MD  
  
  
NPI: [id number]  
  
  
[address] [place] [address] [phone] (Work)  
  
  
[phone] (Fax)                 General Surgery 24           
   
                                                      
  
  
Latosha Anaya MD  
  
  
NPI: [id number]  
  
  
 [place] [address] [address]  
  
  
[phone] (Work)  
  
  
[phone] (Fax)                 Cardiology      24           
  
  
  
                      Team Member Relationship Specialty  Start Date End Date  
   
                                                      
  
  
Juliane Murphy, APRN.CNP  
  
  
NPI: [id number]  
  
  
[address] [place] [address]  
  
  
[phone] (Work)  
  
  
[phone] (Fax) PCP - General   Primary Care    23           
   
                                                      
  
  
Rich Oakley MD  
  
  
NPI: [id number]  
  
  
[address] [place] [address] [phone] (Work)  
  
  
[phone] (Fax)                 Optometry       24           
   
                                                      
  
  
Reginald Pedroza MD  
  
  
NPI: [id number]  
  
  
[address]  
  
  
[place] [address]  
  
  
[phone] (Work)  
  
  
[phone] (Fax)                 Allergy         24            
   
                                                      
  
  
Ryan, Kari Anis  
  
  
NPI: [id number]  
  
  
[address]  
  
  
[address]  
  
  
[place] [address] [phone] (Work)                 Pain Management 10/29/23          
   
                                                      
  
  
Judie Delgado MD  
  
  
NPI: [id number]  
  
  
[address] [address]                 Pulmonary Disease 24           
   
                                                      
  
  
Pascual Austin MD  
  
  
NPI: [id number]  
  
  
[address] [facility] [place] [address] [phone] (Work)  
  
  
[phone] (Fax)                 General Surgery 24           
   
                                                      
  
  
Latosha Anaya MD  
  
  
NPI: [id number]  
  
  
 [address]  
  
  
[address]  
  
  
[place] [address]  
  
  
[phone] (Work)  
  
  
[phone] (Fax)                 Cardiology      24           
   
                                                      
  
  
[facility] [place] [facility]                    25           
  
  
  
                      Team Member Relationship Specialty  Start Date End Date  
   
                                                      
  
  
Juliane Murphy, APRN.CNP  
  
  
NPI: [id number]  
  
  
[address] [place] [address]  
  
  
[phone] (Work)  
  
  
[phone] (Fax) PCP - General   Primary Care    23           
   
                                                      
  
  
Rich Oakley MD  
  
  
NPI: [id number]  
  
  
[address] [place] [address] [phone] (Work)  
  
  
[phone] (Fax)                 Optometry       24           
   
                                                      
  
  
Reginald Pedroza MD  
  
  
NPI: [id number]  
  
  
[address]  
  
  
[place] [address]  
  
  
[phone] (Work)  
  
  
[phone] (Fax)                 Allergy         24            
   
                                                      
  
  
RyanKari Kirstin  
  
  
NPI: [id number]  
  
  
[address]  
  
  
[address]  
  
  
[place] [address] [phone] (Work)                 Pain Management 10/29/23          
   
                                                      
  
  
Judie Delgado MD  
  
  
NPI: [id number]  
  
  
[address] [place] [address]                 Pulmonary Disease 24           
   
                                                      
  
  
Pascual Austin MD  
  
  
NPI: [id number]  
  
  
[address]  
  
  
[place] [address] [phone] (Work)  
  
  
[phone] (Fax)                 General Surgery 24           
   
                                                      
  
  
Latosha Anaya MD  
  
  
NPI: [id number]  
  
  
 [address]  
  
  
[address]  
  
  
[place] [address]  
  
  
[phone] (Work)  
  
  
[phone] (Fax)                 Cardiology      24           
   
                                                      
  
  
[facility] [place] [facility]                    25           
  
  
  
                      Team Member Relationship Specialty  Start Date End Date  
   
                                                      
  
  
Juliane Murphy APRN.CNP  
  
  
NPI: [id number]  
  
  
[address]  
  
  
[place], [place] [address] [phone] (Work)  
  
  
[phone] (Fax) PCP - General   Primary Care    23           
   
                                                      
  
  
Rich Oakley MD  
  
  
NPI: [id number]  
  
  
[address] [place] [address] [phone] (Work)  
  
  
[phone] (Fax)                 Optometry       24           
   
                                                      
  
  
Reginald Pedroza MD  
  
  
NPI: [id number]  
  
  
[address]  
  
  
[place] [address]  
  
  
[phone] (Work)  
  
  
[phone] (Fax)                 Allergy         24            
   
                                                      
  
  
Kari Davis Anis  
  
  
NPI: [id number]  
  
  
[address]  
  
  
[address]  
  
  
[place] [address] [phone] (Work)                 Pain Management 10/29/23          
   
                                                      
  
  
Judie Delgado MD  
  
  
NPI: [id number]  
  
  
[address] [place] [address]                 Pulmonary Disease 24           
   
                                                      
  
  
Pascual Austin MD  
  
  
NPI: [id number]  
  
  
[address] [place] [address] [phone] (Work)  
  
  
[phone] (Fax)                 General Surgery 24           
   
                                                      
  
  
Latosha Anaya MD  
  
  
NPI: [id number]  
  
  
 [address]  
  
  
[address]  
  
  
[place] [address]  
  
  
[phone] (Work)  
  
  
[phone] (Fax)                 Cardiology      24           
   
                                                      
  
  
[facility] [place] [facility]                    25           
  
  
FOR RECORDS PERTAINING TO PATIENTS WHO ARE OR HAVE BEEN ENROLLED IN A CHEMICAL 
DEPENDENCY/SUBSTANCEABUSE PROGRAM, SOME INFORMATION MAY BE OMITTED. This 
clinical summary was aggregated from multiple sources. Caution should be 
exercised in using it in the provision of clinical care. This summary normalizes
 information from multiple sources, and as a consequence, information in this 
document may materially change the coding, format and clinical context of 
patient data. In addition, data may be omitted in some cases. CLINICAL DECISIONS
 SHOULD BE BASED ON THE PRIMARY CLINICAL RECORDS. [facility]. provides
 no warranty or guarantee of the accuracy or completeness of information in this
 document.

## 2025-02-07 NOTE — RAD_ITS
EXAM:  
ESOPHAGUS DUAL CONTRAST  
   
CLINICAL HISTORY:  
Dysphagia for bread and pills.  
   
COMPARISON:  
None.  
   
TECHNIQUE:  
The patient ingested barium.  Multiple images of the esophagus were obtained.  
   
FINDINGS:  
No evidence of obstruction.  No evidence of mass lesion.  No evidence of 
gastroesophageal reflux.  
The patient was unable to ingested a 12 mm tablet the barium.  
   
ORDER #: [id number] RAD/Esophagus Dual Contrast  
IMPRESSION:  
Unremarkable dual contrast esophagram.  
   
Electronically Signed By: Aaron Coleman on 02/07/2025 10:55  
Reading Location: [facility]

## 2025-04-03 ENCOUNTER — HOSPITAL ENCOUNTER (OUTPATIENT)
Dept: HOSPITAL 100 - EN | Age: 45
Discharge: HOME | End: 2025-04-03
Payer: MEDICAID

## 2025-04-03 VITALS
OXYGEN SATURATION: 100 % | RESPIRATION RATE: 16 BRPM | SYSTOLIC BLOOD PRESSURE: 120 MMHG | TEMPERATURE: 97 F | DIASTOLIC BLOOD PRESSURE: 86 MMHG | HEART RATE: 73 BPM

## 2025-04-03 VITALS
DIASTOLIC BLOOD PRESSURE: 86 MMHG | HEART RATE: 68 BPM | SYSTOLIC BLOOD PRESSURE: 109 MMHG | OXYGEN SATURATION: 100 % | TEMPERATURE: 97.4 F | RESPIRATION RATE: 16 BRPM

## 2025-04-03 VITALS
RESPIRATION RATE: 16 BRPM | TEMPERATURE: 97.3 F | OXYGEN SATURATION: 97 % | DIASTOLIC BLOOD PRESSURE: 54 MMHG | SYSTOLIC BLOOD PRESSURE: 79 MMHG | HEART RATE: 64 BPM

## 2025-04-03 VITALS — BODY MASS INDEX: 25.9 KG/M2

## 2025-04-03 VITALS
OXYGEN SATURATION: 100 % | DIASTOLIC BLOOD PRESSURE: 86 MMHG | RESPIRATION RATE: 16 BRPM | SYSTOLIC BLOOD PRESSURE: 120 MMHG | TEMPERATURE: 97 F | HEART RATE: 73 BPM

## 2025-04-03 VITALS
OXYGEN SATURATION: 99 % | HEART RATE: 68 BPM | SYSTOLIC BLOOD PRESSURE: 79 MMHG | DIASTOLIC BLOOD PRESSURE: 54 MMHG | RESPIRATION RATE: 16 BRPM | TEMPERATURE: 97.34 F

## 2025-04-03 VITALS
SYSTOLIC BLOOD PRESSURE: 93 MMHG | RESPIRATION RATE: 16 BRPM | HEART RATE: 64 BPM | DIASTOLIC BLOOD PRESSURE: 45 MMHG | OXYGEN SATURATION: 99 %

## 2025-04-03 VITALS
SYSTOLIC BLOOD PRESSURE: 120 MMHG | HEART RATE: 74 BPM | DIASTOLIC BLOOD PRESSURE: 78 MMHG | OXYGEN SATURATION: 99 % | RESPIRATION RATE: 16 BRPM

## 2025-04-03 VITALS
RESPIRATION RATE: 16 BRPM | SYSTOLIC BLOOD PRESSURE: 89 MMHG | DIASTOLIC BLOOD PRESSURE: 61 MMHG | OXYGEN SATURATION: 100 % | HEART RATE: 76 BPM

## 2025-04-03 VITALS — SYSTOLIC BLOOD PRESSURE: 120 MMHG | DIASTOLIC BLOOD PRESSURE: 86 MMHG

## 2025-04-03 DIAGNOSIS — E78.5: ICD-10-CM

## 2025-04-03 DIAGNOSIS — D64.9: ICD-10-CM

## 2025-04-03 DIAGNOSIS — K22.4: ICD-10-CM

## 2025-04-03 DIAGNOSIS — I10: ICD-10-CM

## 2025-04-03 DIAGNOSIS — R13.14: ICD-10-CM

## 2025-04-03 DIAGNOSIS — J44.9: ICD-10-CM

## 2025-04-03 DIAGNOSIS — Z87.891: ICD-10-CM

## 2025-04-03 DIAGNOSIS — R07.89: ICD-10-CM

## 2025-04-03 DIAGNOSIS — Z12.11: Primary | ICD-10-CM

## 2025-04-03 DIAGNOSIS — Z79.899: ICD-10-CM

## 2025-04-03 DIAGNOSIS — D12.3: ICD-10-CM

## 2025-04-03 DIAGNOSIS — K21.9: ICD-10-CM

## 2025-04-03 PROCEDURE — 0DJD8ZZ INSPECTION OF LOWER INTESTINAL TRACT, VIA NATURAL OR ARTIFICIAL OPENING ENDOSCOPIC: ICD-10-PCS | Performed by: INTERNAL MEDICINE

## 2025-04-03 PROCEDURE — 45380 COLONOSCOPY AND BIOPSY: CPT

## 2025-04-03 PROCEDURE — A4216 STERILE WATER/SALINE, 10 ML: HCPCS

## 2025-04-03 PROCEDURE — 88305 TISSUE EXAM BY PATHOLOGIST: CPT

## 2025-04-03 PROCEDURE — 44361 SMALL BOWEL ENDOSCOPY/BIOPSY: CPT

## 2025-04-03 PROCEDURE — 88342 IMHCHEM/IMCYTCHM 1ST ANTB: CPT

## (undated) DEVICE — INTENDED FOR TISSUE SEPARATION, AND OTHER PROCEDURES THAT REQUIRE A SHARP SURGICAL BLADE TO PUNCTURE OR CUT.: Brand: BARD-PARKER ® CARBON RIB-BACK BLADES

## (undated) DEVICE — LABEL MED MINI W/ MARKER

## (undated) DEVICE — 4-PORT MANIFOLD: Brand: NEPTUNE 2

## (undated) DEVICE — HYPODERMIC SAFETY NEEDLE: Brand: MAGELLAN

## (undated) DEVICE — NEEDLE HYPO 25GA L1.5IN BLU POLYPR HUB S STL REG BVL STR

## (undated) DEVICE — SYRINGE MED 10ML LUERLOCK TIP W/O SFTY DISP

## (undated) DEVICE — SPONGE GZ W4XL4IN RAYON POLY FILL CVR W/ NONWOVEN FAB

## (undated) DEVICE — COUNTER NDL 40 COUNT HLD 70 FOAM BLK ADH W/ MAG

## (undated) DEVICE — MARKER SURG SKIN GENTIAN VLT REG TIP W/ 6IN RUL

## (undated) DEVICE — PENCIL SMOKE EVAC PUSH BUTTON COATED

## (undated) DEVICE — KIT ARMOR C DRP COLLAPSIBLE AND SELF EXP TOP CVR FOR FLUOROSCOPIC

## (undated) DEVICE — NEEDLE HYPO 18GA L1.5IN THN WALL PIVOTING SHLD BVL ORIENTED

## (undated) DEVICE — CHLORAPREP 26ML ORANGE

## (undated) DEVICE — E-Z CLEAN, NON-STICK, PTFE COATED, ELECTROSURGICAL BLADE ELECTRODE, MODIFIED EXTENDED INSULATION, 2.5 INCH (6.35 CM): Brand: MEGADYNE

## (undated) DEVICE — GOWN,AURORA,NONREINFORCED,LARGE: Brand: MEDLINE

## (undated) DEVICE — SHEET,DRAPE,53X77,STERILE: Brand: MEDLINE

## (undated) DEVICE — ADAPTER SNAPLOCK F/19GA FLEXTIP PLUS EPIDURAL CATHETER

## (undated) DEVICE — SUTURE PERMA-HAND SZ 2-0 L30IN NONABSORBABLE BLK L26MM SH K833H

## (undated) DEVICE — TUBING, SUCTION, 1/4" X 10', STRAIGHT: Brand: MEDLINE

## (undated) DEVICE — Device

## (undated) DEVICE — 1010 S-DRAPE TOWEL DRAPE 10/BX: Brand: STERI-DRAPE™

## (undated) DEVICE — GLOVE ORANGE PI 8   MSG9080

## (undated) DEVICE — GLOVE SURG SZ 8 L12IN FNGR THK83MIL CRM POLYISOPRENE

## (undated) DEVICE — STRIP,CLOSURE,WOUND,MEDI-STRIP,1/2X4: Brand: MEDLINE

## (undated) DEVICE — 3M™ STERI-STRIP™ REINFORCED ADHESIVE SKIN CLOSURES, R1547, 1/2 IN X 4 IN (12 MM X 100 MM), 6 STRIPS/ENVELOPE: Brand: 3M™ STERI-STRIP™

## (undated) DEVICE — GLOVE SURG SZ 75 L12IN FNGR THK83MIL CRM POLYISOPRENE

## (undated) DEVICE — 3M™ TEGADERM™ TRANSPARENT FILM DRESSING FRAME STYLE, 1624W, 2-3/8 IN X 2-3/4 IN (6 CM X 7 CM), 100/CT 4CT/CASE: Brand: 3M™ TEGADERM™

## (undated) DEVICE — GAUZE,SPONGE,4"X4",12PLY,STERILE,LF,2'S: Brand: MEDLINE

## (undated) DEVICE — PACK,LAPAROTOMY,NO GOWNS: Brand: MEDLINE

## (undated) DEVICE — DECANTER BAG 9": Brand: MEDLINE INDUSTRIES, INC.

## (undated) DEVICE — BLADE ES ELASTOMERIC COAT INSUL DURABLE BEND UPTO 90DEG

## (undated) DEVICE — GLOVE ORANGE PI 7 1/2   MSG9075

## (undated) DEVICE — PATIENT REMOTE KIT: Brand: SENZA®

## (undated) DEVICE — GAUZE,SPONGE,4"X4",16PLY,XRAY,STRL,LF: Brand: MEDLINE

## (undated) DEVICE — SYRINGE IRRIG 60ML SFT PLIABLE BLB EZ TO GRP 1 HND USE W/

## (undated) DEVICE — TRAY EPI PERIFIX CONT 17GA TUOHY WNG NDL 20GA SFT CLS TIP

## (undated) DEVICE — ELECTRODE PT RET AD L9FT HI MOIST COND ADH HYDRGEL CORDED

## (undated) DEVICE — SUTURE VCRL SZ 4-0 L18IN ABSRB UD L19MM PS-2 3/8 CIR PRIM J496H

## (undated) DEVICE — 3M™ IOBAN™ 2 ANTIMICROBIAL INCISE DRAPE 6650EZ: Brand: IOBAN™ 2

## (undated) DEVICE — TORQUE WRENCH KIT: Brand: NEVRO®

## (undated) DEVICE — SUTURE VCRL SZ 3-0 L27IN ABSRB UD L26MM SH 1/2 CIR J416H

## (undated) DEVICE — ELECTROSURGICAL PENCIL BUTTON SWITCH E-Z CLEAN COATED BLADE ELECTRODE 10 FT (3 M) CORD HOLSTER: Brand: MEGADYNE

## (undated) DEVICE — APPLICATOR MEDICATED 26 CC SOLUTION HI LT ORNG CHLORAPREP

## (undated) DEVICE — TUNNELING TOOL KIT, 35CM: Brand: NEVRO®

## (undated) DEVICE — KIT NDL INSRT 14GA L4IN CRV TIP

## (undated) DEVICE — STYLET KIT, 50CM LENGTH: Brand: NEVRO®

## (undated) DEVICE — SENZA®  CHARGER KIT: Brand: SENZA®

## (undated) DEVICE — 3M™ TEGADERM™ TRANSPARENT FILM DRESSING FRAME STYLE, 1626W, 4 IN X 4-3/4 IN (10 CM X 12 CM), 50/CT 4CT/CASE: Brand: 3M™ TEGADERM™

## (undated) DEVICE — PATIENT RETURN ELECTRODE, SINGLE-USE, CONTACT QUALITY MONITORING, ADULT, WITH 9FT CORD, FOR PATIENTS WEIGING OVER 33LBS. (15KG): Brand: MEGADYNE